# Patient Record
Sex: FEMALE | Race: WHITE | NOT HISPANIC OR LATINO | Employment: OTHER | URBAN - NONMETROPOLITAN AREA
[De-identification: names, ages, dates, MRNs, and addresses within clinical notes are randomized per-mention and may not be internally consistent; named-entity substitution may affect disease eponyms.]

---

## 2017-01-04 DIAGNOSIS — F41.1 GENERALIZED ANXIETY DISORDER: ICD-10-CM

## 2017-01-04 RX ORDER — VENLAFAXINE HYDROCHLORIDE 75 MG/1
CAPSULE, EXTENDED RELEASE ORAL
Qty: 90 CAPSULE | Refills: 2 | Status: SHIPPED | OUTPATIENT
Start: 2017-01-04 | End: 2017-07-13 | Stop reason: SDUPTHER

## 2017-01-27 DIAGNOSIS — G47.00 INSOMNIA, UNSPECIFIED TYPE: ICD-10-CM

## 2017-01-27 RX ORDER — PRAVASTATIN SODIUM 40 MG
TABLET ORAL
Qty: 30 TABLET | Refills: 5 | Status: SHIPPED | OUTPATIENT
Start: 2017-01-27 | End: 2017-08-05 | Stop reason: SDUPTHER

## 2017-01-27 RX ORDER — TRAZODONE HYDROCHLORIDE 100 MG/1
TABLET ORAL
Qty: 30 TABLET | Refills: 2 | Status: SHIPPED | OUTPATIENT
Start: 2017-01-27 | End: 2017-04-29 | Stop reason: SDUPTHER

## 2017-01-30 ENCOUNTER — OFFICE VISIT (OUTPATIENT)
Dept: FAMILY MEDICINE CLINIC | Facility: CLINIC | Age: 77
End: 2017-01-30

## 2017-01-30 ENCOUNTER — APPOINTMENT (OUTPATIENT)
Dept: LAB | Facility: HOSPITAL | Age: 77
End: 2017-01-30

## 2017-01-30 VITALS
HEIGHT: 61 IN | BODY MASS INDEX: 28.51 KG/M2 | DIASTOLIC BLOOD PRESSURE: 82 MMHG | WEIGHT: 151 LBS | SYSTOLIC BLOOD PRESSURE: 154 MMHG

## 2017-01-30 DIAGNOSIS — I10 ESSENTIAL HYPERTENSION: ICD-10-CM

## 2017-01-30 DIAGNOSIS — E11.9 TYPE 2 DIABETES, DIET CONTROLLED (HCC): ICD-10-CM

## 2017-01-30 DIAGNOSIS — R00.2 PALPITATIONS: ICD-10-CM

## 2017-01-30 DIAGNOSIS — L21.9 SEBORRHEIC ECZEMA OF SCALP: Primary | ICD-10-CM

## 2017-01-30 LAB
ALBUMIN SERPL-MCNC: 3.8 G/DL (ref 3.4–4.8)
ALBUMIN UR-MCNC: 2.4 MG/L
ALBUMIN/GLOB SERPL: 1.3 G/DL (ref 1.1–1.8)
ALP SERPL-CCNC: 53 U/L (ref 38–126)
ALT SERPL W P-5'-P-CCNC: 22 U/L (ref 9–52)
ANION GAP SERPL CALCULATED.3IONS-SCNC: 5 MMOL/L (ref 5–15)
AST SERPL-CCNC: 34 U/L (ref 14–36)
BILIRUB SERPL-MCNC: 0.5 MG/DL (ref 0.2–1.3)
BUN BLD-MCNC: 12 MG/DL (ref 7–21)
BUN/CREAT SERPL: 17.4 (ref 7–25)
CALCIUM SPEC-SCNC: 8.7 MG/DL (ref 8.4–10.2)
CHLORIDE SERPL-SCNC: 101 MMOL/L (ref 95–110)
CO2 SERPL-SCNC: 34 MMOL/L (ref 22–31)
CREAT BLD-MCNC: 0.69 MG/DL (ref 0.5–1)
DEPRECATED RDW RBC AUTO: 49.1 FL (ref 36.4–46.3)
ERYTHROCYTE [DISTWIDTH] IN BLOOD BY AUTOMATED COUNT: 15.1 % (ref 11.5–14.5)
GFR SERPL CREATININE-BSD FRML MDRD: 82 ML/MIN/1.73 (ref 60–90)
GLOBULIN UR ELPH-MCNC: 3 GM/DL (ref 2.3–3.5)
GLUCOSE BLD-MCNC: 99 MG/DL (ref 60–100)
HBA1C MFR BLD: 6.29 % (ref 4–5.6)
HCT VFR BLD AUTO: 36.3 % (ref 35–45)
HGB BLD-MCNC: 11.8 G/DL (ref 12–15.5)
MCH RBC QN AUTO: 29.1 PG (ref 26.5–34)
MCHC RBC AUTO-ENTMCNC: 32.5 G/DL (ref 31.4–36)
MCV RBC AUTO: 89.4 FL (ref 80–98)
PLATELET # BLD AUTO: 234 10*3/MM3 (ref 150–450)
PMV BLD AUTO: 9.7 FL (ref 8–12)
POTASSIUM BLD-SCNC: 4.5 MMOL/L (ref 3.5–5.1)
PROT SERPL-MCNC: 6.8 G/DL (ref 6.3–8.6)
RBC # BLD AUTO: 4.06 10*6/MM3 (ref 3.77–5.16)
SODIUM BLD-SCNC: 140 MMOL/L (ref 137–145)
TSH SERPL DL<=0.05 MIU/L-ACNC: 2.28 MIU/ML (ref 0.46–4.68)
WBC NRBC COR # BLD: 6.64 10*3/MM3 (ref 3.2–9.8)

## 2017-01-30 PROCEDURE — 36415 COLL VENOUS BLD VENIPUNCTURE: CPT | Performed by: FAMILY MEDICINE

## 2017-01-30 PROCEDURE — 82043 UR ALBUMIN QUANTITATIVE: CPT | Performed by: FAMILY MEDICINE

## 2017-01-30 PROCEDURE — 99214 OFFICE O/P EST MOD 30 MIN: CPT | Performed by: FAMILY MEDICINE

## 2017-01-30 PROCEDURE — 85027 COMPLETE CBC AUTOMATED: CPT | Performed by: FAMILY MEDICINE

## 2017-01-30 PROCEDURE — 84443 ASSAY THYROID STIM HORMONE: CPT | Performed by: FAMILY MEDICINE

## 2017-01-30 PROCEDURE — 83036 HEMOGLOBIN GLYCOSYLATED A1C: CPT | Performed by: FAMILY MEDICINE

## 2017-01-30 PROCEDURE — 80053 COMPREHEN METABOLIC PANEL: CPT | Performed by: FAMILY MEDICINE

## 2017-01-30 PROCEDURE — 93005 ELECTROCARDIOGRAM TRACING: CPT | Performed by: FAMILY MEDICINE

## 2017-01-30 PROCEDURE — 93010 ELECTROCARDIOGRAM REPORT: CPT | Performed by: INTERNAL MEDICINE

## 2017-01-30 RX ORDER — NEBIVOLOL 10 MG/1
15 TABLET ORAL DAILY
Qty: 45 TABLET | Refills: 3 | Status: SHIPPED | OUTPATIENT
Start: 2017-01-30 | End: 2017-03-30 | Stop reason: CLARIF

## 2017-01-30 RX ORDER — GABAPENTIN 100 MG/1
CAPSULE ORAL
Qty: 90 CAPSULE | Refills: 2 | Status: SHIPPED | OUTPATIENT
Start: 2017-01-30 | End: 2017-03-30 | Stop reason: DRUGHIGH

## 2017-01-30 RX ORDER — CLOBETASOL PROPIONATE 0.05 G/100ML
SHAMPOO TOPICAL DAILY
Qty: 118 ML | Refills: 1 | Status: SHIPPED | OUTPATIENT
Start: 2017-01-30 | End: 2017-03-30

## 2017-01-30 NOTE — MR AVS SNAPSHOT
Mariluz Arango   1/30/2017 9:15 AM   Office Visit    Dept Phone:  [phone]   Encounter #:  [id number]    Provider:  Denisa Powell MD   Department:  [facility] FAMILY MEDICINE                Your Full Care Plan              Today's Medication Changes          These changes are accurate as of: 1/30/17  9:58 AM.  If you have any questions, ask your nurse or doctor.               New Medication(s)Ordered:     clobetasol propionate 0.05 % shampoo   Commonly known as:  CLOBEX   Apply  topically Daily.   Started by:  Denisa Powell MD         Medication(s)that have changed:     cyanocobalamin 1000 MCG/ML injection   INJECT ONE ML SUBCUTANEOUSLY ONCE MONTHLY   What changed:  Another medication with the same name was removed. Continue taking this medication, and follow the directions you see here.   Changed by:  Denisa Powell MD       nebivolol 10 MG tablet   Commonly known as:  BYSTOLIC   Take 1.5 tablets by mouth Daily.   What changed:    - how much to take  - additional instructions   Changed by:  Denisa Powell MD            Where to Get Your Medications      These medications were sent to [facility] [address] [phone]  - [phone] [address] [address]     Phone:  [phone]     clobetasol propionate 0.05 % shampoo    nebivolol 10 MG tablet                  Your Updated Medication List          This list is accurate as of: 1/30/17  9:58 AM.  Always use your most recent med list.                clobetasol propionate 0.05 % shampoo   Commonly known as:  CLOBEX   Apply  topically Daily.       cyanocobalamin 1000 MCG/ML injection   INJECT ONE ML SUBCUTANEOUSLY ONCE MONTHLY       donepezil 5 MG tablet   Commonly known as:  ARICEPT   Take 1 tablet by mouth Every Night.       gabapentin 100 MG capsule   Commonly known as:  NEURONTIN   Take 1 capsule by mouth 3 (Three)  "Times a Day.       glucose blood test strip       lidocaine 5 % ointment   Commonly known as:  XYLOCAINE   Apply  topically 3 (Three) Times a Day. Apply to tongue       nebivolol 10 MG tablet   Commonly known as:  BYSTOLIC   Take 1.5 tablets by mouth Daily.       nystatin 445253 UNIT/GM powder powder       PLAVIX 75 MG tablet   Generic drug:  clopidogrel       potassium chloride 20 MEQ CR tablet   Commonly known as:  K-DUR,KLOR-CON   Take 1 tablet by mouth daily.       pravastatin 40 MG tablet   Commonly known as:  PRAVACHOL   TAKE ONE TABLET BY MOUTH EVERY NIGHT AT BEDTIME       PRILOSEC 40 MG capsule   Generic drug:  omeprazole       * Syringe/Needle (Disp) 25G X 5/8\" 3 ML misc   USE 1 SYRINGE FOR MONTHLY VITAMIN B-12 SHOTS       * B-D INTEGRA SYRINGE 25G X 5/8\" 3 ML misc   Generic drug:  Syringe/Needle (Disp)   Inject 1 syringe as directed every 30 (thirty) days.       traZODone 100 MG tablet   Commonly known as:  DESYREL   TAKE 1 TABLET BY MOUTH EVERY NIGHT.       venlafaxine XR 75 MG 24 hr capsule   Commonly known as:  EFFEXOR-XR   TAKE ONE CAPSULE BY MOUTH THREE TIMES A DAY       Zinc Oxide 25 % paste       * Notice:  This list has 2 medication(s) that are the same as other medications prescribed for you. Read the directions carefully, and ask your doctor or other care provider to review them with you.            We Performed the Following     CBC (No Diff)     Comprehensive Metabolic Panel     ECG 12 Lead     Hemoglobin A1c     MicroAlbumin, Urine, Random     TSH       You Were Diagnosed With        Codes Comments    Seborrheic eczema of scalp    -  Primary ICD-10-CM: L21.9  ICD-9-CM: 690.18     Palpitations     ICD-10-CM: R00.2  ICD-9-CM: 785.1     Essential hypertension     ICD-10-CM: I10  ICD-9-CM: 401.9     Type 2 diabetes, diet controlled     ICD-10-CM: E11.9  ICD-9-CM: 250.00       Instructions    Impingement Syndrome, Rotator Cuff, Bursitis With Rehab  Impingement syndrome is a condition that involves " inflammation of the tendons of the rotator cuff and the subacromial bursa, that causes pain in the shoulder. The rotator cuff consists of four tendons and muscles that control much of the shoulder and upper arm function. The subacromial bursa is a fluid filled sac that helps reduce friction between the rotator cuff and one of the bones of the shoulder (acromion). Impingement syndrome is usually an overuse injury that causes swelling of the bursa (bursitis), swelling of the tendon (tendonitis), and/or a tear of the tendon (strain). Strains are classified into three categories. Grade 1 strains cause pain, but the tendon is not lengthened. Grade 2 strains include a lengthened ligament, due to the ligament being stretched or partially ruptured. With grade 2 strains there is still function, although the function may be decreased. Grade 3 strains include a complete tear of the tendon or muscle, and function is usually impaired.  SYMPTOMS   · Pain around the shoulder, often at the outer portion of the upper arm.  · Pain that gets worse with shoulder function, especially when reaching overhead or lifting.  · Sometimes, aching when not using the arm.  · Pain that wakes you up at night.  · Sometimes, tenderness, swelling, warmth, or redness over the affected area.  · Loss of strength.  · Limited motion of the shoulder, especially reaching behind the back (to the back pocket or to unhook bra) or across your body.  · Crackling sound (crepitation) when moving the arm.  · Biceps tendon pain and inflammation (in the front of the shoulder). Worse when bending the elbow or lifting.  CAUSES   Impingement syndrome is often an overuse injury, in which chronic (repetitive) motions cause the tendons or bursa to become inflamed. A strain occurs when a force is paced on the tendon or muscle that is greater than it can withstand. Common mechanisms of injury include:  Stress from sudden increase in duration, frequency, or intensity of  training.  · Direct hit (trauma) to the shoulder.  · Aging, erosion of the tendon with normal use.  · Bony bump on shoulder (acromial spur).  RISK INCREASES WITH:  · Contact sports (football, wrestling, boxing).  · Throwing sports (baseball, tennis, volleyball).  · Weightlifting and bodybuilding.  · Heavy labor.  · Previous injury to the rotator cuff, including impingement.  · Poor shoulder strength and flexibility.  · Failure to warm up properly before activity.  · Inadequate protective equipment.  · Old age.  · Bony bump on shoulder (acromial spur).  PREVENTION   · Warm up and stretch properly before activity.  · Allow for adequate recovery between workouts.  · Maintain physical fitness:    Strength, flexibility, and endurance.    Cardiovascular fitness.  · Learn and use proper exercise technique.  PROGNOSIS   If treated properly, impingement syndrome usually goes away within 6 weeks. Sometimes surgery is required.   RELATED COMPLICATIONS   · Longer healing time if not properly treated, or if not given enough time to heal.  · Recurring symptoms, that result in a chronic condition.  · Shoulder stiffness, frozen shoulder, or loss of motion.  · Rotator cuff tendon tear.  · Recurring symptoms, especially if activity is resumed too soon, with overuse, with a direct blow, or when using poor technique.  TREATMENT   Treatment first involves the use of ice and medicine, to reduce pain and inflammation. The use of strengthening and stretching exercises may help reduce pain with activity. These exercises may be performed at home or with a therapist. If non-surgical treatment is unsuccessful after more than 6 months, surgery may be advised. After surgery and rehabilitation, activity is usually possible in 3 months.   MEDICATION  · If pain medicine is needed, nonsteroidal anti-inflammatory medicines (aspirin and ibuprofen), or other minor pain relievers (acetaminophen), are often advised.  · Do not take pain medicine for 7  days before surgery.  · Prescription pain relievers may be given, if your caregiver thinks they are needed. Use only as directed and only as much as you need.  · Corticosteroid injections may be given by your caregiver. These injections should be reserved for the most serious cases, because they may only be given a certain number of times.  HEAT AND COLD  · Cold treatment (icing) should be applied for 10 to 15 minutes every 2 to 3 hours for inflammation and pain, and immediately after activity that aggravates your symptoms. Use ice packs or an ice massage.  · Heat treatment may be used before performing stretching and strengthening activities prescribed by your caregiver, physical therapist, or . Use a heat pack or a warm water soak.  SEEK MEDICAL CARE IF:   · Symptoms get worse or do not improve in 4 to 6 weeks, despite treatment.  · New, unexplained symptoms develop. (Drugs used in treatment may produce side effects.)  EXERCISES   RANGE OF MOTION (ROM) AND STRETCHING EXERCISES - Impingement Syndrome (Rotator Cuff   Tendinitis, Bursitis)  These exercises may help you when beginning to rehabilitate your injury. Your symptoms may go away with or without further involvement from your physician, physical therapist or . While completing these exercises, remember:   · Restoring tissue flexibility helps normal motion to return to the joints. This allows healthier, less painful movement and activity.  · An effective stretch should be held for at least 30 seconds.  · A stretch should never be painful. You should only feel a gentle lengthening or release in the stretched tissue.  STRETCH - Flexion, Standing  · Stand with good posture. With an underhand  on your right / left hand, and an overhand  on the opposite hand, grasp a broomstick or cane so that your hands are a little more than shoulder width apart.  · Keeping your right / left elbow straight and shoulder muscles relaxed, push  "the stick with your opposite hand, to raise your right / left arm in front of your body and then overhead. Raise your arm until you feel a stretch in your right / left shoulder, but before you have increased shoulder pain.  · Try to avoid shrugging your right / left shoulder as your arm rises, by keeping your shoulder blade tucked down and toward your mid-back spine. Hold for __________ seconds.  · Slowly return to the starting position.  Repeat __________ times. Complete this exercise __________ times per day.  STRETCH - Abduction, Supine  · Lie on your back. With an underhand  on your right / left hand and an overhand  on the opposite hand, grasp a broomstick or cane so that your hands are a little more than shoulder width apart.  · Keeping your right / left elbow straight and your shoulder muscles relaxed, push the stick with your opposite hand, to raise your right / left arm out to the side of your body and then overhead. Raise your arm until you feel a stretch in your right / left shoulder, but before you have increased shoulder pain.  · Try to avoid shrugging your right / left shoulder as your arm rises, by keeping your shoulder blade tucked down and toward your mid-back spine. Hold for __________ seconds.  · Slowly return to the starting position.  Repeat __________ times. Complete this exercise __________ times per day.  ROM - Flexion, Active-Assisted  · Lie on your back. You may bend your knees for comfort.  · Grasp a broomstick or cane so your hands are about shoulder width apart. Your right / left hand should  the end of the stick, so that your hand is positioned \"thumbs-up,\" as if you were about to shake hands.  · Using your healthy arm to lead, raise your right / left arm overhead, until you feel a gentle stretch in your shoulder. Hold for __________ seconds.  · Use the stick to assist in returning your right / left arm to its starting position.  Repeat __________ times. Complete this " exercise __________ times per day.   ROM - Internal Rotation, Supine   · Lie on your back on a firm surface. Place your right / left elbow about 60 degrees away from your side. Elevate your elbow with a folded towel, so that the elbow and shoulder are the same height.  · Using a broomstick or cane and your strong arm, pull your right / left hand toward your body until you feel a gentle stretch, but no increase in your shoulder pain. Keep your shoulder and elbow in place throughout the exercise.  · Hold for __________ seconds. Slowly return to the starting position.  Repeat __________ times. Complete this exercise __________ times per day.  STRETCH - Internal Rotation  · Place your right / left hand behind your back, palm up.  · Throw a towel or belt over your opposite shoulder. Grasp the towel with your right / left hand.  · While keeping an upright posture, gently pull up on the towel, until you feel a stretch in the front of your right / left shoulder.  · Avoid shrugging your right / left shoulder as your arm rises, by keeping your shoulder blade tucked down and toward your mid-back spine.  · Hold for __________ seconds. Release the stretch, by lowering your healthy hand.  Repeat __________ times. Complete this exercise __________ times per day.  ROM - Internal Rotation   · Using an underhand , grasp a stick behind your back with both hands.  · While standing upright with good posture, slide the stick up your back until you feel a mild stretch in the front of your shoulder.  · Hold for __________ seconds. Slowly return to your starting position.  Repeat __________ times. Complete this exercise __________ times per day.   STRETCH - Posterior Shoulder Capsule   · Stand or sit with good posture. Grasp your right / left elbow and draw it across your chest, keeping it at the same height as your shoulder.  · Pull your elbow, so your upper arm comes in closer to your chest. Pull until you feel a gentle stretch in the  back of your shoulder.  · Hold for __________ seconds.  Repeat __________ times. Complete this exercise __________ times per day.  STRENGTHENING EXERCISES - Impingement Syndrome (Rotator Cuff Tendinitis, Bursitis)  These exercises may help you when beginning to rehabilitate your injury. They may resolve your symptoms with or without further involvement from your physician, physical therapist or . While completing these exercises, remember:  · Muscles can gain both the endurance and the strength needed for everyday activities through controlled exercises.  · Complete these exercises as instructed by your physician, physical therapist or . Increase the resistance and repetitions only as guided.  · You may experience muscle soreness or fatigue, but the pain or discomfort you are trying to eliminate should never worsen during these exercises. If this pain does get worse, stop and make sure you are following the directions exactly. If the pain is still present after adjustments, discontinue the exercise until you can discuss the trouble with your clinician.  · During your recovery, avoid activity or exercises which involve actions that place your injured hand or elbow above your head or behind your back or head. These positions stress the tissues which you are trying to heal.  STRENGTH - Scapular Depression and Adduction   · With good posture, sit on a firm chair. Support your arms in front of you, with pillows, arm rests, or on a table top. Have your elbows in line with the sides of your body.  · Gently draw your shoulder blades down and toward your mid-back spine. Gradually increase the tension, without tensing the muscles along the top of your shoulders and the back of your neck.  · Hold for __________ seconds. Slowly release the tension and relax your muscles completely before starting the next repetition.  · After you have practiced this exercise, remove the arm support and complete  "the exercise in standing as well as sitting position.  Repeat __________ times. Complete this exercise __________ times per day.   STRENGTH - Shoulder Abductors, Isometric  · With good posture, stand or sit about 4-6 inches from a wall, with your right / left side facing the wall.  · Bend your right / left elbow. Gently press your right / left elbow into the wall. Increase the pressure gradually, until you are pressing as hard as you can, without shrugging your shoulder or increasing any shoulder discomfort.  · Hold for __________ seconds.  · Release the tension slowly. Relax your shoulder muscles completely before you begin the next repetition.  Repeat __________ times. Complete this exercise __________ times per day.   STRENGTH - External Rotators, Isometric  · Keep your right / left elbow at your side and bend it 90 degrees.  · Step into a door frame so that the outside of your right / left wrist can press against the door frame without your upper arm leaving your side.  · Gently press your right / left wrist into the door frame, as if you were trying to swing the back of your hand away from your stomach. Gradually increase the tension, until you are pressing as hard as you can, without shrugging your shoulder or increasing any shoulder discomfort.  · Hold for __________ seconds.  · Release the tension slowly. Relax your shoulder muscles completely before you begin the next repetition.  Repeat __________ times. Complete this exercise __________ times per day.   STRENGTH - Supraspinatus   · Stand or sit with good posture. Grasp a __________ weight, or an exercise band or tubing, so that your hand is \"thumbs-up,\" like you are shaking hands.  · Slowly lift your right / left arm in a \"V\" away from your thigh, diagonally into the space between your side and straight ahead. Lift your hand to shoulder height or as far as you can, without increasing any shoulder pain. At first, many people do not lift their hands above " shoulder height.  · Avoid shrugging your right / left shoulder as your arm rises, by keeping your shoulder blade tucked down and toward your mid-back spine.  · Hold for __________ seconds. Control the descent of your hand, as you slowly return to your starting position.  Repeat __________ times. Complete this exercise __________ times per day.   STRENGTH - External Rotators  · Secure a rubber exercise band or tubing to a fixed object (table, pole) so that it is at the same height as your right / left elbow when you are standing or sitting on a firm surface.  · Stand or sit so that the secured exercise band is at your uninjured side.  · Bend your right / left elbow 90 degrees. Place a folded towel or small pillow under your right / left arm, so that your elbow is a few inches away from your side.  · Keeping the tension on the exercise band, pull it away from your body, as if pivoting on your elbow. Be sure to keep your body steady, so that the movement is coming only from your rotating shoulder.  · Hold for __________ seconds. Release the tension in a controlled manner, as you return to the starting position.  Repeat __________ times. Complete this exercise __________ times per day.   STRENGTH - Internal Rotators   · Secure a rubber exercise band or tubing to a fixed object (table, pole) so that it is at the same height as your right / left elbow when you are standing or sitting on a firm surface.  · Stand or sit so that the secured exercise band is at your right / left side.  · Bend your elbow 90 degrees. Place a folded towel or small pillow under your right / left arm so that your elbow is a few inches away from your side.  · Keeping the tension on the exercise band, pull it across your body, toward your stomach. Be sure to keep your body steady, so that the movement is coming only from your rotating shoulder.  · Hold for __________ seconds. Release the tension in a controlled manner, as you return to the starting  position.  Repeat __________ times. Complete this exercise __________ times per day.   STRENGTH - Scapular Protractors, Standing   · Stand arms length away from a wall. Place your hands on the wall, keeping your elbows straight.  · Begin by dropping your shoulder blades down and toward your mid-back spine.  · To strengthen your protractors, keep your shoulder blades down, but slide them forward on your rib cage. It will feel as if you are lifting the back of your rib cage away from the wall. This is a subtle motion and can be challenging to complete. Ask your caregiver for further instruction, if you are not sure you are doing the exercise correctly.  · Hold for __________ seconds. Slowly return to the starting position, resting the muscles completely before starting the next repetition.  Repeat __________ times. Complete this exercise __________ times per day.  STRENGTH - Scapular Protractors, Supine  · Lie on your back on a firm surface. Extend your right / left arm straight into the air while holding a __________ weight in your hand.  · Keeping your head and back in place, lift your shoulder off the floor.  · Hold for __________ seconds. Slowly return to the starting position, and allow your muscles to relax completely before starting the next repetition.  Repeat __________ times. Complete this exercise __________ times per day.  STRENGTH - Scapular Protractors, Quadruped  · Get onto your hands and knees, with your shoulders directly over your hands (or as close as you can be, comfortably).  · Keeping your elbows locked, lift the back of your rib cage up into your shoulder blades, so your mid-back rounds out. Keep your neck muscles relaxed.  · Hold this position for __________ seconds. Slowly return to the starting position and allow your muscles to relax completely before starting the next repetition.  Repeat __________ times. Complete this exercise __________ times per day.   STRENGTH - Scapular  Retractors  · Secure a rubber exercise band or tubing to a fixed object (table, pole), so that it is at the height of your shoulders when you are either standing, or sitting on a firm armless chair.  · With a palm down , grasp an end of the band in each hand. Straighten your elbows and lift your hands straight in front of you, at shoulder height. Step back, away from the secured end of the band, until it becomes tense.  · Squeezing your shoulder blades together, draw your elbows back toward your sides, as you bend them. Keep your upper arms lifted away from your body throughout the exercise.  · Hold for __________ seconds. Slowly ease the tension on the band, as you reverse the directions and return to the starting position.  Repeat __________ times. Complete this exercise __________ times per day.  STRENGTH - Shoulder Extensors   · Secure a rubber exercise band or tubing to a fixed object (table, pole) so that it is at the height of your shoulders when you are either standing, or sitting on a firm armless chair.  · With a thumbs-up , grasp an end of the band in each hand. Straighten your elbows and lift your hands straight in front of you, at shoulder height. Step back, away from the secured end of the band, until it becomes tense.  · Squeezing your shoulder blades together, pull your hands down to the sides of your thighs. Do not allow your hands to go behind you.  · Hold for __________ seconds. Slowly ease the tension on the band, as you reverse the directions and return to the starting position.  Repeat __________ times. Complete this exercise __________ times per day.   STRENGTH - Scapular Retractors and External Rotators   · Secure a rubber exercise band or tubing to a fixed object (table, pole) so that it is at the height as your shoulders, when you are either standing, or sitting on a firm armless chair.  · With a palm down , grasp an end of the band in each hand. Bend your elbows 90 degrees and  lift your elbows to shoulder height, at your sides. Step back, away from the secured end of the band, until it becomes tense.  · Squeezing your shoulder blades together, rotate your shoulders so that your upper arms and elbows remain stationary, but your fists travel upward to head height.  · Hold for __________ seconds. Slowly ease the tension on the band, as you reverse the directions and return to the starting position.  Repeat __________ times. Complete this exercise __________ times per day.   STRENGTH - Scapular Retractors and External Rotators, Rowing   · Secure a rubber exercise band or tubing to a fixed object (table, pole) so that it is at the height of your shoulders, when you are either standing, or sitting on a firm armless chair.  · With a palm down , grasp an end of the band in each hand. Straighten your elbows and lift your hands straight in front of you, at shoulder height. Step back, away from the secured end of the band, until it becomes tense.  · Step 1: Squeeze your shoulder blades together. Bending your elbows, draw your hands to your chest, as if you are rowing a boat. At the end of this motion, your hands and elbow should be at shoulder height and your elbows should be out to your sides.  · Step 2: Rotate your shoulders, to raise your hands above your head. Your forearms should be vertical and your upper arms should be horizontal.  · Hold for __________ seconds. Slowly ease the tension on the band, as you reverse the directions and return to the starting position.  Repeat __________ times. Complete this exercise __________ times per day.   STRENGTH - Scapular Depressors  · Find a sturdy chair without wheels, such as a dining room chair.  · Keeping your feet on the floor, and your hands on the chair arms, lift your bottom up from the seat, and lock your elbows.  · Keeping your elbows straight, allow gravity to pull your body weight down. Your shoulders will rise toward your ears.  · Raise  your body against gravity by drawing your shoulder blades down your back, shortening the distance between your shoulders and ears. Although your feet should always maintain contact with the floor, your feet should progressively support less body weight, as you get stronger.  · Hold for __________ seconds. In a controlled and slow manner, lower your body weight to begin the next repetition.  Repeat __________ times. Complete this exercise __________ times per day.      This information is not intended to replace advice given to you by your health care provider. Make sure you discuss any questions you have with your health care provider.     Document Released: 12/18/2006 Document Revised: 01/08/2016 Document Reviewed: 04/01/2010  [employer] Patient Education ©2016 [employer]       Patient Instructions History      Upcoming Appointments     Visit Type Date Time Department    OFFICE VISIT 1/30/2017  9:15 AM [facility]    OFFICE VISIT 3/30/2017  9:15 AM [facility]      MyChart Signup     Our records indicate that your [employer] account has been deactivated. If you would like to reactivate your account, please email [email or web address] or call [phone] to talk to our [employer] staff.             Other Info from Your Visit           Your Appointments     Mar 30, 2017  9:15 AM CDT   Office Visit with Denisa Powell MD   [facility] (--)    [address]  [address] [place] [id number] [phone]           Arrive 15 minutes prior to appointment.              Allergies     Aspirin      Codeine      UNKNOWN REACTION    Other      Cipro    Penicillins      Sulfa Antibiotics      Sulfa (Sulfonamide Antibiotics)      Reason for Visit     Follow-up recheck for dementia without behavioral disturbance,insomnia      Vital Signs     Blood Pressure Height Weight Last Menstrual Period Body Mass Index Smoking Status     "154/82 61\" (154.9 cm) 151 lb (68.5 kg) (LMP Unknown) 28.53 kg/m2 Former Smoker      Problems and Diagnoses Noted     High blood pressure    Seborrheic eczema of scalp    -  Primary    Palpitations        Diet-controlled type 2 diabetes mellitus            "

## 2017-01-30 NOTE — PATIENT INSTRUCTIONS
Impingement Syndrome, Rotator Cuff, Bursitis With Rehab  Impingement syndrome is a condition that involves inflammation of the tendons of the rotator cuff and the subacromial bursa, that causes pain in the shoulder. The rotator cuff consists of four tendons and muscles that control much of the shoulder and upper arm function. The subacromial bursa is a fluid filled sac that helps reduce friction between the rotator cuff and one of the bones of the shoulder (acromion). Impingement syndrome is usually an overuse injury that causes swelling of the bursa (bursitis), swelling of the tendon (tendonitis), and/or a tear of the tendon (strain). Strains are classified into three categories. Grade 1 strains cause pain, but the tendon is not lengthened. Grade 2 strains include a lengthened ligament, due to the ligament being stretched or partially ruptured. With grade 2 strains there is still function, although the function may be decreased. Grade 3 strains include a complete tear of the tendon or muscle, and function is usually impaired.  SYMPTOMS   · Pain around the shoulder, often at the outer portion of the upper arm.  · Pain that gets worse with shoulder function, especially when reaching overhead or lifting.  · Sometimes, aching when not using the arm.  · Pain that wakes you up at night.  · Sometimes, tenderness, swelling, warmth, or redness over the affected area.  · Loss of strength.  · Limited motion of the shoulder, especially reaching behind the back (to the back pocket or to unhook bra) or across your body.  · Crackling sound (crepitation) when moving the arm.  · Biceps tendon pain and inflammation (in the front of the shoulder). Worse when bending the elbow or lifting.  CAUSES   Impingement syndrome is often an overuse injury, in which chronic (repetitive) motions cause the tendons or bursa to become inflamed. A strain occurs when a force is paced on the tendon or muscle that is greater than it can withstand.  Common mechanisms of injury include:  Stress from sudden increase in duration, frequency, or intensity of training.  · Direct hit (trauma) to the shoulder.  · Aging, erosion of the tendon with normal use.  · Bony bump on shoulder (acromial spur).  RISK INCREASES WITH:  · Contact sports (football, wrestling, boxing).  · Throwing sports (baseball, tennis, volleyball).  · Weightlifting and bodybuilding.  · Heavy labor.  · Previous injury to the rotator cuff, including impingement.  · Poor shoulder strength and flexibility.  · Failure to warm up properly before activity.  · Inadequate protective equipment.  · Old age.  · Bony bump on shoulder (acromial spur).  PREVENTION   · Warm up and stretch properly before activity.  · Allow for adequate recovery between workouts.  · Maintain physical fitness:    Strength, flexibility, and endurance.    Cardiovascular fitness.  · Learn and use proper exercise technique.  PROGNOSIS   If treated properly, impingement syndrome usually goes away within 6 weeks. Sometimes surgery is required.   RELATED COMPLICATIONS   · Longer healing time if not properly treated, or if not given enough time to heal.  · Recurring symptoms, that result in a chronic condition.  · Shoulder stiffness, frozen shoulder, or loss of motion.  · Rotator cuff tendon tear.  · Recurring symptoms, especially if activity is resumed too soon, with overuse, with a direct blow, or when using poor technique.  TREATMENT   Treatment first involves the use of ice and medicine, to reduce pain and inflammation. The use of strengthening and stretching exercises may help reduce pain with activity. These exercises may be performed at home or with a therapist. If non-surgical treatment is unsuccessful after more than 6 months, surgery may be advised. After surgery and rehabilitation, activity is usually possible in 3 months.   MEDICATION  · If pain medicine is needed, nonsteroidal anti-inflammatory medicines (aspirin and  ibuprofen), or other minor pain relievers (acetaminophen), are often advised.  · Do not take pain medicine for 7 days before surgery.  · Prescription pain relievers may be given, if your caregiver thinks they are needed. Use only as directed and only as much as you need.  · Corticosteroid injections may be given by your caregiver. These injections should be reserved for the most serious cases, because they may only be given a certain number of times.  HEAT AND COLD  · Cold treatment (icing) should be applied for 10 to 15 minutes every 2 to 3 hours for inflammation and pain, and immediately after activity that aggravates your symptoms. Use ice packs or an ice massage.  · Heat treatment may be used before performing stretching and strengthening activities prescribed by your caregiver, physical therapist, or . Use a heat pack or a warm water soak.  SEEK MEDICAL CARE IF:   · Symptoms get worse or do not improve in 4 to 6 weeks, despite treatment.  · New, unexplained symptoms develop. (Drugs used in treatment may produce side effects.)  EXERCISES   RANGE OF MOTION (ROM) AND STRETCHING EXERCISES - Impingement Syndrome (Rotator Cuff   Tendinitis, Bursitis)  These exercises may help you when beginning to rehabilitate your injury. Your symptoms may go away with or without further involvement from your physician, physical therapist or . While completing these exercises, remember:   · Restoring tissue flexibility helps normal motion to return to the joints. This allows healthier, less painful movement and activity.  · An effective stretch should be held for at least 30 seconds.  · A stretch should never be painful. You should only feel a gentle lengthening or release in the stretched tissue.  STRETCH - Flexion, Standing  · Stand with good posture. With an underhand  on your right / left hand, and an overhand  on the opposite hand, grasp a broomstick or cane so that your hands are a  "little more than shoulder width apart.  · Keeping your right / left elbow straight and shoulder muscles relaxed, push the stick with your opposite hand, to raise your right / left arm in front of your body and then overhead. Raise your arm until you feel a stretch in your right / left shoulder, but before you have increased shoulder pain.  · Try to avoid shrugging your right / left shoulder as your arm rises, by keeping your shoulder blade tucked down and toward your mid-back spine. Hold for __________ seconds.  · Slowly return to the starting position.  Repeat __________ times. Complete this exercise __________ times per day.  STRETCH - Abduction, Supine  · Lie on your back. With an underhand  on your right / left hand and an overhand  on the opposite hand, grasp a broomstick or cane so that your hands are a little more than shoulder width apart.  · Keeping your right / left elbow straight and your shoulder muscles relaxed, push the stick with your opposite hand, to raise your right / left arm out to the side of your body and then overhead. Raise your arm until you feel a stretch in your right / left shoulder, but before you have increased shoulder pain.  · Try to avoid shrugging your right / left shoulder as your arm rises, by keeping your shoulder blade tucked down and toward your mid-back spine. Hold for __________ seconds.  · Slowly return to the starting position.  Repeat __________ times. Complete this exercise __________ times per day.  ROM - Flexion, Active-Assisted  · Lie on your back. You may bend your knees for comfort.  · Grasp a broomstick or cane so your hands are about shoulder width apart. Your right / left hand should  the end of the stick, so that your hand is positioned \"thumbs-up,\" as if you were about to shake hands.  · Using your healthy arm to lead, raise your right / left arm overhead, until you feel a gentle stretch in your shoulder. Hold for __________ seconds.  · Use the stick " to assist in returning your right / left arm to its starting position.  Repeat __________ times. Complete this exercise __________ times per day.   ROM - Internal Rotation, Supine   · Lie on your back on a firm surface. Place your right / left elbow about 60 degrees away from your side. Elevate your elbow with a folded towel, so that the elbow and shoulder are the same height.  · Using a broomstick or cane and your strong arm, pull your right / left hand toward your body until you feel a gentle stretch, but no increase in your shoulder pain. Keep your shoulder and elbow in place throughout the exercise.  · Hold for __________ seconds. Slowly return to the starting position.  Repeat __________ times. Complete this exercise __________ times per day.  STRETCH - Internal Rotation  · Place your right / left hand behind your back, palm up.  · Throw a towel or belt over your opposite shoulder. Grasp the towel with your right / left hand.  · While keeping an upright posture, gently pull up on the towel, until you feel a stretch in the front of your right / left shoulder.  · Avoid shrugging your right / left shoulder as your arm rises, by keeping your shoulder blade tucked down and toward your mid-back spine.  · Hold for __________ seconds. Release the stretch, by lowering your healthy hand.  Repeat __________ times. Complete this exercise __________ times per day.  ROM - Internal Rotation   · Using an underhand , grasp a stick behind your back with both hands.  · While standing upright with good posture, slide the stick up your back until you feel a mild stretch in the front of your shoulder.  · Hold for __________ seconds. Slowly return to your starting position.  Repeat __________ times. Complete this exercise __________ times per day.   STRETCH - Posterior Shoulder Capsule   · Stand or sit with good posture. Grasp your right / left elbow and draw it across your chest, keeping it at the same height as your  shoulder.  · Pull your elbow, so your upper arm comes in closer to your chest. Pull until you feel a gentle stretch in the back of your shoulder.  · Hold for __________ seconds.  Repeat __________ times. Complete this exercise __________ times per day.  STRENGTHENING EXERCISES - Impingement Syndrome (Rotator Cuff Tendinitis, Bursitis)  These exercises may help you when beginning to rehabilitate your injury. They may resolve your symptoms with or without further involvement from your physician, physical therapist or . While completing these exercises, remember:  · Muscles can gain both the endurance and the strength needed for everyday activities through controlled exercises.  · Complete these exercises as instructed by your physician, physical therapist or . Increase the resistance and repetitions only as guided.  · You may experience muscle soreness or fatigue, but the pain or discomfort you are trying to eliminate should never worsen during these exercises. If this pain does get worse, stop and make sure you are following the directions exactly. If the pain is still present after adjustments, discontinue the exercise until you can discuss the trouble with your clinician.  · During your recovery, avoid activity or exercises which involve actions that place your injured hand or elbow above your head or behind your back or head. These positions stress the tissues which you are trying to heal.  STRENGTH - Scapular Depression and Adduction   · With good posture, sit on a firm chair. Support your arms in front of you, with pillows, arm rests, or on a table top. Have your elbows in line with the sides of your body.  · Gently draw your shoulder blades down and toward your mid-back spine. Gradually increase the tension, without tensing the muscles along the top of your shoulders and the back of your neck.  · Hold for __________ seconds. Slowly release the tension and relax your muscles  "completely before starting the next repetition.  · After you have practiced this exercise, remove the arm support and complete the exercise in standing as well as sitting position.  Repeat __________ times. Complete this exercise __________ times per day.   STRENGTH - Shoulder Abductors, Isometric  · With good posture, stand or sit about 4-6 inches from a wall, with your right / left side facing the wall.  · Bend your right / left elbow. Gently press your right / left elbow into the wall. Increase the pressure gradually, until you are pressing as hard as you can, without shrugging your shoulder or increasing any shoulder discomfort.  · Hold for __________ seconds.  · Release the tension slowly. Relax your shoulder muscles completely before you begin the next repetition.  Repeat __________ times. Complete this exercise __________ times per day.   STRENGTH - External Rotators, Isometric  · Keep your right / left elbow at your side and bend it 90 degrees.  · Step into a door frame so that the outside of your right / left wrist can press against the door frame without your upper arm leaving your side.  · Gently press your right / left wrist into the door frame, as if you were trying to swing the back of your hand away from your stomach. Gradually increase the tension, until you are pressing as hard as you can, without shrugging your shoulder or increasing any shoulder discomfort.  · Hold for __________ seconds.  · Release the tension slowly. Relax your shoulder muscles completely before you begin the next repetition.  Repeat __________ times. Complete this exercise __________ times per day.   STRENGTH - Supraspinatus   · Stand or sit with good posture. Grasp a __________ weight, or an exercise band or tubing, so that your hand is \"thumbs-up,\" like you are shaking hands.  · Slowly lift your right / left arm in a \"V\" away from your thigh, diagonally into the space between your side and straight ahead. Lift your hand to " shoulder height or as far as you can, without increasing any shoulder pain. At first, many people do not lift their hands above shoulder height.  · Avoid shrugging your right / left shoulder as your arm rises, by keeping your shoulder blade tucked down and toward your mid-back spine.  · Hold for __________ seconds. Control the descent of your hand, as you slowly return to your starting position.  Repeat __________ times. Complete this exercise __________ times per day.   STRENGTH - External Rotators  · Secure a rubber exercise band or tubing to a fixed object (table, pole) so that it is at the same height as your right / left elbow when you are standing or sitting on a firm surface.  · Stand or sit so that the secured exercise band is at your uninjured side.  · Bend your right / left elbow 90 degrees. Place a folded towel or small pillow under your right / left arm, so that your elbow is a few inches away from your side.  · Keeping the tension on the exercise band, pull it away from your body, as if pivoting on your elbow. Be sure to keep your body steady, so that the movement is coming only from your rotating shoulder.  · Hold for __________ seconds. Release the tension in a controlled manner, as you return to the starting position.  Repeat __________ times. Complete this exercise __________ times per day.   STRENGTH - Internal Rotators   · Secure a rubber exercise band or tubing to a fixed object (table, pole) so that it is at the same height as your right / left elbow when you are standing or sitting on a firm surface.  · Stand or sit so that the secured exercise band is at your right / left side.  · Bend your elbow 90 degrees. Place a folded towel or small pillow under your right / left arm so that your elbow is a few inches away from your side.  · Keeping the tension on the exercise band, pull it across your body, toward your stomach. Be sure to keep your body steady, so that the movement is coming only from  your rotating shoulder.  · Hold for __________ seconds. Release the tension in a controlled manner, as you return to the starting position.  Repeat __________ times. Complete this exercise __________ times per day.   STRENGTH - Scapular Protractors, Standing   · Stand arms length away from a wall. Place your hands on the wall, keeping your elbows straight.  · Begin by dropping your shoulder blades down and toward your mid-back spine.  · To strengthen your protractors, keep your shoulder blades down, but slide them forward on your rib cage. It will feel as if you are lifting the back of your rib cage away from the wall. This is a subtle motion and can be challenging to complete. Ask your caregiver for further instruction, if you are not sure you are doing the exercise correctly.  · Hold for __________ seconds. Slowly return to the starting position, resting the muscles completely before starting the next repetition.  Repeat __________ times. Complete this exercise __________ times per day.  STRENGTH - Scapular Protractors, Supine  · Lie on your back on a firm surface. Extend your right / left arm straight into the air while holding a __________ weight in your hand.  · Keeping your head and back in place, lift your shoulder off the floor.  · Hold for __________ seconds. Slowly return to the starting position, and allow your muscles to relax completely before starting the next repetition.  Repeat __________ times. Complete this exercise __________ times per day.  STRENGTH - Scapular Protractors, Quadruped  · Get onto your hands and knees, with your shoulders directly over your hands (or as close as you can be, comfortably).  · Keeping your elbows locked, lift the back of your rib cage up into your shoulder blades, so your mid-back rounds out. Keep your neck muscles relaxed.  · Hold this position for __________ seconds. Slowly return to the starting position and allow your muscles to relax completely before starting the  next repetition.  Repeat __________ times. Complete this exercise __________ times per day.   STRENGTH - Scapular Retractors  · Secure a rubber exercise band or tubing to a fixed object (table, pole), so that it is at the height of your shoulders when you are either standing, or sitting on a firm armless chair.  · With a palm down , grasp an end of the band in each hand. Straighten your elbows and lift your hands straight in front of you, at shoulder height. Step back, away from the secured end of the band, until it becomes tense.  · Squeezing your shoulder blades together, draw your elbows back toward your sides, as you bend them. Keep your upper arms lifted away from your body throughout the exercise.  · Hold for __________ seconds. Slowly ease the tension on the band, as you reverse the directions and return to the starting position.  Repeat __________ times. Complete this exercise __________ times per day.  STRENGTH - Shoulder Extensors   · Secure a rubber exercise band or tubing to a fixed object (table, pole) so that it is at the height of your shoulders when you are either standing, or sitting on a firm armless chair.  · With a thumbs-up , grasp an end of the band in each hand. Straighten your elbows and lift your hands straight in front of you, at shoulder height. Step back, away from the secured end of the band, until it becomes tense.  · Squeezing your shoulder blades together, pull your hands down to the sides of your thighs. Do not allow your hands to go behind you.  · Hold for __________ seconds. Slowly ease the tension on the band, as you reverse the directions and return to the starting position.  Repeat __________ times. Complete this exercise __________ times per day.   STRENGTH - Scapular Retractors and External Rotators   · Secure a rubber exercise band or tubing to a fixed object (table, pole) so that it is at the height as your shoulders, when you are either standing, or sitting on a  firm armless chair.  · With a palm down , grasp an end of the band in each hand. Bend your elbows 90 degrees and lift your elbows to shoulder height, at your sides. Step back, away from the secured end of the band, until it becomes tense.  · Squeezing your shoulder blades together, rotate your shoulders so that your upper arms and elbows remain stationary, but your fists travel upward to head height.  · Hold for __________ seconds. Slowly ease the tension on the band, as you reverse the directions and return to the starting position.  Repeat __________ times. Complete this exercise __________ times per day.   STRENGTH - Scapular Retractors and External Rotators, Rowing   · Secure a rubber exercise band or tubing to a fixed object (table, pole) so that it is at the height of your shoulders, when you are either standing, or sitting on a firm armless chair.  · With a palm down , grasp an end of the band in each hand. Straighten your elbows and lift your hands straight in front of you, at shoulder height. Step back, away from the secured end of the band, until it becomes tense.  · Step 1: Squeeze your shoulder blades together. Bending your elbows, draw your hands to your chest, as if you are rowing a boat. At the end of this motion, your hands and elbow should be at shoulder height and your elbows should be out to your sides.  · Step 2: Rotate your shoulders, to raise your hands above your head. Your forearms should be vertical and your upper arms should be horizontal.  · Hold for __________ seconds. Slowly ease the tension on the band, as you reverse the directions and return to the starting position.  Repeat __________ times. Complete this exercise __________ times per day.   STRENGTH - Scapular Depressors  · Find a sturdy chair without wheels, such as a dining room chair.  · Keeping your feet on the floor, and your hands on the chair arms, lift your bottom up from the seat, and lock your elbows.  · Keeping  your elbows straight, allow gravity to pull your body weight down. Your shoulders will rise toward your ears.  · Raise your body against gravity by drawing your shoulder blades down your back, shortening the distance between your shoulders and ears. Although your feet should always maintain contact with the floor, your feet should progressively support less body weight, as you get stronger.  · Hold for __________ seconds. In a controlled and slow manner, lower your body weight to begin the next repetition.  Repeat __________ times. Complete this exercise __________ times per day.      This information is not intended to replace advice given to you by your health care provider. Make sure you discuss any questions you have with your health care provider.     Document Released: 12/18/2006 Document Revised: 01/08/2016 Document Reviewed: 04/01/2010  [employer] Patient Education ©2016 [employer]

## 2017-01-30 NOTE — PROGRESS NOTES
Subjective   Mariluz Arango is a 76 y.o. female.     Rash   This is a new problem. The current episode started more than 1 month ago. The problem is unchanged. The affected locations include the scalp. The rash is characterized by dryness and scaling. She was exposed to nothing. Pertinent negatives include no anorexia, congestion, cough, diarrhea, eye pain, facial edema, fatigue, fever, joint pain, nail changes, rhinorrhea, shortness of breath, sore throat or vomiting. Past treatments include nothing. The treatment provided no relief. There is no history of allergies, asthma, eczema or varicella.   Palpitations    This is a new problem. The current episode started more than 1 month ago. The problem occurs intermittently. The problem has been gradually worsening. The symptoms are aggravated by exercise. Associated symptoms include chest pain, an irregular heartbeat and malaise/fatigue. Pertinent negatives include no chest fullness, coughing, diaphoresis, fever, nausea, near-syncope, numbness, shortness of breath, syncope, vomiting or weakness. She has tried nothing for the symptoms. The treatment provided no relief. Risk factors include diabetes mellitus, dyslipidemia, family history, post menopause, sedentary lifestyle and stress. Her past medical history is significant for anxiety. There is no history of anemia, drug use, heart disease, hyperthyroidism or a valve disorder.   Hypertension   The current episode started more than 1 year ago. The problem is unchanged. The problem is uncontrolled. Associated symptoms include chest pain, malaise/fatigue and palpitations. Pertinent negatives include no blurred vision, headaches, neck pain, orthopnea, peripheral edema, PND, shortness of breath or sweats. There are no associated agents to hypertension. Past treatments include beta blockers. The current treatment provides mild improvement. There are no compliance problems.    Diabetes   She presents for her follow-up  "diabetic visit. She has type 2 diabetes mellitus. Her disease course has been stable. There are no hypoglycemic associated symptoms. Pertinent negatives for hypoglycemia include no headaches, pallor or sweats. Associated symptoms include chest pain. Pertinent negatives for diabetes include no blurred vision, no fatigue and no weakness. There are no hypoglycemic complications. There are no diabetic complications. Pertinent negatives for diabetic complications include no heart disease. Risk factors for coronary artery disease include dyslipidemia, hypertension, sedentary lifestyle, post-menopausal and stress. Current diabetic treatment includes diet. She is compliant with treatment most of the time. Her weight is increasing steadily. She is following a generally healthy diet. She participates in exercise intermittently. There is no change in her home blood glucose trend. An ACE inhibitor/angiotensin II receptor blocker is contraindicated. She does not see a podiatrist.Eye exam is not current.        Current Outpatient Prescriptions:   •  B-D INTEGRA SYRINGE 25G X 5/8\" 3 ML misc, Inject 1 syringe as directed every 30 (thirty) days., Disp: 12 each, Rfl: 1  •  clopidogrel (PLAVIX) 75 MG tablet, Take 75 mg by mouth daily., Disp: , Rfl:   •  cyanocobalamin 1000 MCG/ML injection, INJECT ONE ML SUBCUTANEOUSLY ONCE MONTHLY, Disp: 1 mL, Rfl: 4  •  donepezil (ARICEPT) 5 MG tablet, Take 1 tablet by mouth Every Night., Disp: 30 tablet, Rfl: 3  •  gabapentin (NEURONTIN) 100 MG capsule, Take 1 capsule by mouth 3 (Three) Times a Day., Disp: 90 capsule, Rfl: 2  •  glucose blood test strip, Contour Test Strips, Disp: , Rfl:   •  lidocaine (XYLOCAINE) 5 % ointment, Apply  topically 3 (Three) Times a Day. Apply to tongue, Disp: 50 g, Rfl: 0  •  nebivolol (BYSTOLIC) 10 MG tablet, Take 10 mg by mouth daily. Indication: Essential (primary) hypertension, Disp: , Rfl:   •  nystatin (NYSTOP) 202282 UNIT/GM powder powder, Apply 1 application " "topically 3 (three) times a day as needed. Indication: Candidiasis of skin and nails, Disp: , Rfl:   •  omeprazole (PRILOSEC) 40 MG capsule, Take 40 mg by mouth daily. omeprazole 40 mg capsule,delayed release, Disp: , Rfl:   •  potassium chloride (K-DUR,KLOR-CON) 20 MEQ CR tablet, Take 1 tablet by mouth daily., Disp: 30 tablet, Rfl: 2  •  pravastatin (PRAVACHOL) 40 MG tablet, TAKE ONE TABLET BY MOUTH EVERY NIGHT AT BEDTIME, Disp: 30 tablet, Rfl: 5  •  Syringe/Needle, Disp, 25G X 5/8\" 3 ML misc, USE 1 SYRINGE FOR MONTHLY VITAMIN B-12 SHOTS, Disp: 6 each, Rfl: 2  •  traZODone (DESYREL) 100 MG tablet, TAKE 1 TABLET BY MOUTH EVERY NIGHT., Disp: 30 tablet, Rfl: 2  •  venlafaxine XR (EFFEXOR-XR) 75 MG 24 hr capsule, TAKE ONE CAPSULE BY MOUTH THREE TIMES A DAY, Disp: 90 capsule, Rfl: 2  •  Zinc Oxide 25 % paste, Apply 1 application topically 2 (two) times a day. zinc oxide 25 % topical paste, Disp: , Rfl:     The following portions of the patient's history were reviewed and updated as appropriate: allergies, current medications, past family history, past medical history, past social history, past surgical history and problem list.    Review of Systems   Constitutional: Positive for activity change and malaise/fatigue. Negative for appetite change, diaphoresis, fatigue and fever.   HENT: Negative for congestion, rhinorrhea and sore throat.    Eyes: Negative for blurred vision and pain.   Respiratory: Positive for chest tightness. Negative for cough and shortness of breath.    Cardiovascular: Positive for chest pain and palpitations. Negative for orthopnea, leg swelling, syncope, PND and near-syncope.   Gastrointestinal: Negative for abdominal distention, abdominal pain, anorexia, constipation, diarrhea, nausea and vomiting.   Musculoskeletal: Negative for joint pain and neck pain.   Skin: Positive for rash. Negative for color change, nail changes, pallor and wound.   Neurological: Negative for weakness, numbness and " "headaches.   Psychiatric/Behavioral: Positive for decreased concentration. Negative for dysphoric mood and sleep disturbance.       Objective    Vitals:    01/30/17 0910   BP: 154/82   Weight: 151 lb (68.5 kg)   Height: 61\" (154.9 cm)     Physical Exam   Constitutional: She is oriented to person, place, and time. She appears well-developed and well-nourished. No distress.   Cardiovascular: Normal rate, regular rhythm and normal heart sounds.    No murmur heard.  No LE edema   Pulmonary/Chest: Effort normal and breath sounds normal. No respiratory distress. She has no wheezes.   Abdominal: Soft. Bowel sounds are normal. She exhibits no distension. There is no tenderness.   Neurological: She is alert and oriented to person, place, and time.   Skin:   Large scaly lesions on her left scalp behind her ear.   Psychiatric: She has a normal mood and affect. Her behavior is normal. Judgment and thought content normal.   More alert and seems to be oriented today   Nursing note and vitals reviewed.    EKG- Sinus bradycardia with rate of 59BPM.    Assessment/Plan   Problems Addressed this Visit        Cardiovascular and Mediastinum    Hypertensive disorder    Relevant Medications    nebivolol (BYSTOLIC) 10 MG tablet    Other Relevant Orders    ECG 12 Lead    Comprehensive Metabolic Panel      Other Visit Diagnoses     Seborrheic eczema of scalp    -  Primary    Relevant Medications    clobetasol propionate (CLOBEX) 0.05 % shampoo    Palpitations        Relevant Orders    ECG 12 Lead    Comprehensive Metabolic Panel    CBC (No Diff)    TSH    Type 2 diabetes, diet controlled        Relevant Orders    Comprehensive Metabolic Panel    Hemoglobin A1c    MicroAlbumin, Urine, Random        1.) Scalp derm- Will try steroid shampoo every other day.  2/) Palpitations-  EKG showed bradycardia. May be deconditioning.  Seems to be worse with activity.  May be BP related as well.  Will check TSH and CBC today.  3.) HTN-  Will increase her " bystolic slightly and see if that helps her palpitations and BP.  4.) DM-  She has started to gain weight because she is eating more.  Will recheck A1C and make sure that she doesn't need medications.  Will discuss eye referral at her next appointment.  RTC in 1-2 months or sooner PRN

## 2017-01-31 RX ORDER — CLOPIDOGREL BISULFATE 75 MG/1
75 TABLET ORAL DAILY
Qty: 30 TABLET | Refills: 2 | Status: SHIPPED | OUTPATIENT
Start: 2017-01-31 | End: 2017-05-04 | Stop reason: SDUPTHER

## 2017-02-03 ENCOUNTER — TELEPHONE (OUTPATIENT)
Dept: FAMILY MEDICINE CLINIC | Facility: CLINIC | Age: 77
End: 2017-02-03

## 2017-02-03 RX ORDER — OMEPRAZOLE 40 MG/1
40 CAPSULE, DELAYED RELEASE ORAL DAILY
Qty: 30 CAPSULE | Refills: 5 | Status: SHIPPED | OUTPATIENT
Start: 2017-02-03 | End: 2017-07-11 | Stop reason: SDUPTHER

## 2017-02-03 NOTE — TELEPHONE ENCOUNTER
----- Message from Mayra Root sent at 2/3/2017 10:53 AM CST -----  Regarding: RHONDA MCCABE CALLED FOR ELISE CONTE..SHE NEEDS REFILL ON  OMEPRAZOLE 40MG

## 2017-03-30 ENCOUNTER — OFFICE VISIT (OUTPATIENT)
Dept: FAMILY MEDICINE CLINIC | Facility: CLINIC | Age: 77
End: 2017-03-30

## 2017-03-30 VITALS
SYSTOLIC BLOOD PRESSURE: 140 MMHG | BODY MASS INDEX: 29.27 KG/M2 | HEIGHT: 61 IN | DIASTOLIC BLOOD PRESSURE: 82 MMHG | OXYGEN SATURATION: 98 % | WEIGHT: 155 LBS | HEART RATE: 60 BPM

## 2017-03-30 DIAGNOSIS — R42 DIZZINESS: Primary | ICD-10-CM

## 2017-03-30 DIAGNOSIS — R00.2 PALPITATIONS: ICD-10-CM

## 2017-03-30 DIAGNOSIS — E11.42 DIABETIC POLYNEUROPATHY ASSOCIATED WITH TYPE 2 DIABETES MELLITUS (HCC): ICD-10-CM

## 2017-03-30 DIAGNOSIS — Z23 NEED FOR PNEUMOCOCCAL VACCINATION: ICD-10-CM

## 2017-03-30 DIAGNOSIS — M79.671 RIGHT FOOT PAIN: ICD-10-CM

## 2017-03-30 PROCEDURE — 90732 PPSV23 VACC 2 YRS+ SUBQ/IM: CPT | Performed by: FAMILY MEDICINE

## 2017-03-30 PROCEDURE — G0009 ADMIN PNEUMOCOCCAL VACCINE: HCPCS | Performed by: FAMILY MEDICINE

## 2017-03-30 PROCEDURE — 99214 OFFICE O/P EST MOD 30 MIN: CPT | Performed by: FAMILY MEDICINE

## 2017-03-30 RX ORDER — CARVEDILOL 6.25 MG/1
6.25 TABLET ORAL 2 TIMES DAILY WITH MEALS
Qty: 60 TABLET | Refills: 2 | Status: SHIPPED | OUTPATIENT
Start: 2017-03-30 | End: 2017-06-10 | Stop reason: SDUPTHER

## 2017-03-30 RX ORDER — GABAPENTIN 300 MG/1
300 CAPSULE ORAL 3 TIMES DAILY
Qty: 90 CAPSULE | Refills: 2 | Status: SHIPPED | OUTPATIENT
Start: 2017-03-30 | End: 2017-06-29 | Stop reason: SDUPTHER

## 2017-03-30 NOTE — PATIENT INSTRUCTIONS
Plantar Fasciitis With Rehab  The plantar fascia is a fibrous, ligament-like, soft-tissue structure that spans the bottom of the foot. Plantar fasciitis, also called heel spur syndrome, is a condition that causes pain in the foot due to inflammation of the tissue.  SYMPTOMS   · Pain and tenderness on the underneath side of the foot.  · Pain that worsens with standing or walking.  CAUSES   Plantar fasciitis is caused by irritation and injury to the plantar fascia on the underneath side of the foot. Common mechanisms of injury include:  · Direct trauma to bottom of the foot.  · Damage to a small nerve that runs under the foot where the main fascia attaches to the heel bone.  · Stress placed on the plantar fascia due to bone spurs.  RISK INCREASES WITH:   · Activities that place stress on the plantar fascia (running, jumping, pivoting, or cutting).  · Poor strength and flexibility.  · Improperly fitted shoes.  · Tight calf muscles.  · Flat feet.  · Failure to warm-up properly before activity.  · Obesity.  PREVENTION  · Warm up and stretch properly before activity.  · Allow for adequate recovery between workouts.  · Maintain physical fitness:    Strength, flexibility, and endurance.    Cardiovascular fitness.  · Maintain a health body weight.  · Avoid stress on the plantar fascia.  · Wear properly fitted shoes, including arch supports for individuals who have flat feet.  PROGNOSIS   If treated properly, then the symptoms of plantar fasciitis usually resolve without surgery. However, occasionally surgery is necessary.  RELATED COMPLICATIONS   · Recurrent symptoms that may result in a chronic condition.  · Problems of the lower back that are caused by compensating for the injury, such as limping.  · Pain or weakness of the foot during push-off following surgery.  · Chronic inflammation, scarring, and partial or complete fascia tear, occurring more often from repeated injections.  TREATMENT   Treatment initially involves  the use of ice and medication to help reduce pain and inflammation. The use of strengthening and stretching exercises may help reduce pain with activity, especially stretches of the Achilles tendon. These exercises may be performed at home or with a therapist. Your caregiver may recommend that you use heel cups of arch supports to help reduce stress on the plantar fascia. Occasionally, corticosteroid injections are given to reduce inflammation. If symptoms persist for greater than 6 months despite non-surgical (conservative), then surgery may be recommended.   MEDICATION   · If pain medication is necessary, then nonsteroidal anti-inflammatory medications, such as aspirin and ibuprofen, or other minor pain relievers, such as acetaminophen, are often recommended.  · Do not take pain medication within 7 days before surgery.  · Prescription pain relievers may be given if deemed necessary by your caregiver. Use only as directed and only as much as you need.  · Corticosteroid injections may be given by your caregiver. These injections should be reserved for the most serious cases, because they may only be given a certain number of times.  HEAT AND COLD  · Cold treatment (icing) relieves pain and reduces inflammation. Cold treatment should be applied for 10 to 15 minutes every 2 to 3 hours for inflammation and pain and immediately after any activity that aggravates your symptoms. Use ice packs or massage the area with a piece of ice (ice massage).  · Heat treatment may be used prior to performing the stretching and strengthening activities prescribed by your caregiver, physical therapist, or . Use a heat pack or soak the injury in warm water.  SEEK IMMEDIATE MEDICAL CARE IF:  · Treatment seems to offer no benefit, or the condition worsens.  · Any medications produce adverse side effects.  EXERCISES  RANGE OF MOTION (ROM) AND STRETCHING EXERCISES - Plantar Fasciitis (Heel Spur Syndrome)  These exercises may  help you when beginning to rehabilitate your injury. Your symptoms may resolve with or without further involvement from your physician, physical therapist or . While completing these exercises, remember:   · Restoring tissue flexibility helps normal motion to return to the joints. This allows healthier, less painful movement and activity.  · An effective stretch should be held for at least 30 seconds.  · A stretch should never be painful. You should only feel a gentle lengthening or release in the stretched tissue.  RANGE OF MOTION - Toe Extension, Flexion  · Sit with your right / left leg crossed over your opposite knee.  · Grasp your toes and gently pull them back toward the top of your foot. You should feel a stretch on the bottom of your toes and/or foot.  · Hold this stretch for __________ seconds.  · Now, gently pull your toes toward the bottom of your foot. You should feel a stretch on the top of your toes and or foot.  · Hold this stretch for __________ seconds.  Repeat __________ times. Complete this stretch __________ times per day.   RANGE OF MOTION - Ankle Dorsiflexion, Active Assisted  · Remove shoes and sit on a chair that is preferably not on a carpeted surface.  · Place right / left foot under knee. Extend your opposite leg for support.  · Keeping your heel down, slide your right / left foot back toward the chair until you feel a stretch at your ankle or calf. If you do not feel a stretch, slide your bottom forward to the edge of the chair, while still keeping your heel down.  · Hold this stretch for __________ seconds.  Repeat __________ times. Complete this stretch __________ times per day.   STRETCH - Gastroc, Standing  · Place hands on wall.  · Extend right / left leg, keeping the front knee somewhat bent.  · Slightly point your toes inward on your back foot.  · Keeping your right / left heel on the floor and your knee straight, shift your weight toward the wall, not allowing your  back to arch.  · You should feel a gentle stretch in the right / left calf. Hold this position for __________ seconds.  Repeat __________ times. Complete this stretch __________ times per day.  STRETCH - Soleus, Standing  · Place hands on wall.  · Extend right / left leg, keeping the other knee somewhat bent.  · Slightly point your toes inward on your back foot.  · Keep your right / left heel on the floor, bend your back knee, and slightly shift your weight over the back leg so that you feel a gentle stretch deep in your back calf.  · Hold this position for __________ seconds.  Repeat __________ times. Complete this stretch __________ times per day.  STRETCH - Gastrocsoleus, Standing   Note: This exercise can place a lot of stress on your foot and ankle. Please complete this exercise only if specifically instructed by your caregiver.   · Place the ball of your right / left foot on a step, keeping your other foot firmly on the same step.  · Hold on to the wall or a rail for balance.  · Slowly lift your other foot, allowing your body weight to press your heel down over the edge of the step.  · You should feel a stretch in your right / left calf.  · Hold this position for __________ seconds.  · Repeat this exercise with a slight bend in your right / left knee.  Repeat __________ times. Complete this stretch __________ times per day.   STRENGTHENING EXERCISES - Plantar Fasciitis (Heel Spur Syndrome)   These exercises may help you when beginning to rehabilitate your injury. They may resolve your symptoms with or without further involvement from your physician, physical therapist or . While completing these exercises, remember:   · Muscles can gain both the endurance and the strength needed for everyday activities through controlled exercises.  · Complete these exercises as instructed by your physician, physical therapist or . Progress the resistance and repetitions only as  guided.  STRENGTH - Towel Curls  · Sit in a chair positioned on a non-carpeted surface.  · Place your foot on a towel, keeping your heel on the floor.  · Pull the towel toward your heel by only curling your toes. Keep your heel on the floor.  · If instructed by your physician, physical therapist or , add ____________________ at the end of the towel.  Repeat __________ times. Complete this exercise __________ times per day.  STRENGTH - Ankle Inversion  · Secure one end of a rubber exercise band/tubing to a fixed object (table, pole). Loop the other end around your foot just before your toes.  · Place your fists between your knees. This will focus your strengthening at your ankle.  · Slowly, pull your big toe up and in, making sure the band/tubing is positioned to resist the entire motion.  · Hold this position for __________ seconds.  · Have your muscles resist the band/tubing as it slowly pulls your foot back to the starting position.  Repeat __________ times. Complete this exercises __________ times per day.      This information is not intended to replace advice given to you by your health care provider. Make sure you discuss any questions you have with your health care provider.     Document Released: 12/18/2006 Document Revised: 05/03/2016 Document Reviewed: 10/31/2016  [employer] Interactive Patient Education ©2016 [employer]

## 2017-03-30 NOTE — PROGRESS NOTES
"Subjective   Mariluz Arango is a 76 y.o. female.     Dizziness   This is a recurrent problem. The current episode started more than 1 month ago. The problem occurs intermittently. The problem has been unchanged. Associated symptoms include arthralgias, fatigue, numbness, a visual change and weakness. Pertinent negatives include no abdominal pain, anorexia, change in bowel habit, chest pain, chills, congestion, coughing, diaphoresis, fever, headaches, joint swelling, myalgias, nausea, neck pain, rash, sore throat, swollen glands, urinary symptoms, vertigo or vomiting. Nothing aggravates the symptoms. She has tried nothing for the symptoms. The treatment provided no relief.   Lower Extremity Issue   This is a new (She has been having heal pain for about a month now on the right.) problem. The current episode started more than 1 month ago. The problem occurs daily. The problem has been gradually worsening. Associated symptoms include arthralgias, fatigue, numbness, a visual change and weakness. Pertinent negatives include no abdominal pain, anorexia, change in bowel habit, chest pain, chills, congestion, coughing, diaphoresis, fever, headaches, joint swelling, myalgias, nausea, neck pain, rash, sore throat, swollen glands, urinary symptoms, vertigo or vomiting. Exacerbated by: Has severe pain with putting weight on her right foot. She has tried lying down for the symptoms. The treatment provided mild relief.   She feels that her neuropathy is getting worse in both her feet.  She has history of diabetes but has been well controlled on her diabetes. She has been taking gabapentin 100 TID and that isn't helping.  Doesn't make her tired at all.       Current Outpatient Prescriptions:   •  B-D INTEGRA SYRINGE 25G X 5/8\" 3 ML misc, Inject 1 syringe as directed every 30 (thirty) days., Disp: 12 each, Rfl: 1  •  clobetasol propionate (CLOBEX) 0.05 % shampoo, Apply  topically Daily., Disp: 118 mL, Rfl: 1  •  clopidogrel " "(PLAVIX) 75 MG tablet, Take 1 tablet by mouth Daily., Disp: 30 tablet, Rfl: 2  •  cyanocobalamin 1000 MCG/ML injection, INJECT ONE ML SUBCUTANEOUSLY ONCE MONTHLY, Disp: 1 mL, Rfl: 4  •  donepezil (ARICEPT) 5 MG tablet, Take 1 tablet by mouth Every Night., Disp: 30 tablet, Rfl: 3  •  gabapentin (NEURONTIN) 100 MG capsule, TAKE 1 CAPSULE BY MOUTH 3 TIMES A DAY, Disp: 90 capsule, Rfl: 2  •  glucose blood test strip, Contour Test Strips, Disp: , Rfl:   •  lidocaine (XYLOCAINE) 5 % ointment, Apply  topically 3 (Three) Times a Day. Apply to tongue, Disp: 50 g, Rfl: 0  •  nebivolol (BYSTOLIC) 10 MG tablet, Take 1.5 tablets by mouth Daily., Disp: 45 tablet, Rfl: 3  •  nystatin (NYSTOP) 730739 UNIT/GM powder powder, Apply 1 application topically 3 (three) times a day as needed. Indication: Candidiasis of skin and nails, Disp: , Rfl:   •  omeprazole (PRILOSEC) 40 MG capsule, Take 1 capsule by mouth Daily. omeprazole 40 mg capsule,delayed release, Disp: 30 capsule, Rfl: 5  •  potassium chloride (K-DUR,KLOR-CON) 20 MEQ CR tablet, Take 1 tablet by mouth daily., Disp: 30 tablet, Rfl: 2  •  pravastatin (PRAVACHOL) 40 MG tablet, TAKE ONE TABLET BY MOUTH EVERY NIGHT AT BEDTIME, Disp: 30 tablet, Rfl: 5  •  Syringe/Needle, Disp, 25G X 5/8\" 3 ML misc, USE 1 SYRINGE FOR MONTHLY VITAMIN B-12 SHOTS, Disp: 6 each, Rfl: 2  •  traZODone (DESYREL) 100 MG tablet, TAKE 1 TABLET BY MOUTH EVERY NIGHT., Disp: 30 tablet, Rfl: 2  •  venlafaxine XR (EFFEXOR-XR) 75 MG 24 hr capsule, TAKE ONE CAPSULE BY MOUTH THREE TIMES A DAY, Disp: 90 capsule, Rfl: 2  •  Zinc Oxide 25 % paste, Apply 1 application topically 2 (two) times a day. zinc oxide 25 % topical paste, Disp: , Rfl:     The following portions of the patient's history were reviewed and updated as appropriate: allergies, current medications, past family history, past medical history, past social history, past surgical history and problem list.    Review of Systems   Constitutional: Positive for " "activity change, fatigue and unexpected weight change. Negative for appetite change, chills, diaphoresis and fever.   HENT: Negative for congestion and sore throat.    Respiratory: Negative for cough.    Cardiovascular: Positive for palpitations. Negative for chest pain and leg swelling.   Gastrointestinal: Negative for abdominal distention, abdominal pain, anorexia, change in bowel habit, constipation, diarrhea, nausea and vomiting.   Musculoskeletal: Positive for arthralgias and gait problem. Negative for joint swelling, myalgias and neck pain.   Skin: Negative for rash.   Neurological: Positive for dizziness, weakness, light-headedness and numbness. Negative for vertigo, tremors, syncope, facial asymmetry, speech difficulty and headaches.   Psychiatric/Behavioral: Positive for dysphoric mood and sleep disturbance. Negative for confusion. The patient is nervous/anxious.        Objective    Vitals:    03/30/17 0924   BP: 140/82   Pulse: 60   SpO2: 98%   Weight: 155 lb (70.3 kg)   Height: 61\" (154.9 cm)     Physical Exam   Constitutional: She is oriented to person, place, and time. She appears well-developed and well-nourished. No distress.   Cardiovascular: Normal rate, regular rhythm and normal heart sounds.    No murmur heard.  NO LE EDEMA BL   Pulmonary/Chest: Effort normal and breath sounds normal. No respiratory distress. She has no wheezes.   Abdominal: Soft. Bowel sounds are normal. She exhibits no distension. There is no tenderness.    Mariluz had a diabetic foot exam performed today.   During the foot exam she had a monofilament test performed.    Neurological Sensory Findings - Unaltered hot/cold right ankle/foot discrimination and unaltered hot/cold left ankle/foot discrimination. Unaltered sharp/dull right ankle/foot discrimination and unaltered sharp/dull left ankle/foot discrimination. No right ankle/foot altered proprioception and no left ankle/foot altered proprioception    Vascular Status -  Her exam " exhibits right foot vasculature normal. Her exam exhibits no right foot edema. Her exam exhibits left foot vasculature normal. Her exam exhibits no left foot edema.   Skin Integrity  -  Her right foot skin is intact.   She has no right foot onychomycosis, no right foot ulcer and non-callous right foot.    Mariluz 's left foot skin is intact.  She has no left foot onychomycosis, no left foot ulcer and non-callous left foot..  Neurological: She is alert and oriented to person, place, and time.   Psychiatric: Her behavior is normal. Judgment and thought content normal.   Flat affect today   Nursing note and vitals reviewed.      Assessment/Plan   Problems Addressed this Visit        Nervous and Auditory    Diabetic polyneuropathy      Other Visit Diagnoses     Dizziness    -  Primary    Relevant Orders    Holter monitor - 48 hour    Ambulatory Referral to Cardiology    Palpitations        Relevant Orders    Ambulatory Referral to Cardiology    Right foot pain        Need for pneumococcal vaccination        Relevant Orders    Pneumococcal Polysaccharide Vaccine 23-Valent Greater Than or Equal To 1yo Subcutaneous / IM (Completed)        1.) Dizziness/Palpitations- She had EKG last time that she was here and thyroid studies were normal.  Will get Holter and refer to Cardiology for evaluation.  She says that her Bystolic is too expensive and she has been having issues cutting it.  Will change to Coreg.  2.) Right foot pain-  She has had injury to that foot in the past and had surgery on it.  Will try topical NSAIDS and see if that helps.  3.) Neuropathy-  Increased her gabapentin. Monitor for sedation.  4.) Pneumovax given today.  She has had Prevnar at [facility] in 2015.  RTC in 2 months or sooner PRN. Will do Medicare Wellness at that appointment.

## 2017-04-01 DIAGNOSIS — F03.90 DEMENTIA WITHOUT BEHAVIORAL DISTURBANCE, UNSPECIFIED DEMENTIA TYPE: ICD-10-CM

## 2017-04-03 RX ORDER — DONEPEZIL HYDROCHLORIDE 5 MG/1
TABLET, FILM COATED ORAL
Qty: 30 TABLET | Refills: 3 | Status: SHIPPED | OUTPATIENT
Start: 2017-04-03 | End: 2017-06-29 | Stop reason: SDUPTHER

## 2017-04-29 DIAGNOSIS — G47.00 INSOMNIA, UNSPECIFIED TYPE: ICD-10-CM

## 2017-05-01 RX ORDER — TRAZODONE HYDROCHLORIDE 100 MG/1
TABLET ORAL
Qty: 30 TABLET | Refills: 2 | Status: SHIPPED | OUTPATIENT
Start: 2017-05-01 | End: 2017-06-29 | Stop reason: SDUPTHER

## 2017-05-02 ENCOUNTER — LAB (OUTPATIENT)
Dept: LAB | Facility: HOSPITAL | Age: 77
End: 2017-05-02

## 2017-05-02 ENCOUNTER — OFFICE VISIT (OUTPATIENT)
Dept: FAMILY MEDICINE CLINIC | Facility: CLINIC | Age: 77
End: 2017-05-02

## 2017-05-02 VITALS
HEIGHT: 61 IN | WEIGHT: 158 LBS | SYSTOLIC BLOOD PRESSURE: 122 MMHG | BODY MASS INDEX: 29.83 KG/M2 | DIASTOLIC BLOOD PRESSURE: 78 MMHG

## 2017-05-02 DIAGNOSIS — M54.5 ACUTE BILATERAL LOW BACK PAIN, WITH SCIATICA PRESENCE UNSPECIFIED: Primary | ICD-10-CM

## 2017-05-02 DIAGNOSIS — I71.40 ABDOMINAL AORTIC ANEURYSM WITHOUT RUPTURE (HCC): ICD-10-CM

## 2017-05-02 DIAGNOSIS — M54.5 ACUTE BILATERAL LOW BACK PAIN, WITH SCIATICA PRESENCE UNSPECIFIED: ICD-10-CM

## 2017-05-02 DIAGNOSIS — Z79.899 HIGH RISK MEDICATION USE: ICD-10-CM

## 2017-05-02 DIAGNOSIS — R42 DIZZINESS: ICD-10-CM

## 2017-05-02 LAB
ANION GAP SERPL CALCULATED.3IONS-SCNC: 11 MMOL/L (ref 5–15)
BACTERIA UR QL AUTO: ABNORMAL /HPF
BILIRUB UR QL STRIP: NEGATIVE
BUN BLD-MCNC: 13 MG/DL (ref 7–21)
BUN/CREAT SERPL: 16.5 (ref 7–25)
CALCIUM SPEC-SCNC: 8.9 MG/DL (ref 8.4–10.2)
CHLORIDE SERPL-SCNC: 97 MMOL/L (ref 95–110)
CLARITY UR: CLEAR
CO2 SERPL-SCNC: 29 MMOL/L (ref 22–31)
COLOR UR: YELLOW
CREAT BLD-MCNC: 0.79 MG/DL (ref 0.5–1)
GFR SERPL CREATININE-BSD FRML MDRD: 70 ML/MIN/1.73 (ref 39–90)
GLUCOSE BLD-MCNC: 154 MG/DL (ref 60–100)
GLUCOSE UR STRIP-MCNC: NEGATIVE MG/DL
HGB UR QL STRIP.AUTO: ABNORMAL
HYALINE CASTS UR QL AUTO: ABNORMAL /LPF
KETONES UR QL STRIP: NEGATIVE
LEUKOCYTE ESTERASE UR QL STRIP.AUTO: ABNORMAL
NITRITE UR QL STRIP: NEGATIVE
PH UR STRIP.AUTO: 5.5 [PH] (ref 5–9)
POTASSIUM BLD-SCNC: 4 MMOL/L (ref 3.5–5.1)
PROT UR QL STRIP: ABNORMAL
RBC # UR: ABNORMAL /HPF
REF LAB TEST METHOD: ABNORMAL
SODIUM BLD-SCNC: 137 MMOL/L (ref 137–145)
SP GR UR STRIP: 1.01 (ref 1–1.03)
SQUAMOUS #/AREA URNS HPF: ABNORMAL /HPF
UROBILINOGEN UR QL STRIP: ABNORMAL
WBC UR QL AUTO: ABNORMAL /HPF

## 2017-05-02 PROCEDURE — 81001 URINALYSIS AUTO W/SCOPE: CPT | Performed by: FAMILY MEDICINE

## 2017-05-02 PROCEDURE — 87077 CULTURE AEROBIC IDENTIFY: CPT | Performed by: FAMILY MEDICINE

## 2017-05-02 PROCEDURE — 96372 THER/PROPH/DIAG INJ SC/IM: CPT | Performed by: FAMILY MEDICINE

## 2017-05-02 PROCEDURE — 80048 BASIC METABOLIC PNL TOTAL CA: CPT | Performed by: FAMILY MEDICINE

## 2017-05-02 PROCEDURE — 99213 OFFICE O/P EST LOW 20 MIN: CPT | Performed by: FAMILY MEDICINE

## 2017-05-02 PROCEDURE — 36415 COLL VENOUS BLD VENIPUNCTURE: CPT | Performed by: FAMILY MEDICINE

## 2017-05-02 PROCEDURE — 87086 URINE CULTURE/COLONY COUNT: CPT | Performed by: FAMILY MEDICINE

## 2017-05-02 PROCEDURE — 87186 SC STD MICRODIL/AGAR DIL: CPT | Performed by: FAMILY MEDICINE

## 2017-05-02 RX ORDER — LIDOCAINE 50 MG/G
1 PATCH TOPICAL EVERY 24 HOURS
Qty: 30 PATCH | Refills: 1 | Status: SHIPPED | OUTPATIENT
Start: 2017-05-02 | End: 2017-11-06 | Stop reason: SDUPTHER

## 2017-05-02 RX ORDER — HYDROCODONE BITARTRATE AND ACETAMINOPHEN 5; 325 MG/1; MG/1
1 TABLET ORAL EVERY 6 HOURS PRN
Qty: 40 TABLET | Refills: 0 | Status: SHIPPED | OUTPATIENT
Start: 2017-05-02 | End: 2017-07-12

## 2017-05-02 RX ORDER — CYANOCOBALAMIN 1000 UG/ML
INJECTION, SOLUTION INTRAMUSCULAR; SUBCUTANEOUS
Qty: 1 ML | Refills: 4 | Status: SHIPPED | OUTPATIENT
Start: 2017-05-02 | End: 2017-09-07 | Stop reason: SDUPTHER

## 2017-05-02 RX ORDER — TRIAMCINOLONE ACETONIDE 40 MG/ML
40 INJECTION, SUSPENSION INTRA-ARTICULAR; INTRAMUSCULAR ONCE
Status: COMPLETED | OUTPATIENT
Start: 2017-05-02 | End: 2017-05-02

## 2017-05-02 RX ADMIN — TRIAMCINOLONE ACETONIDE 40 MG: 40 INJECTION, SUSPENSION INTRA-ARTICULAR; INTRAMUSCULAR at 10:54

## 2017-05-03 ENCOUNTER — TELEPHONE (OUTPATIENT)
Dept: FAMILY MEDICINE CLINIC | Facility: CLINIC | Age: 77
End: 2017-05-03

## 2017-05-03 RX ORDER — NITROFURANTOIN 25; 75 MG/1; MG/1
100 CAPSULE ORAL 2 TIMES DAILY
Qty: 14 CAPSULE | Refills: 0 | Status: SHIPPED | OUTPATIENT
Start: 2017-05-03 | End: 2017-05-05 | Stop reason: SINTOL

## 2017-05-04 ENCOUNTER — HOSPITAL ENCOUNTER (OUTPATIENT)
Dept: MRI IMAGING | Facility: HOSPITAL | Age: 77
End: 2017-05-04

## 2017-05-04 ENCOUNTER — TELEPHONE (OUTPATIENT)
Dept: FAMILY MEDICINE CLINIC | Facility: CLINIC | Age: 77
End: 2017-05-04

## 2017-05-04 LAB
BACTERIA SPEC AEROBE CULT: ABNORMAL
BETA LACTAMASE: ABNORMAL

## 2017-05-04 RX ORDER — CLOPIDOGREL BISULFATE 75 MG/1
TABLET ORAL
Qty: 30 TABLET | Refills: 2 | Status: SHIPPED | OUTPATIENT
Start: 2017-05-04 | End: 2017-06-29 | Stop reason: SDUPTHER

## 2017-05-05 RX ORDER — CEFDINIR 300 MG/1
300 CAPSULE ORAL 2 TIMES DAILY
Qty: 10 CAPSULE | Refills: 0 | Status: SHIPPED | OUTPATIENT
Start: 2017-05-05 | End: 2017-05-19

## 2017-05-19 ENCOUNTER — APPOINTMENT (OUTPATIENT)
Dept: LAB | Facility: HOSPITAL | Age: 77
End: 2017-05-19

## 2017-05-19 ENCOUNTER — OFFICE VISIT (OUTPATIENT)
Dept: CARDIOLOGY | Facility: CLINIC | Age: 77
End: 2017-05-19

## 2017-05-19 VITALS
DIASTOLIC BLOOD PRESSURE: 79 MMHG | SYSTOLIC BLOOD PRESSURE: 159 MMHG | BODY MASS INDEX: 27.48 KG/M2 | WEIGHT: 155.1 LBS | HEART RATE: 101 BPM | HEIGHT: 63 IN

## 2017-05-19 DIAGNOSIS — I73.9 CLAUDICATION (HCC): ICD-10-CM

## 2017-05-19 DIAGNOSIS — I48.0 PAROXYSMAL ATRIAL FIBRILLATION (HCC): ICD-10-CM

## 2017-05-19 DIAGNOSIS — I25.10 CORONARY ARTERY CALCIFICATION SEEN ON CAT SCAN: ICD-10-CM

## 2017-05-19 DIAGNOSIS — I10 ESSENTIAL HYPERTENSION: ICD-10-CM

## 2017-05-19 DIAGNOSIS — R00.2 PALPITATIONS: ICD-10-CM

## 2017-05-19 DIAGNOSIS — R06.09 DYSPNEA ON EXERTION: ICD-10-CM

## 2017-05-19 DIAGNOSIS — E78.2 MIXED HYPERLIPIDEMIA: Primary | ICD-10-CM

## 2017-05-19 DIAGNOSIS — I70.0 CALCIFICATION OF ABDOMINAL AORTA (HCC): ICD-10-CM

## 2017-05-19 DIAGNOSIS — R42 DIZZINESS: ICD-10-CM

## 2017-05-19 DIAGNOSIS — I71.60: ICD-10-CM

## 2017-05-19 LAB
ALBUMIN SERPL-MCNC: 4.2 G/DL (ref 3.4–4.8)
ALBUMIN UR-MCNC: 5.4 MG/L
ALBUMIN/GLOB SERPL: 1.2 G/DL (ref 1.1–1.8)
ALP SERPL-CCNC: 69 U/L (ref 38–126)
ALT SERPL W P-5'-P-CCNC: 31 U/L (ref 9–52)
ANION GAP SERPL CALCULATED.3IONS-SCNC: 11 MMOL/L (ref 5–15)
ARTICHOKE IGE QN: 66 MG/DL (ref 1–129)
AST SERPL-CCNC: 37 U/L (ref 14–36)
BILIRUB SERPL-MCNC: 0.5 MG/DL (ref 0.2–1.3)
BUN BLD-MCNC: 12 MG/DL (ref 7–21)
BUN/CREAT SERPL: 16.2 (ref 7–25)
CALCIUM SPEC-SCNC: 9.2 MG/DL (ref 8.4–10.2)
CHLORIDE SERPL-SCNC: 97 MMOL/L (ref 95–110)
CHOLEST SERPL-MCNC: 159 MG/DL (ref 0–199)
CO2 SERPL-SCNC: 29 MMOL/L (ref 22–31)
CREAT BLD-MCNC: 0.74 MG/DL (ref 0.5–1)
CREAT UR-MCNC: 114.2 MG/DL
DEPRECATED RDW RBC AUTO: 44 FL (ref 36.4–46.3)
ERYTHROCYTE [DISTWIDTH] IN BLOOD BY AUTOMATED COUNT: 13.4 % (ref 11.5–14.5)
GFR SERPL CREATININE-BSD FRML MDRD: 76 ML/MIN/1.73 (ref 39–90)
GLOBULIN UR ELPH-MCNC: 3.4 GM/DL (ref 2.3–3.5)
GLUCOSE BLD-MCNC: 94 MG/DL (ref 60–100)
HCT VFR BLD AUTO: 38.3 % (ref 35–45)
HDLC SERPL-MCNC: 67 MG/DL (ref 60–200)
HGB BLD-MCNC: 12.4 G/DL (ref 12–15.5)
LDLC/HDLC SERPL: 0.61 {RATIO} (ref 0–3.22)
MCH RBC QN AUTO: 29.3 PG (ref 26.5–34)
MCHC RBC AUTO-ENTMCNC: 32.4 G/DL (ref 31.4–36)
MCV RBC AUTO: 90.5 FL (ref 80–98)
MICROALBUMIN/CREAT UR: 47.3 MG/G (ref 0–30)
NT-PROBNP SERPL-MCNC: 342 PG/ML (ref 0–1800)
PLATELET # BLD AUTO: 281 10*3/MM3 (ref 150–450)
PMV BLD AUTO: 9.3 FL (ref 8–12)
POTASSIUM BLD-SCNC: 4 MMOL/L (ref 3.5–5.1)
PROT SERPL-MCNC: 7.6 G/DL (ref 6.3–8.6)
RBC # BLD AUTO: 4.23 10*6/MM3 (ref 3.77–5.16)
SODIUM BLD-SCNC: 137 MMOL/L (ref 137–145)
TRIGL SERPL-MCNC: 254 MG/DL (ref 20–199)
WBC NRBC COR # BLD: 10.52 10*3/MM3 (ref 3.2–9.8)

## 2017-05-19 PROCEDURE — 83880 ASSAY OF NATRIURETIC PEPTIDE: CPT | Performed by: INTERNAL MEDICINE

## 2017-05-19 PROCEDURE — 85027 COMPLETE CBC AUTOMATED: CPT | Performed by: INTERNAL MEDICINE

## 2017-05-19 PROCEDURE — 80053 COMPREHEN METABOLIC PANEL: CPT | Performed by: INTERNAL MEDICINE

## 2017-05-19 PROCEDURE — 82043 UR ALBUMIN QUANTITATIVE: CPT | Performed by: INTERNAL MEDICINE

## 2017-05-19 PROCEDURE — 82570 ASSAY OF URINE CREATININE: CPT | Performed by: INTERNAL MEDICINE

## 2017-05-19 PROCEDURE — 86141 C-REACTIVE PROTEIN HS: CPT | Performed by: INTERNAL MEDICINE

## 2017-05-19 PROCEDURE — 80061 LIPID PANEL: CPT | Performed by: INTERNAL MEDICINE

## 2017-05-19 PROCEDURE — 99214 OFFICE O/P EST MOD 30 MIN: CPT | Performed by: INTERNAL MEDICINE

## 2017-05-19 PROCEDURE — 36415 COLL VENOUS BLD VENIPUNCTURE: CPT | Performed by: INTERNAL MEDICINE

## 2017-05-20 LAB — CRP SERPL HS-MCNC: 1.49 MG/L (ref 0–3)

## 2017-05-22 ENCOUNTER — HOSPITAL ENCOUNTER (OUTPATIENT)
Dept: MRI IMAGING | Facility: HOSPITAL | Age: 77
Discharge: HOME | End: 2017-05-22
Admitting: FAMILY MEDICINE

## 2017-05-22 DIAGNOSIS — M54.5 ACUTE BILATERAL LOW BACK PAIN, WITH SCIATICA PRESENCE UNSPECIFIED: ICD-10-CM

## 2017-05-22 DIAGNOSIS — I71.40 ABDOMINAL AORTIC ANEURYSM WITHOUT RUPTURE (HCC): ICD-10-CM

## 2017-05-22 PROCEDURE — 25010000002 GADOTERIDOL PER 1 ML: Performed by: FAMILY MEDICINE

## 2017-05-22 PROCEDURE — 72158 MRI LUMBAR SPINE W/O & W/DYE: CPT

## 2017-05-22 PROCEDURE — A9576 INJ PROHANCE MULTIPACK: HCPCS | Performed by: FAMILY MEDICINE

## 2017-05-22 RX ADMIN — GADOTERIDOL 15 ML: 279.3 INJECTION, SOLUTION INTRAVENOUS at 11:06

## 2017-05-24 ENCOUNTER — HOSPITAL ENCOUNTER (OUTPATIENT)
Dept: PULMONOLOGY | Facility: HOSPITAL | Age: 77
Discharge: HOME | End: 2017-05-24
Attending: INTERNAL MEDICINE

## 2017-05-24 ENCOUNTER — HOSPITAL ENCOUNTER (OUTPATIENT)
Dept: CT IMAGING | Facility: HOSPITAL | Age: 77
Discharge: HOME | End: 2017-05-24
Attending: INTERNAL MEDICINE | Admitting: INTERNAL MEDICINE

## 2017-05-24 ENCOUNTER — HOSPITAL ENCOUNTER (OUTPATIENT)
Dept: CT IMAGING | Facility: HOSPITAL | Age: 77
Discharge: HOME | End: 2017-05-24
Attending: INTERNAL MEDICINE

## 2017-05-24 PROCEDURE — 94010 BREATHING CAPACITY TEST: CPT | Performed by: INTERNAL MEDICINE

## 2017-05-24 PROCEDURE — 94010 BREATHING CAPACITY TEST: CPT

## 2017-05-24 PROCEDURE — 71250 CT THORAX DX C-: CPT

## 2017-05-24 PROCEDURE — 94727 GAS DIL/WSHOT DETER LNG VOL: CPT

## 2017-05-24 PROCEDURE — 74150 CT ABDOMEN W/O CONTRAST: CPT

## 2017-05-24 PROCEDURE — 94729 DIFFUSING CAPACITY: CPT | Performed by: INTERNAL MEDICINE

## 2017-05-24 PROCEDURE — 94727 GAS DIL/WSHOT DETER LNG VOL: CPT | Performed by: INTERNAL MEDICINE

## 2017-05-26 ENCOUNTER — TELEPHONE (OUTPATIENT)
Dept: FAMILY MEDICINE CLINIC | Facility: CLINIC | Age: 77
End: 2017-05-26

## 2017-06-01 ENCOUNTER — HOSPITAL ENCOUNTER (OUTPATIENT)
Dept: NUCLEAR MEDICINE | Facility: HOSPITAL | Age: 77
Discharge: HOME | End: 2017-06-01
Attending: INTERNAL MEDICINE

## 2017-06-01 ENCOUNTER — HOSPITAL ENCOUNTER (OUTPATIENT)
Dept: CARDIOLOGY | Facility: HOSPITAL | Age: 77
Discharge: HOME | End: 2017-06-01
Attending: INTERNAL MEDICINE

## 2017-06-01 LAB
BH CV STRESS BP STAGE 1: NORMAL
BH CV STRESS COMMENTS STAGE 1: NORMAL
BH CV STRESS DOSE REGADENOSON STAGE 1: 0.4
BH CV STRESS DURATION MIN STAGE 1: 0
BH CV STRESS DURATION SEC STAGE 1: 10
BH CV STRESS HR STAGE 1: 57
BH CV STRESS PROTOCOL 1: NORMAL
BH CV STRESS RECOVERY BP: NORMAL MMHG
BH CV STRESS RECOVERY HR: 71 BPM
BH CV STRESS STAGE 1: 1
LV EF NUC BP: 80 %
MAXIMAL PREDICTED HEART RATE: 142 BPM
PERCENT MAX PREDICTED HR: 57.04 %
STRESS BASELINE BP: NORMAL MMHG
STRESS BASELINE HR: 55 BPM
STRESS PERCENT HR: 67 %
STRESS POST ESTIMATED WORKLOAD: 1 METS
STRESS POST PEAK BP: NORMAL MMHG
STRESS POST PEAK HR: 81 BPM
STRESS TARGET HR: 121 BPM

## 2017-06-01 PROCEDURE — 0 TECHNETIUM SESTAMIBI: Performed by: INTERNAL MEDICINE

## 2017-06-01 PROCEDURE — A9500 TC99M SESTAMIBI: HCPCS | Performed by: INTERNAL MEDICINE

## 2017-06-01 PROCEDURE — 78452 HT MUSCLE IMAGE SPECT MULT: CPT

## 2017-06-01 PROCEDURE — 78452 HT MUSCLE IMAGE SPECT MULT: CPT | Performed by: INTERNAL MEDICINE

## 2017-06-01 PROCEDURE — 25010000002 REGADENOSON 0.4 MG/5ML SOLUTION: Performed by: INTERNAL MEDICINE

## 2017-06-01 PROCEDURE — 93016 CV STRESS TEST SUPVJ ONLY: CPT | Performed by: INTERNAL MEDICINE

## 2017-06-01 PROCEDURE — 93017 CV STRESS TEST TRACING ONLY: CPT

## 2017-06-01 PROCEDURE — 93018 CV STRESS TEST I&R ONLY: CPT | Performed by: INTERNAL MEDICINE

## 2017-06-01 RX ORDER — 0.9 % SODIUM CHLORIDE 0.9 %
10 VIAL (ML) INJECTION AS NEEDED
Status: DISCONTINUED | OUTPATIENT
Start: 2017-06-01 | End: 2017-06-02 | Stop reason: HOSPADM

## 2017-06-01 RX ADMIN — REGADENOSON 0.4 MG: 0.08 INJECTION, SOLUTION INTRAVENOUS at 08:36

## 2017-06-01 RX ADMIN — SODIUM CHLORIDE 10 ML: 9 INJECTION INTRAMUSCULAR; INTRAVENOUS; SUBCUTANEOUS at 08:36

## 2017-06-01 RX ADMIN — Medication 1 DOSE: at 08:36

## 2017-06-01 RX ADMIN — Medication 1 DOSE: at 07:22

## 2017-06-02 LAB
BH CV ECHO MEAS - ACS: 2.2 CM
BH CV ECHO MEAS - AO ISTHMUS: 3 CM
BH CV ECHO MEAS - AO MAX PG (FULL): 3 MMHG
BH CV ECHO MEAS - AO MAX PG: 7.1 MMHG
BH CV ECHO MEAS - AO MEAN PG (FULL): 2 MMHG
BH CV ECHO MEAS - AO MEAN PG: 4 MMHG
BH CV ECHO MEAS - AO ROOT AREA: 9.6 CM^2
BH CV ECHO MEAS - AO ROOT DIAM: 3.5 CM
BH CV ECHO MEAS - AO V2 MAX: 133 CM/SEC
BH CV ECHO MEAS - AO V2 MEAN: 89.7 CM/SEC
BH CV ECHO MEAS - AO V2 VTI: 27.2 CM
BH CV ECHO MEAS - ASC AORTA: 3.3 CM
BH CV ECHO MEAS - AVA(I,A): 2 CM^2
BH CV ECHO MEAS - AVA(I,D): 2 CM^2
BH CV ECHO MEAS - AVA(V,A): 1.9 CM^2
BH CV ECHO MEAS - AVA(V,D): 1.9 CM^2
BH CV ECHO MEAS - EDV(CUBED): 54.9 ML
BH CV ECHO MEAS - EDV(TEICH): 62 ML
BH CV ECHO MEAS - EF(CUBED): 77.8 %
BH CV ECHO MEAS - EF(TEICH): 70.8 %
BH CV ECHO MEAS - ESV(CUBED): 12.2 ML
BH CV ECHO MEAS - ESV(TEICH): 18.1 ML
BH CV ECHO MEAS - FS: 39.5 %
BH CV ECHO MEAS - IVS/LVPW: 0.92
BH CV ECHO MEAS - IVSD: 1.1 CM
BH CV ECHO MEAS - LA DIMENSION: 2.9 CM
BH CV ECHO MEAS - LA/AO: 0.83
BH CV ECHO MEAS - LV MASS(C)D: 143.8 GRAMS
BH CV ECHO MEAS - LV MAX PG: 4.1 MMHG
BH CV ECHO MEAS - LV MEAN PG: 2 MMHG
BH CV ECHO MEAS - LV V1 MAX: 101 CM/SEC
BH CV ECHO MEAS - LV V1 MEAN: 62.7 CM/SEC
BH CV ECHO MEAS - LV V1 VTI: 20.9 CM
BH CV ECHO MEAS - LVIDD: 3.8 CM
BH CV ECHO MEAS - LVIDS: 2.3 CM
BH CV ECHO MEAS - LVOT AREA (M): 2.5 CM^2
BH CV ECHO MEAS - LVOT AREA: 2.5 CM^2
BH CV ECHO MEAS - LVOT DIAM: 1.8 CM
BH CV ECHO MEAS - LVPWD: 1.2 CM
BH CV ECHO MEAS - MV A MAX VEL: 93.3 CM/SEC
BH CV ECHO MEAS - MV DEC SLOPE: 290 CM/SEC^2
BH CV ECHO MEAS - MV E MAX VEL: 59.2 CM/SEC
BH CV ECHO MEAS - MV E/A: 0.63
BH CV ECHO MEAS - MV MAX PG: 3.4 MMHG
BH CV ECHO MEAS - MV MEAN PG: 1 MMHG
BH CV ECHO MEAS - MV P1/2T MAX VEL: 60.8 CM/SEC
BH CV ECHO MEAS - MV P1/2T: 61.4 MSEC
BH CV ECHO MEAS - MV V2 MAX: 92.6 CM/SEC
BH CV ECHO MEAS - MV V2 MEAN: 45.7 CM/SEC
BH CV ECHO MEAS - MV V2 VTI: 20.1 CM
BH CV ECHO MEAS - MVA P1/2T LCG: 3.6 CM^2
BH CV ECHO MEAS - MVA(P1/2T): 3.6 CM^2
BH CV ECHO MEAS - MVA(VTI): 2.6 CM^2
BH CV ECHO MEAS - PA MAX PG: 3.3 MMHG
BH CV ECHO MEAS - PA V2 MAX: 91.1 CM/SEC
BH CV ECHO MEAS - RAP SYSTOLE: 5 MMHG
BH CV ECHO MEAS - RVDD: 2.4 CM
BH CV ECHO MEAS - RVSP: 15.8 MMHG
BH CV ECHO MEAS - SV(AO): 261.7 ML
BH CV ECHO MEAS - SV(CUBED): 42.7 ML
BH CV ECHO MEAS - SV(LVOT): 53.2 ML
BH CV ECHO MEAS - SV(TEICH): 43.8 ML
BH CV ECHO MEAS - TR MAX VEL: 164 CM/SEC

## 2017-06-03 LAB
BH CV LOWER ARTERIAL LEFT ABI RATIO: 1.1
BH CV LOWER ARTERIAL LEFT DORSALIS PEDIS SYS MAX: 166 MMHG
BH CV LOWER ARTERIAL LEFT POST TIBIAL SYS MAX: 180 MMHG
BH CV LOWER ARTERIAL RIGHT ABI RATIO: 1.01
BH CV LOWER ARTERIAL RIGHT DORSALIS PEDIS SYS MAX: 165 MMHG
BH CV LOWER ARTERIAL RIGHT POST TIBIAL SYS MAX: 157 MMHG
UPPER ARTERIAL LEFT ARM BRACHIAL SYS MAX: 163 MMHG
UPPER ARTERIAL RIGHT ARM BRACHIAL SYS MAX: 151 MMHG

## 2017-06-07 ENCOUNTER — OFFICE VISIT (OUTPATIENT)
Dept: FAMILY MEDICINE CLINIC | Facility: CLINIC | Age: 77
End: 2017-06-07

## 2017-06-07 VITALS
HEIGHT: 63 IN | WEIGHT: 156.8 LBS | DIASTOLIC BLOOD PRESSURE: 80 MMHG | BODY MASS INDEX: 27.78 KG/M2 | SYSTOLIC BLOOD PRESSURE: 135 MMHG

## 2017-06-07 DIAGNOSIS — M54.16 LUMBAR RADICULOPATHY: ICD-10-CM

## 2017-06-07 DIAGNOSIS — R42 DIZZINESS AND GIDDINESS: Primary | ICD-10-CM

## 2017-06-07 DIAGNOSIS — J01.00 ACUTE NON-RECURRENT MAXILLARY SINUSITIS: ICD-10-CM

## 2017-06-07 PROBLEM — I48.0 PAROXYSMAL ATRIAL FIBRILLATION: Status: ACTIVE | Noted: 2017-06-07

## 2017-06-07 PROCEDURE — 99213 OFFICE O/P EST LOW 20 MIN: CPT | Performed by: FAMILY MEDICINE

## 2017-06-07 RX ORDER — FLUTICASONE PROPIONATE 50 MCG
2 SPRAY, SUSPENSION (ML) NASAL DAILY
Qty: 1 EACH | Refills: 0 | Status: SHIPPED | OUTPATIENT
Start: 2017-06-07 | End: 2017-07-11 | Stop reason: SDUPTHER

## 2017-06-07 RX ORDER — AZITHROMYCIN 250 MG/1
TABLET, FILM COATED ORAL
Qty: 6 TABLET | Refills: 0 | Status: SHIPPED | OUTPATIENT
Start: 2017-06-07 | End: 2017-07-05

## 2017-06-07 NOTE — PROGRESS NOTES
"Subjective   Mariluz Shari Arango is a 76 y.o. female.     History of Present Illness   Ms. Arango is a 75yo female that presents today for follow-up on dizziness, weakness, and chronic back pain.  She has seen Dr. nicholson since the last time that she was here.  She has been still dizzy and not feeling any better.  She is on coreg for rate control. She is on ASA and plavix.  She is frustrated about all the testing that she has had to have. She doesn't know \"any of the results yet\" and they set her up with vascular surgery.    She says that she has had a \"terrible cold\".  She has had it for weeks. She has had productive cough that is producing purulent sputum. She has been taking cough syrup. She hasn't had any fevers or chills.    She has still been having a lot of back pain. She had an MRI of her back. She was having weakness in her legs and gait issues and has PAD as well. She had canal stenosis on MRI.  Has had back surgeries in the past.  She says that the back pain comes and goes.  Depends on how active she is. Doesn't want to see neurosurgery because of her age and she doesn't want to have back surgery. She is nervous about shots also.  She says that the lidocaine patches have helped more then any pain medications.      Current Outpatient Prescriptions:   •  carvedilol (COREG) 6.25 MG tablet, Take 1 tablet by mouth 2 (Two) Times a Day With Meals., Disp: 60 tablet, Rfl: 2  •  clopidogrel (PLAVIX) 75 MG tablet, TAKE 1 TABLET BY MOUTH DAILY., Disp: 30 tablet, Rfl: 2  •  cyanocobalamin 1000 MCG/ML injection, INJECT ONE ML SUBCUTANEOUSLY ONCE A MONTH, Disp: 1 mL, Rfl: 4  •  donepezil (ARICEPT) 5 MG tablet, TAKE 1 TABLET BY MOUTH EVERY NIGHT., Disp: 30 tablet, Rfl: 3  •  gabapentin (NEURONTIN) 300 MG capsule, Take 1 capsule by mouth 3 (Three) Times a Day., Disp: 90 capsule, Rfl: 2  •  HYDROcodone-acetaminophen (NORCO) 5-325 MG per tablet, Take 1 tablet by mouth Every 6 (Six) Hours As Needed for Moderate Pain (4-6)., " "Disp: 40 tablet, Rfl: 0  •  lidocaine (LIDODERM) 5 %, Place 1 patch on the skin Daily. Remove & Discard patch within 12 hours or as directed by MD (Patient taking differently: Place 1 patch on the skin Daily. Remove & Discard patch within 12 hours or as directed by MD (Patient states as needed)), Disp: 30 patch, Rfl: 1  •  nystatin (NYSTOP) 040302 UNIT/GM powder powder, Apply 1 application topically 3 (three) times a day as needed. Indication: Candidiasis of skin and nails, Disp: , Rfl:   •  omeprazole (PRILOSEC) 40 MG capsule, Take 1 capsule by mouth Daily. omeprazole 40 mg capsule,delayed release, Disp: 30 capsule, Rfl: 5  •  potassium chloride (K-DUR,KLOR-CON) 20 MEQ CR tablet, Take 1 tablet by mouth daily., Disp: 30 tablet, Rfl: 2  •  pravastatin (PRAVACHOL) 40 MG tablet, TAKE ONE TABLET BY MOUTH EVERY NIGHT AT BEDTIME, Disp: 30 tablet, Rfl: 5  •  Syringe/Needle, Disp, 25G X 5/8\" 3 ML misc, USE 1 SYRINGE FOR MONTHLY VITAMIN B-12 SHOTS, Disp: 6 each, Rfl: 2  •  traZODone (DESYREL) 100 MG tablet, TAKE 1 TABLET BY MOUTH EVERY NIGHT., Disp: 30 tablet, Rfl: 2  •  venlafaxine XR (EFFEXOR-XR) 75 MG 24 hr capsule, TAKE ONE CAPSULE BY MOUTH THREE TIMES A DAY, Disp: 90 capsule, Rfl: 2    The following portions of the patient's history were reviewed and updated as appropriate: allergies, current medications, past family history, past medical history, past social history, past surgical history and problem list.    Review of Systems   Constitutional: Positive for activity change, appetite change, fatigue and unexpected weight change.   HENT: Positive for congestion, postnasal drip, rhinorrhea, sinus pressure and sore throat.    Eyes: Positive for visual disturbance.   Respiratory: Positive for cough and shortness of breath. Negative for wheezing.    Cardiovascular: Negative for chest pain, palpitations and leg swelling.   Musculoskeletal: Negative for arthralgias, back pain, gait problem, joint swelling, myalgias, neck pain " "and neck stiffness.   Skin: Negative for pallor, rash and wound.   Neurological: Positive for dizziness and weakness. Negative for headaches.   Psychiatric/Behavioral: Positive for dysphoric mood and sleep disturbance. The patient is not nervous/anxious.        Objective    Vitals:    06/07/17 1102   BP: 135/80   Weight: 156 lb 12.8 oz (71.1 kg)   Height: 63\" (160 cm)     Physical Exam   Constitutional: She is oriented to person, place, and time. She appears well-developed and well-nourished. No distress.   HENT:   Right Ear: Hearing, tympanic membrane, external ear and ear canal normal.   Left Ear: Hearing, tympanic membrane, external ear and ear canal normal.   Nose: Mucosal edema and rhinorrhea present. Right sinus exhibits maxillary sinus tenderness. Right sinus exhibits no frontal sinus tenderness. Left sinus exhibits maxillary sinus tenderness. Left sinus exhibits no frontal sinus tenderness.   Mouth/Throat: Posterior oropharyngeal erythema present. No oropharyngeal exudate or posterior oropharyngeal edema.   Cardiovascular: Normal rate, regular rhythm and normal heart sounds.    No LE Edema   Pulmonary/Chest: Effort normal and breath sounds normal. No respiratory distress. She has no wheezes.   Abdominal: Soft. Bowel sounds are normal. She exhibits no distension. There is no tenderness.   Neurological: She is alert and oriented to person, place, and time.   Psychiatric: She has a normal mood and affect. Her behavior is normal. Judgment and thought content normal.   Nursing note and vitals reviewed.      Assessment/Plan   Problems Addressed this Visit        Nervous and Auditory    Lumbar radiculopathy      Other Visit Diagnoses     Dizziness and giddiness    -  Primary    Acute non-recurrent maxillary sinusitis        Relevant Medications    azithromycin (ZITHROMAX) 250 MG tablet        1.) Dizziness-  She is still in the process of having work-up for her newly diagnosed atrial fibrillation. She has had GI " bleed in the past and not a good candidate for long-term AC like coumadin.  She has monitor on and she has a follow-up with Dr. Gill.  I think that this may be the cause of her dizziness.  Will continue to follow his plan and progress notes.    2.) Back pain-  Today is okay. Continue lidacaine patches. Those have helped her a lot.  3.) Sinusitis-  Will treat with azithromycin since she is allergic to PCN.  RTC in 2 months or sooner PRN

## 2017-06-12 RX ORDER — CARVEDILOL 6.25 MG/1
TABLET ORAL
Qty: 60 TABLET | Refills: 2 | Status: SHIPPED | OUTPATIENT
Start: 2017-06-12 | End: 2017-09-29 | Stop reason: SDUPTHER

## 2017-06-29 ENCOUNTER — OFFICE VISIT (OUTPATIENT)
Dept: OPHTHALMOLOGY | Facility: CLINIC | Age: 77
End: 2017-06-29

## 2017-06-29 DIAGNOSIS — Z96.1 PSEUDOPHAKIA: Primary | ICD-10-CM

## 2017-06-29 DIAGNOSIS — E11.9 DIABETES MELLITUS WITHOUT COMPLICATION (HCC): ICD-10-CM

## 2017-06-29 DIAGNOSIS — F03.90 DEMENTIA WITHOUT BEHAVIORAL DISTURBANCE, UNSPECIFIED DEMENTIA TYPE: ICD-10-CM

## 2017-06-29 DIAGNOSIS — G47.00 INSOMNIA, UNSPECIFIED TYPE: ICD-10-CM

## 2017-06-29 DIAGNOSIS — H52.203 ASTIGMATISM, BILATERAL: ICD-10-CM

## 2017-06-29 PROCEDURE — 99214 OFFICE O/P EST MOD 30 MIN: CPT | Performed by: OPHTHALMOLOGY

## 2017-06-29 RX ORDER — CLOPIDOGREL BISULFATE 75 MG/1
TABLET ORAL
Qty: 30 TABLET | Refills: 2 | Status: SHIPPED | OUTPATIENT
Start: 2017-06-29 | End: 2017-10-02 | Stop reason: SDUPTHER

## 2017-06-29 RX ORDER — TRAZODONE HYDROCHLORIDE 100 MG/1
TABLET ORAL
Qty: 30 TABLET | Refills: 2 | Status: SHIPPED | OUTPATIENT
Start: 2017-06-29 | End: 2017-09-08 | Stop reason: SDUPTHER

## 2017-06-29 RX ORDER — DONEPEZIL HYDROCHLORIDE 5 MG/1
TABLET, FILM COATED ORAL
Qty: 30 TABLET | Refills: 3 | Status: SHIPPED | OUTPATIENT
Start: 2017-06-29 | End: 2017-07-12 | Stop reason: DRUGHIGH

## 2017-06-29 RX ORDER — GABAPENTIN 300 MG/1
CAPSULE ORAL
Qty: 90 CAPSULE | Refills: 2 | Status: SHIPPED | OUTPATIENT
Start: 2017-06-29 | End: 2017-07-12 | Stop reason: SDUPTHER

## 2017-06-29 NOTE — PROGRESS NOTES
Subjective   Mariluz Arango is a 76 y.o. female.   Chief Complaint   Patient presents with   • Diabetic Eye Exam   • Pseudophakia     HPI     Diabetic Eye Exam   Vision: is blurred for near   Associated symptoms: Negative for blurred vision   Diabetes Type: controlled with diet   Duration: years   Blood Sugars: is controlled       Last edited by Bernard Guy MD on 6/29/2017  1:17 PM. (History)          Review of Systems    Objective   Base Eye Exam     Visual Acuity (Snellen - Linear)      Right Left   Dist sc 20/70 +1 20/25 +2   Near sc J16 J10         Tonometry (Applanation, 1:19 PM)      Right Left   Pressure 16 17         Pupils      Pupils   Right PERRL   Left PERRL         Visual Fields      Left Right   Result Full Full         Extraocular Movement      Right Left   Result Full Full         Dilation     Both eyes:  1.0% Mydriacyl, 2.5% Alejandro Synephrine @ 1:23 PM            Slit Lamp and Fundus Exam     External Exam      Right Left    External Normal Normal      Slit Lamp Exam      Right Left    Lids/Lashes Normal Normal    Conjunctiva/Sclera White and quiet White and quiet    Cornea Clear Clear    Anterior Chamber Deep and quiet Deep and quiet    Iris Round and reactive Round and reactive    Lens Posterior chamber intraocular lens Posterior chamber intraocular lens    Vitreous Normal Normal      Fundus Exam      Right Left    Disc Normal Normal    Macula Normal Normal    Vessels Normal Normal    Periphery Normal Normal            Refraction     Manifest Refraction      Sphere Cylinder Axis Dist   Right -0.50 +1.25 015 20/25   Left [place] +0.75 165 20/25                   Assessment/Plan   Diagnoses and all orders for this visit:    Pseudophakia    Astigmatism, bilateral    Diabetes mellitus without complication      Plan:      Eye disease risks with diabetes discussed  Consider Rx  Recommend ophthalmology f/u one year/prn

## 2017-07-05 ENCOUNTER — OFFICE VISIT (OUTPATIENT)
Dept: CARDIOLOGY | Facility: CLINIC | Age: 77
End: 2017-07-05

## 2017-07-05 VITALS
DIASTOLIC BLOOD PRESSURE: 86 MMHG | OXYGEN SATURATION: 97 % | WEIGHT: 158.25 LBS | HEIGHT: 63 IN | SYSTOLIC BLOOD PRESSURE: 150 MMHG | BODY MASS INDEX: 28.04 KG/M2

## 2017-07-05 DIAGNOSIS — I48.0 PAROXYSMAL ATRIAL FIBRILLATION (HCC): Primary | ICD-10-CM

## 2017-07-05 DIAGNOSIS — E78.2 MIXED HYPERLIPIDEMIA: ICD-10-CM

## 2017-07-05 DIAGNOSIS — I71.20 THORACIC ANEURYSM WITHOUT MENTION OF RUPTURE: ICD-10-CM

## 2017-07-05 PROCEDURE — 99214 OFFICE O/P EST MOD 30 MIN: CPT | Performed by: INTERNAL MEDICINE

## 2017-07-05 NOTE — PROGRESS NOTES
Cardiovascular Medicine      Hima Gill M.D., Ph.D., [facility]         History of Present Illness  This is a 76 y.o. female with:    1. NVAF  A. Anticoagulation CI  2. Dizziness  3. HTN  4. HLD  5.  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  Subclinical PAD  -CT scan June 2016 with moderate to severe aortoiliac calcifications  -ABIs normal 2017  7.  Subclinical atherosclerosis  -CT scan June 2016 with coronary artery calcifications  -MPS normal 2017  8.  Former smoker  9. Dementia  10. ASA intolerance  -possible prior GI bleed  11. Possible cerebral aneurysm    Atrial Fibrillation   The patient's had sensations of irregularity and cardiac awareness.  EKG was obtained and was abnormal.  This showed sinus bradycardia with poor R-wave progression, unable to exclude anterior prior infarction.  Patient underwent a Holter monitor that had episodes of asymptomatic SVT.  However, the variable cycle length was concerning for atrial fibrillation.  She had a repeat monitor.  This showed primarily asymptomatic SVT.  No recent evidence of atrial fibrillation.  Echocardiogram showed no significant valvular abnormalities.  Her rate control agent is currently carvedilol.  She was deemed to not be a candidate for anticoagulation because of a possible cerebral aneurysm and prior episodes of bleeding.  Her symptoms have remarkably improved since her last visit.    Sub-clinical atherosclerosis  Patient's prior CT scan for other reasons revealed calcification of the coronary arteries.  She was not endorsing any overt angina.  She did perform a myocardial perfusion stress which was unremarkable.    Thoracoabdominal aortic aneurysm  Patient recently had a CT scan dated June 2016.  This showed a thoracoabdominal aortic aneurysm at the level of the diaphragmatic hiatus that measured 3.3 cm.  Patient has no family history of aneurysmal diseases.  She does have an appointment with  vascular tomorrow.    Sub-clinical PAD  Patient's most recent CT scan showed moderate to severe aortoiliac calcifications.She had had some pain in her legs primarily when walking.  ABIs returned as normal.      Review of Systems - History obtained from chart review and the patient  General ROS: positive for  - dizziness  Respiratory ROS: positive for - shortness of breath  Cardiovascular ROS: positive for - chest pain, dyspnea on exertion, irregular heartbeat, palpitations and claudication.  All other systems were reviewed and were negative.    family history includes Diabetes in her other; Hypertension in her mother; Stroke in her mother.     reports that she quit smoking about 13 months ago. Her smoking use included Cigarettes. She smoked 0.50 packs per day. She has never used smokeless tobacco. She reports that she does not drink alcohol or use illicit drugs.    Allergies   Allergen Reactions   • Aspirin    • Codeine      UNKNOWN REACTION   • Other      Cipro   • Penicillins    • Sulfa Antibiotics      Sulfa (Sulfonamide Antibiotics)         Current Outpatient Prescriptions:   •  azithromycin (ZITHROMAX) 250 MG tablet, Take 2 tablets the first day, then 1 tablet daily for 4 days., Disp: 6 tablet, Rfl: 0  •  carvedilol (COREG) 6.25 MG tablet, TAKE 1 TABLET BY MOUTH 2 TIMES A DAY WITH MEALS, Disp: 60 tablet, Rfl: 2  •  clopidogrel (PLAVIX) 75 MG tablet, TAKE 1 TABLET BY MOUTH DAILY., Disp: 30 tablet, Rfl: 2  •  cyanocobalamin 1000 MCG/ML injection, INJECT ONE ML SUBCUTANEOUSLY ONCE A MONTH, Disp: 1 mL, Rfl: 4  •  donepezil (ARICEPT) 5 MG tablet, TAKE 1 TABLET BY MOUTH EVERY NIGHT., Disp: 30 tablet, Rfl: 3  •  fluticasone (FLONASE) 50 MCG/ACT nasal spray, 2 sprays into each nostril Daily for 30 days., Disp: 1 each, Rfl: 0  •  gabapentin (NEURONTIN) 300 MG capsule, TAKE 1 CAPSULE BY MOUTH 3 TIMES A DAY, Disp: 90 capsule, Rfl: 2  •  HYDROcodone-acetaminophen (NORCO) 5-325 MG per tablet, Take 1 tablet by mouth Every 6  "(Six) Hours As Needed for Moderate Pain (4-6)., Disp: 40 tablet, Rfl: 0  •  lidocaine (LIDODERM) 5 %, Place 1 patch on the skin Daily. Remove & Discard patch within 12 hours or as directed by MD (Patient taking differently: Place 1 patch on the skin Daily. Remove & Discard patch within 12 hours or as directed by MD (Patient states as needed)), Disp: 30 patch, Rfl: 1  •  nystatin (NYSTOP) 561226 UNIT/GM powder powder, Apply 1 application topically 3 (three) times a day as needed. Indication: Candidiasis of skin and nails, Disp: , Rfl:   •  omeprazole (PRILOSEC) 40 MG capsule, Take 1 capsule by mouth Daily. omeprazole 40 mg capsule,delayed release, Disp: 30 capsule, Rfl: 5  •  potassium chloride (K-DUR,KLOR-CON) 20 MEQ CR tablet, Take 1 tablet by mouth daily., Disp: 30 tablet, Rfl: 2  •  pravastatin (PRAVACHOL) 40 MG tablet, TAKE ONE TABLET BY MOUTH EVERY NIGHT AT BEDTIME, Disp: 30 tablet, Rfl: 5  •  Syringe/Needle, Disp, 25G X 5/8\" 3 ML misc, USE 1 SYRINGE FOR MONTHLY VITAMIN B-12 SHOTS, Disp: 6 each, Rfl: 2  •  traZODone (DESYREL) 100 MG tablet, TAKE 1 TABLET BY MOUTH EVERY NIGHT., Disp: 30 tablet, Rfl: 2  •  venlafaxine XR (EFFEXOR-XR) 75 MG 24 hr capsule, TAKE ONE CAPSULE BY MOUTH THREE TIMES A DAY, Disp: 90 capsule, Rfl: 2    Physical Exam:  Vitals:    07/05/17 1040   BP: 150/86   SpO2: 97%     Body mass index is 28.03 kg/(m^2).    GEN: alert, appears stated age and cooperative  Body Habitus: well-nourished  JVP: 6 cm of water at 45 degrees HJR: absent      Carotid:  Upstroke: easily palpated bilaterally Volume: Normal.    Carotid Bruit:  None  Subclavian Bruit: Not present.    Lymph: No overt LAD.   Back: Normal.  Chest:  Normal Excursion: Good    I:E: Good  Pulmonary:clear to auscultation, no wheezes, rales or rhonchi, symmetric air entry. Equal chest excursion. Chest physical exam is normal. No tenderness.        Precordium:  No palpable heaves or thrusts. P2 is not palpable.   [place]:  normal size and " placement Palpable S4: Not present.   Heart rate: normal  Heart Rhythm: regular     Heart Sounds: S1: normal intensity  S2: normal intensity  S3: absent   S4: absent  Opening Snap: absent  A2-OS:  N/A  Pericardial rub: absent    Ejection click: None      Murmurs: Systolic: none  Diastolic: none  Pulses: Right radial artery has 2+ (normal) pulse and Left radial artery has 2+ (normal) pulse    DATA REVIEWED:     [facility] CARDI[facility]  [address] [place] [address] [address]  [address]  [facility] [address] [phone]             Mariluz Arango   Holter monitor - 48 hour   Order# [id number]   Reading physician: Hima Gill MD PhD Ordering physician: Denisa Powell MD Study date: 17   Patient Information   Patient Name MRN Sex  (Age)   Mariluz Arango [id number] Female 1940 (76 y.o.)   Interpretation Summary   · Predominant rhythm sinus with normal average heart rate  · Asymptomatic Holter  · Asymptomatic episodes of SVT, some with variable cycle length unable to exclude atrial fibrillation     EKG dated 2017.  This was interpreted today.  Sinus bradycardia with poor R-wave progression, unable to exclude prior anterior infarction.    CONCLUSION-   1. Aneurysmal dilatation of the distal descending/proximal abdominal  aorta at the level of the diaphragmatic hiatus measuring 3.3 cm in  diameter, unchanged.  2. Extensive moderate to severe aortoiliac atherosclerotic  calcification, including a large calcific plaque at the origin of the  celiac artery. Celiac artery appears grossly patent.  3. Coronary artery disease.  4. Redundant sigmoid colon.  5. New, mild grade 1 spondylolisthesis at L4-L5.    ABIs, 2017    Interpretation Summary   · Right Conclusion: The right SILVIA is normal.  · Left Conclusion: The left SILVIA is normal.         [facility]  [address] [place] [address] [address] [place] [address] [phone]             Mariluz Francisw    Acquired echocardiogram 2D complete   Order# [id number]   Reading physician: Hima Gill MD PhD Ordering physician: Hima Gill MD PhD Study date: 17   Patient Information   Patient Name MRN Sex  (Age)   Mariluz Arango [id number] Female 1940 (76 y.o.)   Sedation Narrator Report   Sedation Narrator Report   Interpretation Summary   · Left Ventricle: Left ventricular systolic function is normal. Estimated EF appears to be in the range of 61 - 65%. Normal left ventricular cavity size noted  · Left ventricular wall thickness is consistent with mild concentric hypertrophy  · Left ventricular diastolic dysfunction is noted (grade I a w/high LAP) consistent with impaired relaxation. Elevated left atrial pressure  · Right Ventricle: Normal right ventricular cavity size, wall thickness, systolic function and septal motion noted  · Small patent foramen ovale present with right to left shunting indicated by saline contrast  · There is mild pulmonic valve regurgitation present.  · No evidence of pulmonary hypertension is present  · There is no evidence of pericardial effusion         Total Cholesterol 0 - 199 mg/dL 159   Triglycerides 20 - 199 mg/dL 254 (H)   HDL Cholesterol 60 - 200 mg/dL 67   LDL Cholesterol  1 - 129 mg/dL 66   LDL/HDL Ratio 0.00 - 3.22 0.61     PFTs:  Study date: 17     Spirometry  Spirometry demonstrates moderate airway obstruction.     Post Bronchodilator  N/A     Lung Volume  Lung volumes are normal. and Lung volumes were determined by inert gas methodologies, which demonstrated good intrapulmonary gas mixing.     Diffusion  The diffusing capacity for carbon monoxide (DLCO), a reflection of alveolar-capillary gas transport, appears to be moderately reduced. However, hemoglobin (Hb) levels are not available in the pulmonary function laboratory the thus the potential effect of anemia on reducing carbon monoxide uptake cannot be ruled out.     Study  Comparison  N/A     Conclusion  Moderate obstruction with normal lung volumes. Moderately reduced diffusing capacity    Assessment/Plan     1. paroxysmal - 7 days or less Atrial fibrillation. The patient is currently in NSR with a well controlled ventricular rate.  The patient's been having increased cardiac awareness.  Holter monitor certainly revealed asymptomatic SVT.  However, some of the episodes of SVT had significant artifact, but did appear to have significant variable CLs concerning for atrial fibrillation.  She did have evidence of atrial fibrillation on a recent Holter monitor.  I repeated a monitor with a ZIO.  This showed primarily asymptomatic SVT.  No evidence of atrial fibrillation.  I again discussed the risks and benefits of anticoagulation.  She clearly meets indications for anticoagulation, but she's had issues with bleeding in the past with ASA.  I think she is at elevated risk of hemorrhage.    Anticoagulation CI: bleeding disorder, aneurysm  Rate control agents:ordered.  - Agent: beta blocker-Coreg  -Rhythm control agents:not indicated    2.  Subclinical atherosclerosis.  Patient's CT scan documented significant calcification of the coronary arteries.  Myocardial perfusion stress 2017 was without evidence of inducible ischemia.  -Continue risk factor modifications    3.  Subclinical PAD.  Most recent CT scan showed moderate to severe aortoiliac atherosclerosis calcification.  There is also a large calcium plaque at the origin of the celiac artery but celiac arterial flow appeared patent.  She does not ambulate on a regular basis, so it's difficult to obtain claudication symptoms.  Fortunately, ABIs returned as normal in 2017.  -Risk factor modifications    4.  Thoracoabdominal aortic aneurysm at the diaphragmatic hiatus most recently documented at 4.3cm. This was a marked change from last year. I did not pull up the images to measure this, as she has an appt. With TS tomorrow.   -   Alan for surveillance  -Continue beta blocker and risk factor modifications    5. Cardiac Risk Assessment and need for statin therapy: High Risk.   -Statin:  Yes.  -Recommended moderate-intensity statin therapy  -Lipid-lowering medications: Pravachol (pravastatin)  -Recommended ASA    6. Tobacco status: Former smoker.    Plan for follow-up: in 9 months      EMR Dragon/Transcription disclaimer:     Much of this encounter note is an electronic transcription. This may permit erroneous, or at times, nonsensical words or phrases to be inadvertently transcribed; Although I have reviewed the note for such errors, some may still exist.

## 2017-07-06 ENCOUNTER — OFFICE VISIT (OUTPATIENT)
Dept: CARDIAC SURGERY | Facility: CLINIC | Age: 77
End: 2017-07-06

## 2017-07-06 VITALS
HEART RATE: 80 BPM | HEIGHT: 63 IN | BODY MASS INDEX: 28.44 KG/M2 | SYSTOLIC BLOOD PRESSURE: 152 MMHG | DIASTOLIC BLOOD PRESSURE: 88 MMHG | OXYGEN SATURATION: 98 % | WEIGHT: 160.5 LBS

## 2017-07-06 DIAGNOSIS — I48.0 PAROXYSMAL ATRIAL FIBRILLATION (HCC): ICD-10-CM

## 2017-07-06 DIAGNOSIS — K44.9 HIATAL HERNIA: ICD-10-CM

## 2017-07-06 DIAGNOSIS — K21.9 GASTROESOPHAGEAL REFLUX DISEASE WITHOUT ESOPHAGITIS: ICD-10-CM

## 2017-07-06 DIAGNOSIS — I71.20 THORACIC AORTIC ANEURYSM WITHOUT RUPTURE (HCC): Primary | ICD-10-CM

## 2017-07-06 DIAGNOSIS — Z79.4 TYPE 2 DIABETES MELLITUS WITH DIABETIC MONONEUROPATHY, WITH LONG-TERM CURRENT USE OF INSULIN (HCC): ICD-10-CM

## 2017-07-06 DIAGNOSIS — I10 ESSENTIAL HYPERTENSION: ICD-10-CM

## 2017-07-06 DIAGNOSIS — E11.49 NEUROLOGIC DISORDER ASSOCIATED WITH DIABETES MELLITUS (HCC): ICD-10-CM

## 2017-07-06 DIAGNOSIS — E78.00 HYPERCHOLESTEROLEMIA: ICD-10-CM

## 2017-07-06 DIAGNOSIS — E11.41 TYPE 2 DIABETES MELLITUS WITH DIABETIC MONONEUROPATHY, WITH LONG-TERM CURRENT USE OF INSULIN (HCC): ICD-10-CM

## 2017-07-06 PROCEDURE — 99204 OFFICE O/P NEW MOD 45 MIN: CPT | Performed by: THORACIC SURGERY (CARDIOTHORACIC VASCULAR SURGERY)

## 2017-07-06 NOTE — PROGRESS NOTES
2017    Mariluz Arango  1940      Chief Complaint:    Chief Complaint   Patient presents with   • Aortic Aneurysm       HPI:      PCP:  Denisa Powell MD  Cardiology:  Dr Gill    76 y.o. female with Afib(stable), hyperlipidemia(stable), DM2(stable), GERD, hiatal hernia, peripheral neuropathy.  former smoker.  Moderate palpitations x 1 year.  Moderate burning, stinging pain bilateral feet legs x years.  Asymptomatic thoracic aortic aneurysm found on imaging.  Grandmother  with ruptured abdominal aortic aneurysm.  No TIA stroke amaurosis.  No claudication.  Walks with cane..  No other associated signs, symptoms or modifying factors.    2015 CT Abdomen Pelvis:  38mm aorta at diaphragm, 22mm infrarenal.  2016 CT Abdomen Pelvis:  38mm aorta at diaphragm, 22mm infrarenal.  2017 CT Chest:  Ascending aorta 39mm, arch 31mm, descending thoracic aorta 43mm, diaphragm 36mm, infrarenal abdominal 21mm.    2017 Carotid Duplex:  WILEY 0-49%, antegrade vert.  LICA 0-49%, antegrade vert.  2017 SILVIA:  RIGHT 1.0, LEFT 1.0    The following portions of the patient's history were reviewed and updated as appropriate: allergies, current medications, past family history, past medical history, past social history, past surgical history and problem list.  Recent images independently reviewed.  Available laboratory values reviewed.    PMH:  Past Medical History:   Diagnosis Date   • Altered mental status     unspecified    • Anxiety    • Artificial lens present     Artificial lens in position   • Depressive disorder    • Diabetes mellitus     no retinopathy      • Diabetic polyneuropathy    • Diarrhea     unspecified    • Diverticular disease of colon    • Dizziness    • Gastroesophageal reflux disease    • Generalized abdominal pain    • Headache    • Hemorrhoids    • Hiatal hernia    • History of colonic polyps     Personal history of colonic polyps   • History of echocardiogram 2008     Echocardiogram W/O color flow 37486 (1) - Evidence of LVH & diastolic dysfunction & mild LA enlargement, otherwise NRL echocradiogram   • History of mammogram 05/27/2011    MAMMOGRAM DIAGNOSTIC BILATERAL 30716 ([facility]) (1) - benign Birads category 2   • Hypercholesterolemia    • Hypertensive disorder    • Lumbar radiculopathy    • Memory impairment     Multifactorial      • Nausea    • Neurologic disorder associated with diabetes mellitus    • Pain in thoracic spine    • Palpitations    • Polyp of colon     History of polyp of colon   • Vitamin D deficiency        ALLERGIES:  Allergies   Allergen Reactions   • Aspirin    • Codeine      UNKNOWN REACTION   • Other      Cipro   • Penicillins    • Sulfa Antibiotics      Sulfa (Sulfonamide Antibiotics)         MEDICATIONS:    Current Outpatient Prescriptions:   •  carvedilol (COREG) 6.25 MG tablet, TAKE 1 TABLET BY MOUTH 2 TIMES A DAY WITH MEALS, Disp: 60 tablet, Rfl: 2  •  clopidogrel (PLAVIX) 75 MG tablet, TAKE 1 TABLET BY MOUTH DAILY., Disp: 30 tablet, Rfl: 2  •  cyanocobalamin 1000 MCG/ML injection, INJECT ONE ML SUBCUTANEOUSLY ONCE A MONTH, Disp: 1 mL, Rfl: 4  •  donepezil (ARICEPT) 5 MG tablet, TAKE 1 TABLET BY MOUTH EVERY NIGHT., Disp: 30 tablet, Rfl: 3  •  fluticasone (FLONASE) 50 MCG/ACT nasal spray, 2 sprays into each nostril Daily for 30 days., Disp: 1 each, Rfl: 0  •  gabapentin (NEURONTIN) 300 MG capsule, TAKE 1 CAPSULE BY MOUTH 3 TIMES A DAY, Disp: 90 capsule, Rfl: 2  •  HYDROcodone-acetaminophen (NORCO) 5-325 MG per tablet, Take 1 tablet by mouth Every 6 (Six) Hours As Needed for Moderate Pain (4-6)., Disp: 40 tablet, Rfl: 0  •  lidocaine (LIDODERM) 5 %, Place 1 patch on the skin Daily. Remove & Discard patch within 12 hours or as directed by MD (Patient taking differently: Place 1 patch on the skin Daily. Remove & Discard patch within 12 hours or as directed by MD (Patient states as needed)), Disp: 30 patch, Rfl: 1  •  nystatin (NYSTOP) 104516 UNIT/GM powder  "powder, Apply 1 application topically 3 (three) times a day as needed. Indication: Candidiasis of skin and nails, Disp: , Rfl:   •  omeprazole (PRILOSEC) 40 MG capsule, Take 1 capsule by mouth Daily. omeprazole 40 mg capsule,delayed release, Disp: 30 capsule, Rfl: 5  •  potassium chloride (K-DUR,KLOR-CON) 20 MEQ CR tablet, Take 1 tablet by mouth daily., Disp: 30 tablet, Rfl: 2  •  pravastatin (PRAVACHOL) 40 MG tablet, TAKE ONE TABLET BY MOUTH EVERY NIGHT AT BEDTIME, Disp: 30 tablet, Rfl: 5  •  Syringe/Needle, Disp, 25G X 5/8\" 3 ML misc, USE 1 SYRINGE FOR MONTHLY VITAMIN B-12 SHOTS, Disp: 6 each, Rfl: 2  •  traZODone (DESYREL) 100 MG tablet, TAKE 1 TABLET BY MOUTH EVERY NIGHT., Disp: 30 tablet, Rfl: 2  •  venlafaxine XR (EFFEXOR-XR) 75 MG 24 hr capsule, TAKE ONE CAPSULE BY MOUTH THREE TIMES A DAY, Disp: 90 capsule, Rfl: 2    Review of Systems   Review of Systems   Constitution: Positive for weakness and malaise/fatigue. Negative for night sweats and weight loss.   HENT: Negative for hearing loss, hoarse voice and stridor.    Eyes: Negative for vision loss in left eye, vision loss in right eye and visual disturbance.   Cardiovascular: Positive for palpitations. Negative for chest pain and leg swelling.   Respiratory: Negative for cough, hemoptysis and shortness of breath.    Hematologic/Lymphatic: Negative for adenopathy and bleeding problem. Does not bruise/bleed easily.   Skin: Positive for rash. Negative for color change and poor wound healing.   Musculoskeletal: Positive for arthritis, back pain, muscle weakness and neck pain.   Gastrointestinal: Negative for abdominal pain, dysphagia and heartburn.   Neurological: Positive for difficulty with concentration, dizziness, numbness and paresthesias. Negative for seizures.   Psychiatric/Behavioral: Positive for depression and memory loss. Negative for altered mental status. The patient is nervous/anxious.        Physical Exam   Physical Exam   Constitutional: She is " oriented to person, place, and time. She is active and cooperative. She does not appear ill. No distress.   HENT:   Head: Atraumatic.   Right Ear: Hearing normal.   Left Ear: Hearing normal.   Nose: No nasal deformity. No epistaxis.   Mouth/Throat: She does not have dentures. Normal dentition.   Eyes: Conjunctivae and lids are normal. Right pupil is round and reactive. Left pupil is round and reactive.   Neck: No JVD present. Carotid bruit is not present. No tracheal deviation present. No thyroid mass and no thyromegaly present.   Cardiovascular: Normal rate and regular rhythm.    No murmur heard.  Pulses:       Carotid pulses are 2+ on the right side, and 2+ on the left side.       Radial pulses are 2+ on the right side, and 2+ on the left side.        Dorsalis pedis pulses are 2+ on the right side, and 2+ on the left side.        Posterior tibial pulses are 1+ on the right side, and 1+ on the left side.   Pulmonary/Chest: Effort normal and breath sounds normal.   Abdominal: Soft. She exhibits no distension and no mass. There is no splenomegaly or hepatomegaly. There is no tenderness.   Musculoskeletal: She exhibits no deformity.   Gait normal.    Lymphadenopathy:     She has no cervical adenopathy.        Right: No supraclavicular adenopathy present.        Left: No supraclavicular adenopathy present.   Neurological: She is alert and oriented to person, place, and time. She has normal strength.   Skin: Skin is warm and dry. No cyanosis or erythema. No pallor.   No venous staining   Psychiatric: She has a normal mood and affect. Her speech is normal. Judgment and thought content normal.   Results for MARILUZ CONTE (MRN [id number]) as of 7/6/2017 16:10   Ref. Range 5/19/2017 10:48   Creatinine Latest Ref Range: 0.50 - 1.00 mg/dL 0.74   BUN Latest Ref Range: 7 - 21 mg/dL 12       ASSESSMENT:  Mariluz was seen today for aortic aneurysm.    Diagnoses and all orders for this visit:    Thoracic aortic aneurysm without  rupture  -     CT Chest Without Contrast; Future    Hypercholesterolemia    Essential hypertension    Paroxysmal atrial fibrillation    Hiatal hernia    Gastroesophageal reflux disease without esophagitis    Neurologic disorder associated with diabetes mellitus    Type 2 diabetes mellitus with diabetic mononeuropathy, with long-term current use of insulin    PLAN:  Detailed discussion with Ms Arango regarding situation and options.  Aneurysm descending thoracic Aorta.  Surgical intervention not indicated at this time.  Will plan to follow with interval imaging.  BP control in 120 range.  Discussed symptoms of rupture, details of surgical repair including  Endovascular and open repair.  Risks, benefits discussed.  Understands and wishes to proceed with plan    Return in 1 year with CT Chest without contrast    Recommended regular physical activity, progressive walking program.  Continue current medications as directed.  Will Obtain relevant old records.    Thank you for the opportunity to participate in this patient's care.    Copy to primary care provider.    EMR Dragon/Transcription disclaimer:   Much of this encounter note is an electronic transcription/translation of spoken language to printed text. The electronic translation of spoken language may permit erroneous, or at times, nonsensical words or phrases to be inadvertently transcribed; Although I have reviewed the note for such errors, some may still exist.

## 2017-07-10 ENCOUNTER — TELEPHONE (OUTPATIENT)
Dept: FAMILY MEDICINE CLINIC | Facility: CLINIC | Age: 77
End: 2017-07-10

## 2017-07-10 RX ORDER — TRAMADOL HYDROCHLORIDE 50 MG/1
50 TABLET ORAL EVERY 6 HOURS PRN
Qty: 40 TABLET | Refills: 0 | Status: SHIPPED | OUTPATIENT
Start: 2017-07-10 | End: 2017-07-12 | Stop reason: SDUPTHER

## 2017-07-10 NOTE — TELEPHONE ENCOUNTER
Patient called because she was having issues with the pain medication and she has been having itching.  She has had severe pain.  She is allergic to codeine.  She isn't sure what she can take that she won't have issues.

## 2017-07-11 RX ORDER — FLUTICASONE PROPIONATE 50 MCG
SPRAY, SUSPENSION (ML) NASAL
Qty: 16 G | Refills: 5 | Status: SHIPPED | OUTPATIENT
Start: 2017-07-11 | End: 2019-03-21

## 2017-07-11 RX ORDER — OMEPRAZOLE 40 MG/1
CAPSULE, DELAYED RELEASE ORAL
Qty: 30 CAPSULE | Refills: 5 | Status: SHIPPED | OUTPATIENT
Start: 2017-07-11 | End: 2018-01-20 | Stop reason: SDUPTHER

## 2017-07-11 NOTE — TELEPHONE ENCOUNTER
Pharmacy called and confirmed that Dr. JENNIFER Powell did fax the script for Tramadol 50 mg #40.    ---- Message from Ceci Valero sent at 7/11/2017  9:59 AM CDT -----  Contact: [facility]  PT NEEDS REFILL FOR TRAMODAL 50MG 40 TAB WITH NO REFILLS.     CAMILO FROM [facility] [phone]

## 2017-07-12 ENCOUNTER — APPOINTMENT (OUTPATIENT)
Dept: LAB | Facility: HOSPITAL | Age: 77
End: 2017-07-12

## 2017-07-12 ENCOUNTER — OFFICE VISIT (OUTPATIENT)
Dept: FAMILY MEDICINE CLINIC | Facility: CLINIC | Age: 77
End: 2017-07-12

## 2017-07-12 VITALS
HEIGHT: 63 IN | WEIGHT: 159.2 LBS | DIASTOLIC BLOOD PRESSURE: 72 MMHG | SYSTOLIC BLOOD PRESSURE: 146 MMHG | BODY MASS INDEX: 28.21 KG/M2

## 2017-07-12 DIAGNOSIS — G30.0 EARLY ONSET ALZHEIMER'S DEMENTIA WITHOUT BEHAVIORAL DISTURBANCE (HCC): ICD-10-CM

## 2017-07-12 DIAGNOSIS — G89.29 CHRONIC BILATERAL LOW BACK PAIN WITH RIGHT-SIDED SCIATICA: Primary | ICD-10-CM

## 2017-07-12 DIAGNOSIS — R35.0 URINARY FREQUENCY: ICD-10-CM

## 2017-07-12 DIAGNOSIS — F02.80 EARLY ONSET ALZHEIMER'S DEMENTIA WITHOUT BEHAVIORAL DISTURBANCE (HCC): ICD-10-CM

## 2017-07-12 DIAGNOSIS — I10 ESSENTIAL HYPERTENSION: ICD-10-CM

## 2017-07-12 DIAGNOSIS — Z79.899 HIGH RISK MEDICATION USE: ICD-10-CM

## 2017-07-12 DIAGNOSIS — M54.41 CHRONIC BILATERAL LOW BACK PAIN WITH RIGHT-SIDED SCIATICA: Primary | ICD-10-CM

## 2017-07-12 LAB
BILIRUB BLD-MCNC: NEGATIVE MG/DL
CLARITY, POC: ABNORMAL
COLOR UR: YELLOW
GLUCOSE UR STRIP-MCNC: NEGATIVE MG/DL
KETONES UR QL: NEGATIVE
LEUKOCYTE EST, POC: ABNORMAL
NITRITE UR-MCNC: POSITIVE MG/ML
PH UR: 7 [PH] (ref 5–8)
PROT UR STRIP-MCNC: ABNORMAL MG/DL
RBC # UR STRIP: ABNORMAL /UL
SP GR UR: 1 (ref 1–1.03)
UROBILINOGEN UR QL: ABNORMAL

## 2017-07-12 PROCEDURE — 81002 URINALYSIS NONAUTO W/O SCOPE: CPT | Performed by: FAMILY MEDICINE

## 2017-07-12 PROCEDURE — 99214 OFFICE O/P EST MOD 30 MIN: CPT | Performed by: FAMILY MEDICINE

## 2017-07-12 PROCEDURE — G0481 DRUG TEST DEF 8-14 CLASSES: HCPCS | Performed by: FAMILY MEDICINE

## 2017-07-12 PROCEDURE — 80307 DRUG TEST PRSMV CHEM ANLYZR: CPT | Performed by: FAMILY MEDICINE

## 2017-07-12 RX ORDER — TRAMADOL HYDROCHLORIDE 50 MG/1
50 TABLET ORAL EVERY 6 HOURS PRN
Qty: 120 TABLET | Refills: 1 | Status: SHIPPED | OUTPATIENT
Start: 2017-07-12 | End: 2017-08-11

## 2017-07-12 RX ORDER — LEVOFLOXACIN 500 MG/1
500 TABLET, FILM COATED ORAL DAILY
Qty: 5 TABLET | Refills: 0 | Status: SHIPPED | OUTPATIENT
Start: 2017-07-12 | End: 2017-08-11

## 2017-07-12 RX ORDER — DONEPEZIL HYDROCHLORIDE 10 MG/1
10 TABLET, FILM COATED ORAL NIGHTLY
Qty: 90 TABLET | Refills: 2 | Status: SHIPPED | OUTPATIENT
Start: 2017-07-12 | End: 2018-04-21 | Stop reason: SDUPTHER

## 2017-07-12 RX ORDER — GABAPENTIN 300 MG/1
300 CAPSULE ORAL DAILY
Qty: 90 CAPSULE | Refills: 2 | Status: SHIPPED | OUTPATIENT
Start: 2017-07-12 | End: 2017-09-08 | Stop reason: SDUPTHER

## 2017-07-12 RX ORDER — FUROSEMIDE 20 MG/1
20 TABLET ORAL DAILY
Qty: 90 TABLET | Refills: 2 | Status: SHIPPED | OUTPATIENT
Start: 2017-07-12 | End: 2018-04-02

## 2017-07-12 NOTE — PROGRESS NOTES
Subjective   Mariluz Arango is a 76 y.o. female.     History of Present Illness   Ms. Arango is a 75yo female that presents today for issues with worsened back pain and lower abd pain. She says that she has been in more pain over the last week and has become almost unbearable.  She has had some urinary urgency.  She says that she has had several surgeries to have her bowels removed.  She had SBO in the past. She doesn't think that this is like that.  She has been very active at home and bending more then she usually does.  She tried her pain medication but she didn't tolerate that.   She had a lot of itching.  She hasn't had any accidents or falls.    Abd pain is located in lower abd. She feels pressure in her bladder.  She says that her blood has been running higher then usual.  She has had this pressure since she had a bladder infection several months ago.   She has had issues with back pain for years.  Located in her low back.  Located in her low back and her right flank. She has had shooting pains down her right leg.  She says that walking makes it worse and she has felt weak at times.  She has felt that she might fall, but she hasn't.  She has had a low back surgery in the past.  I called her in tramadol and that seemed to help her a lot.    She has had urinary issues at night. She has had some swelling in her legs and her BP has been running higher.  She has been taking her medications and not missing.    She has still been having a lot of issues with her memory. She has been on Aricept and tolerating that well. Would like to have that increased.  She feels that her memory has gotten worse and she has been getting angry more often and having a hard time controlling it.        Current Outpatient Prescriptions:   •  carvedilol (COREG) 6.25 MG tablet, TAKE 1 TABLET BY MOUTH 2 TIMES A DAY WITH MEALS, Disp: 60 tablet, Rfl: 2  •  clopidogrel (PLAVIX) 75 MG tablet, TAKE 1 TABLET BY MOUTH DAILY., Disp: 30 tablet,  "Rfl: 2  •  cyanocobalamin 1000 MCG/ML injection, INJECT ONE ML SUBCUTANEOUSLY ONCE A MONTH, Disp: 1 mL, Rfl: 4  •  donepezil (ARICEPT) 5 MG tablet, TAKE 1 TABLET BY MOUTH EVERY NIGHT., Disp: 30 tablet, Rfl: 3  •  fluticasone (FLONASE) 50 MCG/ACT nasal spray, INSTILL 2 SPRAYS INTO EACH NOSTRIL DAILY, Disp: 16 g, Rfl: 5  •  gabapentin (NEURONTIN) 300 MG capsule, TAKE 1 CAPSULE BY MOUTH 3 TIMES A DAY, Disp: 90 capsule, Rfl: 2  •  lidocaine (LIDODERM) 5 %, Place 1 patch on the skin Daily. Remove & Discard patch within 12 hours or as directed by MD (Patient taking differently: Place 1 patch on the skin Daily. Remove & Discard patch within 12 hours or as directed by MD (Patient states as needed)), Disp: 30 patch, Rfl: 1  •  nystatin (NYSTOP) 656388 UNIT/GM powder powder, Apply 1 application topically 3 (three) times a day as needed. Indication: Candidiasis of skin and nails, Disp: , Rfl:   •  omeprazole (priLOSEC) 40 MG capsule, TAKE 1 CAPSULE BY MOUTH DAILY., Disp: 30 capsule, Rfl: 5  •  potassium chloride (K-DUR,KLOR-CON) 20 MEQ CR tablet, Take 1 tablet by mouth daily., Disp: 30 tablet, Rfl: 2  •  pravastatin (PRAVACHOL) 40 MG tablet, TAKE ONE TABLET BY MOUTH EVERY NIGHT AT BEDTIME, Disp: 30 tablet, Rfl: 5  •  Syringe/Needle, Disp, 25G X 5/8\" 3 ML misc, USE 1 SYRINGE FOR MONTHLY VITAMIN B-12 SHOTS, Disp: 6 each, Rfl: 2  •  traMADol (ULTRAM) 50 MG tablet, Take 1 tablet by mouth Every 6 (Six) Hours As Needed for Moderate Pain (4-6)., Disp: 40 tablet, Rfl: 0  •  traZODone (DESYREL) 100 MG tablet, TAKE 1 TABLET BY MOUTH EVERY NIGHT., Disp: 30 tablet, Rfl: 2  •  venlafaxine XR (EFFEXOR-XR) 75 MG 24 hr capsule, TAKE ONE CAPSULE BY MOUTH THREE TIMES A DAY, Disp: 90 capsule, Rfl: 2    The following portions of the patient's history were reviewed and updated as appropriate: allergies, current medications, past family history, past medical history, past social history, past surgical history and problem list.    Review of Systems " "  Constitutional: Positive for activity change, appetite change and fatigue. Negative for chills, fever and unexpected weight change.   Eyes: Negative for visual disturbance.   Respiratory: Negative for cough, shortness of breath and wheezing.    Cardiovascular: Positive for leg swelling. Negative for chest pain and palpitations.   Gastrointestinal: Positive for abdominal pain. Negative for abdominal distention, constipation, diarrhea, nausea and vomiting.   Musculoskeletal: Positive for arthralgias, back pain, gait problem and myalgias. Negative for joint swelling.   Skin: Negative for pallor, rash and wound.   Neurological: Positive for weakness. Negative for headaches.   Psychiatric/Behavioral: Positive for confusion and sleep disturbance. Negative for dysphoric mood. The patient is not nervous/anxious.        Objective    Vitals:    07/12/17 0751   BP: 146/72   Weight: 159 lb 3.2 oz (72.2 kg)   Height: 63\" (160 cm)       Physical Exam   Constitutional: She is oriented to person, place, and time. She appears well-developed and well-nourished. No distress.   Cardiovascular: Normal rate, regular rhythm and normal heart sounds.    No murmur heard.  No LE edema.   Pulmonary/Chest: Effort normal and breath sounds normal. No respiratory distress.   Abdominal: Soft. Bowel sounds are normal. She exhibits no distension. There is tenderness. There is no rebound and no guarding.   Musculoskeletal:        Lumbar back: She exhibits deformity, pain and spasm. She exhibits normal range of motion and no tenderness.   Neurological: She is alert and oriented to person, place, and time.   Psychiatric: She has a normal mood and affect. Her behavior is normal. Judgment and thought content normal.   Nursing note and vitals reviewed.      Assessment/Plan   Problems Addressed this Visit        Cardiovascular and Mediastinum    Hypertensive disorder    Relevant Medications    furosemide (LASIX) 20 MG tablet       Nervous and Auditory    " Early onset Alzheimer's dementia without behavioral disturbance    Relevant Medications    donepezil (ARICEPT) 10 MG tablet      Other Visit Diagnoses     Chronic bilateral low back pain with right-sided sciatica    -  Primary    Relevant Orders    Ambulatory Referral to Physical Therapy Evaluate and treat    Urinary frequency        Relevant Orders    Urine culture (clean catch)    High risk medication use        Relevant Orders    ToxASSURE Select 13 (MW)        1.) Back Pain- She has had MRI in May.  Likely not surgical candidate. Will refer to PT again.  UDS today. Will continue tramadol because she is tolerating that well.  Can't take NSAIDS.  The patient has read and signed the [facility] Controlled Substance Contract.  I will continue to see patient for regular follow up appointments.  They are well controlled on their medication.  YASMEEN has been reviewed by me and is updated every 3 months. The patient is aware of the potential for addiction and dependence.  2.) HTN-  Has been running a little high and she is having some swelling. Will add low dose lasix in the am and see if that helps.  3.) Urinary retention/UTI-  Will treat with levaquin. Send culture also.  4.) Dementia- Will increase her Aricept.  RTC in 1 months or sooner PRN.  Has Medicare wellness at that appointment. Brought her paperwork and we will file it so she doesn't have to bring it next time.

## 2017-07-13 DIAGNOSIS — F41.1 GENERALIZED ANXIETY DISORDER: ICD-10-CM

## 2017-07-13 LAB — BACTERIA SPEC AEROBE CULT: NORMAL

## 2017-07-13 RX ORDER — VENLAFAXINE HYDROCHLORIDE 75 MG/1
CAPSULE, EXTENDED RELEASE ORAL
Qty: 90 CAPSULE | Refills: 2 | Status: SHIPPED | OUTPATIENT
Start: 2017-07-13 | End: 2017-11-09 | Stop reason: DRUGHIGH

## 2017-07-22 LAB — CONV REPORT SUMMARY: NORMAL

## 2017-08-07 RX ORDER — PRAVASTATIN SODIUM 40 MG
TABLET ORAL
Qty: 30 TABLET | Refills: 5 | Status: SHIPPED | OUTPATIENT
Start: 2017-08-07 | End: 2018-02-03 | Stop reason: SDUPTHER

## 2017-08-11 ENCOUNTER — OFFICE VISIT (OUTPATIENT)
Dept: FAMILY MEDICINE CLINIC | Facility: CLINIC | Age: 77
End: 2017-08-11

## 2017-08-11 ENCOUNTER — APPOINTMENT (OUTPATIENT)
Dept: LAB | Facility: HOSPITAL | Age: 77
End: 2017-08-11

## 2017-08-11 VITALS
HEIGHT: 63 IN | SYSTOLIC BLOOD PRESSURE: 98 MMHG | WEIGHT: 158.7 LBS | BODY MASS INDEX: 28.12 KG/M2 | DIASTOLIC BLOOD PRESSURE: 70 MMHG

## 2017-08-11 DIAGNOSIS — Z00.00 INITIAL MEDICARE ANNUAL WELLNESS VISIT: Primary | ICD-10-CM

## 2017-08-11 DIAGNOSIS — E11.9 WELL CONTROLLED TYPE 2 DIABETES MELLITUS (HCC): ICD-10-CM

## 2017-08-11 DIAGNOSIS — G89.29 CHRONIC BILATERAL LOW BACK PAIN WITH RIGHT-SIDED SCIATICA: ICD-10-CM

## 2017-08-11 DIAGNOSIS — M54.41 CHRONIC BILATERAL LOW BACK PAIN WITH RIGHT-SIDED SCIATICA: ICD-10-CM

## 2017-08-11 DIAGNOSIS — L40.9 SCALP PSORIASIS: ICD-10-CM

## 2017-08-11 LAB
ALBUMIN SERPL-MCNC: 4 G/DL (ref 3.4–4.8)
ALBUMIN/GLOB SERPL: 1.3 G/DL (ref 1.1–1.8)
ALP SERPL-CCNC: 66 U/L (ref 38–126)
ALT SERPL W P-5'-P-CCNC: 30 U/L (ref 9–52)
ANION GAP SERPL CALCULATED.3IONS-SCNC: 9 MMOL/L (ref 5–15)
AST SERPL-CCNC: 28 U/L (ref 14–36)
BILIRUB SERPL-MCNC: 0.4 MG/DL (ref 0.2–1.3)
BUN BLD-MCNC: 13 MG/DL (ref 7–21)
BUN/CREAT SERPL: 15.7 (ref 7–25)
CALCIUM SPEC-SCNC: 9 MG/DL (ref 8.4–10.2)
CHLORIDE SERPL-SCNC: 98 MMOL/L (ref 95–110)
CO2 SERPL-SCNC: 30 MMOL/L (ref 22–31)
CREAT BLD-MCNC: 0.83 MG/DL (ref 0.5–1)
DEPRECATED RDW RBC AUTO: 44.6 FL (ref 36.4–46.3)
ERYTHROCYTE [DISTWIDTH] IN BLOOD BY AUTOMATED COUNT: 13.5 % (ref 11.5–14.5)
GFR SERPL CREATININE-BSD FRML MDRD: 66 ML/MIN/1.73 (ref 39–90)
GLOBULIN UR ELPH-MCNC: 3 GM/DL (ref 2.3–3.5)
GLUCOSE BLD-MCNC: 100 MG/DL (ref 60–100)
HBA1C MFR BLD: 6.1 % (ref 4–5.6)
HCT VFR BLD AUTO: 38.4 % (ref 35–45)
HGB BLD-MCNC: 12.2 G/DL (ref 12–15.5)
MCH RBC QN AUTO: 28.7 PG (ref 26.5–34)
MCHC RBC AUTO-ENTMCNC: 31.8 G/DL (ref 31.4–36)
MCV RBC AUTO: 90.4 FL (ref 80–98)
PLATELET # BLD AUTO: 224 10*3/MM3 (ref 150–450)
PMV BLD AUTO: 10 FL (ref 8–12)
POTASSIUM BLD-SCNC: 4.2 MMOL/L (ref 3.5–5.1)
PROT SERPL-MCNC: 7 G/DL (ref 6.3–8.6)
RBC # BLD AUTO: 4.25 10*6/MM3 (ref 3.77–5.16)
SODIUM BLD-SCNC: 137 MMOL/L (ref 137–145)
WBC NRBC COR # BLD: 7.78 10*3/MM3 (ref 3.2–9.8)

## 2017-08-11 PROCEDURE — G0438 PPPS, INITIAL VISIT: HCPCS | Performed by: FAMILY MEDICINE

## 2017-08-11 PROCEDURE — 80053 COMPREHEN METABOLIC PANEL: CPT | Performed by: FAMILY MEDICINE

## 2017-08-11 PROCEDURE — 83036 HEMOGLOBIN GLYCOSYLATED A1C: CPT | Performed by: FAMILY MEDICINE

## 2017-08-11 PROCEDURE — 85027 COMPLETE CBC AUTOMATED: CPT | Performed by: FAMILY MEDICINE

## 2017-08-11 PROCEDURE — 36415 COLL VENOUS BLD VENIPUNCTURE: CPT | Performed by: FAMILY MEDICINE

## 2017-08-11 PROCEDURE — 99213 OFFICE O/P EST LOW 20 MIN: CPT | Performed by: FAMILY MEDICINE

## 2017-08-11 RX ORDER — HYDROCODONE BITARTRATE AND ACETAMINOPHEN 5; 325 MG/1; MG/1
1 TABLET ORAL 3 TIMES DAILY PRN
Qty: 90 TABLET | Refills: 0 | Status: SHIPPED | OUTPATIENT
Start: 2017-08-11 | End: 2017-11-09 | Stop reason: DRUGHIGH

## 2017-08-11 RX ORDER — BLOOD SUGAR DIAGNOSTIC
STRIP MISCELLANEOUS
Refills: 11 | COMMUNITY
Start: 2017-07-12

## 2017-08-11 NOTE — PROGRESS NOTES
Subjective   Mariluz Arango is a 76 y.o. female.     History of Present Illness   Ms. Arango is a 77yo female that presents today for follow-up on hypertension, diabetes, and scalp psoriasis.    She says that her scalp has been really itching and she hasn't been able to get her hair done. She is constantly itching. She has tried OTC remedies, steroid shampoo, and ketoconazole shampoo. Hasn't given any relief.    Her BP had been running high but we added a low dose of lasix and that has helped that a lot. She has had some swelling and that is better also. No issues with hypotension.  She hasn't been missing any medications.  Her blood sugars have been running a little better. She has been not taking medications, but she watches her diet. She has had her foot exam and her eye exam. She checks her sugars daily and has been good.    Her back has still been trying to get all that she needs done. 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  She didn't do well with narcotics. She had been started on tramadol and that isn't helping much. She is allergic to codeine.  She has been using lidocaine patches also and that has given temporary relief.      Current Outpatient Prescriptions:   •  carvedilol (COREG) 6.25 MG tablet, TAKE 1 TABLET BY MOUTH 2 TIMES A DAY WITH MEALS, Disp: 60 tablet, Rfl: 2  •  clopidogrel (PLAVIX) 75 MG tablet, TAKE 1 TABLET BY MOUTH DAILY., Disp: 30 tablet, Rfl: 2  •  cyanocobalamin 1000 MCG/ML injection, INJECT ONE ML SUBCUTANEOUSLY ONCE A MONTH, Disp: 1 mL, Rfl: 4  •  donepezil (ARICEPT) 10 MG tablet, Take 1 tablet by mouth Every Night., Disp: 90 tablet, Rfl: 2  •  fluticasone (FLONASE) 50 MCG/ACT nasal spray, INSTILL 2 SPRAYS INTO EACH NOSTRIL DAILY, Disp: 16 g, Rfl: 5  •  furosemide (LASIX) 20 MG tablet, Take 1 tablet by mouth Daily., Disp: 90 tablet, Rfl: 2  •  gabapentin (NEURONTIN) 300 MG capsule, Take 1 capsule by mouth Daily., Disp: 90 capsule, Rfl: 2  •  lidocaine  "(LIDODERM) 5 %, Place 1 patch on the skin Daily. Remove & Discard patch within 12 hours or as directed by MD (Patient taking differently: Place 1 patch on the skin Daily. Remove & Discard patch within 12 hours or as directed by MD (Patient states as needed)), Disp: 30 patch, Rfl: 1  •  nystatin (NYSTOP) [id number] UNIT/GM powder powder, Apply 1 application topically 3 (three) times a day as needed. Indication: Candidiasis of skin and nails, Disp: , Rfl:   •  omeprazole (priLOSEC) 40 MG capsule, TAKE 1 CAPSULE BY MOUTH DAILY., Disp: 30 capsule, Rfl: 5  •  ONETOUCH VERIO test strip, , Disp: , Rfl: 11  •  potassium chloride (K-DUR,KLOR-CON) 20 MEQ CR tablet, Take 1 tablet by mouth daily., Disp: 30 tablet, Rfl: 2  •  pravastatin (PRAVACHOL) 40 MG tablet, TAKE ONE TABLET BY MOUTH EVERY NIGHT AT BEDTIME, Disp: 30 tablet, Rfl: 5  •  Syringe/Needle, Disp, 25G X 5/8\" 3 ML misc, USE 1 SYRINGE FOR MONTHLY VITAMIN B-12 SHOTS, Disp: 6 each, Rfl: 2  •  traMADol (ULTRAM) 50 MG tablet, Take 1 tablet by mouth Every 6 (Six) Hours As Needed for Moderate Pain (4-6)., Disp: 120 tablet, Rfl: 1  •  traZODone (DESYREL) 100 MG tablet, TAKE 1 TABLET BY MOUTH EVERY NIGHT., Disp: 30 tablet, Rfl: 2  •  venlafaxine XR (EFFEXOR-XR) 75 MG 24 hr capsule, TAKE ONE CAPSULE BY MOUTH THREE TIMES A DAY, Disp: 90 capsule, Rfl: 2    The following portions of the patient's history were reviewed and updated as appropriate: allergies, current medications, past family history, past medical history, past social history, past surgical history and problem list.    Review of Systems   Constitutional: Positive for fatigue. Negative for activity change, appetite change and unexpected weight change.   Eyes: Negative for visual disturbance.   Respiratory: Negative for cough, shortness of breath and wheezing.    Cardiovascular: Negative for chest pain, palpitations and leg swelling.   Gastrointestinal: Negative for abdominal distention, abdominal pain, constipation, " "diarrhea, nausea and vomiting.   Musculoskeletal: Positive for back pain. Negative for arthralgias, gait problem and joint swelling.   Skin: Positive for rash. Negative for pallor and wound.   Neurological: Positive for weakness. Negative for headaches.   Psychiatric/Behavioral: Negative for dysphoric mood and sleep disturbance. The patient is not nervous/anxious.        Objective    Vitals:    08/11/17 1115   BP: 98/70   Weight: 158 lb 11.2 oz (72 kg)   Height: 63\" (160 cm)       Physical Exam  Constitutional: She is oriented to person, place, and time. She appears well-developed and well-nourished. No distress.   Cardiovascular: Normal rate, regular rhythm and normal heart sounds.    No murmur heard.  No LE edema.   Pulmonary/Chest: Effort normal and breath sounds normal. No respiratory distress.   Abdominal: Soft. Bowel sounds are normal. She exhibits no distension. There is tenderness. There is no rebound and no guarding.   Musculoskeletal:        Lumbar back: She exhibits deformity, pain and spasm. She exhibits normal range of motion and no tenderness.   Neurological: She is alert and oriented to person, place, and time.   Skin:   Skin on scalp on the temporal left side is thickened, white, and scaly   Psychiatric: She has a normal mood and affect. Her behavior is normal. Judgment and thought content normal.   Nursing note and vitals reviewed.    Assessment/Plan   Problems Addressed this Visit     None      Visit Diagnoses     Initial Medicare annual wellness visit    -  Primary    Scalp psoriasis        Relevant Orders    Ambulatory Referral to Dermatology    Chronic bilateral low back pain with right-sided sciatica        Relevant Medications    HYDROcodone-acetaminophen (NORCO) 5-325 MG per tablet    Other Relevant Orders    Ambulatory Referral to Pain Management    Well controlled type 2 diabetes mellitus        Relevant Orders    CBC (No Diff) (Completed)    Comprehensive Metabolic Panel (Completed)    " Hemoglobin A1c (Completed)        1.) Medicare Wellness-  See additional note.  2.) Psoriasis-  Will refer to dermatology. We have tried many treatments without relief.  3.) Back Pain-  Seems to be getting worse.  Has been limiting her. We tried tramadol and that isn't helping. Will give low dose hydrocodone. Refer to Pain Management.  UDS in chart reviewed. The patient has read and signed the [facility] Controlled Substance Contract.  I will continue to see patient for regular follow up appointments.  They are well controlled on their medication.  YASMEEN has been reviewed by me and is updated every 3 months. The patient is aware of the potential for addiction and dependence.  4.) DM-  Will recheck A1C, CMP today.  Continue to watch diet and monitor at home.  RTC in 1 month or sooner PRN

## 2017-08-11 NOTE — PROGRESS NOTES
QUICK REFERENCE INFORMATION:  The ABCs of the Annual Wellness Visit    Initial Medicare Wellness Visit    HEALTH RISK ASSESSMENT    1940    Recent Hospitalizations:  Recently treated at the following:  [facility].        Current Medical Providers:  Patient Care Team:  Denisa Powell MD as PCP - General (Family Medicine)  Denisa Powell MD as PCP - Claims Attributed  Dr. Gill- Cardiology  Dr. Nix- GI      Smoking Status:  History   Smoking Status   • Former Smoker   • Packs/day: 0.50   • Types: Cigarettes   • Quit date: 06/2016   Smokeless Tobacco   • Never Used     Comment: 0.5 - 1 Pack(s) Of Cigarettes Per Day       Alcohol Consumption:  History   Alcohol Use No       Depression Screen:   PHQ-9 Depression Screening 8/11/2017   Little interest or pleasure in doing things 2   Feeling down, depressed, or hopeless 2   Trouble falling or staying asleep, or sleeping too much 2   Feeling tired or having little energy 2   Poor appetite or overeating 2   Feeling bad about yourself - or that you are a failure or have let yourself or your family down 0   Trouble concentrating on things, such as reading the newspaper or watching television 1   Moving or speaking so slowly that other people could have noticed. Or the opposite - being so fidgety or restless that you have been moving around a lot more than usual 1   Thoughts that you would be better off dead, or of hurting yourself in some way 0   PHQ-9 Total Score 12   If you checked off any problems, how difficult have these problems made it for you to do your work, take care of things at home, or get along with other people? Somewhat difficult       Health Habits and Functional and Cognitive Screening:  Functional & Cognitive Status 8/11/2017   Do you have difficulty preparing food and eating? No   Do you have difficulty bathing yourself? No   Do you have difficulty getting dressed? No   Do you have difficulty using the  toilet? No   Do you have difficulty moving around from place to place? Yes   In the past year have you fallen or experienced a near fall? Yes   Do you need help using the phone?  No   Are you deaf or do you have serious difficulty hearing?  No   Do you need help with transportation? Yes   Do you need help shopping? No   Do you need help preparing meals?  No   Do you need help with housework?  No   Do you need help with laundry? No   Do you need help taking your medications? No   Do you need help managing money? No   Do you have difficulty concentrating, remembering or making decisions? Yes       Health Habits  Current Diet: Unhealthy Diet  Dental Exam: Up to date  Eye Exam: Not up to date  Exercise (times per week): 0 times per week  Current Exercise Activities Include: None          Does the patient have evidence of cognitive impairment? No    Asiprin use counseling: Taking ASA appropriately as indicated      Recent Lab Results:  Pending    Visual Acuity:  No exam data present    Age-appropriate Screening Schedule:  Refer to the list below for future screening recommendations based on patient's age, sex and/or medical conditions. Orders for these recommended tests are listed in the plan section. The patient has been provided with a written plan.    Health Maintenance   Topic Date Due   • TDAP/TD VACCINES (1 - Tdap) 11/18/1959   • ZOSTER VACCINE  08/08/2016   • HEMOGLOBIN A1C  07/30/2017   • INFLUENZA VACCINE  09/01/2017   • DIABETIC FOOT EXAM  03/30/2018   • LIPID PANEL  05/19/2018   • DIABETIC EYE EXAM  06/29/2018   • PNEUMOCOCCAL VACCINES (65+ LOW/MEDIUM RISK)  Addressed        Subjective   History of Present Illness    Mariluz Arango is a 76 y.o. female who presents for an Annual Wellness Visit.    The following portions of the patient's history were reviewed and updated as appropriate: allergies, current medications, past family history, past medical history, past social history, past surgical history and  "problem list.    Outpatient Medications Prior to Visit   Medication Sig Dispense Refill   • carvedilol (COREG) 6.25 MG tablet TAKE 1 TABLET BY MOUTH 2 TIMES A DAY WITH MEALS 60 tablet 2   • clopidogrel (PLAVIX) 75 MG tablet TAKE 1 TABLET BY MOUTH DAILY. 30 tablet 2   • cyanocobalamin 1000 MCG/ML injection INJECT ONE ML SUBCUTANEOUSLY ONCE A MONTH 1 mL 4   • donepezil (ARICEPT) 10 MG tablet Take 1 tablet by mouth Every Night. 90 tablet 2   • fluticasone (FLONASE) 50 MCG/ACT nasal spray INSTILL 2 SPRAYS INTO EACH NOSTRIL DAILY 16 g 5   • furosemide (LASIX) 20 MG tablet Take 1 tablet by mouth Daily. 90 tablet 2   • gabapentin (NEURONTIN) 300 MG capsule Take 1 capsule by mouth Daily. 90 capsule 2   • lidocaine (LIDODERM) 5 % Place 1 patch on the skin Daily. Remove & Discard patch within 12 hours or as directed by MD (Patient taking differently: Place 1 patch on the skin Daily. Remove & Discard patch within 12 hours or as directed by MD (Patient states as needed)) 30 patch 1   • nystatin (NYSTOP) 340027 UNIT/GM powder powder Apply 1 application topically 3 (three) times a day as needed. Indication: Candidiasis of skin and nails     • omeprazole (priLOSEC) 40 MG capsule TAKE 1 CAPSULE BY MOUTH DAILY. 30 capsule 5   • potassium chloride (K-DUR,KLOR-CON) 20 MEQ CR tablet Take 1 tablet by mouth daily. 30 tablet 2   • pravastatin (PRAVACHOL) 40 MG tablet TAKE ONE TABLET BY MOUTH EVERY NIGHT AT BEDTIME 30 tablet 5   • Syringe/Needle, Disp, 25G X 5/8\" 3 ML misc USE 1 SYRINGE FOR MONTHLY VITAMIN B-12 SHOTS 6 each 2   • traMADol (ULTRAM) 50 MG tablet Take 1 tablet by mouth Every 6 (Six) Hours As Needed for Moderate Pain (4-6). 120 tablet 1   • traZODone (DESYREL) 100 MG tablet TAKE 1 TABLET BY MOUTH EVERY NIGHT. 30 tablet 2   • venlafaxine XR (EFFEXOR-XR) 75 MG 24 hr capsule TAKE ONE CAPSULE BY MOUTH THREE TIMES A DAY 90 capsule 2   • levoFLOXacin (LEVAQUIN) 500 MG tablet Take 1 tablet by mouth Daily. 5 tablet 0     No " facility-administered medications prior to visit.        Patient Active Problem List   Diagnosis   • Anxiety   • Artificial lens present   • Depressive disorder   • Diabetes mellitus   • Diabetic polyneuropathy   • Diverticular disease of colon   • Gastroesophageal reflux disease   • Generalized abdominal pain   • Hemorrhoid   • Hiatal hernia   • History of colon polyps   • Hypercholesterolemia   • Hypertensive disorder   • Lumbar radiculopathy   • Neurologic disorder associated with diabetes mellitus   • Pain in thoracic spine   • Vitamin D deficiency   • Paroxysmal atrial fibrillation   • Thoracic aortic aneurysm without rupture   • Early onset Alzheimer's dementia without behavioral disturbance       Advance Care Planning:  has an advance directive - a copy has been provided and is in file    Identification of Risk Factors:  Risk factors include: weight , unhealthy diet, cardiovascular risk, increased fall risk, chronic pain, depression and polypharmacy.    Review of Systems   Constitutional: Positive for activity change and fatigue. Negative for appetite change and unexpected weight change.   Eyes: Negative for visual disturbance.   Respiratory: Negative for cough, shortness of breath and wheezing.    Cardiovascular: Negative for chest pain, palpitations and leg swelling.   Gastrointestinal: Negative for abdominal distention, abdominal pain, constipation, diarrhea, nausea and vomiting.   Musculoskeletal: Positive for arthralgias, back pain and gait problem. Negative for joint swelling.   Skin: Positive for rash. Negative for pallor and wound.   Neurological: Positive for weakness. Negative for headaches.   Psychiatric/Behavioral: Negative for dysphoric mood and sleep disturbance. The patient is not nervous/anxious.        Compared to one year ago, the patient feels her physical health is better.  Compared to one year ago, the patient feels her mental health is better.    Objective     Physical Exam  "  Constitutional: She is oriented to person, place, and time. She appears well-developed and well-nourished. No distress.   Cardiovascular: Normal rate, regular rhythm and normal heart sounds.    No murmur heard.  No LE edema.   Pulmonary/Chest: Effort normal and breath sounds normal. No respiratory distress.   Abdominal: Soft. Bowel sounds are normal. She exhibits no distension. There is tenderness. There is no rebound and no guarding.   Musculoskeletal:        Lumbar back: She exhibits deformity, pain and spasm. She exhibits normal range of motion and no tenderness.   Neurological: She is alert and oriented to person, place, and time.   Skin:   Skin on scalp on the temporal left side is thickened, white, and scaly   Psychiatric: She has a normal mood and affect. Her behavior is normal. Judgment and thought content normal.   Nursing note and vitals reviewed.      Vitals:    08/11/17 1115   BP: 98/70   Weight: 158 lb 11.2 oz (72 kg)   Height: 63\" (160 cm)   PainSc: 10-Worst pain ever   PainLoc: Leg       Body mass index is 28.11 kg/(m^2).  Discussed the patient's BMI with her. The BMI is above average; no BMI management plan is appropriate..    Assessment/Plan   Patient Self-Management and Personalized Health Advice  The patient has been provided with information about: diet, exercise, weight management, prevention of cardiac or vascular disease and fall prevention and preventive services including:   · Diabetes screening, see lab orders, Exercise counseling provided, Fall Risk assessment done, Fall Risk plan of care done.    Visit Diagnoses:    ICD-10-CM ICD-9-CM   1. Initial Medicare annual wellness visit Z00.00 V70.0   2. Scalp psoriasis L40.9 696.1   3. Chronic bilateral low back pain with right-sided sciatica M54.41 724.2    G89.29 724.3     338.29   4. Well controlled type 2 diabetes mellitus E11.9 250.00       Orders Placed This Encounter   Procedures   • CBC (No Diff)   • Comprehensive Metabolic Panel   • " Hemoglobin A1c   • Ambulatory Referral to Dermatology     Referral Priority:   Routine     Referral Type:   Consultation     Referral Reason:   Specialty Services Required     Referred to Provider:   Lamin Dimas MD     Requested Specialty:   Dermatology     Number of Visits Requested:   1   • Ambulatory Referral to Pain Management     Referral Priority:   Routine     Referral Type:   Pain Management     Referral Reason:   Specialty Services Required     Referred to Provider:   Stephon Ayers MD     Requested Specialty:   Pain Medicine     Number of Visits Requested:   1       Outpatient Encounter Prescriptions as of 8/11/2017   Medication Sig Dispense Refill   • carvedilol (COREG) 6.25 MG tablet TAKE 1 TABLET BY MOUTH 2 TIMES A DAY WITH MEALS 60 tablet 2   • clopidogrel (PLAVIX) 75 MG tablet TAKE 1 TABLET BY MOUTH DAILY. 30 tablet 2   • cyanocobalamin 1000 MCG/ML injection INJECT ONE ML SUBCUTANEOUSLY ONCE A MONTH 1 mL 4   • donepezil (ARICEPT) 10 MG tablet Take 1 tablet by mouth Every Night. 90 tablet 2   • fluticasone (FLONASE) 50 MCG/ACT nasal spray INSTILL 2 SPRAYS INTO EACH NOSTRIL DAILY 16 g 5   • furosemide (LASIX) 20 MG tablet Take 1 tablet by mouth Daily. 90 tablet 2   • gabapentin (NEURONTIN) 300 MG capsule Take 1 capsule by mouth Daily. 90 capsule 2   • lidocaine (LIDODERM) 5 % Place 1 patch on the skin Daily. Remove & Discard patch within 12 hours or as directed by MD (Patient taking differently: Place 1 patch on the skin Daily. Remove & Discard patch within 12 hours or as directed by MD (Patient states as needed)) 30 patch 1   • nystatin (NYSTOP) 195578 UNIT/GM powder powder Apply 1 application topically 3 (three) times a day as needed. Indication: Candidiasis of skin and nails     • omeprazole (priLOSEC) 40 MG capsule TAKE 1 CAPSULE BY MOUTH DAILY. 30 capsule 5   • ONETOUCH VERIO test strip   11   • potassium chloride (K-DUR,KLOR-CON) 20 MEQ CR tablet Take 1 tablet by mouth daily. 30 tablet 2  "  • pravastatin (PRAVACHOL) 40 MG tablet TAKE ONE TABLET BY MOUTH EVERY NIGHT AT BEDTIME 30 tablet 5   • Syringe/Needle, Disp, 25G X 5/8\" 3 ML misc USE 1 SYRINGE FOR MONTHLY VITAMIN B-12 SHOTS 6 each 2   • traMADol (ULTRAM) 50 MG tablet Take 1 tablet by mouth Every 6 (Six) Hours As Needed for Moderate Pain (4-6). 120 tablet 1   • traZODone (DESYREL) 100 MG tablet TAKE 1 TABLET BY MOUTH EVERY NIGHT. 30 tablet 2   • venlafaxine XR (EFFEXOR-XR) 75 MG 24 hr capsule TAKE ONE CAPSULE BY MOUTH THREE TIMES A DAY 90 capsule 2   • [DISCONTINUED] levoFLOXacin (LEVAQUIN) 500 MG tablet Take 1 tablet by mouth Daily. 5 tablet 0     No facility-administered encounter medications on file as of 8/11/2017.        Reviewed use of high risk medication in the elderly: yes  Reviewed for potential of harmful drug interactions in the elderly: yes    Follow Up:  Return in about 4 weeks (around 9/8/2017) for Recheck.     An After Visit Summary and PPPS with all of these plans were given to the patient.          "

## 2017-08-15 ENCOUNTER — TELEPHONE (OUTPATIENT)
Dept: FAMILY MEDICINE CLINIC | Facility: CLINIC | Age: 77
End: 2017-08-15

## 2017-08-15 NOTE — TELEPHONE ENCOUNTER
Pr Dr. JENNIFER Powell, Ms. Arango has been called with her recent lab results & recommendations.  Continue her current medications and follow-up as planned or sooner if any problems.    ----- Message from Denisa Powell MD sent at 8/12/2017  7:06 PM CDT -----  Please let her know that her sugars still look good.  Other labs are normal.  Follow-up as planned or sooner PRN.

## 2017-08-15 NOTE — PROGRESS NOTES
Pr Dr. JENNIFER Powell, Ms. Arango has been called with her recent lab results & recommendations.  Continue her current medications and follow-up as planned or sooner if any problems.

## 2017-08-16 ENCOUNTER — OFFICE VISIT (OUTPATIENT)
Dept: PAIN MEDICINE | Facility: CLINIC | Age: 77
End: 2017-08-16

## 2017-08-16 VITALS
HEIGHT: 64 IN | DIASTOLIC BLOOD PRESSURE: 70 MMHG | WEIGHT: 150 LBS | SYSTOLIC BLOOD PRESSURE: 120 MMHG | BODY MASS INDEX: 25.61 KG/M2

## 2017-08-16 DIAGNOSIS — M51.36 DDD (DEGENERATIVE DISC DISEASE), LUMBAR: Primary | ICD-10-CM

## 2017-08-16 DIAGNOSIS — M54.16 LUMBAR RADICULOPATHY: ICD-10-CM

## 2017-08-16 DIAGNOSIS — Z79.899 HIGH RISK MEDICATIONS (NOT ANTICOAGULANTS) LONG-TERM USE: ICD-10-CM

## 2017-08-16 PROCEDURE — 99204 OFFICE O/P NEW MOD 45 MIN: CPT | Performed by: PAIN MEDICINE

## 2017-08-16 RX ORDER — HYDROCODONE BITARTRATE AND ACETAMINOPHEN 7.5; 325 MG/1; MG/1
1 TABLET ORAL 4 TIMES DAILY
Qty: 120 TABLET | Refills: 0 | Status: SHIPPED | OUTPATIENT
Start: 2017-08-16 | End: 2017-09-15

## 2017-08-16 NOTE — PROGRESS NOTES
"Mariluz Arango is a 76 y.o. female.   1940    HPI:   Location: lower back and bilateral leg  Quality: aching, sharp and dull  Severity: 10/10  Timing: constant  Alleviating: nothing  Aggravating: increased activity     Patient has had low back pain and right leg pain for many years.  She has had lumbar fusion at L5-S1 but she does not remember when this was.  She does not think she has been to physical therapy nor has she been to a pain clinic.  Upon reviewing her medical record I do see that she has \"early onset Alzheimer's\".  There is no one here with her to supply further history.  She does not recall if she has had interventions or not.  She's been taking tramadol and more recently Norco 5 by mouth 3 times a day which did not provide her with much relief at all.  She does not feels though the gabapentin she is on provides her relief either.  She is chronically on Plavix but does not recall why.  She looks very uncomfortable sitting in the chair.    Reviewed plain films of the back as well as MRI.      DATE OF EXAM: 5/22/2017 10:25 AM CDT     PROCEDURE: MR LUMBAR SPINE WITHOUT THEN WITH IV CONTRAST     INDICATION FOR PROCEDURE: 76 years old patient presents for  evaluation of low back pain and aneurysm.  ICD 10 codes:  M54.5  and I71.4.     TECHNIQUE:  Sagittal and axial T1 and T2 MR images of the lumbar  spine are obtained before and after intravenous administration of  15 mL of ProHance.     COMPARISON:  CT of the abdomen and pelvis dated Angelique 3, 2016 and  MRI of the lumbar spine dated April 12, 2013     FINDINGS:  Patient has been treated with vertebroplasty of a  compression fracture of T12. There is mildly exaggerated lumbar  lordosis. Patient has had surgical fusion of L5 and S1 vertebral  segments.     The lumbar vertebral bodies have generally normal height and  alignment. There is mild anterolisthesis at L4-5.     Axial images at T11-T12 reveal annular disc bulge and osteophyte  complex. The " neural foramina are patent.     Axial images at T12-L1 reveal a mild annular disc bulge. There is  mild arthropathy of the facet joints. The neuroforamina are  mildly narrowed without evidence for nerve impingement. There is  no acquired central canal stenosis.     Axial images at L1-2 reveal a mild annular disc bulge. There is  mild arthropathy of the facet joints. The neuroforamina are  mildly narrowed without evidence for nerve impingement. There is  no acquired central canal stenosis.     Axial images at L2-3 reveal there is a moderate annular disc  bulge and osteophyte complex. There is mild degenerative  arthropathy of facet joints. There is mild central acquired canal  stenosis. Neural foramina are bilaterally narrowed without  evidence for nerve impingement.     Axial images at L3-4 reveal moderate annular disc bulge and  posterior broad central disc protrusion. There is moderate  degenerative arthropathy of the facet joints. There is mild  central acquired canal stenosis. Neuroforamina are bilaterally  narrowed without evidence for nerve impingement.     Axial images at L4-5 reveal there is uncovering of this disc  secondary to the anterolisthesis. There is moderate acquired  canal stenosis. There is moderate degenerative arthropathy of  facet joints. Artifactual signal from the interpedicular screws  are present. Neuroforamina are narrowed, greater on the right  than the left with potential impingement of the L4 nerve roots at  the neural foramina.     Axial images at L5-S1 reveal patient has had discectomy at this  level. Patient has had operative fusion of L5 and S1 vertebral  segments with interpedicular screws. The neural foramina are  mildly narrowed without evidence for nerve impingement. Patient  may have had laminectomies of S1.     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       Small bilateral renal cysts. There is no  evidence for abnormal  enhancement after intravenous administration of contrast.     IMPRESSION:     1.  Status post discectomy and operative fusion of L5 and S1.  2.  Status post vertebroplasty of compression fracture of T12.  3.  Multilevel degenerative disc disease and degenerative  arthropathy of the lumbar spine with acquired canal stenosis,  neural foraminal narrowing and potential nerve impingement, as  described.  4.  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        The following portions of the patient's history were reviewed by me and updated as appropriate: allergies, current medications, past family history, past medical history, past social history, past surgical history and problem list.    Past Medical History:   Diagnosis Date   • Altered mental status     unspecified    • Anxiety    • Artificial lens present     Artificial lens in position   • Depressive disorder    • Diabetes mellitus     no retinopathy      • Diabetic polyneuropathy    • Diarrhea     unspecified    • Diverticular disease of colon    • Dizziness    • Gastroesophageal reflux disease    • Generalized abdominal pain    • Headache    • Hemorrhoids    • Hiatal hernia    • History of colonic polyps     Personal history of colonic polyps   • History of echocardiogram 09/17/2008    Echocardiogram W/O color flow 11683 (1) - Evidence of LVH & diastolic dysfunction & mild LA enlargement, otherwise NRL echocradiogram   • History of mammogram 05/27/2011    MAMMOGRAM DIAGNOSTIC BILATERAL 28155 ([facility]) (1) - benign Birads category 2   • Hypercholesterolemia    • Hypertensive disorder    • Lumbar radiculopathy    • Memory impairment     Multifactorial      • Nausea    • Neurologic disorder associated with diabetes mellitus    • Pain in thoracic spine    • Palpitations    • Polyp of colon     History of polyp of colon   • Vitamin D deficiency        Social History     Social History   • Marital status:      Spouse  name: N/A   • Number of children: N/A   • Years of education: N/A     Occupational History   • Not on file.     Social History Main Topics   • Smoking status: Former Smoker     Packs/day: 0.50     Types: Cigarettes     Quit date: 06/2016   • Smokeless tobacco: Never Used      Comment: 0.5 - 1 Pack(s) Of Cigarettes Per Day   • Alcohol use No   • Drug use: No   • Sexual activity: Defer      Comment:  - 59 Years     Other Topics Concern   • Not on file     Social History Narrative       Family History   Problem Relation Age of Onset   • Hypertension Mother    • Stroke Mother    • Diabetes Other          Current Outpatient Prescriptions:   •  carvedilol (COREG) 6.25 MG tablet, TAKE 1 TABLET BY MOUTH 2 TIMES A DAY WITH MEALS, Disp: 60 tablet, Rfl: 2  •  clopidogrel (PLAVIX) 75 MG tablet, TAKE 1 TABLET BY MOUTH DAILY., Disp: 30 tablet, Rfl: 2  •  cyanocobalamin 1000 MCG/ML injection, INJECT ONE ML SUBCUTANEOUSLY ONCE A MONTH, Disp: 1 mL, Rfl: 4  •  donepezil (ARICEPT) 10 MG tablet, Take 1 tablet by mouth Every Night., Disp: 90 tablet, Rfl: 2  •  fluticasone (FLONASE) 50 MCG/ACT nasal spray, INSTILL 2 SPRAYS INTO EACH NOSTRIL DAILY, Disp: 16 g, Rfl: 5  •  furosemide (LASIX) 20 MG tablet, Take 1 tablet by mouth Daily., Disp: 90 tablet, Rfl: 2  •  gabapentin (NEURONTIN) 300 MG capsule, Take 1 capsule by mouth Daily., Disp: 90 capsule, Rfl: 2  •  HYDROcodone-acetaminophen (NORCO) 5-325 MG per tablet, Take 1 tablet by mouth 3 (Three) Times a Day As Needed for Moderate Pain (4-6)., Disp: 90 tablet, Rfl: 0  •  lidocaine (LIDODERM) 5 %, Place 1 patch on the skin Daily. Remove & Discard patch within 12 hours or as directed by MD (Patient taking differently: Place 1 patch on the skin Daily. Remove & Discard patch within 12 hours or as directed by MD (Patient states as needed)), Disp: 30 patch, Rfl: 1  •  nystatin (NYSTOP) 011159 UNIT/GM powder powder, Apply 1 application topically 3 (three) times a day as needed. Indication:  "Candidiasis of skin and nails, Disp: , Rfl:   •  omeprazole (priLOSEC) 40 MG capsule, TAKE 1 CAPSULE BY MOUTH DAILY., Disp: 30 capsule, Rfl: 5  •  ONETOUCH VERIO test strip, , Disp: , Rfl: 11  •  potassium chloride (K-DUR,KLOR-CON) 20 MEQ CR tablet, Take 1 tablet by mouth daily., Disp: 30 tablet, Rfl: 2  •  pravastatin (PRAVACHOL) 40 MG tablet, TAKE ONE TABLET BY MOUTH EVERY NIGHT AT BEDTIME, Disp: 30 tablet, Rfl: 5  •  Syringe/Needle, Disp, 25G X 5/8\" 3 ML misc, USE 1 SYRINGE FOR MONTHLY VITAMIN B-12 SHOTS, Disp: 6 each, Rfl: 2  •  traZODone (DESYREL) 100 MG tablet, TAKE 1 TABLET BY MOUTH EVERY NIGHT., Disp: 30 tablet, Rfl: 2  •  venlafaxine XR (EFFEXOR-XR) 75 MG 24 hr capsule, TAKE ONE CAPSULE BY MOUTH THREE TIMES A DAY, Disp: 90 capsule, Rfl: 2  •  HYDROcodone-acetaminophen (NORCO) 7.5-325 MG per tablet, Take 1 tablet by mouth 4 (Four) Times a Day for 30 days., Disp: 120 tablet, Rfl: 0    Allergies   Allergen Reactions   • Aspirin    • Codeine      UNKNOWN REACTION   • Other      Cipro   • Penicillins    • Sulfa Antibiotics      Sulfa (Sulfonamide Antibiotics)       Review of Systems   HENT:        Hypertension,high cholesterol     Endocrine:        Diabetes     Musculoskeletal: Positive for back pain.        B.leg     Psychiatric/Behavioral: Positive for dysphoric mood. The patient is nervous/anxious.    All other systems reviewed and are negative.    All review of systems reviewed and negative except for above.    Physical Exam   Constitutional: She is oriented to person, place, and time. She appears well-developed and well-nourished. She does not appear ill. No distress.   HENT:   Head: Normocephalic and atraumatic.   Right Ear: Hearing normal.   Left Ear: Hearing normal.   Eyes: Conjunctivae and EOM are normal. Pupils are equal, round, and reactive to light.   Neck: Normal range of motion and full passive range of motion without pain. Neck supple. No muscular tenderness present. Normal range of motion " present.   Cardiovascular: Normal rate, regular rhythm and normal heart sounds.    Pulmonary/Chest: Effort normal and breath sounds normal.   Abdominal: Soft. Bowel sounds are normal. There is no tenderness.   Musculoskeletal:        Lumbar back: She exhibits decreased range of motion (Pain with flexion and extension while seated.).   Neurological: She is alert and oriented to person, place, and time. She has normal strength. She displays normal reflexes. No cranial nerve deficit or sensory deficit. She exhibits normal muscle tone. Coordination and gait normal.   Hypoactive reflexes throughout.    Moderate straight leg raise on right   Skin: Skin is warm and dry. No rash noted. No erythema.   Psychiatric: She has a normal mood and affect. Her behavior is normal. Judgment normal.   Vitals reviewed.      Mariluz was seen today for back pain and pain.    Diagnoses and all orders for this visit:    DDD (degenerative disc disease), lumbar    Lumbar radiculopathy    High risk medications (not anticoagulants) long-term use    Other orders  -     HYDROcodone-acetaminophen (NORCO) 7.5-325 MG per tablet; Take 1 tablet by mouth 4 (Four) Times a Day for 30 days.        Medication: Patient reports no negative side effects, Patient reports appropriate usage and storage habits, Refill opioid medication as above and Opioid contract was read and discussed today and the patient chooses to sign. I will provide Norco 7.5 by mouth 4 times a day.  She currently has a prescription of Norco 5 by mouth 3 times a day.  I instructed her to take the remaining of that 4 times per day and when it runs out she will be able to fill her new prescription which will still be 4 times per day.    Interventional: none at this time.  May be candidate for interventions however she is chronically on Plavix.  We will continue to consider.    Rehab: none at this time    Behavioral: No aberrant behavior noted. [facility] Report #[id number]  was reviewed and is  consistent with stated history    Urine drug screen None at this time    ORT: 1            This document has been electronically signed by Stephon Ayers MD on August 16, 2017 11:18 AM

## 2017-09-04 LAB
BH CV XLRA MEAS CAROTID RIGHT ICA/CCA DIASTOLIC RATIO: 1.5
BH CV XLRA MEAS LEFT DIST CCA EDV: 18 CM/SEC
BH CV XLRA MEAS LEFT DIST CCA PSV: 56 CM/SEC
BH CV XLRA MEAS LEFT DIST ICA EDV: 34 CM/SEC
BH CV XLRA MEAS LEFT DIST ICA PSV: 100 CM/SEC
BH CV XLRA MEAS LEFT ICA/CCA DIASTOLIC RATIO: 1.7
BH CV XLRA MEAS LEFT ICA/CCA RATIO: 1.3
BH CV XLRA MEAS LEFT MID ICA EDV: 22 CM/SEC
BH CV XLRA MEAS LEFT MID ICA PSV: 65 CM/SEC
BH CV XLRA MEAS LEFT PROX CCA EDV: 20 CM/SEC
BH CV XLRA MEAS LEFT PROX CCA PSV: 78 CM/SEC
BH CV XLRA MEAS LEFT PROX ECA EDV: 24 CM/SEC
BH CV XLRA MEAS LEFT PROX ECA PSV: 244 CM/SEC
BH CV XLRA MEAS LEFT PROX ICA EDV: 14 CM/SEC
BH CV XLRA MEAS LEFT PROX ICA PSV: 46 CM/SEC
BH CV XLRA MEAS LEFT VERTEBRAL A EDV: 11 CM/SEC
BH CV XLRA MEAS LEFT VERTEBRAL A PSV: 47 CM/SEC
BH CV XLRA MEAS RIGHT DIST CCA EDV: 17 CM/SEC
BH CV XLRA MEAS RIGHT DIST CCA PSV: 59 CM/SEC
BH CV XLRA MEAS RIGHT DIST ICA EDV: 25 CM/SEC
BH CV XLRA MEAS RIGHT DIST ICA PSV: 85 CM/SEC
BH CV XLRA MEAS RIGHT ICA/CCA RATIO: 1.4
BH CV XLRA MEAS RIGHT MID ICA EDV: 21 CM/SEC
BH CV XLRA MEAS RIGHT MID ICA PSV: 63 CM/SEC
BH CV XLRA MEAS RIGHT PROX CCA EDV: 13 CM/SEC
BH CV XLRA MEAS RIGHT PROX CCA PSV: 52 CM/SEC
BH CV XLRA MEAS RIGHT PROX ECA EDV: 5 CM/SEC
BH CV XLRA MEAS RIGHT PROX ECA PSV: 76 CM/SEC
BH CV XLRA MEAS RIGHT PROX ICA EDV: 16 CM/SEC
BH CV XLRA MEAS RIGHT PROX ICA PSV: 46 CM/SEC
BH CV XLRA MEAS RIGHT VERTEBRAL A EDV: 20 CM/SEC
BH CV XLRA MEAS RIGHT VERTEBRAL A PSV: 63 CM/SEC

## 2017-09-06 ENCOUNTER — TELEPHONE (OUTPATIENT)
Dept: FAMILY MEDICINE CLINIC | Facility: CLINIC | Age: 77
End: 2017-09-06

## 2017-09-06 NOTE — TELEPHONE ENCOUNTER
Pr Dr. JENNIFER Powell, Ms. Arango has been called with her recent US results & recommendations.  Continue her current medications and follow-up as planned or sooner if any problems.      ----- Message from Denisa Powell MD sent at 9/4/2017  8:01 PM CDT -----  Please let her know that her US was negative for significant stenosis

## 2017-09-07 RX ORDER — CYANOCOBALAMIN 1000 UG/ML
INJECTION, SOLUTION INTRAMUSCULAR; SUBCUTANEOUS
Qty: 1 ML | Refills: 4 | Status: SHIPPED | OUTPATIENT
Start: 2017-09-07 | End: 2018-03-08 | Stop reason: SDUPTHER

## 2017-09-08 ENCOUNTER — OFFICE VISIT (OUTPATIENT)
Dept: FAMILY MEDICINE CLINIC | Facility: CLINIC | Age: 77
End: 2017-09-08

## 2017-09-08 VITALS
DIASTOLIC BLOOD PRESSURE: 90 MMHG | SYSTOLIC BLOOD PRESSURE: 142 MMHG | WEIGHT: 158 LBS | HEIGHT: 64 IN | HEART RATE: 76 BPM | BODY MASS INDEX: 26.98 KG/M2 | OXYGEN SATURATION: 99 %

## 2017-09-08 DIAGNOSIS — G47.00 INSOMNIA, UNSPECIFIED TYPE: Primary | ICD-10-CM

## 2017-09-08 DIAGNOSIS — K59.03 CONSTIPATION DUE TO OPIOID THERAPY: ICD-10-CM

## 2017-09-08 DIAGNOSIS — T40.2X5A CONSTIPATION DUE TO OPIOID THERAPY: ICD-10-CM

## 2017-09-08 PROCEDURE — 99213 OFFICE O/P EST LOW 20 MIN: CPT | Performed by: FAMILY MEDICINE

## 2017-09-08 RX ORDER — SENNA AND DOCUSATE SODIUM 50; 8.6 MG/1; MG/1
1 TABLET, FILM COATED ORAL DAILY
Qty: 90 TABLET | Refills: 2 | Status: SHIPPED | OUTPATIENT
Start: 2017-09-08 | End: 2018-04-02

## 2017-09-08 RX ORDER — TRAZODONE HYDROCHLORIDE 100 MG/1
150 TABLET ORAL NIGHTLY
Qty: 135 TABLET | Refills: 1 | Status: SHIPPED | OUTPATIENT
Start: 2017-09-08 | End: 2017-12-27 | Stop reason: SDUPTHER

## 2017-09-08 RX ORDER — PROMETHAZINE HYDROCHLORIDE 25 MG/1
25 TABLET ORAL EVERY 6 HOURS PRN
COMMUNITY
End: 2017-09-08 | Stop reason: SDUPTHER

## 2017-09-08 RX ORDER — CLOBETASOL PROPIONATE 0.46 MG/ML
1 SOLUTION TOPICAL DAILY
COMMUNITY
Start: 2017-08-29 | End: 2022-08-29

## 2017-09-08 RX ORDER — FLUOCINOLONE ACETONIDE 0.11 MG/ML
1 OIL TOPICAL DAILY
COMMUNITY
Start: 2017-08-29 | End: 2022-08-29

## 2017-09-08 RX ORDER — PROMETHAZINE HYDROCHLORIDE 25 MG/1
25 TABLET ORAL EVERY 6 HOURS PRN
Qty: 56 TABLET | Refills: 2 | Status: SHIPPED | OUTPATIENT
Start: 2017-09-08 | End: 2018-02-01 | Stop reason: SDUPTHER

## 2017-09-08 RX ORDER — GABAPENTIN 300 MG/1
300 CAPSULE ORAL DAILY
Qty: 90 CAPSULE | Refills: 2 | Status: SHIPPED | OUTPATIENT
Start: 2017-09-08 | End: 2017-09-12 | Stop reason: SDUPTHER

## 2017-09-08 NOTE — PATIENT INSTRUCTIONS
ASK Pharmacy about coverage.We don't have here.  Shingles Vaccine: What You Need to Know  WHAT IS SHINGLES?  · Shingles is a painful skin rash, often with blisters. It is also called Herpes Zoster or just Zoster.  · A shingles rash usually appears on one side of the face or body and lasts from 2 to 4 weeks. Its main symptom is pain, which can be quite severe. Other symptoms of shingles can include fever, headache, chills, and upset stomach. Very rarely, a shingles infection can lead to pneumonia, hearing problems, blindness, brain inflammation (encephalitis), or death.  · For about 1 person in 5, severe pain can continue even after the rash clears up. This is called post-herpetic neuralgia.  · Shingles is caused by the Varicella Zoster virus. This is the same virus that causes chickenpox. Only someone who has had a case of chickenpox or rarely, has gotten chickenpox vaccine, can get shingles. The virus stays in your body. It can reappear many years later to cause a case of shingles.  · You cannot catch shingles from another person with shingles. However, a person who has never had chickenpox (or chickenpox vaccine) could get chickenpox from someone with shingles. This is not very common.  · Shingles is far more common in people 50 and older than in younger people. It is also more common in people whose immune systems are weakened because of a disease such as cancer or drugs such as steroids or chemotherapy.  · At least 1 million people get shingles per year in the [place].  SHINGLES VACCINE  · A vaccine for shingles was licensed in 2006. In clinical trials, the vaccine reduced the risk of shingles by 50%. It can also reduce the pain in people who still get shingles after being vaccinated.  · A single dose of shingles vaccine is recommended for adults 60 years of age and older.  SOME PEOPLE SHOULD NOT GET SHINGLES VACCINE OR SHOULD WAIT  A person should not get shingles vaccine if he or she:  · Has ever had a  life-threatening allergic reaction to gelatin, the antibiotic neomycin, or any other component of shingles vaccine. Tell your caregiver if you have any severe allergies.  · Has a weakened immune system because of current:    AIDS or another disease that affects the immune system.    Treatment with drugs that affect the immune system, such as prolonged use of high-dose steroids.    Cancer treatment, such as radiation or chemotherapy.    Cancer affecting the bone marrow or lymphatic system, such as leukemia or lymphoma.    Is pregnant, or might be pregnant. Women should not become pregnant until at least 4 weeks after getting shingles vaccine.  Someone with a minor illness, such as a cold, may be vaccinated. Anyone with a moderate or severe acute illness should usually wait until he or she recovers before getting the vaccine. This includes anyone with a temperature of 101.3° F (38° C) or higher.  WHAT ARE THE RISKS FROM SHINGLES VACCINE?  · A vaccine, like any medicine, could possibly cause serious problems, such as severe allergic reactions. However, the risk of a vaccine causing serious harm, or death, is extremely small.  · No serious problems have been identified with shingles vaccine.  Mild Problems  · Redness, soreness, swelling, or itching at the site of the injection (about 1 person in 3).  · Headache (about 1 person in 70).  Like all vaccines, shingles vaccine is being closely monitored for unusual or severe problems.  WHAT IF THERE IS A MODERATE OR SEVERE REACTION?  What should I look for?  Any unusual condition, such as a severe allergic reaction or a high fever. If a severe allergic reaction occurred, it would be within a few minutes to an hour after the shot. Signs of a serious allergic reaction can include difficulty breathing, weakness, hoarseness or wheezing, a fast heartbeat, hives, dizziness, paleness, or swelling of the throat.  What should I do?  · Call your caregiver, or get the person to a  caregiver right away.  · Tell the caregiver what happened, the date and time it happened, and when the vaccination was given.  · Ask the caregiver to report the reaction by filing a Vaccine Adverse Event Reporting System (VAERS) form. Or, you can file this report through the VAERS web site at www.vaers.[facility].gov or by calling 1-383.797.1423.  VAERS does not provide medical advice.  HOW CAN I LEARN MORE?  · Ask your caregiver. He or she can give you the vaccine package insert or suggest other sources of information.  · Contact the Centers for Disease Control and Prevention (CDC):    Call 1-766.636.3445 (6-558-QUU-INFO).    Visit the CDC website at www.cdc.gov/vaccines  CDC Shingles Vaccine VIS (10/6/09)     This information is not intended to replace advice given to you by your health care provider. Make sure you discuss any questions you have with your health care provider.     Document Released: 10/15/2007 Document Revised: 05/03/2016 Document Reviewed: 04/09/2014  Elsevier Interactive Patient Education ©2017 [employer]

## 2017-09-08 NOTE — PROGRESS NOTES
Subjective   Mariluz Arango is a 76 y.o. female.     Nausea   This is a new problem. The current episode started more than 1 month ago. The problem occurs intermittently. The problem has been unchanged. Associated symptoms include fatigue and nausea. Pertinent negatives include no abdominal pain, anorexia, arthralgias, change in bowel habit, chest pain, chills, congestion, coughing, diaphoresis, fever, joint swelling, myalgias, numbness, rash, sore throat, swollen glands, urinary symptoms, vertigo, visual change, vomiting or weakness. Exacerbated by: Started with pain medication. She has tried nothing for the symptoms. The treatment provided no relief.   Insomnia   This is a chronic problem. The current episode started more than 1 year ago. The problem occurs daily. The problem has been unchanged. Associated symptoms include fatigue and nausea. Pertinent negatives include no abdominal pain, anorexia, arthralgias, change in bowel habit, chest pain, chills, congestion, coughing, diaphoresis, fever, joint swelling, myalgias, numbness, rash, sore throat, swollen glands, urinary symptoms, vertigo, visual change, vomiting or weakness. Nothing aggravates the symptoms. Treatments tried: trazodone. The treatment provided mild relief.        Current Outpatient Prescriptions:   •  carvedilol (COREG) 6.25 MG tablet, TAKE 1 TABLET BY MOUTH 2 TIMES A DAY WITH MEALS, Disp: 60 tablet, Rfl: 2  •  clopidogrel (PLAVIX) 75 MG tablet, TAKE 1 TABLET BY MOUTH DAILY., Disp: 30 tablet, Rfl: 2  •  cyanocobalamin 1000 MCG/ML injection, INJECT ONE ML SUBCUTANEOUSLY ONCE A MONTH, Disp: 1 mL, Rfl: 4  •  donepezil (ARICEPT) 10 MG tablet, Take 1 tablet by mouth Every Night., Disp: 90 tablet, Rfl: 2  •  fluticasone (FLONASE) 50 MCG/ACT nasal spray, INSTILL 2 SPRAYS INTO EACH NOSTRIL DAILY, Disp: 16 g, Rfl: 5  •  furosemide (LASIX) 20 MG tablet, Take 1 tablet by mouth Daily., Disp: 90 tablet, Rfl: 2  •  gabapentin (NEURONTIN) 300 MG capsule, Take  "1 capsule by mouth Daily., Disp: 90 capsule, Rfl: 2  •  HYDROcodone-acetaminophen (NORCO) 7.5-325 MG per tablet, Take 1 tablet by mouth 4 (Four) Times a Day for 30 days., Disp: 120 tablet, Rfl: 0  •  lidocaine (LIDODERM) 5 %, Place 1 patch on the skin Daily. Remove & Discard patch within 12 hours or as directed by MD (Patient taking differently: Place 1 patch on the skin Daily. Remove & Discard patch within 12 hours or as directed by MD (Patient states as needed)), Disp: 30 patch, Rfl: 1  •  nystatin (NYSTOP) 035808 UNIT/GM powder powder, Apply 1 application topically 3 (three) times a day as needed. Indication: Candidiasis of skin and nails, Disp: , Rfl:   •  omeprazole (priLOSEC) 40 MG capsule, TAKE 1 CAPSULE BY MOUTH DAILY., Disp: 30 capsule, Rfl: 5  •  ONETOUCH VERIO test strip, , Disp: , Rfl: 11  •  potassium chloride (K-DUR,KLOR-CON) 20 MEQ CR tablet, Take 1 tablet by mouth daily., Disp: 30 tablet, Rfl: 2  •  pravastatin (PRAVACHOL) 40 MG tablet, TAKE ONE TABLET BY MOUTH EVERY NIGHT AT BEDTIME, Disp: 30 tablet, Rfl: 5  •  promethazine (PHENERGAN) 25 MG tablet, Take 25 mg by mouth Every 6 (Six) Hours As Needed for Nausea or Vomiting., Disp: , Rfl:   •  Syringe/Needle, Disp, 25G X 5/8\" 3 ML misc, USE 1 SYRINGE FOR MONTHLY VITAMIN B-12 SHOTS, Disp: 6 each, Rfl: 2  •  traZODone (DESYREL) 100 MG tablet, TAKE 1 TABLET BY MOUTH EVERY NIGHT., Disp: 30 tablet, Rfl: 2  •  venlafaxine XR (EFFEXOR-XR) 75 MG 24 hr capsule, TAKE ONE CAPSULE BY MOUTH THREE TIMES A DAY, Disp: 90 capsule, Rfl: 2  •  HYDROcodone-acetaminophen (NORCO) 5-325 MG per tablet, Take 1 tablet by mouth 3 (Three) Times a Day As Needed for Moderate Pain (4-6)., Disp: 90 tablet, Rfl: 0    The following portions of the patient's history were reviewed and updated as appropriate: allergies, current medications, past family history, past medical history, past social history, past surgical history and problem list.    Review of Systems   Constitutional: " "Positive for activity change and fatigue. Negative for appetite change, chills, diaphoresis and fever.   HENT: Negative for congestion and sore throat.    Respiratory: Negative for cough, shortness of breath and wheezing.    Cardiovascular: Negative for chest pain, palpitations and leg swelling.   Gastrointestinal: Positive for abdominal distention, constipation and nausea. Negative for abdominal pain, anorexia, blood in stool, change in bowel habit, diarrhea and vomiting.   Musculoskeletal: Negative for arthralgias, joint swelling and myalgias.   Skin: Negative for rash.   Neurological: Negative for vertigo, weakness and numbness.   Psychiatric/Behavioral: Positive for sleep disturbance. Negative for dysphoric mood. The patient is nervous/anxious and has insomnia.        Objective    Vitals:    09/08/17 1010   BP: 142/90   BP Location: Left arm   Patient Position: Sitting   Cuff Size: Adult   Pulse: 76   SpO2: 99%   Weight: 158 lb (71.7 kg)   Height: 63.5\" (161.3 cm)       Physical Exam   Constitutional: She is oriented to person, place, and time. She appears well-developed and well-nourished. No distress.   Cardiovascular: Normal rate, regular rhythm and normal heart sounds.    No murmur heard.  No LE edema.   Pulmonary/Chest: Effort normal and breath sounds normal. No respiratory distress.   Abdominal: Soft. Bowel sounds are normal. She exhibits no distension. There is no tenderness.   Neurological: She is alert and oriented to person, place, and time.   Psychiatric: She has a normal mood and affect. Her behavior is normal. Judgment and thought content normal.   Seems to be in less pain and more upbeat today.   Nursing note and vitals reviewed.      Assessment/Plan   Problems Addressed this Visit     None      Visit Diagnoses     Insomnia, unspecified type    -  Primary    Relevant Medications    traZODone (DESYREL) 100 MG tablet    Constipation due to opioid therapy            1.) Insomnia- Will increase her " trazodone.  Seems to be doing well as far as anxiety and pain are concerned.    2.) Constipation/Nausea-  Pain medication is really helping her but causing her some nausea and constipation.  Will try senna-s daily and promethazine PRN.  RTC in 2-3 months or sooner PRN

## 2017-09-12 RX ORDER — GABAPENTIN 300 MG/1
300 CAPSULE ORAL 3 TIMES DAILY
Qty: 90 CAPSULE | Refills: 2 | Status: SHIPPED | OUTPATIENT
Start: 2017-09-12 | End: 2017-12-06 | Stop reason: SDUPTHER

## 2017-09-29 DIAGNOSIS — G47.00 INSOMNIA, UNSPECIFIED TYPE: ICD-10-CM

## 2017-09-29 RX ORDER — TRAZODONE HYDROCHLORIDE 100 MG/1
TABLET ORAL
Qty: 30 TABLET | Refills: 2 | Status: SHIPPED | OUTPATIENT
Start: 2017-09-29 | End: 2017-11-09

## 2017-09-29 RX ORDER — CARVEDILOL 6.25 MG/1
TABLET ORAL
Qty: 60 TABLET | Refills: 2 | Status: SHIPPED | OUTPATIENT
Start: 2017-09-29 | End: 2018-02-03 | Stop reason: SDUPTHER

## 2017-10-02 RX ORDER — CLOPIDOGREL BISULFATE 75 MG/1
TABLET ORAL
Qty: 30 TABLET | Refills: 2 | Status: SHIPPED | OUTPATIENT
Start: 2017-10-02 | End: 2018-02-03 | Stop reason: SDUPTHER

## 2017-10-17 ENCOUNTER — TELEPHONE (OUTPATIENT)
Dept: FAMILY MEDICINE CLINIC | Facility: CLINIC | Age: 77
End: 2017-10-17

## 2017-10-17 NOTE — TELEPHONE ENCOUNTER
----- Message from Denisa Powell MD sent at 10/17/2017 12:41 PM CDT -----  Contact: MARILUZ   If she has gotten pain medication from him I cannot because she has a pain contract.   ----- Message -----     From: Roya Lopez     Sent: 10/17/2017  11:24 AM       To: Denisa Powell MD    Mariluz called and said she cannot get in to pain management until 11-6-17 and she will run out of pain medication this weekend.  Wanted to know if she could get medication until her appt with them?    [phone]

## 2017-10-17 NOTE — TELEPHONE ENCOUNTER
Called and spoke with patient. Advised that Dr. Denisa Powell could not fill her controlled substance due to a pain contract she signed with pain management. Pt. Verbalized understanding.

## 2017-10-23 RX ORDER — NYSTATIN 100000 [USP'U]/G
POWDER TOPICAL
Qty: 60 G | Refills: 2 | Status: SHIPPED | OUTPATIENT
Start: 2017-10-23 | End: 2018-03-27

## 2017-11-06 ENCOUNTER — OFFICE VISIT (OUTPATIENT)
Dept: PAIN MEDICINE | Facility: CLINIC | Age: 77
End: 2017-11-06

## 2017-11-06 VITALS
SYSTOLIC BLOOD PRESSURE: 132 MMHG | HEIGHT: 63 IN | BODY MASS INDEX: 28.62 KG/M2 | WEIGHT: 161.5 LBS | DIASTOLIC BLOOD PRESSURE: 82 MMHG

## 2017-11-06 DIAGNOSIS — M51.36 DDD (DEGENERATIVE DISC DISEASE), LUMBAR: Primary | ICD-10-CM

## 2017-11-06 DIAGNOSIS — M54.16 LUMBAR RADICULOPATHY: ICD-10-CM

## 2017-11-06 DIAGNOSIS — Z79.899 HIGH RISK MEDICATIONS (NOT ANTICOAGULANTS) LONG-TERM USE: ICD-10-CM

## 2017-11-06 PROCEDURE — 99213 OFFICE O/P EST LOW 20 MIN: CPT | Performed by: PAIN MEDICINE

## 2017-11-06 RX ORDER — HYDROCODONE BITARTRATE AND ACETAMINOPHEN 7.5; 325 MG/1; MG/1
1 TABLET ORAL 3 TIMES DAILY
Qty: 90 TABLET | Refills: 0 | Status: SHIPPED | OUTPATIENT
Start: 2017-11-06 | End: 2017-12-06

## 2017-11-06 RX ORDER — LIDOCAINE 50 MG/G
PATCH TOPICAL
Qty: 30 PATCH | Refills: 1 | Status: ON HOLD | OUTPATIENT
Start: 2017-11-06 | End: 2020-08-19

## 2017-11-06 NOTE — PROGRESS NOTES
Mariluz Arango is a 76 y.o. female.   1940    HPI:   Location: lower back and bilateral leg  Quality: aching, sharp and dull  Severity: 10/10  Timing: constant  Alleviating: nothing  Aggravating: increased activity      first follow-up appointment and has run out of medication.  She states that the increase did help her significantly.  She's been now been without opioid for at least 3 weeks she states.  She denies any side effects of the medication.      The following portions of the patient's history were reviewed by me and updated as appropriate: allergies, current medications, past family history, past medical history, past social history, past surgical history and problem list.    Past Medical History:   Diagnosis Date   • Altered mental status     unspecified    • Anxiety    • Artificial lens present     Artificial lens in position   • Depressive disorder    • Diabetes mellitus     no retinopathy      • Diabetic polyneuropathy    • Diarrhea     unspecified    • Diverticular disease of colon    • Dizziness    • Gastroesophageal reflux disease    • Generalized abdominal pain    • Headache    • Hemorrhoids    • Hiatal hernia    • History of colonic polyps     Personal history of colonic polyps   • History of echocardiogram 09/17/2008    Echocardiogram W/O color flow 02351 (1) - Evidence of LVH & diastolic dysfunction & mild LA enlargement, otherwise NRL echocradiogram   • History of mammogram 05/27/2011    MAMMOGRAM DIAGNOSTIC BILATERAL 94467 ([facility]) (1) - benign Birads category 2   • Hypercholesterolemia    • Hypertensive disorder    • Lumbar radiculopathy    • Memory impairment     Multifactorial      • Nausea    • Neurologic disorder associated with diabetes mellitus    • Pain in thoracic spine    • Palpitations    • Polyp of colon     History of polyp of colon   • Vitamin D deficiency        Social History     Social History   • Marital status:      Spouse name: N/A   • Number of children: N/A    • Years of education: N/A     Occupational History   • Not on file.     Social History Main Topics   • Smoking status: Former Smoker     Packs/day: 0.50     Types: Cigarettes     Quit date: 06/2016   • Smokeless tobacco: Never Used      Comment: 0.5 - 1 Pack(s) Of Cigarettes Per Day   • Alcohol use No   • Drug use: No   • Sexual activity: Defer      Comment:  - 59 Years     Other Topics Concern   • Not on file     Social History Narrative       Family History   Problem Relation Age of Onset   • Hypertension Mother    • Stroke Mother    • Diabetes Other          Current Outpatient Prescriptions:   •  carvedilol (COREG) 6.25 MG tablet, TAKE 1 TABLET BY MOUTH 2 TIMES A DAY WITH MEALS, Disp: 60 tablet, Rfl: 2  •  clobetasol (TEMOVATE) 0.05 % external solution, Apply 1 application topically Daily., Disp: , Rfl:   •  clopidogrel (PLAVIX) 75 MG tablet, TAKE 1 TABLET BY MOUTH DAILY., Disp: 30 tablet, Rfl: 2  •  cyanocobalamin 1000 MCG/ML injection, INJECT ONE ML SUBCUTANEOUSLY ONCE A MONTH, Disp: 1 mL, Rfl: 4  •  donepezil (ARICEPT) 10 MG tablet, Take 1 tablet by mouth Every Night., Disp: 90 tablet, Rfl: 2  •  FLUOCINOLONE ACETONIDE SCALP 0.01 % oil, Apply 1 application topically Daily., Disp: , Rfl:   •  fluticasone (FLONASE) 50 MCG/ACT nasal spray, INSTILL 2 SPRAYS INTO EACH NOSTRIL DAILY, Disp: 16 g, Rfl: 5  •  furosemide (LASIX) 20 MG tablet, Take 1 tablet by mouth Daily., Disp: 90 tablet, Rfl: 2  •  gabapentin (NEURONTIN) 300 MG capsule, Take 1 capsule by mouth 3 (Three) Times a Day., Disp: 90 capsule, Rfl: 2  •  HYDROcodone-acetaminophen (NORCO) 5-325 MG per tablet, Take 1 tablet by mouth 3 (Three) Times a Day As Needed for Moderate Pain (4-6)., Disp: 90 tablet, Rfl: 0  •  lidocaine (LIDODERM) 5 %, Place 1 patch on the skin Daily. Remove & Discard patch within 12 hours or as directed by MD (Patient taking differently: Place 1 patch on the skin Daily. Remove & Discard patch within 12 hours or as directed by MD  "(Patient states as needed)), Disp: 30 patch, Rfl: 1  •  NYSTATIN 913202 UNIT/GM powder, APPLY TO THE AFFECTED AREA THREE TIMES DAILY, Disp: 60 g, Rfl: 2  •  omeprazole (priLOSEC) 40 MG capsule, TAKE 1 CAPSULE BY MOUTH DAILY., Disp: 30 capsule, Rfl: 5  •  ONETOUCH VERIO test strip, , Disp: , Rfl: 11  •  potassium chloride (K-DUR,KLOR-CON) 20 MEQ CR tablet, Take 1 tablet by mouth daily., Disp: 30 tablet, Rfl: 2  •  pravastatin (PRAVACHOL) 40 MG tablet, TAKE ONE TABLET BY MOUTH EVERY NIGHT AT BEDTIME, Disp: 30 tablet, Rfl: 5  •  promethazine (PHENERGAN) 25 MG tablet, Take 1 tablet by mouth Every 6 (Six) Hours As Needed for Nausea or Vomiting., Disp: 56 tablet, Rfl: 2  •  sennosides-docusate sodium (SENOKOT-S) 8.6-50 MG tablet, Take 1 tablet by mouth Daily., Disp: 90 tablet, Rfl: 2  •  Syringe/Needle, Disp, 25G X 5/8\" 3 ML misc, USE 1 SYRINGE FOR MONTHLY VITAMIN B-12 SHOTS, Disp: 6 each, Rfl: 2  •  traZODone (DESYREL) 100 MG tablet, Take 1.5 tablets by mouth Every Night for 90 days., Disp: 135 tablet, Rfl: 1  •  traZODone (DESYREL) 100 MG tablet, TAKE 1 TABLET BY MOUTH EVERY NIGHT., Disp: 30 tablet, Rfl: 2  •  venlafaxine XR (EFFEXOR-XR) 75 MG 24 hr capsule, TAKE ONE CAPSULE BY MOUTH THREE TIMES A DAY, Disp: 90 capsule, Rfl: 2  •  HYDROcodone-acetaminophen (NORCO) 7.5-325 MG per tablet, Take 1 tablet by mouth 3 (Three) Times a Day for 30 days., Disp: 90 tablet, Rfl: 0    Allergies   Allergen Reactions   • Aspirin    • Codeine      UNKNOWN REACTION   • Other      Cipro   • Penicillins    • Sulfa Antibiotics      Sulfa (Sulfonamide Antibiotics)       Review of Systems   Musculoskeletal: Positive for back pain.        B.leg     All other systems reviewed and are negative.    All systems reviewed and negative except for above.    Physical Exam   Constitutional: She appears well-developed and well-nourished. No distress.   Neurological: She is alert. Gait (Rolling walker) abnormal.   Mod slr on right   Psychiatric: She has a " normal mood and affect. Her behavior is normal. Judgment normal.       Mariluz was seen today for back pain and pain.    Diagnoses and all orders for this visit:    DDD (degenerative disc disease), lumbar    Lumbar radiculopathy    High risk medications (not anticoagulants) long-term use    Other orders  -     HYDROcodone-acetaminophen (NORCO) 7.5-325 MG per tablet; Take 1 tablet by mouth 3 (Three) Times a Day for 30 days.        Medication: Patient reports no negative side effects, Patient reports appropriate usage and storage habits, Patient's opioid provides enough relief to be more active and perform activities of daily living with less discomfort. and Refill opioid medication as above.    Interventional: none at this time    Rehab: none at this time    Behavioral: No aberrant behavior noted. YASMEEN Report #[id number]  was reviewed and is consistent with stated history    Urine drug screen None at this time          This document has been electronically signed by Stephon Ayers MD on November 6, 2017 11:54 AM

## 2017-11-09 ENCOUNTER — OFFICE VISIT (OUTPATIENT)
Dept: FAMILY MEDICINE CLINIC | Facility: CLINIC | Age: 77
End: 2017-11-09

## 2017-11-09 ENCOUNTER — APPOINTMENT (OUTPATIENT)
Dept: LAB | Facility: HOSPITAL | Age: 77
End: 2017-11-09

## 2017-11-09 VITALS
WEIGHT: 163 LBS | BODY MASS INDEX: 28.88 KG/M2 | DIASTOLIC BLOOD PRESSURE: 84 MMHG | SYSTOLIC BLOOD PRESSURE: 146 MMHG | HEIGHT: 63 IN

## 2017-11-09 DIAGNOSIS — F99 INSOMNIA DUE TO OTHER MENTAL DISORDER: ICD-10-CM

## 2017-11-09 DIAGNOSIS — R53.83 FATIGUE, UNSPECIFIED TYPE: ICD-10-CM

## 2017-11-09 DIAGNOSIS — R41.3 MEMORY CHANGES: ICD-10-CM

## 2017-11-09 DIAGNOSIS — F51.05 INSOMNIA DUE TO OTHER MENTAL DISORDER: ICD-10-CM

## 2017-11-09 DIAGNOSIS — I10 ESSENTIAL HYPERTENSION: ICD-10-CM

## 2017-11-09 DIAGNOSIS — I48.20 CHRONIC ATRIAL FIBRILLATION (HCC): ICD-10-CM

## 2017-11-09 DIAGNOSIS — F41.1 GENERALIZED ANXIETY DISORDER: Primary | ICD-10-CM

## 2017-11-09 LAB
DEPRECATED RDW RBC AUTO: 48.7 FL (ref 36.4–46.3)
ERYTHROCYTE [DISTWIDTH] IN BLOOD BY AUTOMATED COUNT: 14.7 % (ref 11.5–14.5)
FERRITIN SERPL-MCNC: 15 NG/ML (ref 11.1–264)
HCT VFR BLD AUTO: 38.5 % (ref 35–45)
HGB BLD-MCNC: 12.3 G/DL (ref 12–15.5)
MCH RBC QN AUTO: 29 PG (ref 26.5–34)
MCHC RBC AUTO-ENTMCNC: 31.9 G/DL (ref 31.4–36)
MCV RBC AUTO: 90.8 FL (ref 80–98)
PLATELET # BLD AUTO: 261 10*3/MM3 (ref 150–450)
PMV BLD AUTO: 9.5 FL (ref 8–12)
RBC # BLD AUTO: 4.24 10*6/MM3 (ref 3.77–5.16)
TSH SERPL DL<=0.05 MIU/L-ACNC: 1.48 MIU/ML (ref 0.46–4.68)
VIT B12 BLD-MCNC: 587 PG/ML (ref 239–931)
WBC NRBC COR # BLD: 10.18 10*3/MM3 (ref 3.2–9.8)

## 2017-11-09 PROCEDURE — 82607 VITAMIN B-12: CPT | Performed by: FAMILY MEDICINE

## 2017-11-09 PROCEDURE — 84443 ASSAY THYROID STIM HORMONE: CPT | Performed by: FAMILY MEDICINE

## 2017-11-09 PROCEDURE — 85027 COMPLETE CBC AUTOMATED: CPT | Performed by: FAMILY MEDICINE

## 2017-11-09 PROCEDURE — 82728 ASSAY OF FERRITIN: CPT | Performed by: FAMILY MEDICINE

## 2017-11-09 PROCEDURE — 36415 COLL VENOUS BLD VENIPUNCTURE: CPT | Performed by: FAMILY MEDICINE

## 2017-11-09 PROCEDURE — 99214 OFFICE O/P EST MOD 30 MIN: CPT | Performed by: FAMILY MEDICINE

## 2017-11-09 RX ORDER — BUSPIRONE HYDROCHLORIDE 5 MG/1
5 TABLET ORAL 3 TIMES DAILY
Qty: 60 TABLET | Refills: 1 | Status: SHIPPED | OUTPATIENT
Start: 2017-11-09 | End: 2017-11-14 | Stop reason: SINTOL

## 2017-11-09 RX ORDER — VENLAFAXINE HYDROCHLORIDE 75 MG/1
75 CAPSULE, EXTENDED RELEASE ORAL DAILY
COMMUNITY
End: 2017-12-06

## 2017-11-09 RX ORDER — KETOCONAZOLE 20 MG/ML
SHAMPOO TOPICAL
COMMUNITY
Start: 2017-11-06 | End: 2022-08-29
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
## 2017-11-14 ENCOUNTER — TELEPHONE (OUTPATIENT)
Dept: FAMILY MEDICINE CLINIC | Facility: CLINIC | Age: 77
End: 2017-11-14

## 2017-11-14 NOTE — TELEPHONE ENCOUNTER
Pr Dr. JENNIFER Powell, Ms. Arango has been called with her recent lab results & recommendations.  Continue her current medications and follow-up as planned or sooner if any problems.      ----- Message from Denisa Powell MD sent at 11/9/2017  5:28 PM CST -----  Please let her know that her know that her labs were normal.  Follow-up as planned or sooner PRN.

## 2017-11-14 NOTE — TELEPHONE ENCOUNTER
PATIENT WAS TAKING THE venlafaxine XR (EFFEXOR-XR) 75 MG 24 hr capsule AND DR ELLIS CHANGED HER RX TO THE busPIRone (BUSPAR) 5 MG tablet. SHE SAID SHE STOPPED TAKING THE BUSPAR BECAUSE SHE COULDN'T SEE. SHE HAS PUT HERSELF BACK ON THE EFFEXOR. PLEASE CALL HER [phone]

## 2017-12-02 DIAGNOSIS — F41.1 GENERALIZED ANXIETY DISORDER: ICD-10-CM

## 2017-12-04 RX ORDER — VENLAFAXINE HYDROCHLORIDE 75 MG/1
CAPSULE, EXTENDED RELEASE ORAL
Qty: 90 CAPSULE | Refills: 2 | Status: SHIPPED | OUTPATIENT
Start: 2017-12-04 | End: 2017-12-06

## 2017-12-06 ENCOUNTER — OFFICE VISIT (OUTPATIENT)
Dept: PAIN MEDICINE | Facility: CLINIC | Age: 77
End: 2017-12-06

## 2017-12-06 ENCOUNTER — OFFICE VISIT (OUTPATIENT)
Dept: FAMILY MEDICINE CLINIC | Facility: CLINIC | Age: 77
End: 2017-12-06

## 2017-12-06 VITALS
BODY MASS INDEX: 28.62 KG/M2 | HEIGHT: 63 IN | WEIGHT: 161.5 LBS | DIASTOLIC BLOOD PRESSURE: 80 MMHG | SYSTOLIC BLOOD PRESSURE: 142 MMHG

## 2017-12-06 VITALS
WEIGHT: 161.5 LBS | SYSTOLIC BLOOD PRESSURE: 144 MMHG | BODY MASS INDEX: 28.62 KG/M2 | DIASTOLIC BLOOD PRESSURE: 88 MMHG | HEIGHT: 63 IN

## 2017-12-06 DIAGNOSIS — Z79.899 HIGH RISK MEDICATIONS (NOT ANTICOAGULANTS) LONG-TERM USE: ICD-10-CM

## 2017-12-06 DIAGNOSIS — E11.49 NEUROLOGIC DISORDER ASSOCIATED WITH DIABETES MELLITUS (HCC): ICD-10-CM

## 2017-12-06 DIAGNOSIS — F41.9 ANXIETY: Primary | ICD-10-CM

## 2017-12-06 DIAGNOSIS — M54.16 LUMBAR RADICULOPATHY: ICD-10-CM

## 2017-12-06 DIAGNOSIS — M51.36 DDD (DEGENERATIVE DISC DISEASE), LUMBAR: Primary | ICD-10-CM

## 2017-12-06 PROCEDURE — 99213 OFFICE O/P EST LOW 20 MIN: CPT | Performed by: FAMILY MEDICINE

## 2017-12-06 PROCEDURE — 99213 OFFICE O/P EST LOW 20 MIN: CPT | Performed by: PAIN MEDICINE

## 2017-12-06 RX ORDER — GABAPENTIN 300 MG/1
300 CAPSULE ORAL 3 TIMES DAILY
Qty: 90 CAPSULE | Refills: 2 | Status: SHIPPED | OUTPATIENT
Start: 2017-12-06 | End: 2018-06-12 | Stop reason: SDUPTHER

## 2017-12-06 RX ORDER — HYDROCODONE BITARTRATE AND ACETAMINOPHEN 7.5; 325 MG/1; MG/1
1 TABLET ORAL 3 TIMES DAILY
Qty: 90 TABLET | Refills: 0 | Status: SHIPPED | OUTPATIENT
Start: 2017-12-06 | End: 2018-01-05

## 2017-12-06 NOTE — PROGRESS NOTES
Subjective   Mariluz Arango is a 77 y.o. female.     Anxiety   Presents for follow-up visit. Symptoms include excessive worry, insomnia, irritability, malaise, muscle tension, nervous/anxious behavior and restlessness. Patient reports no chest pain, compulsions, confusion, decreased concentration, depressed mood, dizziness, dry mouth, feeling of choking, hyperventilation, impotence, nausea, obsessions, palpitations, panic, shortness of breath or suicidal ideas. Symptoms occur most days. The severity of symptoms is moderate. The quality of sleep is poor. Nighttime awakenings: several.     Compliance with medications is %.   We tried buspar and she had blurred vision. She has been taking her effexor BID and not TID. She had thought that it was working well for her before. She doesn't think that she has been on many  Other SSRI.  She has been taking gabapentin for her neuropathy and her back pain. She sees pain mangement and they are giving her pain medication.  She is having normal bowel movements. She takes stool softners and senna PRN.          Current Outpatient Prescriptions:   •  carvedilol (COREG) 6.25 MG tablet, TAKE 1 TABLET BY MOUTH 2 TIMES A DAY WITH MEALS, Disp: 60 tablet, Rfl: 2  •  clobetasol (TEMOVATE) 0.05 % external solution, Apply 1 application topically Daily., Disp: , Rfl:   •  clopidogrel (PLAVIX) 75 MG tablet, TAKE 1 TABLET BY MOUTH DAILY., Disp: 30 tablet, Rfl: 2  •  cyanocobalamin 1000 MCG/ML injection, INJECT ONE ML SUBCUTANEOUSLY ONCE A MONTH, Disp: 1 mL, Rfl: 4  •  donepezil (ARICEPT) 10 MG tablet, Take 1 tablet by mouth Every Night., Disp: 90 tablet, Rfl: 2  •  FLUOCINOLONE ACETONIDE SCALP 0.01 % oil, Apply 1 application topically Daily., Disp: , Rfl:   •  fluticasone (FLONASE) 50 MCG/ACT nasal spray, INSTILL 2 SPRAYS INTO EACH NOSTRIL DAILY, Disp: 16 g, Rfl: 5  •  furosemide (LASIX) 20 MG tablet, Take 1 tablet by mouth Daily., Disp: 90 tablet, Rfl: 2  •  gabapentin (NEURONTIN) 300  "MG capsule, Take 1 capsule by mouth 3 (Three) Times a Day., Disp: 90 capsule, Rfl: 2  •  HYDROcodone-acetaminophen (NORCO) 7.5-325 MG per tablet, Take 1 tablet by mouth 3 (Three) Times a Day for 30 days., Disp: 90 tablet, Rfl: 0  •  HYDROcodone-acetaminophen (NORCO) 7.5-325 MG per tablet, Take 1 tablet by mouth 3 (Three) Times a Day for 30 days., Disp: 90 tablet, Rfl: 0  •  HYDROcodone-acetaminophen (NORCO) 7.5-325 MG per tablet, Take 1 tablet by mouth 3 (Three) Times a Day for 30 days., Disp: 90 tablet, Rfl: 0  •  ketoconazole (NIZORAL) 2 % shampoo, , Disp: , Rfl:   •  lidocaine (LIDODERM) 5 %, PLACE ONE PATCH ONE THE SKIN EVERY DAY. REMOVE AND DISCARD THE PATCH WITHIN 12 HOURS AFTER APPLICATION EVERY DAY OR AS DIRECTED BY MD., Disp: 30 patch, Rfl: 1  •  NYSTATIN 459507 UNIT/GM powder, APPLY TO THE AFFECTED AREA THREE TIMES DAILY, Disp: 60 g, Rfl: 2  •  omeprazole (priLOSEC) 40 MG capsule, TAKE 1 CAPSULE BY MOUTH DAILY., Disp: 30 capsule, Rfl: 5  •  ONETOUCH VERIO test strip, , Disp: , Rfl: 11  •  potassium chloride (K-DUR,KLOR-CON) 20 MEQ CR tablet, Take 1 tablet by mouth daily., Disp: 30 tablet, Rfl: 2  •  pravastatin (PRAVACHOL) 40 MG tablet, TAKE ONE TABLET BY MOUTH EVERY NIGHT AT BEDTIME, Disp: 30 tablet, Rfl: 5  •  promethazine (PHENERGAN) 25 MG tablet, Take 1 tablet by mouth Every 6 (Six) Hours As Needed for Nausea or Vomiting., Disp: 56 tablet, Rfl: 2  •  sennosides-docusate sodium (SENOKOT-S) 8.6-50 MG tablet, Take 1 tablet by mouth Daily., Disp: 90 tablet, Rfl: 2  •  Syringe/Needle, Disp, 25G X 5/8\" 3 ML misc, USE 1 SYRINGE FOR MONTHLY VITAMIN B-12 SHOTS, Disp: 6 each, Rfl: 2  •  traZODone (DESYREL) 100 MG tablet, Take 1.5 tablets by mouth Every Night for 90 days., Disp: 135 tablet, Rfl: 1  •  venlafaxine XR (EFFEXOR-XR) 75 MG 24 hr capsule, Take 75 mg by mouth Daily., Disp: , Rfl:   •  venlafaxine XR (EFFEXOR-XR) 75 MG 24 hr capsule, TAKE ONE CAPSULE BY MOUTH THREE TIMES A DAY, Disp: 90 capsule, Rfl: " "2    The following portions of the patient's history were reviewed and updated as appropriate: allergies, current medications, past family history, past medical history, past social history, past surgical history and problem list.    Review of Systems   Constitutional: Positive for fatigue and irritability. Negative for activity change, appetite change and unexpected weight change.   Eyes: Negative for visual disturbance.   Respiratory: Negative for cough, shortness of breath and wheezing.    Cardiovascular: Negative for chest pain, palpitations and leg swelling.   Gastrointestinal: Negative for abdominal distention, abdominal pain, constipation, diarrhea, nausea and vomiting.   Genitourinary: Negative for impotence.   Musculoskeletal: Positive for back pain. Negative for arthralgias, gait problem and joint swelling.   Skin: Negative for pallor, rash and wound.   Neurological: Positive for weakness. Negative for dizziness and headaches.   Psychiatric/Behavioral: Positive for sleep disturbance. Negative for confusion, decreased concentration, dysphoric mood, self-injury and suicidal ideas. The patient is nervous/anxious and has insomnia.        Objective    Vitals:    12/06/17 0847   BP: 144/88   Weight: 73.3 kg (161 lb 8 oz)   Height: 160 cm (63\")       Physical Exam   Constitutional: She is oriented to person, place, and time. She appears well-developed and well-nourished. No distress.   Cardiovascular: Normal rate, regular rhythm and normal heart sounds.    No murmur heard.  No LE edema.   Pulmonary/Chest: Effort normal and breath sounds normal. No respiratory distress.   Abdominal: Soft. Bowel sounds are normal. She exhibits no distension. There is no tenderness.   Neurological: She is alert and oriented to person, place, and time.   Psychiatric: She has a normal mood and affect. Her behavior is normal. Judgment and thought content normal.   She seems more upbeat and positive today.   Nursing note and vitals " reviewed.      Assessment/Plan   Problems Addressed this Visit        Endocrine    Neurologic disorder associated with diabetes mellitus       Other    Anxiety - Primary        1.) Anxiety/Insomnia- She seems to be doing better. Didn't tolerate the buspar  Will stop the effexor since she has titrated down. Doesn't think that it was working well.  WIll try zoloft and continue trazodone at night.  2.) Neuropathy-  Will continue gabapentin. UDS in chart reviewed. The patient has read and signed the [facility] Controlled Substance Contract.  I will continue to see patient for regular follow up appointments.  They are well controlled on their medication.  YASMEEN has been reviewed by me and is updated every 3 months. The patient is aware of the potential for addiction and dependence.  RTC in 2 months or sooner PRN

## 2017-12-06 NOTE — PROGRESS NOTES
Mariluz Arango is a 77 y.o. female.   1940    HPI:   Location: lower back and bilateral leg  Quality: aching, sharp and dull  Severity: 6/10  Timing: constant  Alleviating: nothing  Aggravating: increased activity     Patient reports that the opioid medication still provides her good relief and allows more activity than she would have without the opioid medication.  She denies side effects.        The following portions of the patient's history were reviewed by me and updated as appropriate: allergies, current medications, past family history, past medical history, past social history, past surgical history and problem list.    Past Medical History:   Diagnosis Date   • Altered mental status     unspecified    • Anxiety    • Artificial lens present     Artificial lens in position   • Depressive disorder    • Diabetes mellitus     no retinopathy      • Diabetic polyneuropathy    • Diarrhea     unspecified    • Diverticular disease of colon    • Dizziness    • Gastroesophageal reflux disease    • Generalized abdominal pain    • Headache    • Hemorrhoids    • Hiatal hernia    • History of colonic polyps     Personal history of colonic polyps   • History of echocardiogram 09/17/2008    Echocardiogram W/O color flow 54866 (1) - Evidence of LVH & diastolic dysfunction & mild LA enlargement, otherwise NRL echocradiogram   • History of mammogram 05/27/2011    MAMMOGRAM DIAGNOSTIC BILATERAL 28824 ([facility]) (1) - benign Birads category 2   • Hypercholesterolemia    • Hypertensive disorder    • Lumbar radiculopathy    • Memory impairment     Multifactorial      • Nausea    • Neurologic disorder associated with diabetes mellitus    • Pain in thoracic spine    • Palpitations    • Polyp of colon     History of polyp of colon   • Vitamin D deficiency        Social History     Social History   • Marital status:      Spouse name: N/A   • Number of children: N/A   • Years of education: N/A     Occupational History   • Not  on file.     Social History Main Topics   • Smoking status: Former Smoker     Packs/day: 0.50     Types: Cigarettes     Quit date: 06/2016   • Smokeless tobacco: Never Used      Comment: 0.5 - 1 Pack(s) Of Cigarettes Per Day   • Alcohol use No   • Drug use: No   • Sexual activity: Defer      Comment:  - 59 Years     Other Topics Concern   • Not on file     Social History Narrative       Family History   Problem Relation Age of Onset   • Hypertension Mother    • Stroke Mother    • Diabetes Other          Current Outpatient Prescriptions:   •  carvedilol (COREG) 6.25 MG tablet, TAKE 1 TABLET BY MOUTH 2 TIMES A DAY WITH MEALS, Disp: 60 tablet, Rfl: 2  •  clobetasol (TEMOVATE) 0.05 % external solution, Apply 1 application topically Daily., Disp: , Rfl:   •  clopidogrel (PLAVIX) 75 MG tablet, TAKE 1 TABLET BY MOUTH DAILY., Disp: 30 tablet, Rfl: 2  •  cyanocobalamin 1000 MCG/ML injection, INJECT ONE ML SUBCUTANEOUSLY ONCE A MONTH, Disp: 1 mL, Rfl: 4  •  donepezil (ARICEPT) 10 MG tablet, Take 1 tablet by mouth Every Night., Disp: 90 tablet, Rfl: 2  •  FLUOCINOLONE ACETONIDE SCALP 0.01 % oil, Apply 1 application topically Daily., Disp: , Rfl:   •  fluticasone (FLONASE) 50 MCG/ACT nasal spray, INSTILL 2 SPRAYS INTO EACH NOSTRIL DAILY, Disp: 16 g, Rfl: 5  •  furosemide (LASIX) 20 MG tablet, Take 1 tablet by mouth Daily., Disp: 90 tablet, Rfl: 2  •  gabapentin (NEURONTIN) 300 MG capsule, Take 1 capsule by mouth 3 (Three) Times a Day., Disp: 90 capsule, Rfl: 2  •  HYDROcodone-acetaminophen (NORCO) 7.5-325 MG per tablet, Take 1 tablet by mouth 3 (Three) Times a Day for 30 days., Disp: 90 tablet, Rfl: 0  •  ketoconazole (NIZORAL) 2 % shampoo, , Disp: , Rfl:   •  lidocaine (LIDODERM) 5 %, PLACE ONE PATCH ONE THE SKIN EVERY DAY. REMOVE AND DISCARD THE PATCH WITHIN 12 HOURS AFTER APPLICATION EVERY DAY OR AS DIRECTED BY MD., Disp: 30 patch, Rfl: 1  •  NYSTATIN 677161 UNIT/GM powder, APPLY TO THE AFFECTED AREA THREE TIMES  "DAILY, Disp: 60 g, Rfl: 2  •  omeprazole (priLOSEC) 40 MG capsule, TAKE 1 CAPSULE BY MOUTH DAILY., Disp: 30 capsule, Rfl: 5  •  ONETOUCH VERIO test strip, , Disp: , Rfl: 11  •  potassium chloride (K-DUR,KLOR-CON) 20 MEQ CR tablet, Take 1 tablet by mouth daily., Disp: 30 tablet, Rfl: 2  •  pravastatin (PRAVACHOL) 40 MG tablet, TAKE ONE TABLET BY MOUTH EVERY NIGHT AT BEDTIME, Disp: 30 tablet, Rfl: 5  •  promethazine (PHENERGAN) 25 MG tablet, Take 1 tablet by mouth Every 6 (Six) Hours As Needed for Nausea or Vomiting., Disp: 56 tablet, Rfl: 2  •  sennosides-docusate sodium (SENOKOT-S) 8.6-50 MG tablet, Take 1 tablet by mouth Daily., Disp: 90 tablet, Rfl: 2  •  Syringe/Needle, Disp, 25G X 5/8\" 3 ML misc, USE 1 SYRINGE FOR MONTHLY VITAMIN B-12 SHOTS, Disp: 6 each, Rfl: 2  •  traZODone (DESYREL) 100 MG tablet, Take 1.5 tablets by mouth Every Night for 90 days., Disp: 135 tablet, Rfl: 1  •  venlafaxine XR (EFFEXOR-XR) 75 MG 24 hr capsule, Take 75 mg by mouth Daily., Disp: , Rfl:   •  venlafaxine XR (EFFEXOR-XR) 75 MG 24 hr capsule, TAKE ONE CAPSULE BY MOUTH THREE TIMES A DAY, Disp: 90 capsule, Rfl: 2  •  HYDROcodone-acetaminophen (NORCO) 7.5-325 MG per tablet, Take 1 tablet by mouth 3 (Three) Times a Day for 30 days., Disp: 90 tablet, Rfl: 0  •  HYDROcodone-acetaminophen (NORCO) 7.5-325 MG per tablet, Take 1 tablet by mouth 3 (Three) Times a Day for 30 days., Disp: 90 tablet, Rfl: 0    Allergies   Allergen Reactions   • Aspirin    • Codeine      UNKNOWN REACTION   • Other      Cipro   • Penicillins    • Sulfa Antibiotics      Sulfa (Sulfonamide Antibiotics)       Review of Systems   Musculoskeletal: Positive for back pain (lower).        Bilateral leg pain     All other systems reviewed and are negative.    All systems reviewed and negative except for above.    Physical Exam   Constitutional: She appears well-developed and well-nourished. No distress.   Neurological: She is alert.   Mild to Mod slr on right   Psychiatric: " She has a normal mood and affect. Her behavior is normal. Judgment normal.       Mariluz was seen today for back pain and leg pain.    Diagnoses and all orders for this visit:    DDD (degenerative disc disease), lumbar    Lumbar radiculopathy    High risk medications (not anticoagulants) long-term use    Other orders  -     HYDROcodone-acetaminophen (NORCO) 7.5-325 MG per tablet; Take 1 tablet by mouth 3 (Three) Times a Day for 30 days.  -     HYDROcodone-acetaminophen (NORCO) 7.5-325 MG per tablet; Take 1 tablet by mouth 3 (Three) Times a Day for 30 days.        Medication: Patient reports no negative side effects, Patient reports appropriate usage and storage habits, Patient's opioid provides enough relief to be more active and perform activities of daily living with less discomfort. and Refill opioid medication as above.   I explained that the pain clinic will be closing after the first of the year.  I let the pt know where I will be located.     Interventional: none at this time    Rehab: none at this time    Behavioral: No aberrant behavior noted. [facility] Report #[id number]  was reviewed and is consistent with stated history    Urine drug screen None at this time          This document has been electronically signed by Stephon Ayers MD on December 6, 2017 7:48 AM

## 2017-12-27 DIAGNOSIS — G47.00 INSOMNIA, UNSPECIFIED TYPE: ICD-10-CM

## 2017-12-27 RX ORDER — TRAZODONE HYDROCHLORIDE 100 MG/1
TABLET ORAL
Qty: 135 TABLET | Refills: 1 | Status: SHIPPED | OUTPATIENT
Start: 2017-12-27 | End: 2018-03-27

## 2018-01-22 RX ORDER — OMEPRAZOLE 40 MG/1
CAPSULE, DELAYED RELEASE ORAL
Qty: 30 CAPSULE | Refills: 5 | Status: SHIPPED | OUTPATIENT
Start: 2018-01-22 | End: 2018-03-27

## 2018-02-01 RX ORDER — PROMETHAZINE HYDROCHLORIDE 25 MG/1
TABLET ORAL
Qty: 56 TABLET | Refills: 2 | Status: SHIPPED | OUTPATIENT
Start: 2018-02-01 | End: 2018-03-27

## 2018-02-05 RX ORDER — CLOPIDOGREL BISULFATE 75 MG/1
TABLET ORAL
Qty: 30 TABLET | Refills: 2 | Status: SHIPPED | OUTPATIENT
Start: 2018-02-05 | End: 2018-03-27

## 2018-02-05 RX ORDER — PRAVASTATIN SODIUM 40 MG
TABLET ORAL
Qty: 30 TABLET | Refills: 5 | Status: SHIPPED | OUTPATIENT
Start: 2018-02-05 | End: 2018-03-27

## 2018-02-05 RX ORDER — CARVEDILOL 6.25 MG/1
TABLET ORAL
Qty: 60 TABLET | Refills: 2 | Status: SHIPPED | OUTPATIENT
Start: 2018-02-05 | End: 2018-03-27

## 2018-02-14 ENCOUNTER — TRANSCRIBE ORDERS (OUTPATIENT)
Dept: LAB | Facility: HOSPITAL | Age: 78
End: 2018-02-14

## 2018-02-14 ENCOUNTER — APPOINTMENT (OUTPATIENT)
Dept: LAB | Facility: HOSPITAL | Age: 78
End: 2018-02-14

## 2018-02-14 DIAGNOSIS — L29.9 PRURITUS: Primary | ICD-10-CM

## 2018-02-14 LAB
ALBUMIN SERPL-MCNC: 4.1 G/DL (ref 3.4–4.8)
ALBUMIN/GLOB SERPL: 1.3 G/DL (ref 1.1–1.8)
ALP SERPL-CCNC: 43 U/L (ref 38–126)
ALT SERPL W P-5'-P-CCNC: 24 U/L (ref 9–52)
ANION GAP SERPL CALCULATED.3IONS-SCNC: 15 MMOL/L (ref 5–15)
AST SERPL-CCNC: 31 U/L (ref 14–36)
BASOPHILS # BLD AUTO: 0.05 10*3/MM3 (ref 0–0.2)
BASOPHILS NFR BLD AUTO: 0.5 % (ref 0–2)
BILIRUB SERPL-MCNC: 0.4 MG/DL (ref 0.2–1.3)
BUN BLD-MCNC: 16 MG/DL (ref 7–21)
BUN/CREAT SERPL: 21.1 (ref 7–25)
CALCIUM SPEC-SCNC: 9.1 MG/DL (ref 8.4–10.2)
CHLORIDE SERPL-SCNC: 99 MMOL/L (ref 95–110)
CO2 SERPL-SCNC: 27 MMOL/L (ref 22–31)
CREAT BLD-MCNC: 0.76 MG/DL (ref 0.5–1)
DEPRECATED RDW RBC AUTO: 45.8 FL (ref 36.4–46.3)
EOSINOPHIL # BLD AUTO: 0.25 10*3/MM3 (ref 0–0.7)
EOSINOPHIL NFR BLD AUTO: 2.5 % (ref 0–7)
ERYTHROCYTE [DISTWIDTH] IN BLOOD BY AUTOMATED COUNT: 13.8 % (ref 11.5–14.5)
GFR SERPL CREATININE-BSD FRML MDRD: 73 ML/MIN/1.73 (ref 39–90)
GLOBULIN UR ELPH-MCNC: 3.2 GM/DL (ref 2.3–3.5)
GLUCOSE BLD-MCNC: 140 MG/DL (ref 60–100)
HCT VFR BLD AUTO: 40.6 % (ref 35–45)
HGB BLD-MCNC: 12.8 G/DL (ref 12–15.5)
IMM GRANULOCYTES # BLD: 0.03 10*3/MM3 (ref 0–0.02)
IMM GRANULOCYTES NFR BLD: 0.3 % (ref 0–0.5)
LYMPHOCYTES # BLD AUTO: 2.07 10*3/MM3 (ref 0.6–4.2)
LYMPHOCYTES NFR BLD AUTO: 20.8 % (ref 10–50)
MCH RBC QN AUTO: 28.4 PG (ref 26.5–34)
MCHC RBC AUTO-ENTMCNC: 31.5 G/DL (ref 31.4–36)
MCV RBC AUTO: 90.2 FL (ref 80–98)
MONOCYTES # BLD AUTO: 0.68 10*3/MM3 (ref 0–0.9)
MONOCYTES NFR BLD AUTO: 6.8 % (ref 0–12)
NEUTROPHILS # BLD AUTO: 6.87 10*3/MM3 (ref 2–8.6)
NEUTROPHILS NFR BLD AUTO: 69.1 % (ref 37–80)
PLATELET # BLD AUTO: 260 10*3/MM3 (ref 150–450)
PMV BLD AUTO: 9.7 FL (ref 8–12)
POTASSIUM BLD-SCNC: 3.7 MMOL/L (ref 3.5–5.1)
PROT SERPL-MCNC: 7.3 G/DL (ref 6.3–8.6)
RBC # BLD AUTO: 4.5 10*6/MM3 (ref 3.77–5.16)
SODIUM BLD-SCNC: 141 MMOL/L (ref 137–145)
TSH SERPL DL<=0.05 MIU/L-ACNC: 1.39 MIU/ML (ref 0.46–4.68)
WBC NRBC COR # BLD: 9.95 10*3/MM3 (ref 3.2–9.8)

## 2018-02-14 PROCEDURE — 84443 ASSAY THYROID STIM HORMONE: CPT | Performed by: NURSE PRACTITIONER

## 2018-02-14 PROCEDURE — 80053 COMPREHEN METABOLIC PANEL: CPT | Performed by: NURSE PRACTITIONER

## 2018-02-14 PROCEDURE — 85025 COMPLETE CBC W/AUTO DIFF WBC: CPT | Performed by: NURSE PRACTITIONER

## 2018-02-14 PROCEDURE — 36415 COLL VENOUS BLD VENIPUNCTURE: CPT | Performed by: NURSE PRACTITIONER

## 2018-02-27 ENCOUNTER — OFFICE VISIT (OUTPATIENT)
Dept: FAMILY MEDICINE CLINIC | Facility: CLINIC | Age: 78
End: 2018-02-27

## 2018-02-27 VITALS
OXYGEN SATURATION: 99 % | WEIGHT: 158.6 LBS | TEMPERATURE: 98.1 F | BODY MASS INDEX: 28.1 KG/M2 | HEART RATE: 61 BPM | HEIGHT: 63 IN | DIASTOLIC BLOOD PRESSURE: 82 MMHG | SYSTOLIC BLOOD PRESSURE: 148 MMHG | RESPIRATION RATE: 18 BRPM

## 2018-02-27 DIAGNOSIS — L29.9 ITCHING: ICD-10-CM

## 2018-02-27 DIAGNOSIS — R13.19 ESOPHAGEAL DYSPHAGIA: Primary | ICD-10-CM

## 2018-02-27 PROCEDURE — 99213 OFFICE O/P EST LOW 20 MIN: CPT | Performed by: NURSE PRACTITIONER

## 2018-02-27 RX ORDER — HYDROCODONE BITARTRATE AND ACETAMINOPHEN 7.5; 325 MG/1; MG/1
1 TABLET ORAL AS NEEDED
Refills: 0 | COMMUNITY
Start: 2018-02-07 | End: 2018-12-20

## 2018-02-27 RX ORDER — HYDROXYZINE PAMOATE 50 MG/1
50 CAPSULE ORAL 3 TIMES DAILY PRN
Qty: 60 CAPSULE | Refills: 0 | Status: SHIPPED | OUTPATIENT
Start: 2018-02-27 | End: 2018-04-02

## 2018-03-08 RX ORDER — CYANOCOBALAMIN 1000 UG/ML
INJECTION, SOLUTION INTRAMUSCULAR; SUBCUTANEOUS
Qty: 1 ML | Refills: 4 | Status: SHIPPED | OUTPATIENT
Start: 2018-03-08 | End: 2018-03-27

## 2018-03-14 ENCOUNTER — TELEPHONE (OUTPATIENT)
Dept: FAMILY MEDICINE CLINIC | Facility: CLINIC | Age: 78
End: 2018-03-14

## 2018-03-14 NOTE — TELEPHONE ENCOUNTER
ELISE CONTE WAS SEEN RECENTLY FOR ITCHING RASH..WAS GIVEN PRESP FOR VISTIRIL FOR ITCHING...SHE HAS EVEN STOPPED THE NORCO AS SOMEONE HAD TOLD HER IT WAS THE PAIN MEDS CAUSING THIS..BUT THAT WAS OVER A  WK AGO AND SHE IS WORSE NOW..ASKING WHAT ELSE IS SHE TO DO?PLEASE CALL HER BACK  0521

## 2018-03-14 NOTE — TELEPHONE ENCOUNTER
"Returned Mrs. Arango's call concerning itching.  She reports that she is \"broke out it from head to toe itching all over.\"  Denies shortness of breath at this time.  Reports she is getting no relief with the Vistaril that was previously prescribed  Encouraged at this time to seek treatment at either the local emergency department or urgent care as this office is closing at this time.  Mariluz verbalized understanding and reports her daughter Maya  is on her way over at this time to look at her rash.    "

## 2018-03-22 ENCOUNTER — HOSPITAL ENCOUNTER (OUTPATIENT)
Dept: GENERAL RADIOLOGY | Facility: HOSPITAL | Age: 78
Discharge: HOME | End: 2018-03-22
Admitting: INTERNAL MEDICINE

## 2018-03-22 DIAGNOSIS — R13.10 DYSPHAGIA, UNSPECIFIED TYPE: ICD-10-CM

## 2018-03-22 PROCEDURE — 74220 X-RAY XM ESOPHAGUS 1CNTRST: CPT

## 2018-03-22 PROCEDURE — 63710000001 BARIUM SULFATE 96 % RECONSTITUTED SUSPENSION: Performed by: RADIOLOGY

## 2018-03-22 PROCEDURE — A9270 NON-COVERED ITEM OR SERVICE: HCPCS | Performed by: RADIOLOGY

## 2018-03-22 RX ADMIN — BARIUM SULFATE 183 ML: 960 POWDER, FOR SUSPENSION ORAL at 07:57

## 2018-03-27 RX ORDER — CYANOCOBALAMIN 1000 UG/ML
1000 INJECTION, SOLUTION INTRAMUSCULAR; SUBCUTANEOUS
COMMUNITY
End: 2018-08-30 | Stop reason: SDUPTHER

## 2018-03-27 RX ORDER — PROMETHAZINE HYDROCHLORIDE 25 MG/1
25 TABLET ORAL EVERY 6 HOURS PRN
COMMUNITY
End: 2020-09-28

## 2018-03-27 RX ORDER — TRAZODONE HYDROCHLORIDE 100 MG/1
150 TABLET ORAL NIGHTLY
COMMUNITY
End: 2018-07-11 | Stop reason: SDUPTHER

## 2018-03-27 RX ORDER — CLOPIDOGREL BISULFATE 75 MG/1
75 TABLET ORAL DAILY
COMMUNITY
End: 2018-07-16 | Stop reason: SDUPTHER

## 2018-03-27 RX ORDER — NYSTATIN 100000 [USP'U]/G
1 POWDER TOPICAL 4 TIMES DAILY
COMMUNITY
End: 2018-07-16 | Stop reason: SDUPTHER

## 2018-03-27 RX ORDER — CARVEDILOL 6.25 MG/1
6.25 TABLET ORAL 2 TIMES DAILY WITH MEALS
COMMUNITY
End: 2018-06-12 | Stop reason: SDUPTHER

## 2018-03-27 RX ORDER — PRAVASTATIN SODIUM 40 MG
40 TABLET ORAL NIGHTLY
COMMUNITY
End: 2018-08-30 | Stop reason: SDUPTHER

## 2018-03-27 RX ORDER — OMEPRAZOLE 40 MG/1
40 CAPSULE, DELAYED RELEASE ORAL 2 TIMES DAILY
COMMUNITY
End: 2022-08-29

## 2018-03-29 ENCOUNTER — HOSPITAL ENCOUNTER (OUTPATIENT)
Facility: HOSPITAL | Age: 78
Setting detail: HOSPITAL OUTPATIENT SURGERY
Discharge: HOME | End: 2018-03-29
Attending: INTERNAL MEDICINE | Admitting: INTERNAL MEDICINE

## 2018-03-29 ENCOUNTER — ANESTHESIA (OUTPATIENT)
Dept: GASTROENTEROLOGY | Facility: HOSPITAL | Age: 78
End: 2018-03-29

## 2018-03-29 ENCOUNTER — ANESTHESIA EVENT (OUTPATIENT)
Dept: GASTROENTEROLOGY | Facility: HOSPITAL | Age: 78
End: 2018-03-29

## 2018-03-29 VITALS
TEMPERATURE: 98.3 F | BODY MASS INDEX: 27.46 KG/M2 | DIASTOLIC BLOOD PRESSURE: 70 MMHG | SYSTOLIC BLOOD PRESSURE: 148 MMHG | HEIGHT: 63 IN | OXYGEN SATURATION: 96 % | HEART RATE: 62 BPM | WEIGHT: 155 LBS | RESPIRATION RATE: 18 BRPM

## 2018-03-29 DIAGNOSIS — K22.4 ESOPHAGEAL SPASM: ICD-10-CM

## 2018-03-29 PROCEDURE — 88342 IMHCHEM/IMCYTCHM 1ST ANTB: CPT | Performed by: PATHOLOGY

## 2018-03-29 PROCEDURE — 88342 IMHCHEM/IMCYTCHM 1ST ANTB: CPT | Performed by: INTERNAL MEDICINE

## 2018-03-29 PROCEDURE — 25010000002 PROPOFOL 10 MG/ML EMULSION: Performed by: NURSE ANESTHETIST, CERTIFIED REGISTERED

## 2018-03-29 PROCEDURE — 88305 TISSUE EXAM BY PATHOLOGIST: CPT | Performed by: INTERNAL MEDICINE

## 2018-03-29 PROCEDURE — 25010000002 FENTANYL CITRATE (PF) 100 MCG/2ML SOLUTION: Performed by: NURSE ANESTHETIST, CERTIFIED REGISTERED

## 2018-03-29 PROCEDURE — 88305 TISSUE EXAM BY PATHOLOGIST: CPT | Performed by: PATHOLOGY

## 2018-03-29 RX ORDER — LIDOCAINE HYDROCHLORIDE 20 MG/ML
INJECTION, SOLUTION INFILTRATION; PERINEURAL AS NEEDED
Status: DISCONTINUED | OUTPATIENT
Start: 2018-03-29 | End: 2018-03-29 | Stop reason: SURG

## 2018-03-29 RX ORDER — PROPOFOL 10 MG/ML
VIAL (ML) INTRAVENOUS AS NEEDED
Status: DISCONTINUED | OUTPATIENT
Start: 2018-03-29 | End: 2018-03-29 | Stop reason: SURG

## 2018-03-29 RX ORDER — PYRIDOXINE HCL (VITAMIN B6) 100 MG
1 TABLET ORAL DAILY
COMMUNITY

## 2018-03-29 RX ORDER — DEXTROSE AND SODIUM CHLORIDE 5; .45 G/100ML; G/100ML
20 INJECTION, SOLUTION INTRAVENOUS CONTINUOUS
Status: DISCONTINUED | OUTPATIENT
Start: 2018-03-29 | End: 2018-03-29 | Stop reason: HOSPADM

## 2018-03-29 RX ORDER — FENTANYL CITRATE 50 UG/ML
INJECTION, SOLUTION INTRAMUSCULAR; INTRAVENOUS AS NEEDED
Status: DISCONTINUED | OUTPATIENT
Start: 2018-03-29 | End: 2018-03-29 | Stop reason: SURG

## 2018-03-29 RX ORDER — MULTIPLE VITAMINS W/ MINERALS TAB 9MG-400MCG
1 TAB ORAL DAILY
COMMUNITY
End: 2018-04-02 | Stop reason: SDUPTHER

## 2018-03-29 RX ADMIN — LIDOCAINE HYDROCHLORIDE 60 MG: 20 INJECTION, SOLUTION INFILTRATION; PERINEURAL at 08:11

## 2018-03-29 RX ADMIN — PROPOFOL 20 MG: 10 INJECTION, EMULSION INTRAVENOUS at 08:17

## 2018-03-29 RX ADMIN — FENTANYL CITRATE 50 MCG: 50 INJECTION, SOLUTION INTRAMUSCULAR; INTRAVENOUS at 08:05

## 2018-03-29 RX ADMIN — PROPOFOL 20 MG: 10 INJECTION, EMULSION INTRAVENOUS at 08:19

## 2018-03-29 RX ADMIN — DEXTROSE AND SODIUM CHLORIDE 20 ML/HR: 5; 450 INJECTION, SOLUTION INTRAVENOUS at 08:01

## 2018-03-29 RX ADMIN — PROPOFOL 20 MG: 10 INJECTION, EMULSION INTRAVENOUS at 08:14

## 2018-03-29 RX ADMIN — PROPOFOL 80 MG: 10 INJECTION, EMULSION INTRAVENOUS at 08:11

## 2018-03-29 NOTE — ANESTHESIA PREPROCEDURE EVALUATION
Anesthesia Evaluation     NPO Solid Status: > 8 hours  NPO Liquid Status: > 8 hours           Airway   Mallampati: II  TM distance: >3 FB  Dental      Pulmonary - normal exam    breath sounds clear to auscultation  (+) a smoker Former,   Cardiovascular - normal exam    ECG reviewed  PT is on anticoagulation therapy  Patient on routine beta blocker and Beta blocker given within 24 hours of surgery    (+) hypertension, dysrhythmias, PVD, hyperlipidemia,       Neuro/Psych  (+) CVA, headaches, dizziness/light headedness, numbness, psychiatric history, dementia,     GI/Hepatic/Renal/Endo    (+)  hiatal hernia, GERD,  diabetes mellitus type 2 well controlled,     Musculoskeletal     Abdominal  - normal exam   Substance History - negative use     OB/GYN          Other                      Anesthesia Plan    ASA 3     MAC     intravenous induction   Anesthetic plan and risks discussed with patient.

## 2018-03-29 NOTE — ANESTHESIA POSTPROCEDURE EVALUATION
Patient: Mariluz Arango    Procedure Summary     Date:  03/29/18 Room / Location:  [facility] ENDOSCOPY 2 / [facility] ENDOSCOPY    Anesthesia Start:  0805 Anesthesia Stop:  0824    Procedure:  ESOPHAGOGASTRODUODENOSCOPY (N/A Esophagus) Diagnosis:       Esophageal spasm      (Esophageal spasm [K22.4])    Surgeon:  Ezra Meyers DO Provider:  Brayan Hunt CRNA    Anesthesia Type:  MAC ASA Status:  3          Anesthesia Type: MAC  Last vitals  BP   159/67 (03/29/18 0746)   Temp   97 °F (36.1 °C) (03/29/18 0746)   Pulse   57 (03/29/18 0746)   Resp   16 (03/29/18 0746)     SpO2   95 % (03/29/18 0746)     Post Anesthesia Care and Evaluation    Patient location during evaluation: PACU  Level of consciousness: responsive to light touch  Pain score: 0  Pain management: adequate  Airway patency: patent  Anesthetic complications: No anesthetic complications  PONV Status: none  Cardiovascular status: acceptable  Respiratory status: acceptable and spontaneous ventilation  Hydration status: acceptable

## 2018-03-30 LAB
LAB AP CASE REPORT: NORMAL
LAB AP DIAGNOSIS COMMENT: NORMAL
Lab: NORMAL
PATH REPORT.FINAL DX SPEC: NORMAL
PATH REPORT.GROSS SPEC: NORMAL

## 2018-04-02 ENCOUNTER — OFFICE VISIT (OUTPATIENT)
Dept: FAMILY MEDICINE CLINIC | Facility: CLINIC | Age: 78
End: 2018-04-02

## 2018-04-02 ENCOUNTER — APPOINTMENT (OUTPATIENT)
Dept: LAB | Facility: HOSPITAL | Age: 78
End: 2018-04-02

## 2018-04-02 VITALS
HEIGHT: 63 IN | WEIGHT: 157.3 LBS | SYSTOLIC BLOOD PRESSURE: 110 MMHG | BODY MASS INDEX: 27.87 KG/M2 | DIASTOLIC BLOOD PRESSURE: 68 MMHG

## 2018-04-02 DIAGNOSIS — R53.1 WEAKNESS: ICD-10-CM

## 2018-04-02 DIAGNOSIS — F32.A DEPRESSIVE DISORDER: ICD-10-CM

## 2018-04-02 DIAGNOSIS — R39.198 DIFFICULTY URINATING: ICD-10-CM

## 2018-04-02 DIAGNOSIS — E11.9 DIET-CONTROLLED TYPE 2 DIABETES MELLITUS (HCC): Primary | ICD-10-CM

## 2018-04-02 LAB
BACTERIA UR QL AUTO: ABNORMAL /HPF
BILIRUB UR QL STRIP: ABNORMAL
CLARITY UR: ABNORMAL
COD CRY URNS QL: ABNORMAL /HPF
COLOR UR: ABNORMAL
DEPRECATED RDW RBC AUTO: 46.8 FL (ref 36.4–46.3)
ERYTHROCYTE [DISTWIDTH] IN BLOOD BY AUTOMATED COUNT: 14.5 % (ref 11.5–14.5)
GLUCOSE UR STRIP-MCNC: NEGATIVE MG/DL
HBA1C MFR BLD: 6.3 % (ref 4–5.6)
HCT VFR BLD AUTO: 40.7 % (ref 35–45)
HGB BLD-MCNC: 13.5 G/DL (ref 12–15.5)
HGB UR QL STRIP.AUTO: ABNORMAL
HYALINE CASTS UR QL AUTO: ABNORMAL /LPF
KETONES UR QL STRIP: NEGATIVE
LEUKOCYTE ESTERASE UR QL STRIP.AUTO: ABNORMAL
MCH RBC QN AUTO: 29.3 PG (ref 26.5–34)
MCHC RBC AUTO-ENTMCNC: 33.2 G/DL (ref 31.4–36)
MCV RBC AUTO: 88.5 FL (ref 80–98)
MUCOUS THREADS URNS QL MICRO: ABNORMAL /HPF
NITRITE UR QL STRIP: POSITIVE
PH UR STRIP.AUTO: 5.5 [PH] (ref 5–9)
PLATELET # BLD AUTO: 235 10*3/MM3 (ref 150–450)
PMV BLD AUTO: 8.8 FL (ref 8–12)
PROT UR QL STRIP: ABNORMAL
RBC # BLD AUTO: 4.6 10*6/MM3 (ref 3.77–5.16)
RBC # UR: ABNORMAL /HPF
REF LAB TEST METHOD: ABNORMAL
SP GR UR STRIP: 1.03 (ref 1–1.03)
SQUAMOUS #/AREA URNS HPF: ABNORMAL /HPF
UROBILINOGEN UR QL STRIP: ABNORMAL
WBC NRBC COR # BLD: 9.14 10*3/MM3 (ref 3.2–9.8)
WBC UR QL AUTO: ABNORMAL /HPF

## 2018-04-02 PROCEDURE — 36415 COLL VENOUS BLD VENIPUNCTURE: CPT | Performed by: FAMILY MEDICINE

## 2018-04-02 PROCEDURE — 87077 CULTURE AEROBIC IDENTIFY: CPT | Performed by: FAMILY MEDICINE

## 2018-04-02 PROCEDURE — 99214 OFFICE O/P EST MOD 30 MIN: CPT | Performed by: FAMILY MEDICINE

## 2018-04-02 PROCEDURE — 87186 SC STD MICRODIL/AGAR DIL: CPT | Performed by: FAMILY MEDICINE

## 2018-04-02 PROCEDURE — 81001 URINALYSIS AUTO W/SCOPE: CPT | Performed by: FAMILY MEDICINE

## 2018-04-02 PROCEDURE — 87086 URINE CULTURE/COLONY COUNT: CPT | Performed by: FAMILY MEDICINE

## 2018-04-02 PROCEDURE — 83036 HEMOGLOBIN GLYCOSYLATED A1C: CPT | Performed by: FAMILY MEDICINE

## 2018-04-02 PROCEDURE — 85027 COMPLETE CBC AUTOMATED: CPT | Performed by: FAMILY MEDICINE

## 2018-04-02 RX ORDER — SERTRALINE HYDROCHLORIDE 100 MG/1
100 TABLET, FILM COATED ORAL DAILY
Qty: 30 TABLET | Refills: 2 | Status: SHIPPED | OUTPATIENT
Start: 2018-04-02 | End: 2018-05-01

## 2018-04-02 RX ORDER — CLOPIDOGREL BISULFATE 75 MG/1
TABLET ORAL
Qty: 30 TABLET | Refills: 2 | Status: SHIPPED | OUTPATIENT
Start: 2018-04-02 | End: 2018-04-02 | Stop reason: SDUPTHER

## 2018-04-02 RX ORDER — SUCRALFATE 1 G/1
1 TABLET ORAL 4 TIMES DAILY
Refills: 2 | COMMUNITY
Start: 2018-03-29 | End: 2019-03-21

## 2018-04-02 NOTE — PROGRESS NOTES
Subjective   Mariluz Arango is a 77 y.o. female.     Anxiety   Presents for follow-up visit. Symptoms include decreased concentration, depressed mood, excessive worry, insomnia, irritability, malaise, muscle tension and restlessness. Patient reports no chest pain, compulsions, confusion, dizziness, dry mouth, feeling of choking, hyperventilation, impotence, nausea, obsessions, palpitations, panic, shortness of breath or suicidal ideas. Symptoms occur most days. The severity of symptoms is moderate and causing significant distress. The quality of sleep is poor. Nighttime awakenings: several.     Compliance with medications is %.   Diabetes   She presents for her follow-up diabetic visit. She has type 2 diabetes mellitus. Her disease course has been stable. Hypoglycemia symptoms include headaches. Pertinent negatives for hypoglycemia include no confusion or dizziness. Associated symptoms include fatigue and weakness. Pertinent negatives for diabetes include no chest pain. There are no hypoglycemic complications. Pertinent negatives for diabetic complications include no impotence. Risk factors for coronary artery disease include diabetes mellitus, dyslipidemia, stress, sedentary lifestyle and post-menopausal. Current diabetic treatment includes diet. She is compliant with treatment most of the time. Her weight is fluctuating minimally. She is following a generally healthy and diabetic diet. There is no change in her home blood glucose trend. An ACE inhibitor/angiotensin II receptor blocker is being taken. She does not see a podiatrist.Eye exam is current.   She has been feeling very weak since she got sick. She has had more bruising. No blood in her stools that she can recall. NO dark or tarry stools either.          Current Outpatient Prescriptions:   •  carvedilol (COREG) 6.25 MG tablet, Take 6.25 mg by mouth 2 (Two) Times a Day With Meals., Disp: , Rfl:   •  clobetasol (TEMOVATE) 0.05 % external solution,  "Apply 1 application topically Daily., Disp: , Rfl:   •  clopidogrel (PLAVIX) 75 MG tablet, Take 75 mg by mouth Daily., Disp: , Rfl:   •  Cranberry 500 MG capsule, Take 1 tablet by mouth Daily., Disp: , Rfl:   •  cyanocobalamin 1000 MCG/ML injection, Inject 1,000 mcg into the shoulder, thigh, or buttocks Every 28 (Twenty-Eight) Days., Disp: , Rfl:   •  donepezil (ARICEPT) 10 MG tablet, Take 1 tablet by mouth Every Night., Disp: 90 tablet, Rfl: 2  •  FLUOCINOLONE ACETONIDE SCALP 0.01 % oil, Apply 1 application topically Daily., Disp: , Rfl:   •  fluticasone (FLONASE) 50 MCG/ACT nasal spray, INSTILL 2 SPRAYS INTO EACH NOSTRIL DAILY, Disp: 16 g, Rfl: 5  •  gabapentin (NEURONTIN) 300 MG capsule, Take 1 capsule by mouth 3 (Three) Times a Day., Disp: 90 capsule, Rfl: 2  •  HYDROcodone-acetaminophen (NORCO) 7.5-325 MG per tablet, 1 tablet Every 8 (Eight) Hours As Needed for Moderate Pain ., Disp: , Rfl: 0  •  ketoconazole (NIZORAL) 2 % shampoo, , Disp: , Rfl:   •  lidocaine (LIDODERM) 5 %, PLACE ONE PATCH ONE THE SKIN EVERY DAY. REMOVE AND DISCARD THE PATCH WITHIN 12 HOURS AFTER APPLICATION EVERY DAY OR AS DIRECTED BY MD., Disp: 30 patch, Rfl: 1  •  Multiple Vitamins-Minerals (MULTIVITAMIN GUMMIES ADULT PO), Take 1 each by mouth Daily., Disp: , Rfl:   •  nystatin (MYCOSTATIN) 100743 UNIT/GM powder, Apply 1 application topically 4 (Four) Times a Day., Disp: , Rfl:   •  omeprazole (priLOSEC) 40 MG capsule, Take 40 mg by mouth Daily., Disp: , Rfl:   •  ONETOUCH VERIO test strip, , Disp: , Rfl: 11  •  pravastatin (PRAVACHOL) 40 MG tablet, Take 40 mg by mouth Every Night., Disp: , Rfl:   •  promethazine (PHENERGAN) 25 MG tablet, Take 25 mg by mouth Every 6 (Six) Hours As Needed for Nausea or Vomiting., Disp: , Rfl:   •  sertraline (ZOLOFT) 50 MG tablet, Take 50 mg by mouth Daily., Disp: , Rfl:   •  sucralfate (CARAFATE) 1 g tablet, Take 1 g by mouth 4 (Four) Times a Day., Disp: , Rfl: 2  •  Syringe/Needle, Disp, 25G X 5/8\" 3 ML " "misc, USE 1 SYRINGE FOR MONTHLY VITAMIN B-12 SHOTS, Disp: 6 each, Rfl: 2  •  traZODone (DESYREL) 100 MG tablet, Take 150 mg by mouth Every Night., Disp: , Rfl:     The following portions of the patient's history were reviewed and updated as appropriate: allergies, current medications, past family history, past medical history, past social history, past surgical history and problem list.    Review of Systems   Constitutional: Positive for activity change, appetite change, fatigue and irritability. Negative for chills, fever and unexpected weight change.   Respiratory: Negative for cough, shortness of breath and wheezing.    Cardiovascular: Negative for chest pain, palpitations and leg swelling.   Gastrointestinal: Positive for abdominal pain. Negative for abdominal distention, anal bleeding, blood in stool, constipation, diarrhea, nausea and vomiting.   Genitourinary: Positive for difficulty urinating. Negative for impotence.   Skin: Positive for color change. Negative for rash and wound.   Neurological: Positive for weakness and headaches. Negative for dizziness.   Psychiatric/Behavioral: Positive for decreased concentration, dysphoric mood and sleep disturbance. Negative for agitation, behavioral problems, confusion, self-injury and suicidal ideas. The patient has insomnia.        Objective    Vitals:    04/02/18 1053   BP: 110/68   Weight: 71.4 kg (157 lb 4.8 oz)   Height: 160 cm (63\")       Physical Exam   Constitutional: She is oriented to person, place, and time. She appears well-developed and well-nourished. No distress.   Appears fatigued. In wheelchair today.   Cardiovascular: Normal rate, regular rhythm and normal heart sounds.    No murmur heard.  No LE edema.   Pulmonary/Chest: Effort normal and breath sounds normal. No respiratory distress.   Abdominal: Soft. Bowel sounds are normal. She exhibits no distension. There is no tenderness.    Mariluz had a diabetic foot exam performed today.   During the foot " exam she had a monofilament test performed.    Neurological Sensory Findings - Unaltered hot/cold right ankle/foot discrimination and unaltered hot/cold left ankle/foot discrimination. Altered sharp/dull right ankle/foot discrimination and altered sharp/dull left ankle/foot discrimination. No right ankle/foot altered proprioception and no left ankle/foot altered proprioception  Vascular Status -  Her right foot exhibits abnormal foot vasculature . Her right foot exhibits no edema. Her left foot exhibits abnormal foot vasculature . Her left foot exhibits no edema.  Skin Integrity  -  Her right foot skin is not intact.  She has no right foot onychomycosis, no right foot ulcer and non-callous right foot.  Her left foot skin is not intact. She has no left foot onychomycosis, no left foot ulcer and non-callous left foot..  Neurological: She is alert and oriented to person, place, and time.   Psychiatric: Her behavior is normal. Judgment and thought content normal.   Flat affect   Nursing note and vitals reviewed.      Assessment/Plan   Problems Addressed this Visit        Other    Depressive disorder    Relevant Medications    sertraline (ZOLOFT) 100 MG tablet      Other Visit Diagnoses     Diet-controlled type 2 diabetes mellitus    -  Primary    Relevant Orders    Comprehensive Metabolic Panel    Hemoglobin A1c    Difficulty urinating        Relevant Orders    Urinalysis With / Culture If Indicated - Urine, Clean Catch    CBC (No Diff)    Weakness            1.) DM-  She has been doing well without medications.  Will continue to [facility]. Encouraged her to check her sugars when she is feeling weak to make sure that she doesn't have hypoglycemia.  Check A1C today. Foot exam overall normal. Encouraged her to use wide toe box.  2.) Depression/Anxiety- Will increase her sertraline.  3.) Weakness- Likely still recovering from recent illness. Stressed to her that it can take some time to get back to her normal. Will also  check CBC with increased bruising.  Offered PT/home health but she would like to wait for now.  4.) UA to evaluate urinary issues.  RTC in 1 month or sooner PRN

## 2018-04-03 RX ORDER — NITROFURANTOIN 25; 75 MG/1; MG/1
100 CAPSULE ORAL EVERY 12 HOURS SCHEDULED
Qty: 14 CAPSULE | Refills: 0 | Status: SHIPPED | OUTPATIENT
Start: 2018-04-03 | End: 2018-04-04 | Stop reason: SINTOL

## 2018-04-04 ENCOUNTER — TELEPHONE (OUTPATIENT)
Dept: FAMILY MEDICINE CLINIC | Facility: CLINIC | Age: 78
End: 2018-04-04

## 2018-04-04 LAB
BACTERIA SPEC AEROBE CULT: ABNORMAL
BACTERIA SPEC AEROBE CULT: ABNORMAL

## 2018-04-04 RX ORDER — LEVOFLOXACIN 250 MG/1
250 TABLET ORAL DAILY
Qty: 5 TABLET | Refills: 0 | Status: SHIPPED | OUTPATIENT
Start: 2018-04-04 | End: 2018-05-01

## 2018-04-17 ENCOUNTER — TELEPHONE (OUTPATIENT)
Dept: FAMILY MEDICINE CLINIC | Facility: CLINIC | Age: 78
End: 2018-04-17

## 2018-04-19 RX ORDER — HYDROXYZINE PAMOATE 50 MG/1
50 CAPSULE ORAL 3 TIMES DAILY PRN
Qty: 90 CAPSULE | Refills: 1 | Status: SHIPPED | OUTPATIENT
Start: 2018-04-19 | End: 2018-06-12 | Stop reason: DRUGHIGH

## 2018-04-23 RX ORDER — DONEPEZIL HYDROCHLORIDE 10 MG/1
10 TABLET, FILM COATED ORAL NIGHTLY
Qty: 90 TABLET | Refills: 2 | Status: SHIPPED | OUTPATIENT
Start: 2018-04-23 | End: 2018-11-02 | Stop reason: SDUPTHER

## 2018-05-01 ENCOUNTER — APPOINTMENT (OUTPATIENT)
Dept: LAB | Facility: HOSPITAL | Age: 78
End: 2018-05-01

## 2018-05-01 ENCOUNTER — OFFICE VISIT (OUTPATIENT)
Dept: FAMILY MEDICINE CLINIC | Facility: CLINIC | Age: 78
End: 2018-05-01

## 2018-05-01 VITALS
SYSTOLIC BLOOD PRESSURE: 122 MMHG | WEIGHT: 155.5 LBS | BODY MASS INDEX: 27.55 KG/M2 | DIASTOLIC BLOOD PRESSURE: 74 MMHG | HEIGHT: 63 IN

## 2018-05-01 DIAGNOSIS — F45.8 OTHER SOMATOFORM DISORDERS: ICD-10-CM

## 2018-05-01 DIAGNOSIS — F41.9 ANXIETY: Primary | ICD-10-CM

## 2018-05-01 DIAGNOSIS — R53.1 WEAKNESS: ICD-10-CM

## 2018-05-01 DIAGNOSIS — R30.0 DYSURIA: ICD-10-CM

## 2018-05-01 LAB
ALBUMIN SERPL-MCNC: 3.7 G/DL (ref 3.4–4.8)
ALBUMIN/GLOB SERPL: 1.3 G/DL (ref 1.1–1.8)
ALP SERPL-CCNC: 39 U/L (ref 38–126)
ALT SERPL W P-5'-P-CCNC: 22 U/L (ref 9–52)
ANION GAP SERPL CALCULATED.3IONS-SCNC: 8 MMOL/L (ref 5–15)
AST SERPL-CCNC: 37 U/L (ref 14–36)
BACTERIA UR QL AUTO: ABNORMAL /HPF
BILIRUB SERPL-MCNC: 0.3 MG/DL (ref 0.2–1.3)
BILIRUB UR QL STRIP: ABNORMAL
BUN BLD-MCNC: 12 MG/DL (ref 7–21)
BUN/CREAT SERPL: 15.2 (ref 7–25)
CALCIUM SPEC-SCNC: 9.1 MG/DL (ref 8.4–10.2)
CHLORIDE SERPL-SCNC: 100 MMOL/L (ref 95–110)
CLARITY UR: ABNORMAL
CO2 SERPL-SCNC: 32 MMOL/L (ref 22–31)
COLOR UR: ABNORMAL
CREAT BLD-MCNC: 0.79 MG/DL (ref 0.5–1)
GFR SERPL CREATININE-BSD FRML MDRD: 70 ML/MIN/1.73 (ref 60–90)
GLOBULIN UR ELPH-MCNC: 2.8 GM/DL (ref 2.3–3.5)
GLUCOSE BLD-MCNC: 97 MG/DL (ref 60–100)
GLUCOSE UR STRIP-MCNC: NEGATIVE MG/DL
HGB UR QL STRIP.AUTO: NEGATIVE
HYALINE CASTS UR QL AUTO: ABNORMAL /LPF
IRON 24H UR-MRATE: 31 MCG/DL (ref 37–170)
IRON SATN MFR SERPL: 8 % (ref 15–50)
KETONES UR QL STRIP: ABNORMAL
LEUKOCYTE ESTERASE UR QL STRIP.AUTO: ABNORMAL
NITRITE UR QL STRIP: POSITIVE
PH UR STRIP.AUTO: 6 [PH] (ref 5–9)
POTASSIUM BLD-SCNC: 3.8 MMOL/L (ref 3.5–5.1)
PROT SERPL-MCNC: 6.5 G/DL (ref 6.3–8.6)
PROT UR QL STRIP: ABNORMAL
RBC # UR: ABNORMAL /HPF
REF LAB TEST METHOD: ABNORMAL
SODIUM BLD-SCNC: 140 MMOL/L (ref 137–145)
SP GR UR STRIP: 1.04 (ref 1–1.03)
SQUAMOUS #/AREA URNS HPF: ABNORMAL /HPF
TIBC SERPL-MCNC: 381 MCG/DL (ref 265–497)
UROBILINOGEN UR QL STRIP: ABNORMAL
WBC UR QL AUTO: ABNORMAL /HPF

## 2018-05-01 PROCEDURE — 87086 URINE CULTURE/COLONY COUNT: CPT | Performed by: FAMILY MEDICINE

## 2018-05-01 PROCEDURE — 87186 SC STD MICRODIL/AGAR DIL: CPT | Performed by: FAMILY MEDICINE

## 2018-05-01 PROCEDURE — 80053 COMPREHEN METABOLIC PANEL: CPT | Performed by: FAMILY MEDICINE

## 2018-05-01 PROCEDURE — 81001 URINALYSIS AUTO W/SCOPE: CPT | Performed by: FAMILY MEDICINE

## 2018-05-01 PROCEDURE — 83540 ASSAY OF IRON: CPT | Performed by: FAMILY MEDICINE

## 2018-05-01 PROCEDURE — 99213 OFFICE O/P EST LOW 20 MIN: CPT | Performed by: FAMILY MEDICINE

## 2018-05-01 PROCEDURE — 36415 COLL VENOUS BLD VENIPUNCTURE: CPT | Performed by: FAMILY MEDICINE

## 2018-05-01 PROCEDURE — 87077 CULTURE AEROBIC IDENTIFY: CPT | Performed by: FAMILY MEDICINE

## 2018-05-01 PROCEDURE — 83550 IRON BINDING TEST: CPT | Performed by: FAMILY MEDICINE

## 2018-05-01 RX ORDER — ONDANSETRON HYDROCHLORIDE 8 MG/1
8 TABLET, FILM COATED ORAL EVERY 8 HOURS PRN
COMMUNITY
End: 2020-09-28

## 2018-05-01 NOTE — PROGRESS NOTES
Subjective   Mariluz Arango is a 77 y.o. female.     History of Present Illness   Ms. Arango is a 78yo female that presents today for follow-up. She had been feeling weak and doesn't think that her UTI has resolved with the antibiotics. She has completed levaquin.  She had an allergic reaction with macrobid.  She has been having a lot of issues with anxiety. She has been waking up a lot at night to urinate and hasn't been sleeping well. She has been taking zoloft for anxiety and she doesn't think that is working well.  She had been on benzos in the past but had a lot of sedation with that medication. She has had some issues with tremor because she has been so anxious.      Current Outpatient Prescriptions:   •  carvedilol (COREG) 6.25 MG tablet, Take 6.25 mg by mouth 2 (Two) Times a Day With Meals., Disp: , Rfl:   •  clobetasol (TEMOVATE) 0.05 % external solution, Apply 1 application topically Daily., Disp: , Rfl:   •  clopidogrel (PLAVIX) 75 MG tablet, Take 75 mg by mouth Daily., Disp: , Rfl:   •  Cranberry 500 MG capsule, Take 1 tablet by mouth Daily., Disp: , Rfl:   •  cyanocobalamin 1000 MCG/ML injection, Inject 1,000 mcg into the shoulder, thigh, or buttocks Every 28 (Twenty-Eight) Days., Disp: , Rfl:   •  donepezil (ARICEPT) 10 MG tablet, TAKE 1 TABLET BY MOUTH EVERY NIGHT., Disp: 90 tablet, Rfl: 2  •  FLUOCINOLONE ACETONIDE SCALP 0.01 % oil, Apply 1 application topically Daily., Disp: , Rfl:   •  fluticasone (FLONASE) 50 MCG/ACT nasal spray, INSTILL 2 SPRAYS INTO EACH NOSTRIL DAILY, Disp: 16 g, Rfl: 5  •  gabapentin (NEURONTIN) 300 MG capsule, Take 1 capsule by mouth 3 (Three) Times a Day., Disp: 90 capsule, Rfl: 2  •  HYDROcodone-acetaminophen (NORCO) 7.5-325 MG per tablet, 1 tablet Every 8 (Eight) Hours As Needed for Moderate Pain ., Disp: , Rfl: 0  •  hydrOXYzine (VISTARIL) 50 MG capsule, Take 1 capsule by mouth 3 (Three) Times a Day As Needed for Itching., Disp: 90 capsule, Rfl: 1  •  ketoconazole  "(NIZORAL) 2 % shampoo, , Disp: , Rfl:   •  lidocaine (LIDODERM) 5 %, PLACE ONE PATCH ONE THE SKIN EVERY DAY. REMOVE AND DISCARD THE PATCH WITHIN 12 HOURS AFTER APPLICATION EVERY DAY OR AS DIRECTED BY MD., Disp: 30 patch, Rfl: 1  •  Multiple Vitamins-Minerals (MULTIVITAMIN GUMMIES ADULT PO), Take 1 each by mouth Daily., Disp: , Rfl:   •  nystatin (MYCOSTATIN) 536324 UNIT/GM powder, Apply 1 application topically 4 (Four) Times a Day., Disp: , Rfl:   •  omeprazole (priLOSEC) 40 MG capsule, Take 40 mg by mouth Daily., Disp: , Rfl:   •  ondansetron (ZOFRAN) 8 MG tablet, Take 8 mg by mouth Every 8 (Eight) Hours As Needed for Nausea or Vomiting., Disp: , Rfl:   •  ONETOUCH VERIO test strip, , Disp: , Rfl: 11  •  pravastatin (PRAVACHOL) 40 MG tablet, Take 40 mg by mouth Every Night., Disp: , Rfl:   •  promethazine (PHENERGAN) 25 MG tablet, Take 25 mg by mouth Every 6 (Six) Hours As Needed for Nausea or Vomiting., Disp: , Rfl:   •  sertraline (ZOLOFT) 100 MG tablet, Take 1 tablet by mouth Daily., Disp: 30 tablet, Rfl: 2  •  sucralfate (CARAFATE) 1 g tablet, Take 1 g by mouth 4 (Four) Times a Day., Disp: , Rfl: 2  •  Syringe/Needle, Disp, 25G X 5/8\" 3 ML misc, USE 1 SYRINGE FOR MONTHLY VITAMIN B-12 SHOTS, Disp: 6 each, Rfl: 2  •  traZODone (DESYREL) 100 MG tablet, Take 150 mg by mouth Every Night., Disp: , Rfl:     The following portions of the patient's history were reviewed and updated as appropriate: allergies, current medications, past family history, past medical history, past social history, past surgical history and problem list.    Review of Systems   Constitutional: Positive for activity change, appetite change and fatigue. Negative for chills, fever and unexpected weight change.   Respiratory: Negative for cough, shortness of breath and wheezing.    Cardiovascular: Negative for chest pain, palpitations and leg swelling.   Gastrointestinal: Positive for abdominal pain. Negative for abdominal distention, anal " "bleeding, blood in stool, constipation, diarrhea, nausea and vomiting.   Genitourinary: Positive for difficulty urinating.   Skin: Positive for color change. Negative for rash and wound.   Neurological: Positive for weakness and headaches. Negative for dizziness.   Psychiatric/Behavioral: Positive for decreased concentration, dysphoric mood and sleep disturbance. Negative for agitation, behavioral problems, confusion, self-injury and suicidal ideas.       Objective    Vitals:    05/01/18 1536   BP: 122/74   Weight: 70.5 kg (155 lb 8 oz)   Height: 160 cm (63\")       Physical Exam   Constitutional: She is oriented to person, place, and time. She appears well-developed and well-nourished. No distress.   Cardiovascular: Normal rate, regular rhythm and normal heart sounds.    No murmur heard.  No LE edema.   Pulmonary/Chest: Effort normal and breath sounds normal. No respiratory distress.   Abdominal: Soft. Bowel sounds are normal. She exhibits no distension. There is tenderness.   Neurological: She is alert and oriented to person, place, and time.   Psychiatric: She has a normal mood and affect. Her behavior is normal. Judgment and thought content normal.   Nursing note and vitals reviewed.      Assessment/Plan   Problems Addressed this Visit        Other    Anxiety - Primary      Other Visit Diagnoses     Dysuria        Relevant Orders    Urinalysis With / Culture If Indicated - Urine, Clean Catch (Completed)    Urinalysis, Microscopic Only - Urine, Clean Catch (Completed)    Urine Culture - Urine, Urine, Clean Catch (Completed)    Weakness        Relevant Orders    Iron and TIBC (Completed)    Other somatoform disorders         Relevant Medications    Vortioxetine HBr (TRINTELLIX) 10 MG tablet    Other Relevant Orders    Iron and TIBC (Completed)        1.) MAGDALENA- She has been having a lot of issues with anxiety for months because she isn't able to do all the things that she would like to do.  Will change medication to " Trintellix and see if that helps. I think that there is a depression component. Will titrate off the sertraline. Cut in half for a week and then switch.  2.) Dysuria-  Culture showed that levaquin should have worked, but she doesn't feel better. Will recheck UA with culture today.   RTC in 1-2 months or sooner PRN

## 2018-05-02 RX ORDER — FERROUS SULFATE 325(65) MG
325 TABLET ORAL
Qty: 90 TABLET | Refills: 0 | Status: SHIPPED | OUTPATIENT
Start: 2018-05-02 | End: 2018-06-12 | Stop reason: SINTOL

## 2018-05-02 RX ORDER — CEFUROXIME AXETIL 250 MG/1
250 TABLET ORAL 2 TIMES DAILY
Qty: 10 TABLET | Refills: 0 | Status: SHIPPED | OUTPATIENT
Start: 2018-05-02 | End: 2018-05-07

## 2018-05-03 LAB
BACTERIA SPEC AEROBE CULT: ABNORMAL
BACTERIA SPEC AEROBE CULT: ABNORMAL

## 2018-05-07 RX ORDER — CARVEDILOL 6.25 MG/1
TABLET ORAL
Qty: 60 TABLET | Refills: 2 | Status: SHIPPED | OUTPATIENT
Start: 2018-05-07 | End: 2018-07-11 | Stop reason: SDUPTHER

## 2018-05-10 ENCOUNTER — ANESTHESIA EVENT (OUTPATIENT)
Dept: GASTROENTEROLOGY | Facility: HOSPITAL | Age: 78
End: 2018-05-10

## 2018-05-10 ENCOUNTER — HOSPITAL ENCOUNTER (OUTPATIENT)
Facility: HOSPITAL | Age: 78
Setting detail: HOSPITAL OUTPATIENT SURGERY
Discharge: HOME | End: 2018-05-10
Attending: INTERNAL MEDICINE | Admitting: INTERNAL MEDICINE

## 2018-05-10 ENCOUNTER — ANESTHESIA (OUTPATIENT)
Dept: GASTROENTEROLOGY | Facility: HOSPITAL | Age: 78
End: 2018-05-10

## 2018-05-10 VITALS
WEIGHT: 151 LBS | HEART RATE: 74 BPM | OXYGEN SATURATION: 96 % | DIASTOLIC BLOOD PRESSURE: 56 MMHG | HEIGHT: 63 IN | TEMPERATURE: 96.8 F | SYSTOLIC BLOOD PRESSURE: 140 MMHG | BODY MASS INDEX: 26.75 KG/M2 | RESPIRATION RATE: 16 BRPM

## 2018-05-10 DIAGNOSIS — K25.9 GASTRIC ULCER: ICD-10-CM

## 2018-05-10 LAB — GLUCOSE BLDC GLUCOMTR-MCNC: 92 MG/DL (ref 70–130)

## 2018-05-10 PROCEDURE — 25010000002 PROPOFOL 10 MG/ML EMULSION: Performed by: NURSE ANESTHETIST, CERTIFIED REGISTERED

## 2018-05-10 PROCEDURE — 88305 TISSUE EXAM BY PATHOLOGIST: CPT | Performed by: INTERNAL MEDICINE

## 2018-05-10 PROCEDURE — 88305 TISSUE EXAM BY PATHOLOGIST: CPT | Performed by: PATHOLOGY

## 2018-05-10 PROCEDURE — 82962 GLUCOSE BLOOD TEST: CPT

## 2018-05-10 RX ORDER — LIDOCAINE HYDROCHLORIDE 10 MG/ML
INJECTION, SOLUTION INFILTRATION; PERINEURAL AS NEEDED
Status: DISCONTINUED | OUTPATIENT
Start: 2018-05-10 | End: 2018-05-10 | Stop reason: SURG

## 2018-05-10 RX ORDER — DEXTROSE AND SODIUM CHLORIDE 5; .45 G/100ML; G/100ML
30 INJECTION, SOLUTION INTRAVENOUS CONTINUOUS
Status: DISCONTINUED | OUTPATIENT
Start: 2018-05-10 | End: 2018-05-10 | Stop reason: HOSPADM

## 2018-05-10 RX ORDER — PROPOFOL 10 MG/ML
VIAL (ML) INTRAVENOUS AS NEEDED
Status: DISCONTINUED | OUTPATIENT
Start: 2018-05-10 | End: 2018-05-10 | Stop reason: SURG

## 2018-05-10 RX ADMIN — LIDOCAINE HYDROCHLORIDE 60 MG: 10 INJECTION, SOLUTION INFILTRATION; PERINEURAL at 08:35

## 2018-05-10 RX ADMIN — DEXTROSE AND SODIUM CHLORIDE 30 ML/HR: 5; 450 INJECTION, SOLUTION INTRAVENOUS at 08:07

## 2018-05-10 RX ADMIN — PROPOFOL 20 MG: 10 INJECTION, EMULSION INTRAVENOUS at 08:38

## 2018-05-10 RX ADMIN — PROPOFOL 70 MG: 10 INJECTION, EMULSION INTRAVENOUS at 08:35

## 2018-05-10 NOTE — ANESTHESIA POSTPROCEDURE EVALUATION
Patient: Mariluz Arango    Procedure Summary     Date:  05/10/18 Room / Location:  [facility] ENDOSCOPY 2 / [facility] ENDOSCOPY    Anesthesia Start:  0831 Anesthesia Stop:  0841    Procedure:  ESOPHAGOGASTRODUODENOSCOPY (N/A Esophagus) Diagnosis:       Gastric ulcer      (Gastric ulcer [K25.9])    Surgeon:  Ezra Meyers DO Provider:  Alexandra Limon CRNA    Anesthesia Type:  MAC ASA Status:  3          Anesthesia Type: MAC  Last vitals  BP   178/91 (05/10/18 0757)   Temp   96.8 °F (36 °C) (05/10/18 0757)   Pulse   77 (05/10/18 0757)   Resp   18 (05/10/18 0757)     SpO2   96 % (05/10/18 0757)     Post Anesthesia Care and Evaluation    Patient location during evaluation: bedside  Patient participation: complete - patient participated  Level of consciousness: awake and awake and alert  Pain score: 0  Pain management: satisfactory to patient  Airway patency: patent  Anesthetic complications: No anesthetic complications  PONV Status: none  Cardiovascular status: acceptable and stable  Respiratory status: acceptable, room air, unassisted and spontaneous ventilation  Hydration status: acceptable

## 2018-05-10 NOTE — ANESTHESIA PREPROCEDURE EVALUATION
Anesthesia Evaluation     Patient summary reviewed and Nursing notes reviewed   NPO Solid Status: > 8 hours  NPO Liquid Status: > 8 hours           Airway   Mallampati: II  TM distance: >3 FB  No difficulty expected  Dental - normal exam     Pulmonary - normal exam    breath sounds clear to auscultation  (+) a smoker Former,   Cardiovascular - normal exam    ECG reviewed  PT is on anticoagulation therapy  Patient on routine beta blocker and Beta blocker given within 24 hours of surgery    (+) hypertension, dysrhythmias Atrial Fib, PVD, hyperlipidemia,       Neuro/Psych  (+) CVA, headaches, dizziness/light headedness, numbness, psychiatric history Depression and Anxiety, dementia,     GI/Hepatic/Renal/Endo    (+)  hiatal hernia, GERD,  diabetes mellitus type 2 well controlled,     Musculoskeletal     Abdominal  - normal exam   Substance History - negative use     OB/GYN          Other - negative ROS                       Anesthesia Plan    ASA 3     MAC     intravenous induction   Anesthetic plan and risks discussed with patient.

## 2018-05-10 NOTE — H&P
Henrietta Damico DO,[facility]  Gastroenterology  Hepatology  Endoscopy  Board Certified in Internal Medicine and gastroenterology  [address], [address]  [place]. [address]  - [phone]   F - [phone]     GASTROENTEROLOGY HISTORY AND PHYSICAL  NOTE   HENRIETTA DAMICO DO.         SUBJECTIVE:   5/10/2018    Name: Mariluz Arango  DOD: 1940        Chief Complaint:       Subjective : Gastric ulcer for follow-up     Patient is 77 y.o. female presents with desire for elective EGD with reevaluation of gastric ulcer without bleeding.      ROS/HISTORY/ CURRENT MEDICATIONS/OBJECTIVE/VS/PE:   Review of Systems:   Review of Systems    History:     Past Medical History:   Diagnosis Date   • Altered mental status     unspecified    • Anxiety    • Artificial lens present     Artificial lens in position   • Depressive disorder    • Diabetes mellitus     no retinopathy      • Diabetic polyneuropathy    • Diarrhea     unspecified    • Diverticular disease of colon    • Dizziness    • Gastroesophageal reflux disease    • Generalized abdominal pain    • Headache    • Hemorrhoids    • Hiatal hernia    • History of colonic polyps     Personal history of colonic polyps   • History of echocardiogram 09/17/2008    Echocardiogram W/O color flow 93709 (1) - Evidence of LVH & diastolic dysfunction & mild LA enlargement, otherwise NRL echocradiogram   • History of mammogram 05/27/2011    MAMMOGRAM DIAGNOSTIC BILATERAL 38901 ([facility]) (1) - benign Birads category 2   • History of transfusion    • Hypercholesterolemia    • Hypertensive disorder    • Lumbar radiculopathy    • Memory impairment     Multifactorial      • Nausea    • Neurologic disorder associated with diabetes mellitus    • Pain in thoracic spine    • Palpitations    • Polyp of colon     History of polyp of colon   • Stroke    • Vitamin D deficiency      Past Surgical History:   Procedure Laterality Date   • ANKLE SURGERY  11/18/2008    Ankle surgery (4) -  Open ankle arthrodesis with intermedullary bella fixation from the heel. Non union of the ankle, tibial plafond with failed ankle arthrodesis   • APPENDECTOMY  1965    Appendectomy (1)   • BLADDER SURGERY  1973    Bladder surgery (1) - Kendall-Raul repair. Cystocele with stress incontinence & endometriosis.Same with P.I.D   • BREAST BIOPSY  1997    Stereotactic breast biopsy (1) - Stereotactic needle localization with aspiration. Non palpable U/S solid mass, R breast by one interpretation & cluster cyst by another   • CARDIAC CATHETERIZATION  1986    Cardiac cath 53905 (1) - NRL LV function w/ NRL hemodynamics. NRL coronary arteries w/o evidence of atherosclerotic heart disease   • CARPAL TUNNEL RELEASE Left 08/15/1995    Carpal tunnel surgery (1) - Left carpal tunnel release, endoscopic procedure.   • CATARACT EXTRACTION Right 2007    Remove cataract, insert lens (2) - right   • CATARACT EXTRACTION Bilateral    • COLONOSCOPY W/ POLYPECTOMY  2016    Colonoscopy remove polyps 74058 (1) - One polyp in the ascending colon.Resected and retrieved.   • DILATATION AND CURETTAGE  10/02/1964    D&C (1) - D&C & colpotomy. Incomplete  or tubal pregnancy. Dysfunctional uterine bleeding   • ENDOSCOPY      Colon endoscopy 91867 (2)   • ENDOSCOPY  2016    EGD w/ biopsy 08071 (1) - Gastritis.Normal duodenum.Biopsied.Normal esophagus.Several biopsies obtained in the gastric antrum.   • ENDOSCOPY  2008    EGD w/ tube 40974 (1) - Hiatal hernia. GERD. Esophageal motility disorder.   • ENDOSCOPY N/A 3/29/2018    Procedure: ESOPHAGOGASTRODUODENOSCOPY;  Surgeon: Ezra Meyers DO;  Location: [facility] ENDOSCOPY;  Service: Gastroenterology   • INJECTION OF MEDICATION  2011    Kenalog (3) - Ordered By: RADHA JIMENEZ (FP)    • LEG SURGERY  1985    Extensive leg surgery (1) - Arthroscopy, arthrotomy lateral release. Chomdromalcia, patella, right knee   • LUMBAR DISC  SURGERY  2004    Low back disk surgery (1)   • OTHER SURGICAL HISTORY  01/13/1981    Bowel surgery procedure (1) - Lysis of adhesions, resection of terminal ileum with end-to-end anastomosis. partial small bowel ibstruction. Same plus ulceration of distal ileum   • OTHER SURGICAL HISTORY  06/16/1965    Suspension of uterus (1) - Uterine suspension & appendectomy, lysis of adhesion. Chronic pelvic inflammatory disease vs endometriosis. Chronic pelvic inflannatory disease   • TOTAL ABDOMINAL HYSTERECTOMY WITH SALPINGO OOPHORECTOMY  08/05/1966    YAEL/BSO (1) - Chronic pelvic inflammatory disease.Same plus endometriosis of the ovary   • WRIST ARTHROSCOPY  05/09/1988    Wrist arthroscopy/surgery (2) - excision ganglion cyst left wrist.     Family History   Problem Relation Age of Onset   • Hypertension Mother    • Stroke Mother    • Diabetes Other      Social History   Substance Use Topics   • Smoking status: Former Smoker     Packs/day: 0.50     Types: Cigarettes     Quit date: 06/2016   • Smokeless tobacco: Never Used      Comment: 0.5 - 1 Pack(s) Of Cigarettes Per Day   • Alcohol use No     Prescriptions Prior to Admission   Medication Sig Dispense Refill Last Dose   • carvedilol (COREG) 6.25 MG tablet Take 6.25 mg by mouth 2 (Two) Times a Day With Meals.   Taking   • carvedilol (COREG) 6.25 MG tablet TAKE 1 TABLET BY MOUTH 2 TIMES A DAY WITH MEALS 60 tablet 2    • clobetasol (TEMOVATE) 0.05 % external solution Apply 1 application topically Daily.   Taking   • clopidogrel (PLAVIX) 75 MG tablet Take 75 mg by mouth Daily.   Taking   • Cranberry 500 MG capsule Take 1 tablet by mouth Daily.   Taking   • cyanocobalamin 1000 MCG/ML injection Inject 1,000 mcg into the shoulder, thigh, or buttocks Every 28 (Twenty-Eight) Days.   Taking   • donepezil (ARICEPT) 10 MG tablet TAKE 1 TABLET BY MOUTH EVERY NIGHT. 90 tablet 2 Taking   • ferrous sulfate 325 (65 FE) MG tablet Take 1 tablet by mouth Daily With Breakfast. 90 tablet 0   "  • FLUOCINOLONE ACETONIDE SCALP 0.01 % oil Apply 1 application topically Daily.   Taking   • fluticasone (FLONASE) 50 MCG/ACT nasal spray INSTILL 2 SPRAYS INTO EACH NOSTRIL DAILY 16 g 5 Taking   • gabapentin (NEURONTIN) 300 MG capsule Take 1 capsule by mouth 3 (Three) Times a Day. 90 capsule 2 Taking   • HYDROcodone-acetaminophen (NORCO) 7.5-325 MG per tablet 1 tablet Every 8 (Eight) Hours As Needed for Moderate Pain .  0 Taking   • hydrOXYzine (VISTARIL) 50 MG capsule Take 1 capsule by mouth 3 (Three) Times a Day As Needed for Itching. 90 capsule 1 Taking   • ketoconazole (NIZORAL) 2 % shampoo    Taking   • lidocaine (LIDODERM) 5 % PLACE ONE PATCH ONE THE SKIN EVERY DAY. REMOVE AND DISCARD THE PATCH WITHIN 12 HOURS AFTER APPLICATION EVERY DAY OR AS DIRECTED BY MD. 30 patch 1 Taking   • Multiple Vitamins-Minerals (MULTIVITAMIN GUMMIES ADULT PO) Take 1 each by mouth Daily.   Taking   • nystatin (MYCOSTATIN) 500772 UNIT/GM powder Apply 1 application topically 4 (Four) Times a Day.   Taking   • omeprazole (priLOSEC) 40 MG capsule Take 40 mg by mouth Daily.   Taking   • ondansetron (ZOFRAN) 8 MG tablet Take 8 mg by mouth Every 8 (Eight) Hours As Needed for Nausea or Vomiting.   Taking   • ONETOUCH VERIO test strip   11 Taking   • pravastatin (PRAVACHOL) 40 MG tablet Take 40 mg by mouth Every Night.   Taking   • promethazine (PHENERGAN) 25 MG tablet Take 25 mg by mouth Every 6 (Six) Hours As Needed for Nausea or Vomiting.   Taking   • sucralfate (CARAFATE) 1 g tablet Take 1 g by mouth 4 (Four) Times a Day.  2 Taking   • Syringe/Needle, Disp, 25G X 5/8\" 3 ML misc USE 1 SYRINGE FOR MONTHLY VITAMIN B-12 SHOTS 6 each 2 Taking   • traZODone (DESYREL) 100 MG tablet Take 150 mg by mouth Every Night.   Taking   • Vortioxetine HBr (TRINTELLIX) 10 MG tablet Take 10 mg by mouth Daily. 30 tablet 2      Allergies:  Aspirin; Codeine; Macrobid [nitrofurantoin macrocrystal]; Other; Penicillins; Sulfa antibiotics; and Dexilant " [dexlansoprazole]    I have reviewed the patients medical history, surgical history and family history in the available medical record system.     Current Medications:     Current Facility-Administered Medications   Medication Dose Route Frequency Provider Last Rate Last Dose   • dextrose 5 % and sodium chloride 0.45 % infusion  30 mL/hr Intravenous Continuous Ezra Meyers DO           Objective     Physical Exam:        Physical Exam:  General Appearance:    Alert, cooperative, in no acute distress   Head:    Normocephalic, without obvious abnormality, atraumatic   Eyes:            Lids and lashes normal, conjunctivae and sclerae normal, no   icterus, no pallor, corneas clear, PERRLA   Ears:    Ears appear intact with no abnormalities noted   Throat:   No oral lesions, no thrush, oral mucosa moist   Neck:   No adenopathy, supple, trachea midline, no thyromegaly, no     carotid bruit, no JVD   Back:     No kyphosis present, no scoliosis present, no skin lesions,       erythema or scars, no tenderness to percussion or                   palpation,   range of motion normal   Lungs:     Clear to auscultation,respirations regular, even and                   unlabored    Heart:    Regular rhythm and normal rate, normal S1 and S2, no            murmur, no gallop, no rub, no click   Breast Exam:    Deferred   Abdomen:     Normal bowel sounds, no masses, no organomegaly, soft        non-tender, non-distended, no guarding, no rebound                 tenderness   Genitalia:    Deferred   Extremities:   Moves all extremities well, no edema, no cyanosis, no              redness   Pulses:   Pulses palpable and equal bilaterally   Skin:   No bleeding, bruising or rash   Lymph nodes:   No palpable adenopathy   Neurologic:   Cranial nerves 2 - 12 grossly intact, sensation intact, DTR        present and equal bilaterally      Results Review:     Lab Results   Component Value Date    WBC 9.14 04/02/2018    WBC 9.95 (H) 02/14/2018     WBC 10.18 (H) 11/09/2017    HGB 13.5 04/02/2018    HGB 12.8 02/14/2018    HGB 12.3 11/09/2017    HCT 40.7 04/02/2018    HCT 40.6 02/14/2018    HCT 38.5 11/09/2017     04/02/2018     02/14/2018     11/09/2017             No results found for: LIPASE  No results found for: INR       Radiology Review:  Imaging Results (last 72 hours)     ** No results found for the last 72 hours. **           I reviewed the patient's new clinical results.  I reviewed the patient's new imaging results and agree with the interpretation.     ASSESSMENT/PLAN:   ASSESSMENT:   1.  Gastric ulcer  2.  Esophageal stricture    PLAN:   1.  Esophagogastroduodenoscopy with biopsy and dilation as appropriate    Risk and benefits associated with the procedure are reviewed with the patient.  The patient wishes to proceed      Ezra Meyers DO  05/10/18  7:44 AM

## 2018-05-11 LAB
LAB AP CASE REPORT: NORMAL
Lab: NORMAL
PATH REPORT.FINAL DX SPEC: NORMAL
PATH REPORT.GROSS SPEC: NORMAL

## 2018-05-21 RX ORDER — NYSTATIN 100000 [USP'U]/G
POWDER TOPICAL
Qty: 60 G | Refills: 2 | Status: SHIPPED | OUTPATIENT
Start: 2018-05-21 | End: 2018-10-08 | Stop reason: SDUPTHER

## 2018-06-12 ENCOUNTER — OFFICE VISIT (OUTPATIENT)
Dept: FAMILY MEDICINE CLINIC | Facility: CLINIC | Age: 78
End: 2018-06-12

## 2018-06-12 VITALS
DIASTOLIC BLOOD PRESSURE: 80 MMHG | TEMPERATURE: 99 F | HEIGHT: 63 IN | WEIGHT: 150.2 LBS | BODY MASS INDEX: 26.61 KG/M2 | SYSTOLIC BLOOD PRESSURE: 126 MMHG

## 2018-06-12 DIAGNOSIS — F32.A DEPRESSIVE DISORDER: Primary | ICD-10-CM

## 2018-06-12 DIAGNOSIS — D50.8 IRON DEFICIENCY ANEMIA SECONDARY TO INADEQUATE DIETARY IRON INTAKE: ICD-10-CM

## 2018-06-12 DIAGNOSIS — E11.42 DIABETIC POLYNEUROPATHY ASSOCIATED WITH TYPE 2 DIABETES MELLITUS (HCC): ICD-10-CM

## 2018-06-12 PROCEDURE — 99214 OFFICE O/P EST MOD 30 MIN: CPT | Performed by: FAMILY MEDICINE

## 2018-06-12 RX ORDER — DULOXETIN HYDROCHLORIDE 20 MG/1
20 CAPSULE, DELAYED RELEASE ORAL DAILY
Qty: 30 CAPSULE | Refills: 2 | Status: SHIPPED | OUTPATIENT
Start: 2018-06-12 | End: 2018-08-30 | Stop reason: SDUPTHER

## 2018-06-12 RX ORDER — FENOPROFEN CALCIUM 200 MG
CAPSULE ORAL 3 TIMES DAILY PRN
Qty: 1 BOTTLE | Refills: 1 | Status: SHIPPED | OUTPATIENT
Start: 2018-06-12 | End: 2020-09-28

## 2018-06-12 RX ORDER — GABAPENTIN 300 MG/1
300 CAPSULE ORAL 3 TIMES DAILY
Qty: 90 CAPSULE | Refills: 2 | Status: SHIPPED | OUTPATIENT
Start: 2018-06-12 | End: 2018-09-18 | Stop reason: SDUPTHER

## 2018-06-12 NOTE — PATIENT INSTRUCTIONS
Iron-Rich Diet  Iron is a mineral that helps your body to produce hemoglobin. Hemoglobin is a protein in your red blood cells that carries oxygen to your body's tissues. Eating too little iron may cause you to feel weak and tired, and it can increase your risk for infection. Eating enough iron is necessary for your body's metabolism, muscle function, and nervous system.  Iron is naturally found in many foods. It can also be added to foods or fortified in foods. There are two types of dietary iron:  · Heme iron. Heme iron is absorbed by the body more easily than nonheme iron. Heme iron is found in meat, poultry, and fish.  · Nonheme iron. Nonheme iron is found in dietary supplements, iron-fortified grains, beans, and vegetables.  You may need to follow an iron-rich diet if:  · You have been diagnosed with iron deficiency or iron-deficiency anemia.  · You have a condition that prevents you from absorbing dietary iron, such as:  ¨ Infection in your intestines.  ¨ Celiac disease. This involves long-lasting (chronic) inflammation of your intestines.  · You do not eat enough iron.  · You eat a diet that is high in foods that impair iron absorption.  · You have lost a lot of blood.  · You have heavy bleeding during your menstrual cycle.  · You are pregnant.  What is my plan?  Your health care provider may help you to determine how much iron you need per day based on your condition. Generally, when a person consumes sufficient amounts of iron in the diet, the following iron needs are met:  · Men.  ¨ 14-18 years old: 11 mg per day.  ¨ 19-50 years old: 8 mg per day.  · Women.  ¨ 14-18 years old: 15 mg per day.  ¨ 19-50 years old: 18 mg per day.  ¨ Over 50 years old: 8 mg per day.  ¨ Pregnant women: 27 mg per day.  ¨ Breastfeeding women: 9 mg per day.  What do I need to know about an iron-rich diet?  · Eat fresh fruits and vegetables that are high in vitamin C along with foods that are high in iron. This will help increase the  amount of iron that your body absorbs from food, especially with foods containing nonheme iron. Foods that are high in vitamin C include oranges, peppers, tomatoes, and adalid.  · Take iron supplements only as directed by your health care provider. Overdose of iron can be life-threatening. If you were prescribed iron supplements, take them with orange juice or a vitamin C supplement.  · Cook foods in pots and pans that are made from iron.  · Eat nonheme iron-containing foods alongside foods that are high in heme iron. This helps to improve your iron absorption.  · Certain foods and drinks contain compounds that impair iron absorption. Avoid eating these foods in the same meal as iron-rich foods or with iron supplements. These include:  ¨ Coffee, black tea, and red wine.  ¨ Milk, dairy products, and foods that are high in calcium.  ¨ Beans, soybeans, and peas.  ¨ Whole grains.  · When eating foods that contain both nonheme iron and compounds that impair iron absorption, follow these tips to absorb iron better.  ¨ Soak beans overnight before cooking.  ¨ Soak whole grains overnight and drain them before using.  ¨ Ferment flours before baking, such as using yeast in bread dough.  What foods can I eat?  Grains   Iron-fortified breakfast cereal. Iron-fortified whole-wheat bread. Enriched rice. Sprouted grains.  Vegetables   Spinach. Potatoes with skin. Green peas. Broccoli. Red and green bell peppers. Fermented vegetables.  Fruits   Prunes. Raisins. Oranges. Strawberries. Adalid. Grapefruit.  Meats and Other Protein Sources   Beef liver. Oysters. Beef. Shrimp. Turkey. Chicken. Tuna. Sardines. Chickpeas. Nuts. Tofu.  Beverages   Tomato juice. Fresh orange juice. Prune juice. Hibiscus tea. Fortified instant breakfast shakes.  Condiments   Tahini. Fermented soy sauce.  Sweets and Desserts   Black-strap molasses.  Other   Wheat germ.  The items listed above may not be a complete list of recommended foods or beverages. Contact  your dietitian for more options.   What foods are not recommended?  Grains   Whole grains. Bran cereal. Bran flour. Oats.  Vegetables   Artichokes. Deposit sprouts. Kale.  Fruits   Blueberries. Raspberries. Strawberries. Figs.  Meats and Other Protein Sources   Soybeans. Products made from soy protein.  Dairy   Milk. Cream. Cheese. Yogurt. Cottage cheese.  Beverages   Coffee. Black tea. Red wine.  Sweets and Desserts   Cocoa. Chocolate. Ice cream.  Other   Basil. Oregano. Parsley.  The items listed above may not be a complete list of foods and beverages to avoid. Contact your dietitian for more information.   This information is not intended to replace advice given to you by your health care provider. Make sure you discuss any questions you have with your health care provider.  Document Released: 08/01/2006 Document Revised: 07/07/2017 Document Reviewed: 07/15/2015  [employer] Patient Education © 2017 [employer]

## 2018-06-12 NOTE — PROGRESS NOTES
Subjective   Mariluz Arango is a 77 y.o. female.     Fatigue   This is a chronic problem. The current episode started more than 1 year ago. The problem occurs constantly. The problem has been unchanged. Associated symptoms include abdominal pain, congestion, coughing, fatigue, headaches, nausea, a sore throat and weakness. Pertinent negatives include no anorexia, arthralgias, change in bowel habit, chest pain, chills, diaphoresis, fever, joint swelling, myalgias, neck pain, numbness, rash, swollen glands, urinary symptoms, vertigo, visual change or vomiting. Nothing aggravates the symptoms. Treatments tried: She has had issues with depression and anxiety. Seems to be getting worse and her sertraline isn't working. She tried Trintellix and that wasn't tolerated. The treatment provided no relief.     She has been very down about not being able to do as much as she used to do.  She says that she feels very frustrated about it   She restarted on her sertraline and she isn't sure which dose that was.      She had iron defieciency and she was started on an iron supplement but she stopped taking that because it caused her GI upset. She has still been having a lot of issues with heartburn. She had an ulcer and she hasn't felt well since then. She has been taking carafate and that helps with pain. She had a follow-up EGD and that showed that the ulcer had resolved.      She has been having issues with sinus drainage. She has been coughing up yellow sputum. She has been getting better with mucinex.  She has had some issues with fevers at home.  She didn't measure it but she had chills and fevers.      She has been on medication for neuropathy.  She has been taking gabapentin TID.  She has been having more issues with tingling pain in her legs.        Current Outpatient Prescriptions:   •  carvedilol (COREG) 6.25 MG tablet, TAKE 1 TABLET BY MOUTH 2 TIMES A DAY WITH MEALS, Disp: 60 tablet, Rfl: 2  •  clobetasol (TEMOVATE)  0.05 % external solution, Apply 1 application topically Daily., Disp: , Rfl:   •  clopidogrel (PLAVIX) 75 MG tablet, Take 75 mg by mouth Daily., Disp: , Rfl:   •  Cranberry 500 MG capsule, Take 1 tablet by mouth Daily., Disp: , Rfl:   •  cyanocobalamin 1000 MCG/ML injection, Inject 1,000 mcg into the shoulder, thigh, or buttocks Every 28 (Twenty-Eight) Days., Disp: , Rfl:   •  donepezil (ARICEPT) 10 MG tablet, TAKE 1 TABLET BY MOUTH EVERY NIGHT., Disp: 90 tablet, Rfl: 2  •  FLUOCINOLONE ACETONIDE SCALP 0.01 % oil, Apply 1 application topically Daily., Disp: , Rfl:   •  fluticasone (FLONASE) 50 MCG/ACT nasal spray, INSTILL 2 SPRAYS INTO EACH NOSTRIL DAILY, Disp: 16 g, Rfl: 5  •  gabapentin (NEURONTIN) 300 MG capsule, Take 1 capsule by mouth 3 (Three) Times a Day., Disp: 90 capsule, Rfl: 2  •  HYDROcodone-acetaminophen (NORCO) 7.5-325 MG per tablet, 1 tablet As Needed for Moderate Pain ., Disp: , Rfl: 0  •  hydrOXYzine (VISTARIL) 50 MG capsule, Take 1 capsule by mouth 3 (Three) Times a Day As Needed for Itching., Disp: 90 capsule, Rfl: 1  •  ketoconazole (NIZORAL) 2 % shampoo, , Disp: , Rfl:   •  lidocaine (LIDODERM) 5 %, PLACE ONE PATCH ONE THE SKIN EVERY DAY. REMOVE AND DISCARD THE PATCH WITHIN 12 HOURS AFTER APPLICATION EVERY DAY OR AS DIRECTED BY MD., Disp: 30 patch, Rfl: 1  •  Multiple Vitamins-Minerals (MULTIVITAMIN GUMMIES ADULT PO), Take 1 each by mouth Daily., Disp: , Rfl:   •  nystatin (MYCOSTATIN) 640903 UNIT/GM powder, Apply 1 application topically 4 (Four) Times a Day., Disp: , Rfl:   •  nystatin (MYCOSTATIN) 645220 UNIT/GM powder, APPLY TO THE AFFECTED AREA THREE TIMES DAILY, Disp: 60 g, Rfl: 2  •  omeprazole (priLOSEC) 40 MG capsule, Take 40 mg by mouth Daily., Disp: , Rfl:   •  ondansetron (ZOFRAN) 8 MG tablet, Take 8 mg by mouth Every 8 (Eight) Hours As Needed for Nausea or Vomiting., Disp: , Rfl:   •  ONETOUCH VERIO test strip, , Disp: , Rfl: 11  •  pravastatin (PRAVACHOL) 40 MG tablet, Take 40 mg by  "mouth Every Night., Disp: , Rfl:   •  promethazine (PHENERGAN) 25 MG tablet, Take 25 mg by mouth Every 6 (Six) Hours As Needed for Nausea or Vomiting., Disp: , Rfl:   •  sucralfate (CARAFATE) 1 g tablet, Take 1 g by mouth 4 (Four) Times a Day., Disp: , Rfl: 2  •  Syringe/Needle, Disp, 25G X 5/8\" 3 ML misc, USE 1 SYRINGE FOR MONTHLY VITAMIN B-12 SHOTS, Disp: 6 each, Rfl: 2  •  traZODone (DESYREL) 100 MG tablet, Take 150 mg by mouth Every Night., Disp: , Rfl:     The following portions of the patient's history were reviewed and updated as appropriate: allergies, current medications, past family history, past medical history, past social history, past surgical history and problem list.    Review of Systems   Constitutional: Positive for activity change, appetite change and fatigue. Negative for chills, diaphoresis, fever and unexpected weight change.   HENT: Positive for congestion, rhinorrhea, sinus pressure and sore throat. Negative for sinus pain.    Respiratory: Positive for cough. Negative for shortness of breath and wheezing.    Cardiovascular: Negative for chest pain, palpitations and leg swelling.   Gastrointestinal: Positive for abdominal pain, constipation, diarrhea and nausea. Negative for abdominal distention, anal bleeding, anorexia, blood in stool, change in bowel habit and vomiting.   Genitourinary: Negative for difficulty urinating and dysuria.   Musculoskeletal: Negative for arthralgias, joint swelling, myalgias and neck pain.   Skin: Positive for color change. Negative for rash and wound.   Neurological: Positive for weakness and headaches. Negative for dizziness, vertigo and numbness.   Psychiatric/Behavioral: Positive for decreased concentration, dysphoric mood and sleep disturbance. Negative for agitation, behavioral problems, confusion, self-injury and suicidal ideas.       Objective    Vitals:    06/12/18 1327   BP: 126/80   Temp: 99 °F (37.2 °C)   Weight: 68.1 kg (150 lb 3.2 oz)   Height: 160 cm " "(63\")       Physical Exam   Constitutional: She is oriented to person, place, and time. She appears well-developed and well-nourished. No distress.   HENT:   Right Ear: Hearing, tympanic membrane, external ear and ear canal normal.   Left Ear: Hearing, tympanic membrane, external ear and ear canal normal.   Nose: Mucosal edema and rhinorrhea present. Right sinus exhibits maxillary sinus tenderness. Right sinus exhibits no frontal sinus tenderness. Left sinus exhibits maxillary sinus tenderness. Left sinus exhibits no frontal sinus tenderness.   Mouth/Throat: Posterior oropharyngeal erythema present. No oropharyngeal exudate or posterior oropharyngeal edema.   Cardiovascular: Normal rate, regular rhythm and normal heart sounds.    No murmur heard.  Pulmonary/Chest: Effort normal and breath sounds normal. No respiratory distress. She has no wheezes.   Abdominal: Soft. Bowel sounds are normal. She exhibits no distension. There is tenderness.   Neurological: She is alert and oriented to person, place, and time.   Psychiatric: Her behavior is normal. Judgment and thought content normal.   Flat affect and depressed mood   Nursing note and vitals reviewed.      Assessment/Plan   Problems Addressed this Visit        Endocrine    Diabetic polyneuropathy       Hematopoietic and Hemostatic    Iron deficiency anemia secondary to inadequate dietary iron intake       Other    Depressive disorder - Primary    Relevant Medications    DULoxetine (CYMBALTA) 20 MG capsule        1.) Depression- Seems to be getting worse. I am concerned to continue adding medications. She has a lot of reactions to medications. Will stop her sertraline. Will try low dose cymbalta and see if that helps with her pain also.  2.) Neuropathy-  Refilled gabapentin. I would like to avoid increasing for now because of side effects and concern for sedation.  3.) Iron def anemia- She has a poor diet.  Discussed foods that she can try to eat to help with her " anemia. Printed list for her and her family.  She didn't tolerate the oral iron. Will rechek CBC when she comes back in.  RTC in 1 month or sooner PRN

## 2018-06-13 ENCOUNTER — APPOINTMENT (OUTPATIENT)
Dept: CT IMAGING | Facility: HOSPITAL | Age: 78
End: 2018-06-13

## 2018-06-13 ENCOUNTER — HOSPITAL ENCOUNTER (OUTPATIENT)
Facility: HOSPITAL | Age: 78
Setting detail: OBSERVATION
Discharge: HOME | End: 2018-06-17
Attending: EMERGENCY MEDICINE | Admitting: FAMILY MEDICINE

## 2018-06-13 ENCOUNTER — APPOINTMENT (OUTPATIENT)
Dept: GENERAL RADIOLOGY | Facility: HOSPITAL | Age: 78
End: 2018-06-13

## 2018-06-13 DIAGNOSIS — Z74.09 IMPAIRED PHYSICAL MOBILITY: ICD-10-CM

## 2018-06-13 DIAGNOSIS — Z78.9 IMPAIRED MOBILITY AND ADLS: ICD-10-CM

## 2018-06-13 DIAGNOSIS — I16.0 HYPERTENSIVE URGENCY: ICD-10-CM

## 2018-06-13 DIAGNOSIS — Z74.09 IMPAIRED MOBILITY AND ADLS: ICD-10-CM

## 2018-06-13 DIAGNOSIS — N12 PYELONEPHRITIS: Primary | ICD-10-CM

## 2018-06-13 DIAGNOSIS — I71.60 THORACOABDOMINAL AORTIC ANEURYSM (TAAA) WITHOUT RUPTURE (HCC): ICD-10-CM

## 2018-06-13 PROBLEM — D50.8 IRON DEFICIENCY ANEMIA SECONDARY TO INADEQUATE DIETARY IRON INTAKE: Status: ACTIVE | Noted: 2018-06-13

## 2018-06-13 PROBLEM — N30.00 ACUTE CYSTITIS WITHOUT HEMATURIA: Status: ACTIVE | Noted: 2018-06-13

## 2018-06-13 LAB
ALBUMIN SERPL-MCNC: 3.5 G/DL (ref 3.4–4.8)
ALBUMIN/GLOB SERPL: 1.3 G/DL (ref 1.1–1.8)
ALP SERPL-CCNC: 41 U/L (ref 38–126)
ALT SERPL W P-5'-P-CCNC: 29 U/L (ref 9–52)
ANION GAP SERPL CALCULATED.3IONS-SCNC: 7 MMOL/L (ref 5–15)
AST SERPL-CCNC: 21 U/L (ref 14–36)
BACTERIA UR QL AUTO: ABNORMAL /HPF
BASOPHILS # BLD AUTO: 0.05 10*3/MM3 (ref 0–0.2)
BASOPHILS NFR BLD AUTO: 0.4 % (ref 0–2)
BILIRUB SERPL-MCNC: 0.5 MG/DL (ref 0.2–1.3)
BILIRUB UR QL STRIP: NEGATIVE
BUN BLD-MCNC: 10 MG/DL (ref 7–21)
BUN/CREAT SERPL: 16.1 (ref 7–25)
CALCIUM SPEC-SCNC: 9.2 MG/DL (ref 8.4–10.2)
CHLORIDE SERPL-SCNC: 96 MMOL/L (ref 95–110)
CLARITY UR: CLEAR
CO2 SERPL-SCNC: 34 MMOL/L (ref 22–31)
COLOR UR: YELLOW
CREAT BLD-MCNC: 0.62 MG/DL (ref 0.5–1)
D-LACTATE SERPL-SCNC: 0.6 MMOL/L (ref 0.5–2)
DEPRECATED RDW RBC AUTO: 44.2 FL (ref 36.4–46.3)
EOSINOPHIL # BLD AUTO: 0.53 10*3/MM3 (ref 0–0.7)
EOSINOPHIL NFR BLD AUTO: 4.5 % (ref 0–7)
ERYTHROCYTE [DISTWIDTH] IN BLOOD BY AUTOMATED COUNT: 14.1 % (ref 11.5–14.5)
GFR SERPL CREATININE-BSD FRML MDRD: 93 ML/MIN/1.73 (ref 39–90)
GLOBULIN UR ELPH-MCNC: 2.7 GM/DL (ref 2.3–3.5)
GLUCOSE BLD-MCNC: 109 MG/DL (ref 60–100)
GLUCOSE UR STRIP-MCNC: NEGATIVE MG/DL
HCT VFR BLD AUTO: 38.4 % (ref 35–45)
HGB BLD-MCNC: 12.6 G/DL (ref 12–15.5)
HGB UR QL STRIP.AUTO: ABNORMAL
HOLD SPECIMEN: NORMAL
HOLD SPECIMEN: NORMAL
HYALINE CASTS UR QL AUTO: ABNORMAL /LPF
IMM GRANULOCYTES # BLD: 0.03 10*3/MM3 (ref 0–0.02)
IMM GRANULOCYTES NFR BLD: 0.3 % (ref 0–0.5)
KETONES UR QL STRIP: ABNORMAL
LEUKOCYTE ESTERASE UR QL STRIP.AUTO: ABNORMAL
LIPASE SERPL-CCNC: 32 U/L (ref 23–300)
LYMPHOCYTES # BLD AUTO: 2.03 10*3/MM3 (ref 0.6–4.2)
LYMPHOCYTES NFR BLD AUTO: 17.3 % (ref 10–50)
MCH RBC QN AUTO: 28.1 PG (ref 26.5–34)
MCHC RBC AUTO-ENTMCNC: 32.8 G/DL (ref 31.4–36)
MCV RBC AUTO: 85.5 FL (ref 80–98)
MONOCYTES # BLD AUTO: 0.59 10*3/MM3 (ref 0–0.9)
MONOCYTES NFR BLD AUTO: 5 % (ref 0–12)
NEUTROPHILS # BLD AUTO: 8.52 10*3/MM3 (ref 2–8.6)
NEUTROPHILS NFR BLD AUTO: 72.5 % (ref 37–80)
NITRITE UR QL STRIP: POSITIVE
PH UR STRIP.AUTO: 7 [PH] (ref 5–9)
PLATELET # BLD AUTO: 188 10*3/MM3 (ref 150–450)
PMV BLD AUTO: 9.1 FL (ref 8–12)
POTASSIUM BLD-SCNC: 3.5 MMOL/L (ref 3.5–5.1)
PROT SERPL-MCNC: 6.2 G/DL (ref 6.3–8.6)
PROT UR QL STRIP: NEGATIVE
RBC # BLD AUTO: 4.49 10*6/MM3 (ref 3.77–5.16)
RBC # UR: ABNORMAL /HPF
REF LAB TEST METHOD: ABNORMAL
SODIUM BLD-SCNC: 137 MMOL/L (ref 137–145)
SP GR UR STRIP: 1.01 (ref 1–1.03)
SQUAMOUS #/AREA URNS HPF: ABNORMAL /HPF
UROBILINOGEN UR QL STRIP: ABNORMAL
WBC CLUMPS # UR AUTO: ABNORMAL /HPF
WBC NRBC COR # BLD: 11.75 10*3/MM3 (ref 3.2–9.8)
WBC UR QL AUTO: ABNORMAL /HPF
WHOLE BLOOD HOLD SPECIMEN: NORMAL
WHOLE BLOOD HOLD SPECIMEN: NORMAL

## 2018-06-13 PROCEDURE — 0 DIATRIZOATE MEGLUMINE & SODIUM PER 1 ML: Performed by: EMERGENCY MEDICINE

## 2018-06-13 PROCEDURE — 25010000002 ONDANSETRON PER 1 MG: Performed by: FAMILY MEDICINE

## 2018-06-13 PROCEDURE — 71045 X-RAY EXAM CHEST 1 VIEW: CPT

## 2018-06-13 PROCEDURE — G0378 HOSPITAL OBSERVATION PER HR: HCPCS

## 2018-06-13 PROCEDURE — 96376 TX/PRO/DX INJ SAME DRUG ADON: CPT

## 2018-06-13 PROCEDURE — 25010000002 ONDANSETRON PER 1 MG: Performed by: EMERGENCY MEDICINE

## 2018-06-13 PROCEDURE — 83605 ASSAY OF LACTIC ACID: CPT | Performed by: EMERGENCY MEDICINE

## 2018-06-13 PROCEDURE — 93005 ELECTROCARDIOGRAM TRACING: CPT | Performed by: EMERGENCY MEDICINE

## 2018-06-13 PROCEDURE — 80053 COMPREHEN METABOLIC PANEL: CPT | Performed by: EMERGENCY MEDICINE

## 2018-06-13 PROCEDURE — 25010000002 MORPHINE PER 10 MG: Performed by: EMERGENCY MEDICINE

## 2018-06-13 PROCEDURE — 25010000002 PROMETHAZINE PER 50 MG: Performed by: EMERGENCY MEDICINE

## 2018-06-13 PROCEDURE — 96375 TX/PRO/DX INJ NEW DRUG ADDON: CPT

## 2018-06-13 PROCEDURE — 83690 ASSAY OF LIPASE: CPT | Performed by: EMERGENCY MEDICINE

## 2018-06-13 PROCEDURE — 81001 URINALYSIS AUTO W/SCOPE: CPT | Performed by: EMERGENCY MEDICINE

## 2018-06-13 PROCEDURE — 96361 HYDRATE IV INFUSION ADD-ON: CPT

## 2018-06-13 PROCEDURE — 85025 COMPLETE CBC W/AUTO DIFF WBC: CPT | Performed by: EMERGENCY MEDICINE

## 2018-06-13 PROCEDURE — 99285 EMERGENCY DEPT VISIT HI MDM: CPT

## 2018-06-13 PROCEDURE — 93010 ELECTROCARDIOGRAM REPORT: CPT | Performed by: INTERNAL MEDICINE

## 2018-06-13 PROCEDURE — 25010000002 LEVOFLOXACIN PER 250 MG: Performed by: EMERGENCY MEDICINE

## 2018-06-13 PROCEDURE — 74177 CT ABD & PELVIS W/CONTRAST: CPT

## 2018-06-13 PROCEDURE — 25010000002 IOPAMIDOL 61 % SOLUTION: Performed by: EMERGENCY MEDICINE

## 2018-06-13 PROCEDURE — 96366 THER/PROPH/DIAG IV INF ADDON: CPT

## 2018-06-13 PROCEDURE — 96365 THER/PROPH/DIAG IV INF INIT: CPT

## 2018-06-13 RX ORDER — PROMETHAZINE HYDROCHLORIDE 25 MG/ML
12.5 INJECTION, SOLUTION INTRAMUSCULAR; INTRAVENOUS ONCE
Status: COMPLETED | OUTPATIENT
Start: 2018-06-13 | End: 2018-06-13

## 2018-06-13 RX ORDER — MORPHINE SULFATE 10 MG/ML
4 INJECTION INTRAMUSCULAR; INTRAVENOUS; SUBCUTANEOUS ONCE
Status: COMPLETED | OUTPATIENT
Start: 2018-06-13 | End: 2018-06-13

## 2018-06-13 RX ORDER — ONDANSETRON 4 MG/1
4 TABLET, ORALLY DISINTEGRATING ORAL EVERY 8 HOURS PRN
Qty: 10 TABLET | Refills: 0 | Status: SHIPPED | OUTPATIENT
Start: 2018-06-13 | End: 2019-09-26

## 2018-06-13 RX ORDER — ONDANSETRON 2 MG/ML
4 INJECTION INTRAMUSCULAR; INTRAVENOUS ONCE
Status: COMPLETED | OUTPATIENT
Start: 2018-06-13 | End: 2018-06-13

## 2018-06-13 RX ORDER — CIPROFLOXACIN 500 MG/1
500 TABLET, FILM COATED ORAL EVERY 12 HOURS
Qty: 14 TABLET | Refills: 0 | Status: SHIPPED | OUTPATIENT
Start: 2018-06-13 | End: 2018-06-17 | Stop reason: HOSPADM

## 2018-06-13 RX ORDER — SODIUM CHLORIDE 0.9 % (FLUSH) 0.9 %
1-10 SYRINGE (ML) INJECTION AS NEEDED
Status: DISCONTINUED | OUTPATIENT
Start: 2018-06-13 | End: 2018-06-17 | Stop reason: HOSPADM

## 2018-06-13 RX ORDER — LABETALOL HYDROCHLORIDE 5 MG/ML
10 INJECTION, SOLUTION INTRAVENOUS ONCE
Status: COMPLETED | OUTPATIENT
Start: 2018-06-13 | End: 2018-06-13

## 2018-06-13 RX ORDER — HYDROCODONE BITARTRATE AND ACETAMINOPHEN 5; 325 MG/1; MG/1
1 TABLET ORAL ONCE
Status: COMPLETED | OUTPATIENT
Start: 2018-06-13 | End: 2018-06-13

## 2018-06-13 RX ORDER — SODIUM CHLORIDE 0.9 % (FLUSH) 0.9 %
10 SYRINGE (ML) INJECTION AS NEEDED
Status: DISCONTINUED | OUTPATIENT
Start: 2018-06-13 | End: 2018-06-17 | Stop reason: HOSPADM

## 2018-06-13 RX ORDER — PHENAZOPYRIDINE HYDROCHLORIDE 100 MG/1
100 TABLET, FILM COATED ORAL 3 TIMES DAILY PRN
Qty: 6 TABLET | Refills: 0 | Status: SHIPPED | OUTPATIENT
Start: 2018-06-13 | End: 2020-09-28

## 2018-06-13 RX ORDER — ONDANSETRON 2 MG/ML
4 INJECTION INTRAMUSCULAR; INTRAVENOUS EVERY 6 HOURS PRN
Status: DISCONTINUED | OUTPATIENT
Start: 2018-06-13 | End: 2018-06-14

## 2018-06-13 RX ORDER — FAMOTIDINE 10 MG/ML
20 INJECTION, SOLUTION INTRAVENOUS ONCE
Status: COMPLETED | OUTPATIENT
Start: 2018-06-13 | End: 2018-06-13

## 2018-06-13 RX ORDER — LEVOFLOXACIN 5 MG/ML
750 INJECTION, SOLUTION INTRAVENOUS ONCE
Status: COMPLETED | OUTPATIENT
Start: 2018-06-13 | End: 2018-06-13

## 2018-06-13 RX ORDER — SODIUM CHLORIDE 9 MG/ML
75 INJECTION, SOLUTION INTRAVENOUS CONTINUOUS
Status: DISCONTINUED | OUTPATIENT
Start: 2018-06-13 | End: 2018-06-17 | Stop reason: HOSPADM

## 2018-06-13 RX ADMIN — FAMOTIDINE 20 MG: 10 INJECTION, SOLUTION INTRAVENOUS at 18:53

## 2018-06-13 RX ADMIN — LABETALOL HYDROCHLORIDE 5 MG: 5 INJECTION INTRAVENOUS at 20:47

## 2018-06-13 RX ADMIN — SODIUM CHLORIDE 75 ML/HR: 900 INJECTION, SOLUTION INTRAVENOUS at 22:45

## 2018-06-13 RX ADMIN — PROMETHAZINE HYDROCHLORIDE 12.5 MG: 25 INJECTION INTRAMUSCULAR; INTRAVENOUS at 22:44

## 2018-06-13 RX ADMIN — LEVOFLOXACIN 750 MG: 5 INJECTION, SOLUTION INTRAVENOUS at 20:48

## 2018-06-13 RX ADMIN — HYDROCODONE BITARTRATE AND ACETAMINOPHEN 1 TABLET: 5; 325 TABLET ORAL at 22:44

## 2018-06-13 RX ADMIN — DIATRIZOATE MEGLUMINE AND DIATRIZOATE SODIUM 30 ML: 660; 100 LIQUID ORAL; RECTAL at 18:54

## 2018-06-13 RX ADMIN — ONDANSETRON 4 MG: 2 INJECTION INTRAMUSCULAR; INTRAVENOUS at 23:44

## 2018-06-13 RX ADMIN — MORPHINE SULFATE 4 MG: 10 INJECTION INTRAVENOUS at 20:46

## 2018-06-13 RX ADMIN — IOPAMIDOL 95 ML: 612 INJECTION, SOLUTION INTRAVENOUS at 20:24

## 2018-06-13 RX ADMIN — SODIUM CHLORIDE 75 ML/HR: 900 INJECTION, SOLUTION INTRAVENOUS at 18:51

## 2018-06-13 RX ADMIN — ONDANSETRON 4 MG: 2 INJECTION, SOLUTION INTRAMUSCULAR; INTRAVENOUS at 18:52

## 2018-06-14 PROBLEM — I71.20 THORACIC AORTIC ANEURYSM WITHOUT RUPTURE (HCC): Chronic | Status: ACTIVE | Noted: 2017-07-06

## 2018-06-14 LAB
ANION GAP SERPL CALCULATED.3IONS-SCNC: 6 MMOL/L (ref 5–15)
BASOPHILS # BLD AUTO: 0.03 10*3/MM3 (ref 0–0.2)
BASOPHILS NFR BLD AUTO: 0.3 % (ref 0–2)
BUN BLD-MCNC: 8 MG/DL (ref 7–21)
BUN/CREAT SERPL: 14.3 (ref 7–25)
CALCIUM SPEC-SCNC: 8.3 MG/DL (ref 8.4–10.2)
CHLORIDE SERPL-SCNC: 99 MMOL/L (ref 95–110)
CO2 SERPL-SCNC: 31 MMOL/L (ref 22–31)
CREAT BLD-MCNC: 0.56 MG/DL (ref 0.5–1)
DEPRECATED RDW RBC AUTO: 46.2 FL (ref 36.4–46.3)
EOSINOPHIL # BLD AUTO: 0.43 10*3/MM3 (ref 0–0.7)
EOSINOPHIL NFR BLD AUTO: 4.5 % (ref 0–7)
ERYTHROCYTE [DISTWIDTH] IN BLOOD BY AUTOMATED COUNT: 14.4 % (ref 11.5–14.5)
GFR SERPL CREATININE-BSD FRML MDRD: 104 ML/MIN/1.73 (ref 39–90)
GLUCOSE BLD-MCNC: 89 MG/DL (ref 60–100)
HCT VFR BLD AUTO: 38.7 % (ref 35–45)
HGB BLD-MCNC: 12.7 G/DL (ref 12–15.5)
IMM GRANULOCYTES # BLD: 0.02 10*3/MM3 (ref 0–0.02)
IMM GRANULOCYTES NFR BLD: 0.2 % (ref 0–0.5)
LYMPHOCYTES # BLD AUTO: 2.3 10*3/MM3 (ref 0.6–4.2)
LYMPHOCYTES NFR BLD AUTO: 23.9 % (ref 10–50)
MCH RBC QN AUTO: 28.5 PG (ref 26.5–34)
MCHC RBC AUTO-ENTMCNC: 32.8 G/DL (ref 31.4–36)
MCV RBC AUTO: 86.8 FL (ref 80–98)
MONOCYTES # BLD AUTO: 0.63 10*3/MM3 (ref 0–0.9)
MONOCYTES NFR BLD AUTO: 6.5 % (ref 0–12)
NEUTROPHILS # BLD AUTO: 6.21 10*3/MM3 (ref 2–8.6)
NEUTROPHILS NFR BLD AUTO: 64.6 % (ref 37–80)
NRBC BLD MANUAL-RTO: 0 /100 WBC (ref 0–0)
PLATELET # BLD AUTO: 214 10*3/MM3 (ref 150–450)
PMV BLD AUTO: 8.9 FL (ref 8–12)
POTASSIUM BLD-SCNC: 3.5 MMOL/L (ref 3.5–5.1)
RBC # BLD AUTO: 4.46 10*6/MM3 (ref 3.77–5.16)
SODIUM BLD-SCNC: 136 MMOL/L (ref 137–145)
WBC NRBC COR # BLD: 9.62 10*3/MM3 (ref 3.2–9.8)

## 2018-06-14 PROCEDURE — 25010000002 PROMETHAZINE PER 50 MG: Performed by: FAMILY MEDICINE

## 2018-06-14 PROCEDURE — 96361 HYDRATE IV INFUSION ADD-ON: CPT

## 2018-06-14 PROCEDURE — 25010000002 LEVOFLOXACIN PER 250 MG: Performed by: INTERNAL MEDICINE

## 2018-06-14 PROCEDURE — 80048 BASIC METABOLIC PNL TOTAL CA: CPT | Performed by: FAMILY MEDICINE

## 2018-06-14 PROCEDURE — 25010000002 ONDANSETRON PER 1 MG: Performed by: FAMILY MEDICINE

## 2018-06-14 PROCEDURE — 85025 COMPLETE CBC W/AUTO DIFF WBC: CPT | Performed by: FAMILY MEDICINE

## 2018-06-14 PROCEDURE — 96376 TX/PRO/DX INJ SAME DRUG ADON: CPT

## 2018-06-14 PROCEDURE — 96366 THER/PROPH/DIAG IV INF ADDON: CPT

## 2018-06-14 PROCEDURE — G0378 HOSPITAL OBSERVATION PER HR: HCPCS

## 2018-06-14 RX ORDER — LEVOFLOXACIN 5 MG/ML
750 INJECTION, SOLUTION INTRAVENOUS EVERY 24 HOURS
Status: DISCONTINUED | OUTPATIENT
Start: 2018-06-14 | End: 2018-06-15 | Stop reason: DRUGHIGH

## 2018-06-14 RX ORDER — CARVEDILOL 6.25 MG/1
6.25 TABLET ORAL 2 TIMES DAILY WITH MEALS
Status: DISCONTINUED | OUTPATIENT
Start: 2018-06-14 | End: 2018-06-15

## 2018-06-14 RX ORDER — PROMETHAZINE HYDROCHLORIDE 25 MG/ML
12.5 INJECTION, SOLUTION INTRAMUSCULAR; INTRAVENOUS EVERY 6 HOURS PRN
Status: DISCONTINUED | OUTPATIENT
Start: 2018-06-14 | End: 2018-06-16

## 2018-06-14 RX ORDER — TRAZODONE HYDROCHLORIDE 150 MG/1
150 TABLET ORAL NIGHTLY
Status: DISCONTINUED | OUTPATIENT
Start: 2018-06-14 | End: 2018-06-17 | Stop reason: HOSPADM

## 2018-06-14 RX ORDER — ONDANSETRON 4 MG/1
8 TABLET, FILM COATED ORAL EVERY 8 HOURS PRN
Status: DISCONTINUED | OUTPATIENT
Start: 2018-06-14 | End: 2018-06-14

## 2018-06-14 RX ORDER — ENALAPRILAT 2.5 MG/2ML
1.25 INJECTION INTRAVENOUS EVERY 6 HOURS PRN
Status: DISCONTINUED | OUTPATIENT
Start: 2018-06-14 | End: 2018-06-17 | Stop reason: HOSPADM

## 2018-06-14 RX ORDER — DONEPEZIL HYDROCHLORIDE 10 MG/1
10 TABLET, FILM COATED ORAL NIGHTLY
Status: DISCONTINUED | OUTPATIENT
Start: 2018-06-14 | End: 2018-06-17 | Stop reason: HOSPADM

## 2018-06-14 RX ORDER — SUCRALFATE 1 G/1
1 TABLET ORAL 4 TIMES DAILY
Status: DISCONTINUED | OUTPATIENT
Start: 2018-06-14 | End: 2018-06-17 | Stop reason: HOSPADM

## 2018-06-14 RX ORDER — CLOPIDOGREL BISULFATE 75 MG/1
75 TABLET ORAL DAILY
Status: DISCONTINUED | OUTPATIENT
Start: 2018-06-14 | End: 2018-06-17 | Stop reason: HOSPADM

## 2018-06-14 RX ORDER — HYDROCODONE BITARTRATE AND ACETAMINOPHEN 7.5; 325 MG/1; MG/1
1 TABLET ORAL EVERY 8 HOURS PRN
Status: DISCONTINUED | OUTPATIENT
Start: 2018-06-14 | End: 2018-06-17 | Stop reason: HOSPADM

## 2018-06-14 RX ORDER — ONDANSETRON 2 MG/ML
4 INJECTION INTRAMUSCULAR; INTRAVENOUS EVERY 6 HOURS PRN
Status: DISCONTINUED | OUTPATIENT
Start: 2018-06-14 | End: 2018-06-16

## 2018-06-14 RX ORDER — GABAPENTIN 300 MG/1
300 CAPSULE ORAL 3 TIMES DAILY
Status: DISCONTINUED | OUTPATIENT
Start: 2018-06-14 | End: 2018-06-17 | Stop reason: HOSPADM

## 2018-06-14 RX ORDER — ATORVASTATIN CALCIUM 10 MG/1
10 TABLET, FILM COATED ORAL DAILY
Status: DISCONTINUED | OUTPATIENT
Start: 2018-06-14 | End: 2018-06-17 | Stop reason: HOSPADM

## 2018-06-14 RX ADMIN — SUCRALFATE 1 G: 1 TABLET ORAL at 20:50

## 2018-06-14 RX ADMIN — CLOPIDOGREL BISULFATE 75 MG: 75 TABLET ORAL at 08:12

## 2018-06-14 RX ADMIN — ATORVASTATIN CALCIUM 10 MG: 10 TABLET, FILM COATED ORAL at 08:12

## 2018-06-14 RX ADMIN — CARVEDILOL 6.25 MG: 6.25 TABLET, FILM COATED ORAL at 08:12

## 2018-06-14 RX ADMIN — GABAPENTIN 300 MG: 300 CAPSULE ORAL at 20:50

## 2018-06-14 RX ADMIN — SODIUM CHLORIDE 75 ML/HR: 900 INJECTION, SOLUTION INTRAVENOUS at 11:05

## 2018-06-14 RX ADMIN — LEVOFLOXACIN 750 MG: 5 INJECTION, SOLUTION INTRAVENOUS at 20:49

## 2018-06-14 RX ADMIN — PROMETHAZINE HYDROCHLORIDE 12.5 MG: 25 INJECTION INTRAMUSCULAR; INTRAVENOUS at 20:50

## 2018-06-14 RX ADMIN — ONDANSETRON 4 MG: 2 INJECTION INTRAMUSCULAR; INTRAVENOUS at 06:17

## 2018-06-14 RX ADMIN — SERTRALINE HYDROCHLORIDE 50 MG: 50 TABLET ORAL at 08:12

## 2018-06-14 RX ADMIN — TRAZODONE HYDROCHLORIDE 150 MG: 150 TABLET ORAL at 20:50

## 2018-06-14 RX ADMIN — SUCRALFATE 1 G: 1 TABLET ORAL at 11:05

## 2018-06-14 RX ADMIN — GABAPENTIN 300 MG: 300 CAPSULE ORAL at 15:49

## 2018-06-14 RX ADMIN — ONDANSETRON 4 MG: 2 INJECTION INTRAMUSCULAR; INTRAVENOUS at 12:49

## 2018-06-14 RX ADMIN — GABAPENTIN 300 MG: 300 CAPSULE ORAL at 08:13

## 2018-06-14 RX ADMIN — DONEPEZIL HYDROCHLORIDE 10 MG: 10 TABLET ORAL at 20:50

## 2018-06-14 RX ADMIN — HYDROCODONE BITARTRATE AND ACETAMINOPHEN 1 TABLET: 7.5; 325 TABLET ORAL at 06:17

## 2018-06-14 RX ADMIN — SUCRALFATE 1 G: 1 TABLET ORAL at 17:01

## 2018-06-14 RX ADMIN — PROMETHAZINE HYDROCHLORIDE 12.5 MG: 25 INJECTION INTRAMUSCULAR; INTRAVENOUS at 15:43

## 2018-06-14 RX ADMIN — CARVEDILOL 6.25 MG: 6.25 TABLET, FILM COATED ORAL at 17:01

## 2018-06-14 RX ADMIN — SUCRALFATE 1 G: 1 TABLET ORAL at 08:13

## 2018-06-14 NOTE — PLAN OF CARE
Problem: Patient Care Overview  Goal: Plan of Care Review  Outcome: Ongoing (interventions implemented as appropriate)   06/14/18 0438   Coping/Psychosocial   Plan of Care Reviewed With patient   Plan of Care Review   Progress improving   OTHER   Outcome Summary Patient is reluctant to take pain medication. Nausa seems to have subsided.       Problem: Pain, Acute (Adult)  Goal: Identify Related Risk Factors and Signs and Symptoms  Outcome: Ongoing (interventions implemented as appropriate)    Goal: Acceptable Pain Control/Comfort Level  Outcome: Ongoing (interventions implemented as appropriate)

## 2018-06-14 NOTE — PLAN OF CARE
Problem: Patient Care Overview  Goal: Plan of Care Review  Outcome: Ongoing (interventions implemented as appropriate)   06/14/18 5356   Coping/Psychosocial   Plan of Care Reviewed With patient   Plan of Care Review   Progress improving   OTHER   Outcome Summary vss. continue iv abx. pain well controlled.        Problem: Pain, Acute (Adult)  Goal: Identify Related Risk Factors and Signs and Symptoms  Outcome: Ongoing (interventions implemented as appropriate)    Goal: Acceptable Pain Control/Comfort Level  Outcome: Ongoing (interventions implemented as appropriate)      Problem: Urinary Tract Infection (Adult)  Goal: Signs and Symptoms of Listed Potential Problems Will be Absent, Minimized or Managed (Urinary Tract Infection)  Outcome: Ongoing (interventions implemented as appropriate)

## 2018-06-14 NOTE — ED PROVIDER NOTES
Subjective   76yo female pmh significant mdd/htn/hyperlipidemia/anemia/dm2/ddd/dementia/copd/pud presents ED c/o 2d hx epigastric abdominal pain radiating right flank 'sharp'/associated nausea.  ROS neg fever/chills/vomiting/diarrhea/ melena/hematochoezia/hematemesis/chest pain/soa.        History provided by:  Patient  Illness   Severity:  Moderate  Onset quality:  Gradual  Duration:  2 days  Timing:  Constant  Progression:  Waxing and waning  Chronicity:  New  Associated symptoms: abdominal pain and nausea    Associated symptoms: no vomiting        Review of Systems   Constitutional: Negative.    HENT: Negative.    Respiratory: Negative.    Cardiovascular: Negative.    Gastrointestinal: Positive for abdominal pain and nausea. Negative for blood in stool and vomiting.   Endocrine: Negative.    Genitourinary: Positive for flank pain. Negative for dysuria.   Skin: Negative.    All other systems reviewed and are negative.      Past Medical History:   Diagnosis Date   • Altered mental status     unspecified    • Anxiety    • Artificial lens present     Artificial lens in position   • Depressive disorder    • Diabetes mellitus     no retinopathy      • Diabetic polyneuropathy    • Diarrhea     unspecified    • Diverticular disease of colon    • Dizziness    • Gastroesophageal reflux disease    • Generalized abdominal pain    • Headache    • Hemorrhoids    • Hiatal hernia    • History of colonic polyps     Personal history of colonic polyps   • History of echocardiogram 09/17/2008    Echocardiogram W/O color flow 00229 (1) - Evidence of LVH & diastolic dysfunction & mild LA enlargement, otherwise NRL echocradiogram   • History of mammogram 05/27/2011    MAMMOGRAM DIAGNOSTIC BILATERAL 87032 ([facility]) (1) - benign Birads category 2   • History of transfusion    • Hypercholesterolemia    • Hypertensive disorder    • Lumbar radiculopathy    • Memory impairment     Multifactorial      • Nausea    • Neurologic disorder associated  with diabetes mellitus    • Pain in thoracic spine    • Palpitations    • Polyp of colon     History of polyp of colon   • Stroke    • Vitamin D deficiency        Allergies   Allergen Reactions   • Aspirin    • Codeine      UNKNOWN REACTION   • Macrobid [Nitrofurantoin Macrocrystal] Nausea And Vomiting   • Other      Cipro   • Penicillins    • Sulfa Antibiotics      Sulfa (Sulfonamide Antibiotics)   • Dexilant [Dexlansoprazole] Rash       Past Surgical History:   Procedure Laterality Date   • ANKLE SURGERY  2008    Ankle surgery (4) - Open ankle arthrodesis with intermedullary bella fixation from the heel. Non union of the ankle, tibial plafond with failed ankle arthrodesis   • APPENDECTOMY  1965    Appendectomy (1)   • BLADDER SURGERY  1973    Bladder surgery (1) - Kendall-Raul repair. Cystocele with stress incontinence & endometriosis.Same with P.I.D   • BREAST BIOPSY  1997    Stereotactic breast biopsy (1) - Stereotactic needle localization with aspiration. Non palpable U/S solid mass, R breast by one interpretation & cluster cyst by another   • CARDIAC CATHETERIZATION  1986    Cardiac cath 20414 (1) - NRL LV function w/ NRL hemodynamics. NRL coronary arteries w/o evidence of atherosclerotic heart disease   • CARPAL TUNNEL RELEASE Left 08/15/1995    Carpal tunnel surgery (1) - Left carpal tunnel release, endoscopic procedure.   • CATARACT EXTRACTION Right 2007    Remove cataract, insert lens (2) - right   • CATARACT EXTRACTION Bilateral    • COLONOSCOPY W/ POLYPECTOMY  2016    Colonoscopy remove polyps 62053 (1) - One polyp in the ascending colon.Resected and retrieved.   • DILATATION AND CURETTAGE  10/02/1964    D&C (1) - D&C & colpotomy. Incomplete  or tubal pregnancy. Dysfunctional uterine bleeding   • ENDOSCOPY      Colon endoscopy 08436 (2)   • ENDOSCOPY  2016    EGD w/ biopsy 69329 (1) - Gastritis.Normal duodenum.Biopsied.Normal  esophagus.Several biopsies obtained in the gastric antrum.   • ENDOSCOPY  05/21/2008    EGD w/ tube 48458 (1) - Hiatal hernia. GERD. Esophageal motility disorder.   • ENDOSCOPY N/A 3/29/2018    Procedure: ESOPHAGOGASTRODUODENOSCOPY;  Surgeon: Ezra Meyers DO;  Location: [facility] ENDOSCOPY;  Service: Gastroenterology   • ENDOSCOPY N/A 5/10/2018    Procedure: ESOPHAGOGASTRODUODENOSCOPY;  Surgeon: Ezra Meyers DO;  Location: [facility] ENDOSCOPY;  Service: Gastroenterology   • INJECTION OF MEDICATION  09/06/2011    Kenalog (3) - Ordered By: RADHA JIMENEZ ()    • LEG SURGERY  04/29/1985    Extensive leg surgery (1) - Arthroscopy, arthrotomy lateral release. Chomdromalcia, patella, right knee   • LUMBAR DISC SURGERY  2004    Low back disk surgery (1)   • OTHER SURGICAL HISTORY  01/13/1981    Bowel surgery procedure (1) - Lysis of adhesions, resection of terminal ileum with end-to-end anastomosis. partial small bowel ibstruction. Same plus ulceration of distal ileum   • OTHER SURGICAL HISTORY  06/16/1965    Suspension of uterus (1) - Uterine suspension & appendectomy, lysis of adhesion. Chronic pelvic inflammatory disease vs endometriosis. Chronic pelvic inflannatory disease   • TOTAL ABDOMINAL HYSTERECTOMY WITH SALPINGO OOPHORECTOMY  08/05/1966    YAEL/BSO (1) - Chronic pelvic inflammatory disease.Same plus endometriosis of the ovary   • WRIST ARTHROSCOPY  05/09/1988    Wrist arthroscopy/surgery (2) - excision ganglion cyst left wrist.       Family History   Problem Relation Age of Onset   • Hypertension Mother    • Stroke Mother    • Diabetes Other        Social History     Social History   • Marital status:      Social History Main Topics   • Smoking status: Former Smoker     Packs/day: 0.50     Types: Cigarettes     Quit date: 06/2016   • Smokeless tobacco: Never Used      Comment: 0.5 - 1 Pack(s) Of Cigarettes Per Day   • Alcohol use No   • Drug use: No   • Sexual activity: Defer      Comment:  - 59  Years     Other Topics Concern   • Not on file           Objective   Physical Exam   Constitutional: She is oriented to person, place, and time. She appears well-developed and well-nourished.   HENT:   Head: Normocephalic and atraumatic.   Mouth/Throat: Oropharynx is clear and moist.   Eyes: Pupils are equal, round, and reactive to light.   Neck: Neck supple. No JVD present. No tracheal deviation present.   Cardiovascular: Normal rate, regular rhythm, normal heart sounds and intact distal pulses.  Exam reveals no gallop and no friction rub.    No murmur heard.  Pulmonary/Chest: Effort normal and breath sounds normal. She has no wheezes. She has no rales.   Abdominal: Soft. Normal appearance. There is tenderness in the right upper quadrant and epigastric area. There is CVA tenderness. There is no rigidity, no rebound, no guarding, no tenderness at McBurney's point and negative Coello's sign.   Genitourinary: Rectum normal. Rectal exam shows guaiac negative stool.   Musculoskeletal: Normal range of motion. She exhibits no edema.   Lymphadenopathy:     She has no cervical adenopathy.   Neurological: She is alert and oriented to person, place, and time. GCS eye subscore is 4. GCS verbal subscore is 5. GCS motor subscore is 6.   Skin: Skin is warm and dry.   Nursing note and vitals reviewed.      ECG 12 Lead    Date/Time: 6/13/2018 9:46 PM  Performed by: JONATHAN LITTLE  Authorized by: JONATHAN LITTLE   Interpreted by physician  Rhythm: sinus rhythm  Rate: normal  BPM: 85  QRS axis: left  Conduction: conduction normal  ST Segments: ST segments normal  T Waves: T waves normal  Other findings: PRWP  Q waves: V1 and V2  Clinical impression: abnormal ECG                 ED Course  ED Course as of Jun 13 2229 Wed Jun 13, 2018 2139 Pt denies abdominal pain. No evidence emesis in ER. Repeat abdominal exam: soft nontender. Neg r/r/g/hsm. No evidence peritonitis. Neg sepsis. Pt requests discharge home. GCS 15. Family at  bedside. Stable discharge.  [SD]   2227 Pt now complaints recurrent pain/nausea. Will plan 23 observation.  [SD]      ED Course User Index  [SD] Saurabh Paula MD      Labs Reviewed   COMPREHENSIVE METABOLIC PANEL - Abnormal; Notable for the following:        Result Value    Glucose 109 (*)     CO2 34.0 (*)     Total Protein 6.2 (*)     eGFR Non  Amer 93 (*)     All other components within normal limits    Narrative:     The MDRD GFR formula is only valid for adults with stable renal function between ages 18 and 70.   URINALYSIS W/ MICROSCOPIC IF INDICATED (NO CULTURE) - Abnormal; Notable for the following:     Ketones, UA Trace (*)     Blood, UA Small (1+) (*)     Leuk Esterase, UA Small (1+) (*)     Nitrite, UA Positive (*)     All other components within normal limits   CBC WITH AUTO DIFFERENTIAL - Abnormal; Notable for the following:     WBC 11.75 (*)     Immature Grans, Absolute 0.03 (*)     All other components within normal limits   URINALYSIS, MICROSCOPIC ONLY - Abnormal; Notable for the following:     WBC, UA Too Numerous to Count (*)     Bacteria, UA 2+ (*)     All other components within normal limits   LIPASE - Normal   LACTIC ACID, PLASMA - Normal   RAINBOW DRAW    Narrative:     The following orders were created for panel order [place] Draw.  Procedure                               Abnormality         Status                     ---------                               -----------         ------                     Light Blue Top[[id number]]                                   Final result               Green Top (Gel)[[id number]]                                  Final result               Lavender Top[[id number]]                                     Final result               Gold Top - [facility][[id number]]                                   Final result                 Please view results for these tests on the individual orders.   CBC AND DIFFERENTIAL    Narrative:     The following orders were created for  panel order CBC & Differential.  Procedure                               Abnormality         Status                     ---------                               -----------         ------                     CBC Auto Differential[[id number]]        Abnormal            Final result                 Please view results for these tests on the individual orders.   LIGHT BLUE TOP   GREEN TOP   LAVENDER TOP   GOLD TOP - SST     Ct Abdomen Pelvis With Contrast    Result Date: 6/13/2018  Narrative: Patient Name: ELISE CONTE ORDERING: JONATHAN LITTLE ATTENDING: JONATHAN LITTLE REFERRING: JONATHAN LITTLE ----------------------- EXAM DESCRIPTION: CT ABDOMEN PELVIS W CONTRAST HISTORY: Abdominal pain. COMPARISON: 6/3/2016 Dose Length Product: 475.1. This exam was performed according to our departmental dose optimization program, which includes automated exposure control, adjustment of the mA and/or KV according to patient size and/or use of iterative reconstruction technique. CONTRAST: Oral and 95 cc intravenous Isovue 300.  TECHNIQUE: Multiple contiguous contrast enhanced axial images are obtained of the abdomen and pelvis. FINDINGS: LOWER CHEST: There is fibrotic changes within the lung bases. No consolidation or pleural effusion. No cardiac enlargement or pericardial effusion. HEPATOBILIARY: No suspicious hepatic lesion or ductal dilation. No calcified gallstones or pericholecystic inflammatory change. No extrahepatic biliary dilation. SPLEEN: Unremarkable. PANCREAS: Atrophic changes particularly of the body and tail. No acute or suspicious interval change. ADRENAL GLANDS: Unremarkable. KIDNEYS/URETERS: Redemonstration of renal cyst predominantly on the right. Tiny subcentimeter hypodensities on the left statistically represents small cyst as well. No suspicious renal lesion or findings of obstructive uropathy. GASTROINTESTINAL: Mild thickening of the wall of the first second portion of the duodenum without associated inflammatory  changes. No finding of small bowel obstruction or inflammatory changes of bowel. The terminal ileum and ileocecal junction region are unremarkable. Moderate volume of stool within the colon. No acute inflammatory changes. REPRODUCTIVE ORGANS: Hysterectomy. URINARY BLADDER: Unremarkable VASCULAR: There is atherosclerotic vascular changes involving the aorta and its branches. The middistal thoracic aorta is ectatic and mildly dilated measuring 3.1 x 4.4 cm in its AP and transverse dimensions. At the esophageal hiatus the aorta measures 3.5 x 4.8 cm. No dilation low the level of the renal arteries. LYMPH NODES: No pathologically enlarged nodes by size criteria. PERITONEUM/RETROPERITONEUM: No free air or free fluid. OSSEOUS STRUCTURES: Redemonstration of postsurgical changes at L5-S1 with posterior fusion with pedicle screws and rods. The intervertebral disc spacer is positioned anteriorly at the disc space level. There is anterolisthesis at L4-5 similar to the prior exam. Similar appearance of treated compression fracture at T12. ADDITIONAL FINDINGS: None     Impression: Findings of COPD and fibrotic changes within the lung bases. No CT evidence of acute abdominal or pelvic finding. Redemonstration of ectatic/aneurysmal changes of the descending thoracic and thoracoabdominal aorta measuring 3.1 x 4.4 cm and 3.5 x 4.8 cm respectively. This has increased compared to prior study. Electronically signed by:  Main Medrano MD  6/13/2018 9:11 PM CDT Workstation: [id number]    Xr Chest 1 View    Result Date: 6/13/2018  Narrative: [facility] [place] Patient Name: MRS. ELISE CONTE ORDERING: JONATHAN ILTTLE ATTENDING: JONATHAN LITTLE REFERRING: JONATHAN LITTLE ----------------------- PROCEDURE: Chest Single View TECHNIQUE: Single AP view of the chest COMPARISON: 8/8/2016 HISTORY: Abdominal pain. FINDINGS:  Life-support devices: None. Lungs/pleura: Mild chronic interstitial markings. No consolidation, effusion, or  pneumothorax.. Heart, hilar and mediastinal structures: The heart size is enlarged. Mediastinal contours within limits of normal. Vascular calcification and uncoiling of the aorta. The trachea is midline. Skeletal Structures: No acute findings. No free air beneath the diaphragm.     Impression: No acute pulmonary or pleural finding. Electronically signed by:  Main Medrano MD  6/13/2018 7:50 PM CDT Workstation: [id number]                [facility]      Final diagnoses:   Pyelonephritis   Hypertensive urgency   Thoracoabdominal aortic aneurysm (TAAA) without rupture            Saurabh Paula MD  06/13/18 2147       Saurabh Paula MD  06/13/18 2229       Saurabh Paula MD  06/13/18 223

## 2018-06-14 NOTE — H&P
HISTORY AND PHYSICAL  NAME: Mariluz Arango  : 1940  MRN: [id number]    DATE OF ADMISSION: 18    DATE & TIME SEEN: 18 10:34 PM    PCP: Denisa Powell MD    CODE STATUS: Conditional Code    CHIEF COMPLAINT Nausea; abdominal pain    HPI:  Mariluz Arango is a 77 y.o. female with GERD, h/o peptic ulcer disease, HTN, DM presents with nausea, abdominal pain, and flank pain for the last 48 hours.    Patient states that Monday she developed some nausea, subjective fevers, chills, and right sided flank pain.  It waxed and waned but worsened today so she came to the ED.  She has some chronic abdominal pain and intermittent diarrhea.  At time of exam patient states that her symptoms have waxed and waned since being in the ED.     CONCURRENT MEDICAL HISTORY:  Past Medical History:   Diagnosis Date   • Altered mental status     unspecified    • Anxiety    • Artificial lens present     Artificial lens in position   • Depressive disorder    • Diabetes mellitus     no retinopathy      • Diabetic polyneuropathy    • Diarrhea     unspecified    • Diverticular disease of colon    • Dizziness    • Gastroesophageal reflux disease    • Generalized abdominal pain    • Headache    • Hemorrhoids    • Hiatal hernia    • History of colonic polyps     Personal history of colonic polyps   • History of echocardiogram 2008    Echocardiogram W/O color flow 35873 (1) - Evidence of LVH & diastolic dysfunction & mild LA enlargement, otherwise NRL echocradiogram   • History of mammogram 2011    MAMMOGRAM DIAGNOSTIC BILATERAL 92683 ([facility]) (1) - benign Birads category 2   • History of transfusion    • Hypercholesterolemia    • Hypertensive disorder    • Lumbar radiculopathy    • Memory impairment     Multifactorial      • Nausea    • Neurologic disorder associated with diabetes mellitus    • Pain in thoracic spine    • Palpitations    • Polyp of colon     History of polyp of colon   • Stroke    • Vitamin D  deficiency        PAST SURGICAL HISTORY:  Past Surgical History:   Procedure Laterality Date   • ANKLE SURGERY  2008    Ankle surgery (4) - Open ankle arthrodesis with intermedullary bella fixation from the heel. Non union of the ankle, tibial plafond with failed ankle arthrodesis   • APPENDECTOMY  1965    Appendectomy (1)   • BLADDER SURGERY  1973    Bladder surgery (1) - Kendall-Raul repair. Cystocele with stress incontinence & endometriosis.Same with P.I.D   • BREAST BIOPSY  1997    Stereotactic breast biopsy (1) - Stereotactic needle localization with aspiration. Non palpable U/S solid mass, R breast by one interpretation & cluster cyst by another   • CARDIAC CATHETERIZATION  1986    Cardiac cath 20436 (1) - NRL LV function w/ NRL hemodynamics. NRL coronary arteries w/o evidence of atherosclerotic heart disease   • CARPAL TUNNEL RELEASE Left 08/15/1995    Carpal tunnel surgery (1) - Left carpal tunnel release, endoscopic procedure.   • CATARACT EXTRACTION Right 2007    Remove cataract, insert lens (2) - right   • CATARACT EXTRACTION Bilateral    • COLONOSCOPY W/ POLYPECTOMY  2016    Colonoscopy remove polyps 00807 (1) - One polyp in the ascending colon.Resected and retrieved.   • DILATATION AND CURETTAGE  10/02/1964    D&C (1) - D&C & colpotomy. Incomplete  or tubal pregnancy. Dysfunctional uterine bleeding   • ENDOSCOPY      Colon endoscopy 46180 (2)   • ENDOSCOPY  2016    EGD w/ biopsy 47343 (1) - Gastritis.Normal duodenum.Biopsied.Normal esophagus.Several biopsies obtained in the gastric antrum.   • ENDOSCOPY  2008    EGD w/ tube 63659 (1) - Hiatal hernia. GERD. Esophageal motility disorder.   • ENDOSCOPY N/A 3/29/2018    Procedure: ESOPHAGOGASTRODUODENOSCOPY;  Surgeon: Ezra Meyers DO;  Location: [facility] ENDOSCOPY;  Service: Gastroenterology   • ENDOSCOPY N/A 5/10/2018    Procedure: ESOPHAGOGASTRODUODENOSCOPY;  Surgeon: Ezra BARBOZA  DO Luigi;  Location: [facility] ENDOSCOPY;  Service: Gastroenterology   • INJECTION OF MEDICATION  09/06/2011    Kenalog (3) - Ordered By: RADHA JIMENEZ (FP)    • LEG SURGERY  04/29/1985    Extensive leg surgery (1) - Arthroscopy, arthrotomy lateral release. Chomdromalcia, patella, right knee   • LUMBAR DISC SURGERY  2004    Low back disk surgery (1)   • OTHER SURGICAL HISTORY  01/13/1981    Bowel surgery procedure (1) - Lysis of adhesions, resection of terminal ileum with end-to-end anastomosis. partial small bowel ibstruction. Same plus ulceration of distal ileum   • OTHER SURGICAL HISTORY  06/16/1965    Suspension of uterus (1) - Uterine suspension & appendectomy, lysis of adhesion. Chronic pelvic inflammatory disease vs endometriosis. Chronic pelvic inflannatory disease   • TOTAL ABDOMINAL HYSTERECTOMY WITH SALPINGO OOPHORECTOMY  08/05/1966    YAEL/BSO (1) - Chronic pelvic inflammatory disease.Same plus endometriosis of the ovary   • WRIST ARTHROSCOPY  05/09/1988    Wrist arthroscopy/surgery (2) - excision ganglion cyst left wrist.       FAMILY HISTORY:  Family History   Problem Relation Age of Onset   • Hypertension Mother    • Stroke Mother    • Diabetes Other         SOCIAL HISTORY:  Social History     Social History   • Marital status:      Spouse name: N/A   • Number of children: N/A   • Years of education: N/A     Occupational History   • Not on file.     Social History Main Topics   • Smoking status: Former Smoker     Packs/day: 0.50     Types: Cigarettes     Quit date: 06/2016   • Smokeless tobacco: Never Used      Comment: 0.5 - 1 Pack(s) Of Cigarettes Per Day   • Alcohol use No   • Drug use: No   • Sexual activity: Defer      Comment:  - 59 Years     Other Topics Concern   • Not on file     Social History Narrative   • No narrative on file       HOME MEDICATIONS:  Prior to Admission medications    Medication Sig Start Date End Date Taking? Authorizing Provider   carvedilol (COREG) 6.25 MG tablet  TAKE 1 TABLET BY MOUTH 2 TIMES A DAY WITH MEALS 5/7/18   Denisa Powell MD   ciprofloxacin (CIPRO) 500 MG tablet Take 1 tablet by mouth Every 12 (Twelve) Hours. 6/13/18   Saurabh Paula MD   clobetasol (TEMOVATE) 0.05 % external solution Apply 1 application topically Daily. 8/29/17   Historical Provider, MD   clopidogrel (PLAVIX) 75 MG tablet Take 75 mg by mouth Daily.    Historical Provider, MD   Cranberry 500 MG capsule Take 1 tablet by mouth Daily.    Historical Provider, MD   cyanocobalamin 1000 MCG/ML injection Inject 1,000 mcg into the shoulder, thigh, or buttocks Every 28 (Twenty-Eight) Days.    Historical Provider, MD   donepezil (ARICEPT) 10 MG tablet TAKE 1 TABLET BY MOUTH EVERY NIGHT. 4/23/18   Denisa Powell MD   DULoxetine (CYMBALTA) 20 MG capsule Take 1 capsule by mouth Daily. 6/12/18   Denisa Powell MD   FLUOCINOLONE ACETONIDE SCALP 0.01 % oil Apply 1 application topically Daily. 8/29/17   Historical Provider, MD   fluticasone (FLONASE) 50 MCG/ACT nasal spray INSTILL 2 SPRAYS INTO EACH NOSTRIL DAILY 7/11/17   Denisa oPwell MD   gabapentin (NEURONTIN) 300 MG capsule Take 1 capsule by mouth 3 (Three) Times a Day. 6/12/18   Denisa Powell MD   HYDROcodone-acetaminophen (NORCO) 7.5-325 MG per tablet 1 tablet As Needed for Moderate Pain . 2/7/18   Historical Provider, MD   hydrocortisone 1 % lotion Apply  topically 3 (Three) Times a Day As Needed for Irritation or Rash. 6/12/18   Denisa Powell MD   ketoconazole (NIZORAL) 2 % shampoo  11/6/17   Historical Provider, MD   lidocaine (LIDODERM) 5 % PLACE ONE PATCH ONE THE SKIN EVERY DAY. REMOVE AND DISCARD THE PATCH WITHIN 12 HOURS AFTER APPLICATION EVERY DAY OR AS DIRECTED BY MD. 11/6/17   Denisa Powell MD   Multiple Vitamins-Minerals (MULTIVITAMIN GUMMIES ADULT PO) Take 1 each by mouth Daily.    Historical Provider, MD   nystatin (MYCOSTATIN) 068100 UNIT/GM powder Apply 1 application topically  "4 (Four) Times a Day.    Historical Provider, MD   nystatin (MYCOSTATIN) 933249 UNIT/GM powder APPLY TO THE AFFECTED AREA THREE TIMES DAILY 5/21/18   Denisa Powell MD   omeprazole (priLOSEC) 40 MG capsule Take 40 mg by mouth Daily.    Historical Provider, MD   ondansetron (ZOFRAN) 8 MG tablet Take 8 mg by mouth Every 8 (Eight) Hours As Needed for Nausea or Vomiting.    Historical Provider, MD   ondansetron ODT (ZOFRAN-ODT) 4 MG disintegrating tablet Take 1 tablet by mouth Every 8 (Eight) Hours As Needed for Nausea or Vomiting. 6/13/18   Saurabh Paula MD   ONETOUCH VERIO test strip  7/12/17   Historical Provider, MD   phenazopyridine (PYRIDIUM) 100 MG tablet Take 1 tablet by mouth 3 (Three) Times a Day As Needed for bladder spasms. 6/13/18   Saurabh Paula MD   pravastatin (PRAVACHOL) 40 MG tablet Take 40 mg by mouth Every Night.    Historical Provider, MD   promethazine (PHENERGAN) 25 MG tablet Take 25 mg by mouth Every 6 (Six) Hours As Needed for Nausea or Vomiting.    Historical Provider, MD   sucralfate (CARAFATE) 1 g tablet Take 1 g by mouth 4 (Four) Times a Day. 3/29/18   Historical Provider, MD   Syringe/Needle, Disp, 25G X 5/8\" 3 ML misc USE 1 SYRINGE FOR MONTHLY VITAMIN B-12 SHOTS 11/27/17   Denisa Powell MD   traZODone (DESYREL) 100 MG tablet Take 150 mg by mouth Every Night.    Historical Provider, MD       ALLERGIES:  Aspirin; Codeine; Macrobid [nitrofurantoin macrocrystal]; Other; Penicillins; Sulfa antibiotics; and Dexilant [dexlansoprazole]    REVIEW OF SYSTEMS  Review of Systems   Constitutional: Positive for activity change, appetite change, chills, fatigue and fever (Subjective).   HENT: Negative for ear pain and sore throat.    Eyes: Negative for pain and visual disturbance.   Respiratory: Negative for cough and shortness of breath.    Cardiovascular: Negative for chest pain and palpitations.   Gastrointestinal: Positive for diarrhea (Intermittent) and nausea. Negative for " abdominal pain, constipation and vomiting.   Endocrine: Negative for cold intolerance and heat intolerance.   Genitourinary: Positive for flank pain. Negative for difficulty urinating and dysuria.   Musculoskeletal: Negative for arthralgias and gait problem.   Skin: Negative for color change and rash.   Neurological: Negative for dizziness, weakness and headaches.   Hematological: Negative for adenopathy. Does not bruise/bleed easily.   Psychiatric/Behavioral: Negative for agitation, confusion and sleep disturbance.       PHYSICAL EXAM:  Temp:  [99.1 °F (37.3 °C)] 99.1 °F (37.3 °C)  Heart Rate:  [75-94] 75  Resp:  [16-20] 16  BP: (162-214)/() 174/82  Body mass index is 27.44 kg/m².     Physical Exam   Constitutional: She is oriented to person, place, and time. She appears well-developed and well-nourished. No distress.   HENT:   Head: Normocephalic and atraumatic.   Right Ear: External ear normal.   Left Ear: External ear normal.   Nose: Nose normal.   Eyes: Conjunctivae and EOM are normal. Pupils are equal, round, and reactive to light.   Neck: Normal range of motion. Neck supple.   Cardiovascular: Normal rate, regular rhythm, normal heart sounds and intact distal pulses.    Pulmonary/Chest: Effort normal and breath sounds normal. No respiratory distress. She has no wheezes. She has no rales. She exhibits no tenderness.   Abdominal: Soft. Bowel sounds are normal. She exhibits no distension and no mass. There is tenderness. There is CVA tenderness (Right sided.). There is no rebound and no guarding.   Musculoskeletal: Normal range of motion. She exhibits no edema or tenderness.   Neurological: She is alert and oriented to person, place, and time.   Skin: Skin is warm and dry. No rash noted. She is not diaphoretic. No erythema. No pallor.   Psychiatric: She has a normal mood and affect. Her behavior is normal.   Nursing note and vitals reviewed.      DIAGNOSTIC DATA:   Lab Results (last 24 hours)     Procedure  Component Value Units Date/Time    [place] Draw [[id number]] Collected:  06/13/18 1823    Specimen:  Blood Updated:  06/13/18 1930    Narrative:       The following orders were created for panel order [place] Draw.  Procedure                               Abnormality         Status                     ---------                               -----------         ------                     Light Blue Top[[id number]]                                   Final result               Green Top (Gel)[[id number]]                                  Final result               Lavender Top[[id number]]                                     Final result               Gold Top - SST[[id number]]                                   Final result                 Please view results for these tests on the individual orders.    Light Blue Top [[id number]] Collected:  06/13/18 1823    Specimen:  Blood Updated:  06/13/18 1930     Extra Tube hold for add-on     Comment: Auto resulted       Green Top (Gel) [[id number]] Collected:  06/13/18 1823    Specimen:  Blood Updated:  06/13/18 1930     Extra Tube Hold for add-ons.     Comment: Auto resulted.       Lavender Top [[id number]] Collected:  06/13/18 1823    Specimen:  Blood Updated:  06/13/18 1930     Extra Tube hold for add-on     Comment: Auto resulted       Gold Top - SST [[id number]] Collected:  06/13/18 1823    Specimen:  Blood Updated:  06/13/18 1930     Extra Tube Hold for add-ons.     Comment: Auto resulted.       Urinalysis, Microscopic Only - Urine, Clean Catch [[id number]]  (Abnormal) Collected:  06/13/18 1846    Specimen:  Urine from Urine, Clean Catch Updated:  06/13/18 1917     RBC, UA None Seen /HPF      WBC, UA Too Numerous to Count (A) /HPF      Bacteria, UA 2+ (A) /HPF      Squamous Epithelial Cells, UA 0-2 /HPF      Hyaline Casts, UA None Seen /LPF      WBC Clumps, UA Small/1+ /HPF      Methodology Automated Microscopy    Urinalysis With / Microscopic If Indicated (No Culture) - Urine, Clean  Catch [[id number]]  (Abnormal) Collected:  06/13/18 1846    Specimen:  Urine from Urine, Clean Catch Updated:  06/13/18 1913     Color, UA Yellow     Appearance, UA Clear     pH, UA 7.0     Specific Gravity, UA 1.015     Glucose, UA Negative     Ketones, UA Trace (A)     Bilirubin, UA Negative     Blood, UA Small (1+) (A)     Protein, UA Negative     Leuk Esterase, UA Small (1+) (A)     Nitrite, UA Positive (A)     Urobilinogen, UA 0.2 E.U./dL    Comprehensive Metabolic Panel [[id number]]  (Abnormal) Collected:  06/13/18 1823    Specimen:  Blood Updated:  06/13/18 1844     Glucose 109 (H) mg/dL      BUN 10 mg/dL      Creatinine 0.62 mg/dL      Sodium 137 mmol/L      Potassium 3.5 mmol/L      Chloride 96 mmol/L      CO2 34.0 (H) mmol/L      Calcium 9.2 mg/dL      Total Protein 6.2 (L) g/dL      Albumin 3.50 g/dL      ALT (SGPT) 29 U/L      AST (SGOT) 21 U/L      Alkaline Phosphatase 41 U/L      Total Bilirubin 0.5 mg/dL      eGFR Non African Amer 93 (H) mL/min/1.73      Globulin 2.7 gm/dL      A/G Ratio 1.3 g/dL      BUN/Creatinine Ratio 16.1     Anion Gap 7.0 mmol/L     Narrative:       The MDRD GFR formula is only valid for adults with stable renal function between ages 18 and 70.    Lipase [[id number]]  (Normal) Collected:  06/13/18 1823    Specimen:  Blood Updated:  06/13/18 1844     Lipase 32 U/L     Lactic Acid, Plasma [[id number]]  (Normal) Collected:  06/13/18 1823    Specimen:  Blood Updated:  06/13/18 1844     Lactate 0.6 mmol/L     CBC & Differential [[id number]] Collected:  06/13/18 1823    Specimen:  Blood Updated:  06/13/18 1833    Narrative:       The following orders were created for panel order CBC & Differential.  Procedure                               Abnormality         Status                     ---------                               -----------         ------                     CBC Auto Differential[[id number]]        Abnormal            Final result                 Please view results for these  tests on the individual orders.    CBC Auto Differential [[id number]]  (Abnormal) Collected:  06/13/18 1823    Specimen:  Blood Updated:  06/13/18 1833     WBC 11.75 (H) 10*3/mm3      RBC 4.49 10*6/mm3      Hemoglobin 12.6 g/dL      Hematocrit 38.4 %      MCV 85.5 fL      MCH 28.1 pg      MCHC 32.8 g/dL      RDW 14.1 %      RDW-SD 44.2 fl      MPV 9.1 fL      Platelets 188 10*3/mm3      Neutrophil % 72.5 %      Lymphocyte % 17.3 %      Monocyte % 5.0 %      Eosinophil % 4.5 %      Basophil % 0.4 %      Immature Grans % 0.3 %      Neutrophils, Absolute 8.52 10*3/mm3      Lymphocytes, Absolute 2.03 10*3/mm3      Monocytes, Absolute 0.59 10*3/mm3      Eosinophils, Absolute 0.53 10*3/mm3      Basophils, Absolute 0.05 10*3/mm3      Immature Grans, Absolute 0.03 (H) 10*3/mm3         Estimated Creatinine Clearance: 51.6 mL/min (by C-G formula based on SCr of 0.62 mg/dL).     Imaging Results (last 24 hours)     Procedure Component Value Units Date/Time    CT Abdomen Pelvis With Contrast [[id number]] Collected:  06/13/18 2019     Updated:  06/13/18 2112    Narrative:         Patient Name: ELISE CONTE    ORDERING: JONATHAN LITTLE    ATTENDING: JONATHAN LITTLE     REFERRING: JONATHAN LITTLE    -----------------------  EXAM DESCRIPTION: CT ABDOMEN PELVIS W CONTRAST    HISTORY: Abdominal pain.    COMPARISON: 6/3/2016    Dose Length Product: 475.1.    This exam was performed according to our departmental dose  optimization program, which includes automated exposure control,  adjustment of the mA and/or KV according to patient size and/or  use of iterative reconstruction technique.      CONTRAST:   Oral and 95 cc intravenous Isovue 300.      TECHNIQUE: Multiple contiguous contrast enhanced axial images are  obtained of the abdomen and pelvis.     FINDINGS:     LOWER CHEST: There is fibrotic changes within the lung bases. No  consolidation or pleural effusion. No cardiac enlargement or  pericardial effusion.    HEPATOBILIARY: No suspicious  hepatic lesion or ductal dilation.  No calcified gallstones or pericholecystic inflammatory change.  No extrahepatic biliary dilation.  SPLEEN: Unremarkable.  PANCREAS: Atrophic changes particularly of the body and tail. No  acute or suspicious interval change.  ADRENAL GLANDS: Unremarkable.  KIDNEYS/URETERS: Redemonstration of renal cyst predominantly on  the right. Tiny subcentimeter hypodensities on the left  statistically represents small cyst as well. No suspicious renal  lesion or findings of obstructive uropathy.    GASTROINTESTINAL: Mild thickening of the wall of the first second  portion of the duodenum without associated inflammatory changes.  No finding of small bowel obstruction or inflammatory changes of  bowel. The terminal ileum and ileocecal junction region are  unremarkable. Moderate volume of stool within the colon. No acute  inflammatory changes.  REPRODUCTIVE ORGANS: Hysterectomy.  URINARY BLADDER: Unremarkable    VASCULAR: There is atherosclerotic vascular changes involving the  aorta and its branches. The middistal thoracic aorta is ectatic  and mildly dilated measuring 3.1 x 4.4 cm in its AP and  transverse dimensions. At the esophageal hiatus the aorta  measures 3.5 x 4.8 cm. No dilation low the level of the renal  arteries.  LYMPH NODES: No pathologically enlarged nodes by size criteria.  PERITONEUM/RETROPERITONEUM: No free air or free fluid.     OSSEOUS STRUCTURES: Redemonstration of postsurgical changes at  L5-S1 with posterior fusion with pedicle screws and rods. The  intervertebral disc spacer is positioned anteriorly at the disc  space level. There is anterolisthesis at L4-5 similar to the  prior exam. Similar appearance of treated compression fracture at  T12.    ADDITIONAL FINDINGS: None      Impression:       Findings of COPD and fibrotic changes within the lung bases.    No CT evidence of acute abdominal or pelvic finding.    Redemonstration of ectatic/aneurysmal changes of the  descending  thoracic and thoracoabdominal aorta measuring 3.1 x 4.4 cm and  3.5 x 4.8 cm respectively. This has increased compared to prior  study.    Electronically signed by:  Main Medrano MD  6/13/2018 9:11 PM  CDT Workstation: [id number]    XR Chest 1 View [[id number]] Collected:  06/13/18 1921     Updated:  06/13/18 1951    Narrative:       [facility] [place]    Patient Name: MRS. ELISE CONTE    ORDERING: JONATHAN LITTLE     ATTENDING: JONATHAN LITTLE     REFERRING: JONATHAN LITTLE    -----------------------    PROCEDURE: Chest Single View    TECHNIQUE: Single AP view of the chest    COMPARISON: 8/8/2016    HISTORY: Abdominal pain.    FINDINGS:     Life-support devices: None.    Lungs/pleura: Mild chronic interstitial markings. No  consolidation, effusion, or pneumothorax..    Heart, hilar and mediastinal structures: The heart size is  enlarged. Mediastinal contours within limits of normal. Vascular  calcification and uncoiling of the aorta. The trachea is midline.    Skeletal Structures: No acute findings. No free air beneath the  diaphragm.      Impression:       No acute pulmonary or pleural finding.    Electronically signed by:  Main Medrano MD  6/13/2018 7:50 PM  CDT Workstation: [id number]          I reviewed the patient's new clinical results.    ASSESSMENT AND PLAN: This is a 77 y.o. female with:    Active Hospital Problems (** Indicates Principal Problem)    Diagnosis Date Noted   • **Acute cystitis without hematuria [N30.00] 06/13/2018   • Early onset Alzheimer's dementia without behavioral disturbance [G30.0, F02.80] 07/12/2017   • Thoracic aortic aneurysm without rupture [I71.2] 07/06/2017   • Hypertensive disorder [I10] 08/19/2016      Resolved Hospital Problems    Diagnosis Date Noted Date Resolved   No resolved problems to display.       1. Acute cystitis. Patient with positive UA. She is afebrile. CT with contrast reviewed. Minimally elevated WBC. Patient placed in observation. Given  Levaquin in ED, but has Cipro allergy.  On discussing with patient it is GI intolerance and not a true allergy.  Will monitor for tolerance.  If tolerates complete course of Levaquin. Await urine culture. Continue supportive care.  2. TAA. Chronic. Monitor. Currently under surveillance at [facility].  3. Dementia. Aricept.  4. GERD w/ h/o PUD. PPI. Carafate.  5. HTN. Coreg.    Will monitor patient's hospital course and adjust treatment as hospital course dictates.    DVT prophylaxis: SCD/TEDs  Code status is Conditional Code   [facility] # [id number], reviewed and consistent with patient reported medications.      I discussed the patients findings and my recommendations with patient and nursing staff.           This document has been electronically signed by Jose A Patel MD on June 13, 2018 10:34 PM

## 2018-06-14 NOTE — DISCHARGE INSTRUCTIONS
Return ED fever, vomiting, dehydration, abdominal pain, flank pain, syncope, dizziness, worse condition, other concerns

## 2018-06-14 NOTE — PROGRESS NOTES
[facility] Medicine Services  INPATIENT PROGRESS NOTE    Length of Stay: 0  Date of Admission: 6/13/2018  Primary Care Physician: Denisa Powell MD    Subjective   Chief Complaint: Abdominal discomfort and nausea.  HPI:  Patient is seen for follow-up today.  She was admitted yesterday for acute cystitis and hypertensive urgency.  She remains on IV antibiotics, IV hydration and blood pressure has since improved.  She continues to have lower abdominal discomfort, nausea and diminished oral intake.    Review of Systems   Constitutional: Positive for activity change, appetite change and fatigue. Negative for chills, diaphoresis and fever.   HENT: Negative for trouble swallowing and voice change.    Eyes: Negative for photophobia and visual disturbance.   Respiratory: Negative for cough, choking, chest tightness, shortness of breath, wheezing and stridor.    Cardiovascular: Negative for chest pain, palpitations and leg swelling.   Gastrointestinal: Positive for abdominal pain and nausea. Negative for abdominal distention, blood in stool, constipation, diarrhea and vomiting.   Endocrine: Negative for cold intolerance, heat intolerance, polydipsia, polyphagia and polyuria.   Genitourinary: Negative for decreased urine volume, difficulty urinating, dysuria, enuresis, flank pain, frequency, hematuria and urgency.   Musculoskeletal: Negative for arthralgias, gait problem, myalgias, neck pain and neck stiffness.   Skin: Negative for pallor, rash and wound.   Neurological: Positive for weakness. Negative for dizziness, tremors, seizures, syncope, facial asymmetry, speech difficulty, light-headedness, numbness and headaches.   Hematological: Does not bruise/bleed easily.   Psychiatric/Behavioral: Negative for agitation, behavioral problems and confusion.       Objective    Temp:  [97.8 °F (36.6 °C)-99.1 °F (37.3 °C)] 98.2 °F (36.8 °C)  Heart Rate:  [64-94] 77  Resp:  [16-20]  18  BP: (130-214)/() 154/71    Physical Exam   Constitutional: She is oriented to person, place, and time. She appears well-developed and well-nourished. She is cooperative. No distress.   HENT:   Head: Normocephalic and atraumatic.   Right Ear: External ear normal.   Left Ear: External ear normal.   Nose: Nose normal.   Mouth/Throat: Oropharynx is clear and moist.   Eyes: Conjunctivae and EOM are normal. Pupils are equal, round, and reactive to light.   Neck: Normal range of motion. Neck supple. No JVD present. No thyromegaly present.   Cardiovascular: Normal rate, regular rhythm, normal heart sounds and intact distal pulses.  Exam reveals no gallop and no friction rub.    No murmur heard.  Pulmonary/Chest: Effort normal and breath sounds normal. No stridor. No respiratory distress. She has no wheezes. She has no rales. She exhibits no tenderness.   Abdominal: Soft. Bowel sounds are normal. She exhibits no distension and no mass. There is no tenderness. There is no rebound and no guarding. No hernia.   Musculoskeletal: Normal range of motion. She exhibits no edema, tenderness or deformity.   Neurological: She is alert and oriented to person, place, and time. She has normal reflexes. No cranial nerve deficit or sensory deficit. She exhibits normal muscle tone.   Skin: Skin is warm and dry. No rash noted. She is not diaphoretic. No erythema. No pallor.   Psychiatric: She has a normal mood and affect. Her behavior is normal. Judgment and thought content normal.   Nursing note and vitals reviewed.        Medication Review:    Current Facility-Administered Medications:   •  atorvastatin (LIPITOR) tablet 10 mg, 10 mg, Oral, Daily, Jose A Patel MD, 10 mg at 06/14/18 0812  •  carvedilol (COREG) tablet 6.25 mg, 6.25 mg, Oral, BID With Meals, Jose A Patel MD, 6.25 mg at 06/14/18 0812  •  clopidogrel (PLAVIX) tablet 75 mg, 75 mg, Oral, Daily, Jose A Patel MD, 75 mg at 06/14/18 0812  •  donepezil (ARICEPT) tablet 10  mg, 10 mg, Oral, Nightly, Jose A Patel MD  •  gabapentin (NEURONTIN) capsule 300 mg, 300 mg, Oral, TID, Jose A Patel MD, 300 mg at 06/14/18 0813  •  HYDROcodone-acetaminophen (NORCO) 7.5-325 MG per tablet 1 tablet, 1 tablet, Oral, Q8H PRN, Jose A Patel MD, 1 tablet at 06/14/18 0617  •  levoFLOXacin (LEVAQUIN) 750 mg/150 mL D5W (premix) (LEVAQUIN) 750 mg, 750 mg, Intravenous, Q24H, Rashad Huggins MD  •  ondansetron (ZOFRAN) injection 4 mg, 4 mg, Intravenous, Q6H PRN, Jose A Patel MD, 4 mg at 06/14/18 0617  •  promethazine (PHENERGAN) injection 12.5 mg, 12.5 mg, Intravenous, Q6H PRN, Jose A Patel MD  •  sertraline (ZOLOFT) tablet 50 mg, 50 mg, Oral, Daily, Jose A Patel MD, 50 mg at 06/14/18 0812  •  sodium chloride 0.9 % flush 1-10 mL, 1-10 mL, Intravenous, PRN, Jose A Patel MD  •  Insert peripheral IV, , , Once **AND** sodium chloride 0.9 % flush 10 mL, 10 mL, Intravenous, PRN, Saurabh Paula MD  •  sodium chloride 0.9 % infusion, 75 mL/hr, Intravenous, Continuous, Saurabh Paula MD, Last Rate: 75 mL/hr at 06/13/18 2245, 75 mL/hr at 06/13/18 2245  •  sucralfate (CARAFATE) tablet 1 g, 1 g, Oral, 4x Daily, Jose A Patel MD, 1 g at 06/14/18 0813  •  traZODone (DESYREL) tablet 150 mg, 150 mg, Oral, Nightly, Jose A Patel MD    Results Review:  I have reviewed the labs, radiology results, and diagnostic studies.    Laboratory Data:     Results from last 7 days  Lab Units 06/14/18  0558 06/13/18  1823   SODIUM mmol/L 136* 137   POTASSIUM mmol/L 3.5 3.5   CHLORIDE mmol/L 99 96   CO2 mmol/L 31.0 34.0*   BUN mg/dL 8 10   CREATININE mg/dL 0.56 0.62   GLUCOSE mg/dL 89 109*   CALCIUM mg/dL 8.3* 9.2   BILIRUBIN mg/dL  --  0.5   ALK PHOS U/L  --  41   ALT (SGPT) U/L  --  29   AST (SGOT) U/L  --  21   ANION GAP mmol/L 6.0 7.0     Estimated Creatinine Clearance: 51.6 mL/min (by C-G formula based on SCr of 0.56 mg/dL).            Results from last 7 days  Lab Units 06/14/18  0558 06/13/18  1823   WBC 10*3/mm3 9.62 11.75*    HEMOGLOBIN g/dL 12.7 12.6   HEMATOCRIT % 38.7 38.4   PLATELETS 10*3/mm3 214 188           Culture Data:   No results found for: BLOODCX  No results found for: URINECX  No results found for: RESPCX  No results found for: WOUNDCX  No results found for: STOOLCX  No components found for: BODYFLD    Radiology Data:   Imaging Results (last 24 hours)     Procedure Component Value Units Date/Time    CT Abdomen Pelvis With Contrast [[id number]] Collected:  06/13/18 2019     Updated:  06/13/18 2112    Narrative:         Patient Name: ELISE CONTE    ORDERING: JONATHAN LITTLE    ATTENDING: JONATHAN LITTLE     REFERRING: JONATHAN LITTLE    -----------------------  EXAM DESCRIPTION: CT ABDOMEN PELVIS W CONTRAST    HISTORY: Abdominal pain.    COMPARISON: 6/3/2016    Dose Length Product: 475.1.    This exam was performed according to our departmental dose  optimization program, which includes automated exposure control,  adjustment of the mA and/or KV according to patient size and/or  use of iterative reconstruction technique.      CONTRAST:   Oral and 95 cc intravenous Isovue 300.      TECHNIQUE: Multiple contiguous contrast enhanced axial images are  obtained of the abdomen and pelvis.     FINDINGS:     LOWER CHEST: There is fibrotic changes within the lung bases. No  consolidation or pleural effusion. No cardiac enlargement or  pericardial effusion.    HEPATOBILIARY: No suspicious hepatic lesion or ductal dilation.  No calcified gallstones or pericholecystic inflammatory change.  No extrahepatic biliary dilation.  SPLEEN: Unremarkable.  PANCREAS: Atrophic changes particularly of the body and tail. No  acute or suspicious interval change.  ADRENAL GLANDS: Unremarkable.  KIDNEYS/URETERS: Redemonstration of renal cyst predominantly on  the right. Tiny subcentimeter hypodensities on the left  statistically represents small cyst as well. No suspicious renal  lesion or findings of obstructive uropathy.    GASTROINTESTINAL: Mild thickening of  the wall of the first second  portion of the duodenum without associated inflammatory changes.  No finding of small bowel obstruction or inflammatory changes of  bowel. The terminal ileum and ileocecal junction region are  unremarkable. Moderate volume of stool within the colon. No acute  inflammatory changes.  REPRODUCTIVE ORGANS: Hysterectomy.  URINARY BLADDER: Unremarkable    VASCULAR: There is atherosclerotic vascular changes involving the  aorta and its branches. The middistal thoracic aorta is ectatic  and mildly dilated measuring 3.1 x 4.4 cm in its AP and  transverse dimensions. At the esophageal hiatus the aorta  measures 3.5 x 4.8 cm. No dilation low the level of the renal  arteries.  LYMPH NODES: No pathologically enlarged nodes by size criteria.  PERITONEUM/RETROPERITONEUM: No free air or free fluid.     OSSEOUS STRUCTURES: Redemonstration of postsurgical changes at  L5-S1 with posterior fusion with pedicle screws and rods. The  intervertebral disc spacer is positioned anteriorly at the disc  space level. There is anterolisthesis at L4-5 similar to the  prior exam. Similar appearance of treated compression fracture at  T12.    ADDITIONAL FINDINGS: None      Impression:       Findings of COPD and fibrotic changes within the lung bases.    No CT evidence of acute abdominal or pelvic finding.    Redemonstration of ectatic/aneurysmal changes of the descending  thoracic and thoracoabdominal aorta measuring 3.1 x 4.4 cm and  3.5 x 4.8 cm respectively. This has increased compared to prior  study.    Electronically signed by:  Main Medrano MD  6/13/2018 9:11 PM  CDT Workstation: [id number]    XR Chest 1 View [[id number]] Collected:  06/13/18 1921     Updated:  06/13/18 1951    Narrative:       [facility] [place]    Patient Name: MRS. ELISE CONTE    ORDERING: JONATHAN LITTLE     ATTENDING: JONATHAN LITTLE     REFERRING: JONATHAN LITTLE    -----------------------    PROCEDURE: Chest Single  View    TECHNIQUE: Single AP view of the chest    COMPARISON: 8/8/2016    HISTORY: Abdominal pain.    FINDINGS:     Life-support devices: None.    Lungs/pleura: Mild chronic interstitial markings. No  consolidation, effusion, or pneumothorax..    Heart, hilar and mediastinal structures: The heart size is  enlarged. Mediastinal contours within limits of normal. Vascular  calcification and uncoiling of the aorta. The trachea is midline.    Skeletal Structures: No acute findings. No free air beneath the  diaphragm.      Impression:       No acute pulmonary or pleural finding.    Electronically signed by:  Main Medrano MD  6/13/2018 7:50 PM  CDT Workstation: [id number]          I have reviewed the patient current medications.     Assessment/Plan     Hospital Problem List:  Principal Problem:    -Acute cystitis without hematuria: Continue IV antibiotics and await outcome of urine and blood cultures.  - Hypertensive urgency: Much improved.  Continue current medications and make adjustments as needed to achieve optimal blood pressure control.   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 Gill as outpatient.   - Early onset Alzheimer's dementia without behavioral disturbance: Continue home medications.  - History of coronary artery disease: Continue home medications.  - Continue DVT prophylaxis and PPI for GERD.        Discharge Planning: Likely discharge in 1-2 days.    Rashad Huggins MD   06/14/18   10:01 AM

## 2018-06-15 LAB
ADV 40+41 DNA STL QL NAA+NON-PROBE: NOT DETECTED
ANION GAP SERPL CALCULATED.3IONS-SCNC: 6 MMOL/L (ref 5–15)
ASTRO TYP 1-8 RNA STL QL NAA+NON-PROBE: NOT DETECTED
BACTERIA UR QL AUTO: ABNORMAL /HPF
BASOPHILS # BLD AUTO: 0.06 10*3/MM3 (ref 0–0.2)
BASOPHILS NFR BLD AUTO: 0.7 % (ref 0–2)
BILIRUB UR QL STRIP: ABNORMAL
BUN BLD-MCNC: 8 MG/DL (ref 7–21)
BUN/CREAT SERPL: 14.3 (ref 7–25)
C CAYETANENSIS DNA STL QL NAA+NON-PROBE: NOT DETECTED
C DIFF TOX GENS STL QL NAA+PROBE: NOT DETECTED
CALCIUM SPEC-SCNC: 8 MG/DL (ref 8.4–10.2)
CAMPY SP DNA.DIARRHEA STL QL NAA+PROBE: NOT DETECTED
CHLORIDE SERPL-SCNC: 102 MMOL/L (ref 95–110)
CLARITY UR: CLEAR
CO2 SERPL-SCNC: 28 MMOL/L (ref 22–31)
COLOR UR: YELLOW
CREAT BLD-MCNC: 0.56 MG/DL (ref 0.5–1)
CRYPTOSP STL CULT: NOT DETECTED
DEPRECATED RDW RBC AUTO: 45.6 FL (ref 36.4–46.3)
E COLI DNA SPEC QL NAA+PROBE: NOT DETECTED
E HISTOLYT AG STL-ACNC: NOT DETECTED
EAEC PAA PLAS AGGR+AATA ST NAA+NON-PRB: NOT DETECTED
EC STX1 + STX2 GENES STL NAA+PROBE: NOT DETECTED
EOSINOPHIL # BLD AUTO: 0.51 10*3/MM3 (ref 0–0.7)
EOSINOPHIL NFR BLD AUTO: 5.8 % (ref 0–7)
EPEC EAE GENE STL QL NAA+NON-PROBE: NOT DETECTED
ERYTHROCYTE [DISTWIDTH] IN BLOOD BY AUTOMATED COUNT: 14.6 % (ref 11.5–14.5)
ETEC LTA+ST1A+ST1B TOX ST NAA+NON-PROBE: NOT DETECTED
G LAMBLIA DNA SPEC QL NAA+PROBE: NOT DETECTED
GFR SERPL CREATININE-BSD FRML MDRD: 104 ML/MIN/1.73 (ref 39–90)
GLUCOSE BLD-MCNC: 84 MG/DL (ref 60–100)
GLUCOSE UR STRIP-MCNC: NEGATIVE MG/DL
HCT VFR BLD AUTO: 36.1 % (ref 35–45)
HGB BLD-MCNC: 11.6 G/DL (ref 12–15.5)
HGB UR QL STRIP.AUTO: ABNORMAL
HYALINE CASTS UR QL AUTO: ABNORMAL /LPF
IMM GRANULOCYTES # BLD: 0.07 10*3/MM3 (ref 0–0.02)
IMM GRANULOCYTES NFR BLD: 0.8 % (ref 0–0.5)
KETONES UR QL STRIP: ABNORMAL
LEUKOCYTE ESTERASE UR QL STRIP.AUTO: ABNORMAL
LYMPHOCYTES # BLD AUTO: 2.38 10*3/MM3 (ref 0.6–4.2)
LYMPHOCYTES NFR BLD AUTO: 27 % (ref 10–50)
MCH RBC QN AUTO: 27.6 PG (ref 26.5–34)
MCHC RBC AUTO-ENTMCNC: 32.1 G/DL (ref 31.4–36)
MCV RBC AUTO: 86 FL (ref 80–98)
MONOCYTES # BLD AUTO: 0.63 10*3/MM3 (ref 0–0.9)
MONOCYTES NFR BLD AUTO: 7.2 % (ref 0–12)
NEUTROPHILS # BLD AUTO: 5.15 10*3/MM3 (ref 2–8.6)
NEUTROPHILS NFR BLD AUTO: 58.5 % (ref 37–80)
NITRITE UR QL STRIP: NEGATIVE
NOROVIRUS GI+II RNA STL QL NAA+NON-PROBE: NOT DETECTED
NRBC BLD MANUAL-RTO: 0 /100 WBC (ref 0–0)
P SHIGELLOIDES DNA STL QL NAA+NON-PROBE: NOT DETECTED
PH UR STRIP.AUTO: 6.5 [PH] (ref 5–9)
PLATELET # BLD AUTO: 188 10*3/MM3 (ref 150–450)
PMV BLD AUTO: 9.2 FL (ref 8–12)
POTASSIUM BLD-SCNC: 3.5 MMOL/L (ref 3.5–5.1)
PROT UR QL STRIP: NEGATIVE
RBC # BLD AUTO: 4.2 10*6/MM3 (ref 3.77–5.16)
RBC # UR: ABNORMAL /HPF
REF LAB TEST METHOD: ABNORMAL
RV RNA STL NAA+PROBE: NOT DETECTED
SALMONELLA DNA SPEC QL NAA+PROBE: NOT DETECTED
SAPO I+II+IV+V RNA STL QL NAA+NON-PROBE: NOT DETECTED
SHIGELLA SP+EIEC IPAH STL QL NAA+PROBE: NOT DETECTED
SODIUM BLD-SCNC: 136 MMOL/L (ref 137–145)
SP GR UR STRIP: 1.02 (ref 1–1.03)
SQUAMOUS #/AREA URNS HPF: ABNORMAL /HPF
UROBILINOGEN UR QL STRIP: ABNORMAL
V CHOLERAE DNA SPEC QL NAA+PROBE: NOT DETECTED
VIBRIO DNA SPEC NAA+PROBE: NOT DETECTED
WBC NRBC COR # BLD: 8.8 10*3/MM3 (ref 3.2–9.8)
WBC UR QL AUTO: ABNORMAL /HPF
YERSINIA STL CULT: NOT DETECTED

## 2018-06-15 PROCEDURE — 25010000002 ONDANSETRON PER 1 MG: Performed by: FAMILY MEDICINE

## 2018-06-15 PROCEDURE — 81001 URINALYSIS AUTO W/SCOPE: CPT | Performed by: INTERNAL MEDICINE

## 2018-06-15 PROCEDURE — 80048 BASIC METABOLIC PNL TOTAL CA: CPT | Performed by: FAMILY MEDICINE

## 2018-06-15 PROCEDURE — 96376 TX/PRO/DX INJ SAME DRUG ADON: CPT

## 2018-06-15 PROCEDURE — 87999 UNLISTED MICROBIOLOGY PX: CPT | Performed by: INTERNAL MEDICINE

## 2018-06-15 PROCEDURE — 25010000002 PROMETHAZINE PER 50 MG: Performed by: FAMILY MEDICINE

## 2018-06-15 PROCEDURE — 85025 COMPLETE CBC W/AUTO DIFF WBC: CPT | Performed by: FAMILY MEDICINE

## 2018-06-15 PROCEDURE — 96361 HYDRATE IV INFUSION ADD-ON: CPT

## 2018-06-15 PROCEDURE — G0378 HOSPITAL OBSERVATION PER HR: HCPCS

## 2018-06-15 RX ORDER — CARVEDILOL 12.5 MG/1
12.5 TABLET ORAL 2 TIMES DAILY WITH MEALS
Status: DISCONTINUED | OUTPATIENT
Start: 2018-06-15 | End: 2018-06-17 | Stop reason: HOSPADM

## 2018-06-15 RX ORDER — LEVOFLOXACIN 750 MG/1
750 TABLET ORAL EVERY 24 HOURS
Status: DISCONTINUED | OUTPATIENT
Start: 2018-06-15 | End: 2018-06-16

## 2018-06-15 RX ORDER — HYDRALAZINE HYDROCHLORIDE 25 MG/1
25 TABLET, FILM COATED ORAL EVERY 8 HOURS SCHEDULED
Status: DISCONTINUED | OUTPATIENT
Start: 2018-06-15 | End: 2018-06-17 | Stop reason: HOSPADM

## 2018-06-15 RX ADMIN — SERTRALINE HYDROCHLORIDE 50 MG: 50 TABLET ORAL at 09:22

## 2018-06-15 RX ADMIN — GABAPENTIN 300 MG: 300 CAPSULE ORAL at 09:22

## 2018-06-15 RX ADMIN — DONEPEZIL HYDROCHLORIDE 10 MG: 10 TABLET ORAL at 20:50

## 2018-06-15 RX ADMIN — ONDANSETRON 4 MG: 2 INJECTION INTRAMUSCULAR; INTRAVENOUS at 07:22

## 2018-06-15 RX ADMIN — TRAZODONE HYDROCHLORIDE 150 MG: 150 TABLET ORAL at 20:50

## 2018-06-15 RX ADMIN — CLOPIDOGREL BISULFATE 75 MG: 75 TABLET ORAL at 09:21

## 2018-06-15 RX ADMIN — PROMETHAZINE HYDROCHLORIDE 12.5 MG: 25 INJECTION INTRAMUSCULAR; INTRAVENOUS at 11:16

## 2018-06-15 RX ADMIN — SUCRALFATE 1 G: 1 TABLET ORAL at 11:16

## 2018-06-15 RX ADMIN — ATORVASTATIN CALCIUM 10 MG: 10 TABLET, FILM COATED ORAL at 09:21

## 2018-06-15 RX ADMIN — CARVEDILOL 6.25 MG: 6.25 TABLET, FILM COATED ORAL at 09:22

## 2018-06-15 RX ADMIN — SUCRALFATE 1 G: 1 TABLET ORAL at 20:50

## 2018-06-15 RX ADMIN — LEVOFLOXACIN 750 MG: 750 TABLET, FILM COATED ORAL at 20:50

## 2018-06-15 RX ADMIN — HYDRALAZINE HYDROCHLORIDE 25 MG: 25 TABLET, FILM COATED ORAL at 14:33

## 2018-06-15 RX ADMIN — HYDRALAZINE HYDROCHLORIDE 25 MG: 25 TABLET, FILM COATED ORAL at 20:51

## 2018-06-15 RX ADMIN — CARVEDILOL 12.5 MG: 12.5 TABLET, FILM COATED ORAL at 17:00

## 2018-06-15 RX ADMIN — ONDANSETRON 4 MG: 2 INJECTION INTRAMUSCULAR; INTRAVENOUS at 14:38

## 2018-06-15 RX ADMIN — SODIUM CHLORIDE 75 ML/HR: 900 INJECTION, SOLUTION INTRAVENOUS at 20:50

## 2018-06-15 RX ADMIN — GABAPENTIN 300 MG: 300 CAPSULE ORAL at 20:51

## 2018-06-15 RX ADMIN — ONDANSETRON 4 MG: 2 INJECTION INTRAMUSCULAR; INTRAVENOUS at 20:54

## 2018-06-15 RX ADMIN — SUCRALFATE 1 G: 1 TABLET ORAL at 09:21

## 2018-06-15 RX ADMIN — HYDROCODONE BITARTRATE AND ACETAMINOPHEN 1 TABLET: 7.5; 325 TABLET ORAL at 09:22

## 2018-06-15 RX ADMIN — PROMETHAZINE HYDROCHLORIDE 12.5 MG: 25 INJECTION INTRAMUSCULAR; INTRAVENOUS at 18:25

## 2018-06-15 RX ADMIN — GABAPENTIN 300 MG: 300 CAPSULE ORAL at 16:44

## 2018-06-15 RX ADMIN — SUCRALFATE 1 G: 1 TABLET ORAL at 17:00

## 2018-06-15 NOTE — PROGRESS NOTES
[facility] Medicine Services  INPATIENT PROGRESS NOTE    Length of Stay: 0  Date of Admission: 6/13/2018  Primary Care Physician: Denisa Powell MD    Subjective   Chief Complaint: Abdominal discomfort.  HPI:  Patient is seen for follow-up today.  She was admitted for acute cystitis and hypertensive urgency.  She remains on IV antibiotics, IV hydration and blood pressure has since improved.  She continues to have lower abdominal discomfort but much less nauseated.     Review of Systems   Constitutional: Positive for fatigue. Negative for activity change, appetite change, chills, diaphoresis and fever.   HENT: Negative for trouble swallowing and voice change.    Eyes: Negative for photophobia and visual disturbance.   Respiratory: Negative for cough, choking, chest tightness, shortness of breath, wheezing and stridor.    Cardiovascular: Negative for chest pain, palpitations and leg swelling.   Gastrointestinal: Positive for abdominal pain. Negative for abdominal distention, blood in stool, constipation, diarrhea, nausea and vomiting.   Endocrine: Negative for cold intolerance, heat intolerance, polydipsia, polyphagia and polyuria.   Genitourinary: Negative for decreased urine volume, difficulty urinating, dysuria, enuresis, flank pain, frequency, hematuria and urgency.   Musculoskeletal: Negative for arthralgias, gait problem, myalgias, neck pain and neck stiffness.   Skin: Negative for pallor, rash and wound.   Neurological: Negative for dizziness, tremors, seizures, syncope, facial asymmetry, speech difficulty, weakness, light-headedness, numbness and headaches.   Hematological: Does not bruise/bleed easily.   Psychiatric/Behavioral: Negative for agitation, behavioral problems and confusion.       Objective    Temp:  [98 °F (36.7 °C)-99.6 °F (37.6 °C)] 98.5 °F (36.9 °C)  Heart Rate:  [70-83] 77  Resp:  [18] 18  BP: (141-158)/() 158/115    Physical Exam    Constitutional: She is oriented to person, place, and time. She appears well-developed and well-nourished. She is cooperative. No distress.   HENT:   Head: Normocephalic and atraumatic.   Right Ear: External ear normal.   Left Ear: External ear normal.   Nose: Nose normal.   Mouth/Throat: Oropharynx is clear and moist.   Eyes: Conjunctivae and EOM are normal. Pupils are equal, round, and reactive to light.   Neck: Normal range of motion. Neck supple. No JVD present. No thyromegaly present.   Cardiovascular: Normal rate, regular rhythm, normal heart sounds and intact distal pulses.  Exam reveals no gallop and no friction rub.    No murmur heard.  Pulmonary/Chest: Effort normal and breath sounds normal. No stridor. No respiratory distress. She has no wheezes. She has no rales. She exhibits no tenderness.   Abdominal: Soft. Bowel sounds are normal. She exhibits no distension and no mass. There is no tenderness. There is no rebound and no guarding. No hernia.   Musculoskeletal: Normal range of motion. She exhibits no edema, tenderness or deformity.   Neurological: She is alert and oriented to person, place, and time. She has normal reflexes. No cranial nerve deficit or sensory deficit. She exhibits normal muscle tone.   Skin: Skin is warm and dry. No rash noted. She is not diaphoretic. No erythema. No pallor.   Psychiatric: She has a normal mood and affect. Her behavior is normal. Judgment and thought content normal.   Nursing note and vitals reviewed.        Medication Review:    Current Facility-Administered Medications:   •  atorvastatin (LIPITOR) tablet 10 mg, 10 mg, Oral, Daily, Jose A Patel MD, 10 mg at 06/15/18 0921  •  carvedilol (COREG) tablet 6.25 mg, 6.25 mg, Oral, BID With Meals, Jose A Patel MD, 6.25 mg at 06/15/18 0922  •  clopidogrel (PLAVIX) tablet 75 mg, 75 mg, Oral, Daily, Jose A Patel MD, 75 mg at 06/15/18 0921  •  donepezil (ARICEPT) tablet 10 mg, 10 mg, Oral, Nightly, Jose A Patel MD, 10 mg at  06/14/18 2050  •  enalaprilat (VASOTEC) injection 1.25 mg, 1.25 mg, Intravenous, Q6H PRN, Rashad Huggins MD  •  gabapentin (NEURONTIN) capsule 300 mg, 300 mg, Oral, TID, Jose A Patel MD, 300 mg at 06/15/18 0922  •  HYDROcodone-acetaminophen (NORCO) 7.5-325 MG per tablet 1 tablet, 1 tablet, Oral, Q8H PRN, Jose A Patel MD, 1 tablet at 06/15/18 0922  •  levoFLOXacin (LEVAQUIN) 750 mg/150 mL D5W (premix) (LEVAQUIN) 750 mg, 750 mg, Intravenous, Q24H, Rashad Huggins MD, Stopped at 06/14/18 2314  •  ondansetron (ZOFRAN) injection 4 mg, 4 mg, Intravenous, Q6H PRN, Jose A Patel MD, 4 mg at 06/15/18 0722  •  promethazine (PHENERGAN) injection 12.5 mg, 12.5 mg, Intravenous, Q6H PRN, Jose A Patel MD, 12.5 mg at 06/14/18 2050  •  sertraline (ZOLOFT) tablet 50 mg, 50 mg, Oral, Daily, Jose A Patel MD, 50 mg at 06/15/18 0922  •  sodium chloride 0.9 % flush 1-10 mL, 1-10 mL, Intravenous, PRN, Jose A Patel MD  •  Insert peripheral IV, , , Once **AND** sodium chloride 0.9 % flush 10 mL, 10 mL, Intravenous, PRN, Saurabh Paula MD  •  sodium chloride 0.9 % infusion, 75 mL/hr, Intravenous, Continuous, Saurabh Paula MD, Last Rate: 75 mL/hr at 06/15/18 1013, 75 mL/hr at 06/15/18 1013  •  sucralfate (CARAFATE) tablet 1 g, 1 g, Oral, 4x Daily, Jose A Patel MD, 1 g at 06/15/18 0921  •  traZODone (DESYREL) tablet 150 mg, 150 mg, Oral, Nightly, Jose A Patel MD, 150 mg at 06/14/18 2050    Results Review:  I have reviewed the labs, radiology results, and diagnostic studies.    Laboratory Data:     Results from last 7 days  Lab Units 06/15/18  0603 06/14/18  0558 06/13/18  1823   SODIUM mmol/L 136* 136* 137   POTASSIUM mmol/L 3.5 3.5 3.5   CHLORIDE mmol/L 102 99 96   CO2 mmol/L 28.0 31.0 34.0*   BUN mg/dL 8 8 10   CREATININE mg/dL 0.56 0.56 0.62   GLUCOSE mg/dL 84 89 109*   CALCIUM mg/dL 8.0* 8.3* 9.2   BILIRUBIN mg/dL  --   --  0.5   ALK PHOS U/L  --   --  41   ALT (SGPT) U/L  --   --  29   AST (SGOT) U/L  --   --  21   ANION GAP  mmol/L 6.0 6.0 7.0     Estimated Creatinine Clearance: 51.6 mL/min (by C-G formula based on SCr of 0.56 mg/dL).            Results from last 7 days  Lab Units 06/15/18  0603 06/14/18  0558 06/13/18  1823   WBC 10*3/mm3 8.80 9.62 11.75*   HEMOGLOBIN g/dL 11.6* 12.7 12.6   HEMATOCRIT % 36.1 38.7 38.4   PLATELETS 10*3/mm3 188 214 188           Culture Data:   No results found for: BLOODCX  No results found for: URINECX  No results found for: RESPCX  No results found for: WOUNDCX  No results found for: STOOLCX  No components found for: BODYFLD    Radiology Data:   Imaging Results (last 24 hours)     ** No results found for the last 24 hours. **          I have reviewed the patient current medications.     Assessment/Plan     Hospital Problem List:  Principal Problem:    -Acute cystitis without hematuria: Continue IV antibiotics and await outcome of urine and blood cultures.  - Hypertensive urgency: Much improved.  Continue current medications and make adjustments as needed to achieve optimal blood pressure control.   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 Gill as outpatient.   - Early onset Alzheimer's dementia without behavioral disturbance: Continue home medications.  - History of coronary artery disease: Continue home medications.  - Continue DVT prophylaxis and PPI for GERD.  - Consult PT and OT secondary to deconditioning.        Discharge Planning: Likely discharge in 1-2 days.    Rashad Huggins MD   06/15/18   10:51 AM

## 2018-06-15 NOTE — PLAN OF CARE
Problem: Patient Care Overview  Goal: Plan of Care Review  Outcome: Ongoing (interventions implemented as appropriate)   06/15/18 1547   Coping/Psychosocial   Plan of Care Reviewed With patient   Plan of Care Review   Progress no change       Problem: Pain, Acute (Adult)  Goal: Identify Related Risk Factors and Signs and Symptoms  Outcome: Outcome(s) achieved Date Met: 06/15/18    Goal: Acceptable Pain Control/Comfort Level  Outcome: Ongoing (interventions implemented as appropriate)      Problem: Urinary Tract Infection (Adult)  Goal: Signs and Symptoms of Listed Potential Problems Will be Absent, Minimized or Managed (Urinary Tract Infection)  Outcome: Ongoing (interventions implemented as appropriate)

## 2018-06-15 NOTE — PLAN OF CARE
Problem: Patient Care Overview  Goal: Individualization and Mutuality  Outcome: Ongoing (interventions implemented as appropriate)      Problem: Pain, Acute (Adult)  Goal: Identify Related Risk Factors and Signs and Symptoms  Outcome: Ongoing (interventions implemented as appropriate)   06/14/18 1445   Pain, Acute (Adult)   Related Risk Factors (Acute Pain) disease process   Signs and Symptoms (Acute Pain) verbalization of pain descriptors       Problem: Urinary Tract Infection (Adult)  Goal: Signs and Symptoms of Listed Potential Problems Will be Absent, Minimized or Managed (Urinary Tract Infection)  Outcome: Ongoing (interventions implemented as appropriate)   06/14/18 1445 06/15/18 0341   Goal/Outcome Evaluation   Problems Assessed (Urinary Tract Infection) --  all   Problems Present (UTI) infection progression --

## 2018-06-16 LAB
ANION GAP SERPL CALCULATED.3IONS-SCNC: 6 MMOL/L (ref 5–15)
BASOPHILS # BLD AUTO: 0.03 10*3/MM3 (ref 0–0.2)
BASOPHILS NFR BLD AUTO: 0.3 % (ref 0–2)
BUN BLD-MCNC: 9 MG/DL (ref 7–21)
BUN/CREAT SERPL: 16.7 (ref 7–25)
CALCIUM SPEC-SCNC: 7.6 MG/DL (ref 8.4–10.2)
CHLORIDE SERPL-SCNC: 101 MMOL/L (ref 95–110)
CO2 SERPL-SCNC: 28 MMOL/L (ref 22–31)
CREAT BLD-MCNC: 0.54 MG/DL (ref 0.5–1)
DEPRECATED RDW RBC AUTO: 45.8 FL (ref 36.4–46.3)
EOSINOPHIL # BLD AUTO: 0.61 10*3/MM3 (ref 0–0.7)
EOSINOPHIL NFR BLD AUTO: 6.1 % (ref 0–7)
ERYTHROCYTE [DISTWIDTH] IN BLOOD BY AUTOMATED COUNT: 14.5 % (ref 11.5–14.5)
GFR SERPL CREATININE-BSD FRML MDRD: 109 ML/MIN/1.73 (ref 39–90)
GLUCOSE BLD-MCNC: 87 MG/DL (ref 60–100)
HCT VFR BLD AUTO: 34.2 % (ref 35–45)
HGB BLD-MCNC: 11.1 G/DL (ref 12–15.5)
IMM GRANULOCYTES # BLD: 0.02 10*3/MM3 (ref 0–0.02)
IMM GRANULOCYTES NFR BLD: 0.2 % (ref 0–0.5)
LYMPHOCYTES # BLD AUTO: 2.48 10*3/MM3 (ref 0.6–4.2)
LYMPHOCYTES NFR BLD AUTO: 24.9 % (ref 10–50)
MCH RBC QN AUTO: 27.8 PG (ref 26.5–34)
MCHC RBC AUTO-ENTMCNC: 32.5 G/DL (ref 31.4–36)
MCV RBC AUTO: 85.7 FL (ref 80–98)
MONOCYTES # BLD AUTO: 0.67 10*3/MM3 (ref 0–0.9)
MONOCYTES NFR BLD AUTO: 6.7 % (ref 0–12)
NEUTROPHILS # BLD AUTO: 6.13 10*3/MM3 (ref 2–8.6)
NEUTROPHILS NFR BLD AUTO: 61.8 % (ref 37–80)
PLATELET # BLD AUTO: 183 10*3/MM3 (ref 150–450)
PMV BLD AUTO: 9.7 FL (ref 8–12)
POTASSIUM BLD-SCNC: 3.1 MMOL/L (ref 3.5–5.1)
RBC # BLD AUTO: 3.99 10*6/MM3 (ref 3.77–5.16)
SODIUM BLD-SCNC: 135 MMOL/L (ref 137–145)
WBC NRBC COR # BLD: 9.94 10*3/MM3 (ref 3.2–9.8)

## 2018-06-16 PROCEDURE — 85025 COMPLETE CBC W/AUTO DIFF WBC: CPT | Performed by: FAMILY MEDICINE

## 2018-06-16 PROCEDURE — 63710000001 PROMETHAZINE PER 12.5 MG: Performed by: INTERNAL MEDICINE

## 2018-06-16 PROCEDURE — 96361 HYDRATE IV INFUSION ADD-ON: CPT

## 2018-06-16 PROCEDURE — G8988 SELF CARE GOAL STATUS: HCPCS

## 2018-06-16 PROCEDURE — G8987 SELF CARE CURRENT STATUS: HCPCS

## 2018-06-16 PROCEDURE — G0378 HOSPITAL OBSERVATION PER HR: HCPCS

## 2018-06-16 PROCEDURE — G8978 MOBILITY CURRENT STATUS: HCPCS | Performed by: PHYSICAL THERAPIST

## 2018-06-16 PROCEDURE — 97162 PT EVAL MOD COMPLEX 30 MIN: CPT | Performed by: PHYSICAL THERAPIST

## 2018-06-16 PROCEDURE — 25010000002 PROMETHAZINE PER 50 MG: Performed by: FAMILY MEDICINE

## 2018-06-16 PROCEDURE — 96376 TX/PRO/DX INJ SAME DRUG ADON: CPT

## 2018-06-16 PROCEDURE — 97166 OT EVAL MOD COMPLEX 45 MIN: CPT

## 2018-06-16 PROCEDURE — G8979 MOBILITY GOAL STATUS: HCPCS | Performed by: PHYSICAL THERAPIST

## 2018-06-16 PROCEDURE — 80048 BASIC METABOLIC PNL TOTAL CA: CPT | Performed by: FAMILY MEDICINE

## 2018-06-16 RX ORDER — POTASSIUM CHLORIDE 750 MG/1
30 CAPSULE, EXTENDED RELEASE ORAL ONCE
Status: COMPLETED | OUTPATIENT
Start: 2018-06-16 | End: 2018-06-16

## 2018-06-16 RX ORDER — LEVOFLOXACIN 750 MG/1
750 TABLET ORAL EVERY 24 HOURS
Status: DISCONTINUED | OUTPATIENT
Start: 2018-06-16 | End: 2018-06-17 | Stop reason: HOSPADM

## 2018-06-16 RX ORDER — PROMETHAZINE HYDROCHLORIDE 12.5 MG/1
12.5 TABLET ORAL EVERY 6 HOURS PRN
Status: DISCONTINUED | OUTPATIENT
Start: 2018-06-16 | End: 2018-06-17 | Stop reason: HOSPADM

## 2018-06-16 RX ORDER — LOPERAMIDE HYDROCHLORIDE 2 MG/1
2 CAPSULE ORAL 4 TIMES DAILY PRN
Status: DISCONTINUED | OUTPATIENT
Start: 2018-06-16 | End: 2018-06-17 | Stop reason: HOSPADM

## 2018-06-16 RX ADMIN — PROMETHAZINE HYDROCHLORIDE 12.5 MG: 25 INJECTION INTRAMUSCULAR; INTRAVENOUS at 00:01

## 2018-06-16 RX ADMIN — GABAPENTIN 300 MG: 300 CAPSULE ORAL at 17:12

## 2018-06-16 RX ADMIN — PROMETHAZINE HYDROCHLORIDE 12.5 MG: 12.5 TABLET ORAL at 18:59

## 2018-06-16 RX ADMIN — SUCRALFATE 1 G: 1 TABLET ORAL at 09:12

## 2018-06-16 RX ADMIN — SERTRALINE HYDROCHLORIDE 50 MG: 50 TABLET ORAL at 09:12

## 2018-06-16 RX ADMIN — PROMETHAZINE HYDROCHLORIDE 12.5 MG: 12.5 TABLET ORAL at 09:49

## 2018-06-16 RX ADMIN — SUCRALFATE 1 G: 1 TABLET ORAL at 11:09

## 2018-06-16 RX ADMIN — GABAPENTIN 300 MG: 300 CAPSULE ORAL at 09:12

## 2018-06-16 RX ADMIN — GABAPENTIN 300 MG: 300 CAPSULE ORAL at 20:37

## 2018-06-16 RX ADMIN — HYDRALAZINE HYDROCHLORIDE 25 MG: 25 TABLET, FILM COATED ORAL at 05:45

## 2018-06-16 RX ADMIN — CARVEDILOL 12.5 MG: 12.5 TABLET, FILM COATED ORAL at 09:12

## 2018-06-16 RX ADMIN — DONEPEZIL HYDROCHLORIDE 10 MG: 10 TABLET ORAL at 20:37

## 2018-06-16 RX ADMIN — POTASSIUM CHLORIDE 30 MEQ: 750 CAPSULE, EXTENDED RELEASE ORAL at 11:09

## 2018-06-16 RX ADMIN — ATORVASTATIN CALCIUM 10 MG: 10 TABLET, FILM COATED ORAL at 09:12

## 2018-06-16 RX ADMIN — CARVEDILOL 12.5 MG: 12.5 TABLET, FILM COATED ORAL at 17:12

## 2018-06-16 RX ADMIN — CLOPIDOGREL BISULFATE 75 MG: 75 TABLET ORAL at 09:12

## 2018-06-16 RX ADMIN — SUCRALFATE 1 G: 1 TABLET ORAL at 17:12

## 2018-06-16 RX ADMIN — HYDRALAZINE HYDROCHLORIDE 25 MG: 25 TABLET, FILM COATED ORAL at 14:59

## 2018-06-16 RX ADMIN — LOPERAMIDE HYDROCHLORIDE 2 MG: 2 CAPSULE ORAL at 02:00

## 2018-06-16 RX ADMIN — LEVOFLOXACIN 750 MG: 750 TABLET, FILM COATED ORAL at 20:37

## 2018-06-16 RX ADMIN — SUCRALFATE 1 G: 1 TABLET ORAL at 20:37

## 2018-06-16 RX ADMIN — LOPERAMIDE HYDROCHLORIDE 2 MG: 2 CAPSULE ORAL at 12:47

## 2018-06-16 RX ADMIN — HYDRALAZINE HYDROCHLORIDE 25 MG: 25 TABLET, FILM COATED ORAL at 20:38

## 2018-06-16 RX ADMIN — TRAZODONE HYDROCHLORIDE 150 MG: 150 TABLET ORAL at 20:38

## 2018-06-16 NOTE — PLAN OF CARE
Problem: Patient Care Overview  Goal: Plan of Care Review  Outcome: Ongoing (interventions implemented as appropriate)   06/16/18 5886   Coping/Psychosocial   Plan of Care Reviewed With patient   Plan of Care Review   Progress no change   OTHER   Outcome Summary PO potassium given, changed to PO phenergan and PO Levaquin due to loss of IV access, no IV ok per MD, ambulated halls with PT, emesis x1 this shift, immodium given x1        Problem: Pain, Acute (Adult)  Goal: Acceptable Pain Control/Comfort Level  Outcome: Ongoing (interventions implemented as appropriate)      Problem: Urinary Tract Infection (Adult)  Goal: Signs and Symptoms of Listed Potential Problems Will be Absent, Minimized or Managed (Urinary Tract Infection)  Outcome: Ongoing (interventions implemented as appropriate)

## 2018-06-16 NOTE — THERAPY EVALUATION
Acute Care - Physical Therapy Initial Evaluation  [facility]     Patient Name: Mariluz Arango  : 1940  MRN: [id number]  Today's Date: 2018   Onset of Illness/Injury or Date of Surgery: 18  Date of Referral to PT: 06/15/18  Referring Physician: Dr Huggins      Admit Date: 2018    Visit Dx:     ICD-10-CM ICD-9-CM   1. Pyelonephritis N12 590.80   2. Hypertensive urgency I16.0 401.9   3. Thoracoabdominal aortic aneurysm (TAAA) without rupture I71.6 441.7   4. Impaired mobility and ADLs Z74.09 799.89   5. Impaired physical mobility Z74.09 781.99     Patient Active Problem List   Diagnosis   • Anxiety   • Artificial lens present   • Depressive disorder   • Diabetes mellitus   • Diabetic polyneuropathy   • Diverticular disease of colon   • Gastroesophageal reflux disease   • Hemorrhoid   • Hiatal hernia   • History of colon polyps   • Hypercholesterolemia   • Hypertensive disorder   • Lumbar radiculopathy   • Neurologic disorder associated with diabetes mellitus   • Pain in thoracic spine   • Vitamin D deficiency   • Paroxysmal atrial fibrillation   • Thoracic aortic aneurysm without rupture   • Early onset Alzheimer's dementia without behavioral disturbance   • Iron deficiency anemia secondary to inadequate dietary iron intake   • Pyelonephritis   • Acute cystitis without hematuria     Past Medical History:   Diagnosis Date   • Altered mental status     unspecified    • Anxiety    • Artificial lens present     Artificial lens in position   • Depressive disorder    • Diabetes mellitus     no retinopathy      • Diabetic polyneuropathy    • Diarrhea     unspecified    • Diverticular disease of colon    • Dizziness    • Gastroesophageal reflux disease    • Generalized abdominal pain    • Headache    • Hemorrhoids    • Hiatal hernia    • History of colonic polyps     Personal history of colonic polyps   • History of echocardiogram 2008    Echocardiogram W/O color flow 49853 (1) - Evidence  of LVH & diastolic dysfunction & mild LA enlargement, otherwise NRL echocradiogram   • History of mammogram 2011    MAMMOGRAM DIAGNOSTIC BILATERAL 89511 ([facility]) (1) - benign Birads category 2   • History of transfusion    • Hypercholesterolemia    • Hypertensive disorder    • Lumbar radiculopathy    • Memory impairment     Multifactorial      • Nausea    • Neurologic disorder associated with diabetes mellitus    • Pain in thoracic spine    • Palpitations    • Polyp of colon     History of polyp of colon   • Stroke    • Vitamin D deficiency      Past Surgical History:   Procedure Laterality Date   • ANKLE SURGERY  2008    Ankle surgery (4) - Open ankle arthrodesis with intermedullary bella fixation from the heel. Non union of the ankle, tibial plafond with failed ankle arthrodesis   • APPENDECTOMY  1965    Appendectomy (1)   • BLADDER SURGERY  1973    Bladder surgery (1) - Kendall-Raul repair. Cystocele with stress incontinence & endometriosis.Same with P.I.D   • BREAST BIOPSY  1997    Stereotactic breast biopsy (1) - Stereotactic needle localization with aspiration. Non palpable U/S solid mass, R breast by one interpretation & cluster cyst by another   • CARDIAC CATHETERIZATION  1986    Cardiac cath 02781 (1) - NRL LV function w/ NRL hemodynamics. NRL coronary arteries w/o evidence of atherosclerotic heart disease   • CARPAL TUNNEL RELEASE Left 08/15/1995    Carpal tunnel surgery (1) - Left carpal tunnel release, endoscopic procedure.   • CATARACT EXTRACTION Right 2007    Remove cataract, insert lens (2) - right   • CATARACT EXTRACTION Bilateral    • COLONOSCOPY W/ POLYPECTOMY  2016    Colonoscopy remove polyps 37607 (1) - One polyp in the ascending colon.Resected and retrieved.   • DILATATION AND CURETTAGE  10/02/1964    D&C (1) - D&C & colpotomy. Incomplete  or tubal pregnancy. Dysfunctional uterine bleeding   • ENDOSCOPY      Colon endoscopy 52231 (2)    • ENDOSCOPY  04/04/2016    EGD w/ biopsy 64839 (1) - Gastritis.Normal duodenum.Biopsied.Normal esophagus.Several biopsies obtained in the gastric antrum.   • ENDOSCOPY  05/21/2008    EGD w/ tube 04754 (1) - Hiatal hernia. GERD. Esophageal motility disorder.   • ENDOSCOPY N/A 3/29/2018    Procedure: ESOPHAGOGASTRODUODENOSCOPY;  Surgeon: Ezra Meyers DO;  Location: [facility] ENDOSCOPY;  Service: Gastroenterology   • ENDOSCOPY N/A 5/10/2018    Procedure: ESOPHAGOGASTRODUODENOSCOPY;  Surgeon: Ezra Meyers DO;  Location: [facility] ENDOSCOPY;  Service: Gastroenterology   • INJECTION OF MEDICATION  09/06/2011    Kenalog (3) - Ordered By: RADHA JIMENEZ ()    • LEG SURGERY  04/29/1985    Extensive leg surgery (1) - Arthroscopy, arthrotomy lateral release. Chomdromalcia, patella, right knee   • LUMBAR DISC SURGERY  2004    Low back disk surgery (1)   • OTHER SURGICAL HISTORY  01/13/1981    Bowel surgery procedure (1) - Lysis of adhesions, resection of terminal ileum with end-to-end anastomosis. partial small bowel ibstruction. Same plus ulceration of distal ileum   • OTHER SURGICAL HISTORY  06/16/1965    Suspension of uterus (1) - Uterine suspension & appendectomy, lysis of adhesion. Chronic pelvic inflammatory disease vs endometriosis. Chronic pelvic inflannatory disease   • TOTAL ABDOMINAL HYSTERECTOMY WITH SALPINGO OOPHORECTOMY  08/05/1966    YAEL/BSO (1) - Chronic pelvic inflammatory disease.Same plus endometriosis of the ovary   • WRIST ARTHROSCOPY  05/09/1988    Wrist arthroscopy/surgery (2) - excision ganglion cyst left wrist.        PT ASSESSMENT (last 12 hours)      Physical Therapy Evaluation     Row Name 06/16/18 1011          PT Evaluation Time/Intention    Subjective Information complains of;nausea/vomiting  -CB     Document Type evaluation  -CB     Mode of Treatment co-treatment;occupational therapy;physical therapy  -CB     Total Evaluation Minutes, Physical Therapy 34  -CB     Patient Effort good  -CB      Symptoms Noted During/After Treatment significant change in vital signs  -CB     Comment BP increased 70 points systolic -RN informed  -CB     Row Name 06/16/18 1011          General Information    Patient Profile Reviewed? yes  -CB     Onset of Illness/Injury or Date of Surgery 06/13/18  -CB     Referring Physician Dr Huggins  -CB     Patient Observations alert;cooperative;agree to therapy  -CB     Patient/Family Observations spouse present thruout  -CB     General Observations of Patient sidelying with HOB elevatedIV removed due to infiltrattion  -CB     Prior Level of Function independent:;gait;ADL's    assists with getting into and out of tub   -CB     Equipment Currently Used at Home rollator;walker, rolling;shower chair;grab bar   has SC, manual w/c, BSC  -CB     Pertinent History of Current Functional Problem lower abdominal discomfort  -CB     Existing Precautions/Restrictions fall  -CB     Risks Reviewed patient and family:;nausea/vomiting;increased discomfort;change in vital signs  -CB     Benefits Reviewed patient and family:;improve function;increase independence;increase strength;decrease risk of DVT  -CB     Row Name 06/16/18 1011          Relationship/Environment    Lives With spouse  -CB     Row Name 06/16/18 1011          Resource/Environmental Concerns    Current Living Arrangements home/apartment/condo  -CB     Resource/Environmental Concerns home accessibility  -CB     Home Accessibility Concerns stairs to enter home  -CB     Transportation Concerns car, none  -CB     Row Name 06/16/18 1011          Home Main Entrance    Number of Stairs, Main Entrance two  -CB     Stair Railings, Main Entrance railing on left side (ascending)  -CB     Row Name 06/16/18 1011          Cognitive Assessment/Interventions    Additional Documentation Cognitive Assessment/Intervention (Group)  -CB     Row Name 06/16/18 1011          Cognitive Assessment/Intervention- PT/OT    Affect/Mental Status (Cognitive) WFL   -CB     Orientation Status (Cognition) oriented x 3  -CB     Follows Commands (Cognition) WFL  -CB     Cognitive Function (Cognitive) WFL  -CB     Personal Safety Interventions fall prevention program maintained;gait belt;nonskid shoes/slippers when out of bed  -CB     Row Name 06/16/18 1011          Safety Issues, Functional Mobility    Impairments Affecting Function (Mobility) balance;endurance/activity tolerance;strength  -CB     Comment, Safety Issues/Impairments (Mobility) Patient seems depressed/lack of hipe  -CB     Row Name 06/16/18 1011          Bed Mobility Assessment/Treatment    Bed Mobility Assessment/Treatment supine-sit;sit-supine;rolling left;rolling right  -CB     Rolling Left [place] (Bed Mobility) conditional independence  -CB     Rolling Right [place] (Bed Mobility) conditional independence  -CB     Supine-Sit [place] (Bed Mobility) supervision  -CB     Sit-Supine [place] (Bed Mobility) supervision  -CB     Assistive Device (Bed Mobility) bed rails;head of bed elevated  -CB     Row Name 06/16/18 1011          Transfer Assessment/Treatment    Transfer Assessment/Treatment sit-stand transfer;stand-sit transfer;toilet transfer  -CB     Comment (Transfers) did not use walker going into bathroom but held onto door frame and was unsteady  -CB     Sit-Stand [place] (Transfers) contact guard  -CB     Stand-Sit [place] (Transfers) contact guard  -CB     [place] Level (Toilet Transfer) contact guard  -CB     Assistive Device (Toilet Transfer) commode, 3-in-1  -CB     Row Name 06/16/18 1011          Sit-Stand Transfer    Assistive Device (Sit-Stand Transfers) walker, front-wheeled  -CB     Row Name 06/16/18 1011          Stand-Sit Transfer    Assistive Device (Stand-Sit Transfers) walker, front-wheeled  -CB     Row Name 06/16/18 1011          Toilet Transfer    Type (Toilet Transfer) sit-stand;stand-sit  -CB     Row Name 06/16/18 1011          Gait/Stairs  Assessment/Training    Gait/Stairs Assessment/Training gait/ambulation independence;gait/ambulation assistive device;distance ambulated  -CB     [place] Level (Gait) contact guard  -CB     Assistive Device (Gait) walker, front-wheeled  -CB     Distance in Feet (Gait) 60 feet  -CB     Row Name 06/16/18 1011          General ROM    GENERAL ROM COMMENTS AROM WFL BLE  -CB     Row Name 06/16/18 1011          General Assessment (Manual Muscle Testing)    General Manual Muscle Testing (MMT) Assessment lower extremity strength deficits identified  -CB     Row Name 06/16/18 1011          Upper Extremity (Manual Muscle Testing)    Upper Extremity: Manual Muscle Testing (MMT) left shoulder strength deficit;right shoulder strength deficit;left elbow/forearm strength deficit;right elbow/forearm strength deficit;left wrist strength deficit;right wrist strength deficit  -CB     Row Name 06/16/18 1011          Left Shoulder (Manual Muscle Testing)    Left Shoulder Manual Muscle Testing (MMT) flexion  -CB     MMT: Flexion, Left Shoulder flexion  -CB     MMT, Gross Movement: Left Shoulder Flexion (3-/5) fair minus  -CB     Row Name 06/16/18 1011          Right Shoulder (Manual Muscle Testing)    Right Shoulder Manual Muscle Testing (MMT) flexion  -CB     MMT: Flexion, Right Shoulder flexion  -CB     MMT, Gross Movement: Right Shoulder Flexion (3-/5) fair minus  -CB     Row Name 06/16/18 1011          Left Elbow/Forearm (Manual Muscle Testing)    Left Elbow/Forearm Manual Muscle Testing (MMT) flexion  -CB     MMT: Flexion, Left Elbow flexion  -CB     MMT, Gross Movement: Left Elbow Flexion (3+/5) fair plus  -CB     Row Name 06/16/18 1011          Right Elbow/Forearm (Manual Muscle Testing)    Right Elbow/Forearm Manual Muscle Testing (MMT) flexion  -CB     MMT: Flexion, Right Elbow flexion  -CB     MMT, Gross Movement: Right Elbow Flexion (3+/5) fair plus  -CB     Row Name 06/16/18 1011          Left Wrist (Manual Muscle Testing)     Left Wrist Manual Muscle Testing (MMT) extension  -CB     MMT, Gross Movement: Left Wrist Extension (3+/5) fair plus  -CB     Row Name 06/16/18 1011          Right Wrist (Manual Muscle Testing)    Right Wrist Manual Muscle Testing (MMT) extension  -CB     MMT, Gross Movement: Right Wrist Extension (3+/5) fair plus  -CB     Row Name 06/16/18 1011          Static Sitting Balance    Level of [place] (Unsupported Sitting, Static Balance) supervision  -CB     Sitting Position (Unsupported Sitting, Static Balance) sitting on edge of bed  -CB     Time Able to Maintain Position (Unsupported Sitting, Static Balance) 4 to 5 minutes  -CB     Row Name 06/16/18 1011          Dynamic Sitting Balance    Level of [place], Reaches Outside Midline (Sitting, Dynamic Balance) supervision  -CB     Sitting Position, Reaches Outside Midline (Sitting, Dynamic Balance) other (see comments)   on BSC over toilet  -CB     Row Name 06/16/18 1011          Dynamic Standing Balance    Level of [place], Reaches Outside Midline (Standing, Dynamic Balance) contact guard assist  -CB     Time Able to Maintain Position, Reaches Outside Midline (Standing, Dynamic Balance) 30 to 45 seconds  -CB     [facility] Name 06/16/18 1011          Sensory Assessment/Intervention    Sensory General Assessment no sensation deficits identified  -CB     [facility] Name 06/16/18 1011          Hearing Assessment    Hearing Status hearing impairment, bilaterally  -CB     [facility] Name 06/16/18 1011          Vision Assessment/Intervention    Visual Impairment/Limitations WFL  -CB     [facility] Name 06/16/18 1011          Light Touch Sensation Assessment    Left Upper Extremity: Light Touch Sensation Assessment intact  -CB     Right Upper Extremity: Light Touch Sensation Assessment intact  -CB     [facility] Name 06/16/18 1011          Pain Scale: Numbers Pre/Post-Treatment    Pain Scale: Numbers, Pretreatment 0/10 - no pain  -CB     Pain Scale: Numbers, Post-Treatment 0/10 - no pain  -CB      Row Name 06/16/18 1011          Physical Therapy Clinical Impression    Date of Referral to PT 06/15/18  -CB     PT Diagnosis (PT Clinical Impression) impaired physical mobility due to pyelonephritis/TAAA without rupture  -CB     Criteria for Skilled Interventions Met (PT Clinical Impression) treatment indicated;yes  -CB     Pathology/Pathophysiology Noted (Describe Specifically for Each System) musculoskeletal  -CB     Impairments Found (describe specific impairments) aerobic capacity/endurance;gait, locomotion, and balance  -CB     Rehab Potential (PT Clinical Summary) fair, will monitor progress closely  -CB     Predicted Duration of Therapy (PT) until goals met or discharged  -CB     Care Plan Review (PT) care plan/treatment goals reviewed  -CB     Referral Needed to Another Service (PT) home health care  -CB     Row Name 06/16/18 1011          Vital Signs    Pre Systolic BP Rehab 110  -CB     Pre Treatment Diastolic BP 62  -CB     Post Systolic BP Rehab 180  -CB     Post Treatment Diastolic BP 78  -CB     Pretreatment Heart Rate (beats/min) 86  -CB     Posttreatment Heart Rate (beats/min) 82  -CB     Pre SpO2 (%) 94  -CB     O2 Delivery Pre Treatment room air  -CB     Post SpO2 (%) 95  -CB     O2 Delivery Post Treatment room air  -CB     Pre Patient Position Side Lying  -CB     Intra Patient Position Standing  -CB     Post Patient Position Side Lying  -CB     Row Name 06/16/18 1011          Physical Therapy Goals    Bed Mobility Goal Selection (PT) bed mobility, PT goal 1  -CB     Transfer Goal Selection (PT) transfer, PT goal 1  -CB     Gait Training Goal Selection (PT) gait training, PT goal 1  -CB     Stairs Goal Selection (PT) stairs, PT goal 1  -CB     Additional Documentation Stairs Goal Selection (PT) (Row)  -CB     Row Name 06/16/18 1011          Bed Mobility Goal 1 (PT)    Activity/Assistive Device (Bed Mobility Goal 1, PT) sit to supine;supine to sit   HOB down and no rails  -CB     [place]  Level/Cues Needed (Bed Mobility Goal 1, PT) independent  -CB     Time Frame (Bed Mobility Goal 1, PT) 3 days;2 days  -CB     Progress/Outcomes (Bed Mobility Goal 1, PT) goal not met  -CB     Row Name 06/16/18 1011          Transfer Goal 1 (PT)    Activity/Assistive Device (Transfer Goal 1, PT) sit-to-stand/stand-to-sit;bed-to-chair/chair-to-bed;walker, rolling  -CB     [place] Level/Cues Needed (Transfer Goal 1, PT) conditional independence  -CB     Time Frame (Transfer Goal 1, PT) 2 - 3 days  -CB     Progress/Outcome (Transfer Goal 1, PT) goal not met  -CB     Row Name 06/16/18 1011          Gait Training Goal 1 (PT)    Activity/Assistive Device (Gait Training Goal 1, PT) gait (walking locomotion);assistive device use;turning, left;turning, right;walker, rolling  -CB     [place] Level (Gait Training Goal 1, PT) conditional independence  -CB     Distance (Gait Goal 1, PT) 100 feet  -CB     Time Frame (Gait Training Goal 1, PT) 2 - 3 days  -CB     Progress/Outcome (Gait Training Goal 1, PT) goal not met  -CB     Row Name 06/16/18 1011          Stairs Goal 1 (PT)    Activity/Assistive Device (Stairs Goal 1, PT) ascending stairs;descending stairs;improve balance and speed;using handrail, left  -CB     [place] Level/Cues Needed (Stairs Goal 1, PT) contact guard assist  -CB     Number of Stairs (Stairs Goal 1, PT) 2  -CB     Time Frame (Stairs Goal 1, PT) 2 days  -CB     Progress/Outcome (Stairs Goal 1, PT) goal not met  -CB     Row Name 06/16/18 1011          Patient Education Goal (PT)    Activity (Patient Education Goal, PT) HEP  -CB     [place]/Cues/Accuracy (Memory Goal 2, PT) demonstrates adequately;verbalizes understanding  -CB     Time Frame (Patient Education Goal, PT) 2 days  -CB     Progress/Outcome (Patient Education Goal, PT) goal not met  -CB     Row Name 06/16/18 1011          Positioning and Restraints    Pre-Treatment Position in bed  -CB     Post Treatment Position bed  -CB     In  Bed side lying right;call light within reach;encouraged to call for assist;exit alarm on;with family/caregiver  -CB     Row Name 06/16/18 1011          Living Environment    Home Accessibility stairs to enter home  -CB       User Key  (r) = Recorded By, (t) = Taken By, (c) = Cosigned By    Initials Name Provider Type    CB Amina Singh, PT Physical Therapist          Physical Therapy Education     Title: PT OT SLP Therapies (Active)     Topic: Physical Therapy (Active)     Point: Mobility training (Active)    Learning Progress Summary     Learner Status Readiness Method Response Comment Documented by    Patient Active Acceptance E,D NR   06/16/18 1319    Family Active Acceptance E,D NR  CB 06/16/18 1319          Point: Precautions (Active)    Learning Progress Summary     Learner Status Readiness Method Response Comment Documented by    Patient Active Acceptance E,D NR   06/16/18 1319    Family Active Acceptance E,D NR  CB 06/16/18 1319                      User Key     Initials Effective Dates Name Provider Type Discipline     04/06/17 -  Amina Singh, PT Physical Therapist PT                PT Recommendation and Plan  Anticipated Discharge Disposition (PT): home with home health  Planned Therapy Interventions (PT Eval): bed mobility training, gait training, home exercise program, transfer training, stair training, strengthening, patient/family education  Therapy Frequency (PT Clinical Impression): other (see comments) (5-14)  Outcome Summary/Treatment Plan (PT)  Anticipated Equipment Needs at Discharge (PT):  (none)  Anticipated Discharge Disposition (PT): home with home health  Referral Needed to Another Service (PT): home health care  Plan of Care Reviewed With: patient, spouse  Outcome Summary: PT eval completed, able to come to sit EOB with HOB up  with supervision, able to come to stand at WEOB and walk into bathroom holding onto door facing and was unsteady, able to ambulate with rolling walker  60 feet with CGA of one and then backe to bed, Patient seens depressed and down thru out treatment, perked up some what with more interaction but quickly fell back into being down and out, coul benefit from skilled PT to regain and return to highest level of function in bed mobility, transfers and gait          Outcome Measures     Row Name 06/16/18 1011 06/16/18 1010          How much help from another person do you currently need...    Turning from your back to your side while in flat bed without using bedrails? 4  -CB  --     Moving from lying on back to sitting on the side of a flat bed without bedrails? 3  -CB  --     Moving to and from a bed to a chair (including a wheelchair)? 3  -CB  --     Standing up from a chair using your arms (e.g., wheelchair, bedside chair)? 3  -CB  --     Climbing 3-5 steps with a railing? 2  -CB  --     To walk in hospital room? 3  -CB  --     AM-PAC 6 Clicks Score 18  -CB  --        How much help from another is currently needed...    Putting on and taking off regular lower body clothing?  -- 3  -RW     Bathing (including washing, rinsing, and drying)  -- 3  -RW     Toileting (which includes using toilet bed pan or urinal)  -- 3  -RW     Putting on and taking off regular upper body clothing  -- 3  -RW     Taking care of personal grooming (such as brushing teeth)  -- 3  -RW     Eating meals  -- 4  -RW     Score  -- 19  -RW        Functional Assessment    Outcome Measure Options AM-PAC 6 Clicks Basic Mobility (PT)  -CB AM-PAC 6 Clicks Daily Activity (OT)  -RW       User Key  (r) = Recorded By, (t) = Taken By, (c) = Cosigned By    Initials Name Provider Type    CB Amina Singh, PT Physical Therapist    RW Gabbie Jay, OTR/L Occupational Therapist           Time Calculation:         PT Charges     Row Name 06/16/18 1324             Time Calculation    Start Time 1010  -CB      Stop Time 1044  -CB      Time Calculation (min) 34 min  -CB      PT Received On 06/16/18  -CB       PT Goal Re-Cert Due Date 06/29/18  -CB        User Key  (r) = Recorded By, (t) = Taken By, (c) = Cosigned By    Initials Name Provider Type    CB Amina Singh, PT Physical Therapist        Therapy Suggested Charges     Code   Minutes Charges    None           Therapy Charges for Today     Code Description Service Date Service Provider Modifiers Qty    80919255223 HC PT MOBILITY CURRENT 6/16/2018 Amina Singh, PT GP, CK 1    71911877580 HC PT MOBILITY PROJECTED 6/16/2018 Amina Singh, PT GP, CJ 1    73217296720 HC PT EVAL MOD COMPLEXITY 2 6/16/2018 Amina Singh, PT GP 1          PT G-Codes  PT Professional Judgement Used?: Yes  Outcome Measure Options: AM-PAC 6 Clicks Basic Mobility (PT)  Score: 18  Functional Limitation: Mobility: Walking and moving around  Mobility: Walking and Moving Around Current Status (): At least 40 percent but less than 60 percent impaired, limited or restricted  Mobility: Walking and Moving Around Goal Status (): At least 20 percent but less than 40 percent impaired, limited or restricted      Amina Singh, PT  6/16/2018

## 2018-06-16 NOTE — PLAN OF CARE
Problem: Patient Care Overview  Goal: Plan of Care Review   06/16/18 0143   Coping/Psychosocial   Plan of Care Reviewed With patient   Plan of Care Review   Progress no change   OTHER   Outcome Summary Vss, drowsy but rousable, pain controlled

## 2018-06-16 NOTE — PLAN OF CARE
Problem: Patient Care Overview  Goal: Plan of Care Review  Outcome: Ongoing (interventions implemented as appropriate)   06/16/18 1207   Coping/Psychosocial   Plan of Care Reviewed With patient   OTHER   Outcome Summary OT eval complete. Pt presented with generalized weakness and unsteadiness. Pt SBA for supine to sit, grooming, and toileting. Pt CGA for sit-stand-sit, toilet t/f, and ambulation. Pt dependent in donning socks. Pt ambulated to bathroom 6ft to and from the bathroom w/o AD. Pt used rolling walker to ambulate 60 ft in the hallways due to unsteadiness observed during short ambulation. Pt would benefit from skilled OT services to increase strength, balance, independence and safety with ADLs. Anticipate return home with 24 hour care and home health OT.

## 2018-06-16 NOTE — THERAPY EVALUATION
Acute Care - Occupational Therapy Initial Evaluation  [facility]     Patient Name: Mariluz Arango  : 1940  MRN: [id number]  Today's Date: 2018  Onset of Illness/Injury or Date of Surgery: 18  Date of Referral to OT: 06/15/18  Referring Physician: Dr. Huggins    Admit Date: 2018       ICD-10-CM ICD-9-CM   1. Pyelonephritis N12 590.80   2. Hypertensive urgency I16.0 401.9   3. Thoracoabdominal aortic aneurysm (TAAA) without rupture I71.6 441.7   4. Impaired mobility and ADLs Z74.09 799.89     Patient Active Problem List   Diagnosis   • Anxiety   • Artificial lens present   • Depressive disorder   • Diabetes mellitus   • Diabetic polyneuropathy   • Diverticular disease of colon   • Gastroesophageal reflux disease   • Hemorrhoid   • Hiatal hernia   • History of colon polyps   • Hypercholesterolemia   • Hypertensive disorder   • Lumbar radiculopathy   • Neurologic disorder associated with diabetes mellitus   • Pain in thoracic spine   • Vitamin D deficiency   • Paroxysmal atrial fibrillation   • Thoracic aortic aneurysm without rupture   • Early onset Alzheimer's dementia without behavioral disturbance   • Iron deficiency anemia secondary to inadequate dietary iron intake   • Pyelonephritis   • Acute cystitis without hematuria     Past Medical History:   Diagnosis Date   • Altered mental status     unspecified    • Anxiety    • Artificial lens present     Artificial lens in position   • Depressive disorder    • Diabetes mellitus     no retinopathy      • Diabetic polyneuropathy    • Diarrhea     unspecified    • Diverticular disease of colon    • Dizziness    • Gastroesophageal reflux disease    • Generalized abdominal pain    • Headache    • Hemorrhoids    • Hiatal hernia    • History of colonic polyps     Personal history of colonic polyps   • History of echocardiogram 2008    Echocardiogram W/O color flow 56455 (1) - Evidence of LVH & diastolic dysfunction & mild LA enlargement,  otherwise NRL echocradiogram   • History of mammogram 2011    MAMMOGRAM DIAGNOSTIC BILATERAL 82856 ([facility]) (1) - benign Birads category 2   • History of transfusion    • Hypercholesterolemia    • Hypertensive disorder    • Lumbar radiculopathy    • Memory impairment     Multifactorial      • Nausea    • Neurologic disorder associated with diabetes mellitus    • Pain in thoracic spine    • Palpitations    • Polyp of colon     History of polyp of colon   • Stroke    • Vitamin D deficiency      Past Surgical History:   Procedure Laterality Date   • ANKLE SURGERY  2008    Ankle surgery (4) - Open ankle arthrodesis with intermedullary bella fixation from the heel. Non union of the ankle, tibial plafond with failed ankle arthrodesis   • APPENDECTOMY  1965    Appendectomy (1)   • BLADDER SURGERY  1973    Bladder surgery (1) - Kendall-Raul repair. Cystocele with stress incontinence & endometriosis.Same with P.I.D   • BREAST BIOPSY  1997    Stereotactic breast biopsy (1) - Stereotactic needle localization with aspiration. Non palpable U/S solid mass, R breast by one interpretation & cluster cyst by another   • CARDIAC CATHETERIZATION  1986    Cardiac cath 81553 (1) - NRL LV function w/ NRL hemodynamics. NRL coronary arteries w/o evidence of atherosclerotic heart disease   • CARPAL TUNNEL RELEASE Left 08/15/1995    Carpal tunnel surgery (1) - Left carpal tunnel release, endoscopic procedure.   • CATARACT EXTRACTION Right 2007    Remove cataract, insert lens (2) - right   • CATARACT EXTRACTION Bilateral    • COLONOSCOPY W/ POLYPECTOMY  2016    Colonoscopy remove polyps 18898 (1) - One polyp in the ascending colon.Resected and retrieved.   • DILATATION AND CURETTAGE  10/02/1964    D&C (1) - D&C & colpotomy. Incomplete  or tubal pregnancy. Dysfunctional uterine bleeding   • ENDOSCOPY      Colon endoscopy 21962 (2)   • ENDOSCOPY  2016    EGD w/ biopsy 11619 (1) -  Gastritis.Normal duodenum.Biopsied.Normal esophagus.Several biopsies obtained in the gastric antrum.   • ENDOSCOPY  05/21/2008    EGD w/ tube 39440 (1) - Hiatal hernia. GERD. Esophageal motility disorder.   • ENDOSCOPY N/A 3/29/2018    Procedure: ESOPHAGOGASTRODUODENOSCOPY;  Surgeon: Ezra Meyers DO;  Location: [facility] ENDOSCOPY;  Service: Gastroenterology   • ENDOSCOPY N/A 5/10/2018    Procedure: ESOPHAGOGASTRODUODENOSCOPY;  Surgeon: Ezra Meyers DO;  Location: [facility] ENDOSCOPY;  Service: Gastroenterology   • INJECTION OF MEDICATION  09/06/2011    Kenalog (3) - Ordered By: RADHA JIMENEZ ()    • LEG SURGERY  04/29/1985    Extensive leg surgery (1) - Arthroscopy, arthrotomy lateral release. Chomdromalcia, patella, right knee   • LUMBAR DISC SURGERY  2004    Low back disk surgery (1)   • OTHER SURGICAL HISTORY  01/13/1981    Bowel surgery procedure (1) - Lysis of adhesions, resection of terminal ileum with end-to-end anastomosis. partial small bowel ibstruction. Same plus ulceration of distal ileum   • OTHER SURGICAL HISTORY  06/16/1965    Suspension of uterus (1) - Uterine suspension & appendectomy, lysis of adhesion. Chronic pelvic inflammatory disease vs endometriosis. Chronic pelvic inflannatory disease   • TOTAL ABDOMINAL HYSTERECTOMY WITH SALPINGO OOPHORECTOMY  08/05/1966    YAEL/BSO (1) - Chronic pelvic inflammatory disease.Same plus endometriosis of the ovary   • WRIST ARTHROSCOPY  05/09/1988    Wrist arthroscopy/surgery (2) - excision ganglion cyst left wrist.          OT ASSESSMENT FLOWSHEET (last 72 hours)      Occupational Therapy Evaluation     Row Name 06/16/18 1010                   OT Evaluation Time/Intention    Subjective Information complains of;nausea/vomiting  -RW        Document Type evaluation  -RW        Mode of Treatment co-treatment;occupational therapy;physical therapy  -RW        Total Evaluation Minutes, Occupational Therapy 34  -RW        Patient Effort good  -RW        Symptoms  Noted During/After Treatment none  -RW           General Information    Patient Profile Reviewed? yes  -RW        Onset of Illness/Injury or Date of Surgery 06/13/18  -RW        Referring Physician Dr. Huggins  -RW        Patient Observations alert;cooperative;agree to therapy  -RW        Patient/Family Observations Spouse present.  -RW        General Observations of Patient Sidelying with HOB elevated, IV disconnected, room air.  -RW        Prior Level of Function independent:;ADL's;all household mobility   Spouse does IADLs; assist with tub t/f  -RW        Equipment Currently Used at Home rollator;walker, rolling;shower chair;grab bar   has SC, manual w/c, BSC  -RW        Pertinent History of Current Functional Problem Admitted with acute cystitis, hypertensive urgency, deconditioning.  -RW        Existing Precautions/Restrictions fall  -RW        Risks Reviewed patient:;LOB;nausea/vomiting;dizziness;increased discomfort;change in vital signs  -RW        Benefits Reviewed patient:;increase independence;increase strength;increase balance;increase knowledge  -RW           Relationship/Environment    Primary Source of Support/Comfort spouse  -RW        Lives With spouse  -RW           Resource/Environmental Concerns    Current Living Arrangements home/apartment/condo  -RW        Resource/Environmental Concerns home accessibility  -RW        Home Accessibility Concerns stairs to enter home  -RW           Home Main Entrance    Number of Stairs, Main Entrance one  -RW        Stair Railings, Main Entrance railing on left side (ascending)  -RW           Cognitive Assessment/Interventions    Additional Documentation Cognitive Assessment/Intervention (Group)  -RW           Cognitive Assessment/Intervention- PT/OT    Affect/Mental Status (Cognitive) WFL  -RW        Orientation Status (Cognition) oriented x 3  -RW        Follows Commands (Cognition) WFL  -RW        Cognitive Function (Cognitive) WFL  -RW        Personal  Safety Interventions fall prevention program maintained;gait belt;muscle strengthening facilitated;nonskid shoes/slippers when out of bed  -RW           Safety Issues, Functional Mobility    Impairments Affecting Function (Mobility) balance;endurance/activity tolerance;strength  -RW           Bed Mobility Assessment/Treatment    Bed Mobility Assessment/Treatment supine-sit;sit-supine;rolling left;rolling right  -RW        Rolling Left [place] (Bed Mobility) conditional independence  -RW        Rolling Right [place] (Bed Mobility) conditional independence  -RW        Supine-Sit [place] (Bed Mobility) supervision  -RW        Sit-Supine [place] (Bed Mobility) supervision  -RW        Assistive Device (Bed Mobility) bed rails;head of bed elevated  -RW           Functional Mobility    Functional Mobility- Ind. Level contact guard assist  -RW        Functional Mobility- Device rolling walker  -RW        Functional Mobility-Distance (Feet) 60  -RW        Functional Mobility- Comment Also ambulated x6 ft to bathroom & x6 ft back to bed CGA without AD  -RW           Transfer Assessment/Treatment    Transfer Assessment/Treatment sit-stand transfer;stand-sit transfer;toilet transfer  -RW        Sit-Stand [place] (Transfers) contact guard  -RW        Stand-Sit [place] (Transfers) contact guard  -RW        [place] Level (Toilet Transfer) contact guard  -RW        Assistive Device (Toilet Transfer) commode, 3-in-1  -RW           Toilet Transfer    Type (Toilet Transfer) sit-stand;stand-sit  -RW           ADL Assessment/Intervention    BADL Assessment/Intervention toileting;grooming;lower body dressing  -RW           Lower Body Dressing Assessment/Training    Lower Body Dressing [place] Level dependent (less than 25% patient effort);don;socks  -RW        Lower Body Dressing Position edge of bed sitting  -RW           Grooming Assessment/Training    [place] Level (Grooming) contact guard  assist;wash face, hands  -RW        Grooming Position unsupported standing;sink side  -RW           Toileting Assessment/Training    [place] Level (Toileting) contact guard assist;adjust/manage clothing;perform perineal hygiene  -RW        Assistive Devices (Toileting) commode, 3-in-1  -RW        Toileting Position unsupported sitting;unsupported standing  -RW           BADL Safety/Performance    Impairments, BADL Safety/Performance balance;endurance/activity tolerance;strength  -RW        Skilled BADL Treatment/Intervention BADL process/adaptation training  -RW           General ROM    GENERAL ROM COMMENTS BUE AROM WFL  -RW           MMT (Manual Muscle Testing)    Additional Documentation General Assessment (Manual Muscle Testing) (Group)  -RW           General Assessment (Manual Muscle Testing)    General Manual Muscle Testing (MMT) Assessment upper extremity strength deficits identified  -RW           Upper Extremity (Manual Muscle Testing)    Upper Extremity: Manual Muscle Testing (MMT) left shoulder strength deficit;right shoulder strength deficit;left elbow/forearm strength deficit;right elbow/forearm strength deficit;left wrist strength deficit;right wrist strength deficit  -RW           Left Shoulder (Manual Muscle Testing)    Left Shoulder Manual Muscle Testing (MMT) flexion  -RW        MMT: Flexion, Left Shoulder flexion  -RW        MMT, Gross Movement: Left Shoulder Flexion (3-/5) fair minus  -RW           Right Shoulder (Manual Muscle Testing)    Right Shoulder Manual Muscle Testing (MMT) flexion  -RW        MMT: Flexion, Right Shoulder flexion  -RW        MMT, Gross Movement: Right Shoulder Flexion (3-/5) fair minus  -RW           Left Elbow/Forearm (Manual Muscle Testing)    Left Elbow/Forearm Manual Muscle Testing (MMT) flexion  -RW        MMT: Flexion, Left Elbow flexion  -RW        MMT, Gross Movement: Left Elbow Flexion (3+/5) fair plus  -RW           Right Elbow/Forearm (Manual Muscle  Testing)    Right Elbow/Forearm Manual Muscle Testing (MMT) flexion  -RW        MMT: Flexion, Right Elbow flexion  -RW        MMT, Gross Movement: Right Elbow Flexion (3+/5) fair plus  -RW           Left Wrist (Manual Muscle Testing)    Left Wrist Manual Muscle Testing (MMT) extension  -RW        MMT, Gross Movement: Left Wrist Extension (3+/5) fair plus  -RW           Right Wrist (Manual Muscle Testing)    Right Wrist Manual Muscle Testing (MMT) extension  -RW        MMT, Gross Movement: Right Wrist Extension (3+/5) fair plus  -RW           Motor Assessment/Interventions    Additional Documentation Balance (Group)  -RW           Balance    Balance static sitting balance;static standing balance;dynamic sitting balance;dynamic standing balance  -RW           Static Sitting Balance    Level of [place] (Unsupported Sitting, Static Balance) supervision  -RW        Sitting Position (Unsupported Sitting, Static Balance) sitting on edge of bed  -RW        Time Able to Maintain Position (Unsupported Sitting, Static Balance) 4 to 5 minutes  -RW           Dynamic Sitting Balance    Level of [place], Reaches Outside Midline (Sitting, Dynamic Balance) supervision  -RW        Sitting Position, Reaches Outside Midline (Sitting, Dynamic Balance) other (see comments)   3-in-1 over commode  -RW        Comment, Reaches Outside Midline (Sitting, Dynamic Balance) toileting tasks  -RW           Static Standing Balance    Level of [place] (Unsupported Standing, Static Balance) contact guard assist  -RW        Time Able to Maintain Position (Unsupported Standing, Static Balance) 15 to 30 seconds  -RW           Dynamic Standing Balance    Level of [place], Reaches Outside Midline (Standing, Dynamic Balance) contact guard assist  -RW        Time Able to Maintain Position, Reaches Outside Midline (Standing, Dynamic Balance) less than 15 seconds  -RW           Sensory Assessment/Intervention    Additional Documentation  Hearing Assessment (Group);Vision Assessment/Intervention (Group)  -RW           Light Touch Sensation Assessment    Left Upper Extremity: Light Touch Sensation Assessment intact  -RW        Right Upper Extremity: Light Touch Sensation Assessment intact  -RW           Hearing Assessment    Hearing Status WFL  -RW           Vision Assessment/Intervention    Visual Impairment/Limitations WFL  -RW           Positioning and Restraints    Pre-Treatment Position in bed  -RW        Post Treatment Position bed  -RW        In Bed side lying right;call light within reach;encouraged to call for assist;exit alarm on;with family/caregiver;notified nsg  -RW           Pain Assessment    Additional Documentation Pain Scale: Numbers Pre/Post-Treatment (Group)  -RW           Pain Scale: Numbers Pre/Post-Treatment    Pain Scale: Numbers, Pretreatment 0/10 - no pain  -RW        Pain Scale: Numbers, Post-Treatment 0/10 - no pain  -RW           Clinical Impression (OT)    Date of Referral to OT 06/15/18  -RW        OT Diagnosis impaired mobility & ADLs  -RW        Patient/Family Goals Statement (OT Eval) home  -RW        Criteria for Skilled Therapeutic Interventions Met (OT Eval) yes;treatment indicated  -RW        Rehab Potential (OT Eval) good, to achieve stated therapy goals  -RW        Therapy Frequency (OT Eval) other (see comments)   3-14 times/wk  -RW        Predicted Duration of Therapy Intervention (Therapy Eval) until discharge from facility  -RW        Care Plan Review (OT) evaluation/treatment results reviewed;care plan/treatment goals reviewed;risks/benefits reviewed;current/potential barriers reviewed;patient/other agree to care plan  -RW        Care Plan Review, Other Participant (OT Eval) spouse  -RW        Anticipated Discharge Disposition (OT) home with 24/7 care;home with home health  -RW           Vital Signs    Pre Systolic BP Rehab 110   uncertain accuracy  -RW        Pre Treatment Diastolic BP 62  -RW        Post  Systolic BP Rehab 180   RN notified  -RW        Post Treatment Diastolic BP 78  -RW        Pretreatment Heart Rate (beats/min) 86  -RW        Posttreatment Heart Rate (beats/min) 82  -RW        Pre SpO2 (%) 94  -RW        O2 Delivery Pre Treatment room air  -RW        Post SpO2 (%) 95  -RW        O2 Delivery Post Treatment room air  -RW        Pre Patient Position Side Lying  -RW        Post Patient Position Side Lying  -RW           Planned OT Interventions    Planned Therapy Interventions (OT Eval) activity tolerance training;adaptive equipment training;BADL retraining;functional balance retraining;occupation/activity based interventions;patient/caregiver education/training;ROM/therapeutic exercise;strengthening exercise;transfer/mobility retraining  -RW           OT Goals    Transfer Goal Selection (OT) transfer, OT goal 1;transfer, OT goal 2  -RW        Bathing Goal Selection (OT) bathing, OT goal 1  -RW        Dressing Goal Selection (OT) dressing, OT goal 1  -RW           Transfer Goal 1 (OT)    Activity/Assistive Device (Transfer Goal 1, OT) toilet  -RW        [place] Level/Cues Needed (Transfer Goal 1, OT) standby assist  -RW        Time Frame (Transfer Goal 1, OT) long term goal (LTG);by discharge  -RW        Progress/Outcome (Transfer Goal 1, OT) goal not met  -RW           Transfer Goal 2 (OT)    Activity/Assistive Device (Transfer Goal 2, OT) walk-in shower  -RW        [place] Level/Cues Needed (Transfer Goal 2, OT) standby assist  -RW        Time Frame (Transfer Goal 2, OT) long term goal (LTG);by discharge  -RW        Progress/Outcome (Transfer Goal 2, OT) goal not met  -RW           Bathing Goal 1 (OT)    Activity/Assistive Device (Bathing Goal 1, OT) bathing skills, all  -RW        [place] Level/Cues Needed (Bathing Goal 1, OT) standby assist  -RW        Time Frame (Bathing Goal 1, OT) long term goal (LTG);by discharge  -RW        Progress/Outcomes (Bathing Goal 1, OT) goal not met   -RW           Dressing Goal 1 (OT)    Activity/Assistive Device (Dressing Goal 1, OT) dressing skills, all  -RW        [place]/Cues Needed (Dressing Goal 1, OT) standby assist  -RW        Time Frame (Dressing Goal 1, OT) long term goal (LTG);by discharge  -RW        Progress/Outcome (Dressing Goal 1, OT) goal not met  -RW           Living Environment    Home Accessibility stairs to enter home;tub/shower is not walk in  -RW          User Key  (r) = Recorded By, (t) = Taken By, (c) = Cosigned By    Initials Name Effective Dates    RW Gabbie Jay, OTR/L 04/03/18 -            Occupational Therapy Education     Title: PT OT SLP Therapies (Active)     Topic: Occupational Therapy (Active)     Point: ADL training (Active)     Description: Instruct learner(s) on proper safety adaptation and remediation techniques during self care or transfers.   Instruct in proper use of assistive devices.   Learning Progress Summary     Learner Status Readiness Method Response Comment Documented by    Patient Active Acceptance E NR fall precautions, ADL safety/technique, transfer safety/technique, OT role/POC AK 06/16/18 1206          Point: Precautions (Active)     Description: Instruct learner(s) on prescribed precautions during self-care and functional transfers.   Learning Progress Summary     Learner Status Readiness Method Response Comment Documented by    Patient Active Acceptance E NR fall precautions, ADL safety/technique, transfer safety/technique, OT role/POC AK 06/16/18 1206                      User Key     Initials Effective Dates Name Provider Type Discipline    AK 05/09/18 -  Liliane Vaughn, OT Student OT Student OT                  OT Recommendation and Plan  Outcome Summary/Treatment Plan (OT)  Anticipated Discharge Disposition (OT): home with 24/7 care, home with home health  Planned Therapy Interventions (OT Eval): activity tolerance training, adaptive equipment training, BADL retraining, functional  balance retraining, occupation/activity based interventions, patient/caregiver education/training, ROM/therapeutic exercise, strengthening exercise, transfer/mobility retraining  Therapy Frequency (OT Eval): other (see comments) (3-14 times/wk)             Outcome Measures     Row Name 06/16/18 1010             How much help from another is currently needed...    Putting on and taking off regular lower body clothing? 3  -RW      Bathing (including washing, rinsing, and drying) 3  -RW      Toileting (which includes using toilet bed pan or urinal) 3  -RW      Putting on and taking off regular upper body clothing 3  -RW      Taking care of personal grooming (such as brushing teeth) 3  -RW      Eating meals 4  -RW      Score 19  -RW         Functional Assessment    Outcome Measure Options AM-PAC 6 Clicks Daily Activity (OT)  -RW        User Key  (r) = Recorded By, (t) = Taken By, (c) = Cosigned By    Initials Name Provider Type    RW Gabbie Jay, OTR/L Occupational Therapist          Time Calculation:   OT Start Time: 1010  OT Stop Time: 1044  OT Time Calculation (min): 34 min  Therapy Suggested Charges     Code   Minutes Charges    None           Therapy Charges for Today     Code Description Service Date Service Provider Modifiers Qty    [id number]  OT SELFCARE CURRENT 6/16/2018 Gabbie Jay, OTR/L TERESA, CK 1    [id number]  OT SELFCARE PROJECTED 6/16/2018 Gabbie Jay, OTR/L TERESA, CJ 1    [id number]  OT EVAL MOD COMPLEXITY 2 6/16/2018 Gabbie Jay, OTR/L GO 1          OT G-codes  OT Professional Judgement Used?: Yes  OT Functional Scales Options: AM-PAC 6 Clicks Daily Activity (OT)  Score: 19  Functional Limitation: Self care  Self Care Current Status (): At least 40 percent but less than 60 percent impaired, limited or restricted  Self Care Goal Status (): At least 20 percent but less than 40 percent impaired, limited or restricted    Gabbie Jay, OTR/L  6/16/2018

## 2018-06-16 NOTE — PLAN OF CARE
Problem: Patient Care Overview  Goal: Plan of Care Review  Outcome: Ongoing (interventions implemented as appropriate)   06/16/18 1011   Coping/Psychosocial   Plan of Care Reviewed With patient;spouse   OTHER   Outcome Summary PT eval completed, able to come to sit EOB with HOB up with supervision, able to come to stand at WEOB and walk into bathroom holding onto door facing and was unsteady, able to ambulate with rolling walker 60 feet with CGA of one and then backe to bed, Patient seens depressed and down thru out treatment, perked up some what with more interaction but quickly fell back into being down and out, coul benefit from skilled PT to regain and return to highest level of function in bed mobility, transfers and gait     Goal: Discharge Needs Assessment  Outcome: Ongoing (interventions implemented as appropriate)   06/16/18 1011   Discharge Needs Assessment   Concerns to be Addressed adjustment to diagnosis/illness   Patient/Family Anticipates Transition to home with family   Patient/Family Anticipated Services at Transition home health care   Transportation Concerns car, none   Transportation Anticipated family or friend will provide   Anticipated Changes Related to Illness none   Equipment Needed After Discharge none   Outpatient/Agency/Support Group Needs homecare agency   Discharge Facility/Level of Care Needs home with home health   Current Discharge Risk chronically ill   Disability   Equipment Currently Used at Home shower chair;walker, rolling

## 2018-06-16 NOTE — PROGRESS NOTES
[facility] Medicine Services  INPATIENT PROGRESS NOTE    Length of Stay: 0  Date of Admission: 6/13/2018  Primary Care Physician: Denisa Powell MD    Subjective   Chief Complaint: Nausea.    HPI:  Patient is seen for follow-up today.  She was admitted for acute cystitis and hypertensive urgency.    She complains of nausea this a.m. but lower abdominal discomfort has resolved.       Review of Systems   Constitutional: Positive for fatigue. Negative for activity change, appetite change, chills, diaphoresis and fever.   HENT: Negative for trouble swallowing and voice change.    Eyes: Negative for photophobia and visual disturbance.   Respiratory: Negative for cough, choking, chest tightness, shortness of breath, wheezing and stridor.    Cardiovascular: Negative for chest pain, palpitations and leg swelling.   Gastrointestinal: Positive for nausea. Negative for abdominal distention, abdominal pain, blood in stool, constipation, diarrhea and vomiting.   Endocrine: Negative for cold intolerance, heat intolerance, polydipsia, polyphagia and polyuria.   Genitourinary: Negative for decreased urine volume, difficulty urinating, dysuria, enuresis, flank pain, frequency, hematuria and urgency.   Musculoskeletal: Negative for arthralgias, gait problem, myalgias, neck pain and neck stiffness.   Skin: Negative for pallor, rash and wound.   Neurological: Negative for dizziness, tremors, seizures, syncope, facial asymmetry, speech difficulty, weakness, light-headedness, numbness and headaches.   Hematological: Does not bruise/bleed easily.   Psychiatric/Behavioral: Negative for agitation, behavioral problems and confusion.       Objective    Temp:  [97.8 °F (36.6 °C)-99.5 °F (37.5 °C)] 98.3 °F (36.8 °C)  Heart Rate:  [66-76] 70  Resp:  [16-18] 18  BP: (129-158)/(59-78) 139/59    Physical Exam   Constitutional: She is oriented to person, place, and time. She appears well-developed  and well-nourished. She is cooperative. No distress.   HENT:   Head: Normocephalic and atraumatic.   Right Ear: External ear normal.   Left Ear: External ear normal.   Nose: Nose normal.   Mouth/Throat: Oropharynx is clear and moist.   Eyes: Conjunctivae and EOM are normal. Pupils are equal, round, and reactive to light.   Neck: Normal range of motion. Neck supple. No JVD present. No thyromegaly present.   Cardiovascular: Normal rate, regular rhythm, normal heart sounds and intact distal pulses.  Exam reveals no gallop and no friction rub.    No murmur heard.  Pulmonary/Chest: Effort normal and breath sounds normal. No stridor. No respiratory distress. She has no wheezes. She has no rales. She exhibits no tenderness.   Abdominal: Soft. Bowel sounds are normal. She exhibits no distension and no mass. There is no tenderness. There is no rebound and no guarding. No hernia.   Musculoskeletal: Normal range of motion. She exhibits no edema, tenderness or deformity.   Neurological: She is alert and oriented to person, place, and time. She has normal reflexes. No cranial nerve deficit or sensory deficit. She exhibits normal muscle tone.   Skin: Skin is warm and dry. No rash noted. She is not diaphoretic. No erythema. No pallor.   Psychiatric: She has a normal mood and affect. Her behavior is normal. Judgment and thought content normal.   Nursing note and vitals reviewed.        Medication Review:    Current Facility-Administered Medications:   •  atorvastatin (LIPITOR) tablet 10 mg, 10 mg, Oral, Daily, Jose A Patel MD, 10 mg at 06/16/18 0912  •  carvedilol (COREG) tablet 12.5 mg, 12.5 mg, Oral, BID With Meals, Rashad Huggins MD, 12.5 mg at 06/16/18 0912  •  clopidogrel (PLAVIX) tablet 75 mg, 75 mg, Oral, Daily, Jose A Patel MD, 75 mg at 06/16/18 0912  •  donepezil (ARICEPT) tablet 10 mg, 10 mg, Oral, Nightly, Jose A Patel MD, 10 mg at 06/15/18 2050  •  enalaprilat (VASOTEC) injection 1.25 mg, 1.25 mg, Intravenous,  Q6H PRN, Rashad Huggins MD  •  gabapentin (NEURONTIN) capsule 300 mg, 300 mg, Oral, TID, Jose A Patel MD, 300 mg at 06/16/18 0912  •  hydrALAZINE (APRESOLINE) tablet 25 mg, 25 mg, Oral, Q8H, Rashad Huggins MD, 25 mg at 06/16/18 0545  •  HYDROcodone-acetaminophen (NORCO) 7.5-325 MG per tablet 1 tablet, 1 tablet, Oral, Q8H PRN, Jose A Patel MD, 1 tablet at 06/15/18 0922  •  levoFLOXacin (LEVAQUIN) tablet 750 mg, 750 mg, Oral, Q24H, Rashad Huggins MD  •  loperamide (IMODIUM) capsule 2 mg, 2 mg, Oral, 4x Daily PRN, Jose A Patel MD, 2 mg at 06/16/18 0200  •  promethazine (PHENERGAN) tablet 12.5 mg, 12.5 mg, Oral, Q6H PRN, Rashad Huggins MD, 12.5 mg at 06/16/18 0949  •  sertraline (ZOLOFT) tablet 50 mg, 50 mg, Oral, Daily, Jose A Patel MD, 50 mg at 06/16/18 0912  •  sodium chloride 0.9 % flush 1-10 mL, 1-10 mL, Intravenous, PRN, Jose A Patel MD  •  Insert peripheral IV, , , Once **AND** sodium chloride 0.9 % flush 10 mL, 10 mL, Intravenous, PRN, Saurabh Paula MD  •  sodium chloride 0.9 % infusion, 75 mL/hr, Intravenous, Continuous, Saurabh Paula MD, Stopped at 06/16/18 0910  •  sucralfate (CARAFATE) tablet 1 g, 1 g, Oral, 4x Daily, Jose A Patel MD, 1 g at 06/16/18 1109  •  traZODone (DESYREL) tablet 150 mg, 150 mg, Oral, Nightly, Jose A Patel MD, 150 mg at 06/15/18 2050    Results Review:  I have reviewed the labs, radiology results, and diagnostic studies.    Laboratory Data:     Results from last 7 days  Lab Units 06/16/18  0653 06/15/18  0603 06/14/18  0558 06/13/18  1823   SODIUM mmol/L 135* 136* 136* 137   POTASSIUM mmol/L 3.1* 3.5 3.5 3.5   CHLORIDE mmol/L 101 102 99 96   CO2 mmol/L 28.0 28.0 31.0 34.0*   BUN mg/dL 9 8 8 10   CREATININE mg/dL 0.54 0.56 0.56 0.62   GLUCOSE mg/dL 87 84 89 109*   CALCIUM mg/dL 7.6* 8.0* 8.3* 9.2   BILIRUBIN mg/dL  --   --   --  0.5   ALK PHOS U/L  --   --   --  41   ALT (SGPT) U/L  --   --   --  29   AST (SGOT) U/L  --   --   --  21   ANION GAP mmol/L 6.0 6.0  6.0 7.0     Estimated Creatinine Clearance: 51.6 mL/min (by C-G formula based on SCr of 0.54 mg/dL).            Results from last 7 days  Lab Units 06/16/18  0653 06/15/18  0603 06/14/18  0558 06/13/18  1823   WBC 10*3/mm3 9.94* 8.80 9.62 11.75*   HEMOGLOBIN g/dL 11.1* 11.6* 12.7 12.6   HEMATOCRIT % 34.2* 36.1 38.7 38.4   PLATELETS 10*3/mm3 183 188 214 188           Culture Data:   No results found for: BLOODCX  No results found for: URINECX  No results found for: RESPCX  No results found for: WOUNDCX  No results found for: STOOLCX  No components found for: BODYFLD    Radiology Data:   Imaging Results (last 24 hours)     ** No results found for the last 24 hours. **          I have reviewed the patient current medications.     Assessment/Plan     Hospital Problem List:  Principal Problem:    -Acute cystitis without hematuria: Continue antibiotics.  Repeat urinalysis is much improved and urine culture wasn't done.   - Hypertensive urgency: Much improved.  Continue current medications and make adjustments as needed to achieve optimal blood pressure control.  - Hypokalemia: Replete potassium and monitor levels.   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 Gill as outpatient.   - Early onset Alzheimer's dementia without behavioral disturbance: Continue home medications.  - History of coronary artery disease: Continue home medications.  - Continue DVT prophylaxis and PPI for GERD.  - Consult PT and OT secondary to deconditioning.        Discharge Planning: Likely discharge in am.    Rashad Huggins MD   06/16/18   11:28 AM

## 2018-06-17 VITALS
RESPIRATION RATE: 18 BRPM | WEIGHT: 150 LBS | DIASTOLIC BLOOD PRESSURE: 83 MMHG | OXYGEN SATURATION: 93 % | HEART RATE: 78 BPM | BODY MASS INDEX: 27.6 KG/M2 | HEIGHT: 62 IN | SYSTOLIC BLOOD PRESSURE: 143 MMHG | TEMPERATURE: 98.7 F

## 2018-06-17 LAB
ANION GAP SERPL CALCULATED.3IONS-SCNC: 6 MMOL/L (ref 5–15)
BASOPHILS # BLD AUTO: 0.03 10*3/MM3 (ref 0–0.2)
BASOPHILS NFR BLD AUTO: 0.3 % (ref 0–2)
BUN BLD-MCNC: 8 MG/DL (ref 7–21)
BUN/CREAT SERPL: 15.7 (ref 7–25)
CALCIUM SPEC-SCNC: 7.9 MG/DL (ref 8.4–10.2)
CHLORIDE SERPL-SCNC: 102 MMOL/L (ref 95–110)
CO2 SERPL-SCNC: 28 MMOL/L (ref 22–31)
CREAT BLD-MCNC: 0.51 MG/DL (ref 0.5–1)
DEPRECATED RDW RBC AUTO: 46.2 FL (ref 36.4–46.3)
EOSINOPHIL # BLD AUTO: 0.54 10*3/MM3 (ref 0–0.7)
EOSINOPHIL NFR BLD AUTO: 6.1 % (ref 0–7)
ERYTHROCYTE [DISTWIDTH] IN BLOOD BY AUTOMATED COUNT: 14.8 % (ref 11.5–14.5)
GFR SERPL CREATININE-BSD FRML MDRD: 116 ML/MIN/1.73 (ref 39–90)
GLUCOSE BLD-MCNC: 75 MG/DL (ref 60–100)
HCT VFR BLD AUTO: 35.5 % (ref 35–45)
HGB BLD-MCNC: 11.4 G/DL (ref 12–15.5)
IMM GRANULOCYTES # BLD: 0.02 10*3/MM3 (ref 0–0.02)
IMM GRANULOCYTES NFR BLD: 0.2 % (ref 0–0.5)
LYMPHOCYTES # BLD AUTO: 2.26 10*3/MM3 (ref 0.6–4.2)
LYMPHOCYTES NFR BLD AUTO: 25.6 % (ref 10–50)
MCH RBC QN AUTO: 27.5 PG (ref 26.5–34)
MCHC RBC AUTO-ENTMCNC: 32.1 G/DL (ref 31.4–36)
MCV RBC AUTO: 85.7 FL (ref 80–98)
MONOCYTES # BLD AUTO: 0.56 10*3/MM3 (ref 0–0.9)
MONOCYTES NFR BLD AUTO: 6.3 % (ref 0–12)
NEUTROPHILS # BLD AUTO: 5.43 10*3/MM3 (ref 2–8.6)
NEUTROPHILS NFR BLD AUTO: 61.5 % (ref 37–80)
PLATELET # BLD AUTO: 198 10*3/MM3 (ref 150–450)
PMV BLD AUTO: 9.4 FL (ref 8–12)
POTASSIUM BLD-SCNC: 3.6 MMOL/L (ref 3.5–5.1)
RBC # BLD AUTO: 4.14 10*6/MM3 (ref 3.77–5.16)
SODIUM BLD-SCNC: 136 MMOL/L (ref 137–145)
WBC NRBC COR # BLD: 8.84 10*3/MM3 (ref 3.2–9.8)

## 2018-06-17 PROCEDURE — 80048 BASIC METABOLIC PNL TOTAL CA: CPT | Performed by: FAMILY MEDICINE

## 2018-06-17 PROCEDURE — G0378 HOSPITAL OBSERVATION PER HR: HCPCS

## 2018-06-17 PROCEDURE — 85025 COMPLETE CBC W/AUTO DIFF WBC: CPT | Performed by: FAMILY MEDICINE

## 2018-06-17 RX ORDER — LEVOFLOXACIN 750 MG/1
750 TABLET ORAL EVERY 24 HOURS
Qty: 3 TABLET | Refills: 0 | Status: SHIPPED | OUTPATIENT
Start: 2018-06-17 | End: 2018-06-20

## 2018-06-17 RX ORDER — HYDRALAZINE HYDROCHLORIDE 25 MG/1
25 TABLET, FILM COATED ORAL EVERY 8 HOURS SCHEDULED
Qty: 90 TABLET | Refills: 1 | Status: SHIPPED | OUTPATIENT
Start: 2018-06-17 | End: 2018-06-21 | Stop reason: DRUGHIGH

## 2018-06-17 RX ADMIN — HYDRALAZINE HYDROCHLORIDE 25 MG: 25 TABLET, FILM COATED ORAL at 05:37

## 2018-06-17 RX ADMIN — CARVEDILOL 12.5 MG: 12.5 TABLET, FILM COATED ORAL at 08:35

## 2018-06-17 RX ADMIN — SERTRALINE HYDROCHLORIDE 50 MG: 50 TABLET ORAL at 08:35

## 2018-06-17 RX ADMIN — GABAPENTIN 300 MG: 300 CAPSULE ORAL at 08:35

## 2018-06-17 RX ADMIN — ATORVASTATIN CALCIUM 10 MG: 10 TABLET, FILM COATED ORAL at 08:35

## 2018-06-17 RX ADMIN — CLOPIDOGREL BISULFATE 75 MG: 75 TABLET ORAL at 08:35

## 2018-06-17 RX ADMIN — SUCRALFATE 1 G: 1 TABLET ORAL at 08:35

## 2018-06-17 NOTE — PLAN OF CARE
Problem: Patient Care Overview  Goal: Plan of Care Review   06/16/18 0728   Coping/Psychosocial   Plan of Care Reviewed With patient   Plan of Care Review   Progress improving   OTHER   Outcome Summary low grade temp this shift, nausea/diarrhea seems to have improved, resting between care.

## 2018-06-17 NOTE — DISCHARGE INSTR - LAB
Dr Powell  1 Day  [phone]  Info was sent to the office, if they do not contact you within one business day with appointment info, please call.    Dr Phillips  1 Week  [phone]  Info was sent to the office, if they do not contact you within one business day with appointment info, please call.

## 2018-06-17 NOTE — THERAPY DISCHARGE NOTE
Acute Care - Physical Therapy Discharge Summary  [facility]       Patient Name: Mariluz Arango  : 1940  MRN: [id number]    Today's Date: 2018  Onset of Illness/Injury or Date of Surgery: 18    Date of Referral to PT: 06/15/18  Referring Physician: Dr Huggins      Admit Date: 2018      PT Recommendation and Plan    Visit Dx:    ICD-10-CM ICD-9-CM   1. Pyelonephritis N12 590.80   2. Hypertensive urgency I16.0 401.9   3. Thoracoabdominal aortic aneurysm (TAAA) without rupture I71.6 441.7   4. Impaired mobility and ADLs Z74.09 799.89   5. Impaired physical mobility Z74.09 781.99             Outcome Measures     Row Name 18 1011 18 1010          How much help from another person do you currently need...    Turning from your back to your side while in flat bed without using bedrails? 4  -CB  --     Moving from lying on back to sitting on the side of a flat bed without bedrails? 3  -CB  --     Moving to and from a bed to a chair (including a wheelchair)? 3  -CB  --     Standing up from a chair using your arms (e.g., wheelchair, bedside chair)? 3  -CB  --     Climbing 3-5 steps with a railing? 2  -CB  --     To walk in hospital room? 3  -CB  --     AM-PAC 6 Clicks Score 18  -CB  --        How much help from another is currently needed...    Putting on and taking off regular lower body clothing?  -- 3  -RW     Bathing (including washing, rinsing, and drying)  -- 3  -RW     Toileting (which includes using toilet bed pan or urinal)  -- 3  -RW     Putting on and taking off regular upper body clothing  -- 3  -RW     Taking care of personal grooming (such as brushing teeth)  -- 3  -RW     Eating meals  -- 4  -RW     Score  -- 19  -RW        Functional Assessment    Outcome Measure Options AM-PAC 6 Clicks Basic Mobility (PT)  -CB AM-PAC 6 Clicks Daily Activity (OT)  -RW       User Key  (r) = Recorded By, (t) = Taken By, (c) = Cosigned By    Initials Name Provider Type    DINAH PATTERSON  Francisco, PT Physical Therapist    BENJAMIN Jay, OTR/L Occupational Therapist            Therapy Suggested Charges     Code   Minutes Charges    None                 Therapy Charges for Today     Code Description Service Date Service Provider Modifiers Qty    95650335451 HC PT MOBILITY CURRENT 6/16/2018 Amina Singh, PT GP, CK 1    80229158008 HC PT MOBILITY PROJECTED 6/16/2018 Amina Singh, PT GP, CJ 1    52501513348 HC PT EVAL MOD COMPLEXITY 2 6/16/2018 Amina Singh, PT GP 1          PT Discharge Summary  Anticipated Discharge Disposition (PT): home with home health  Reason for Discharge: Discharge from facility, Per MD order  Outcomes Achieved: Patient able to partially acheive established goals  Discharge Destination: Home      Amina Singh, PT   6/17/2018

## 2018-06-17 NOTE — DISCHARGE SUMMARY
[facility] Medicine [facility]  DISCHARGE SUMMARY       Date of Admission: 6/13/2018  Date of Discharge:  6/17/2018  Primary Care Physician: Denisa Powell MD    Presenting Problem/History of Present Illness:  Pyelonephritis [N12]  Hypertensive urgency [I16.0]  Thoracoabdominal aortic aneurysm (TAAA) without rupture [I71.6]   Mariluz Arango is a 77 y.o. female with GERD, h/o peptic ulcer disease, HTN, DM who presented with 2 day history of nausea, abdominal pain, and right-sided flank pain associated with subjective fever and chills.  She has some chronic abdominal pain and intermittent diarrhea.  At time of exam patient states that her symptoms have waxed and waned since being in the ED.    She was noted to have elevated blood pressure on triage and urinalysis showed evidence of urinary tract infection.    Final Discharge Diagnoses:  Hospital Problem List     * (Principal)Acute cystitis without hematuria    Hypertensive disorder    Thoracic aortic aneurysm without rupture (Chronic)    Early onset Alzheimer's dementia without behavioral disturbance          Consults:   Consults     Date and Time Order Name Status Description    6/13/2018 2229 Hospitalist (on-call MD unless specified)            Procedures Performed: None                Pertinent Test Results:   Lab Results (last 7 days)     Procedure Component Value Units Date/Time    Basic Metabolic Panel [[id number]]  (Abnormal) Collected:  06/17/18 0607    Specimen:  Blood Updated:  06/17/18 0640     Glucose 75 mg/dL      BUN 8 mg/dL      Creatinine 0.51 mg/dL      Sodium 136 (L) mmol/L      Potassium 3.6 mmol/L      Chloride 102 mmol/L      CO2 28.0 mmol/L      Calcium 7.9 (L) mg/dL      eGFR Non African Amer 116 (H) mL/min/1.73      BUN/Creatinine Ratio 15.7     Anion Gap 6.0 mmol/L     Narrative:       The MDRD GFR formula is only valid for adults with stable renal function between ages 18 and 70.    CBC &  Differential [[id number]] Collected:  06/17/18 0607    Specimen:  Blood Updated:  06/17/18 0631    Narrative:       The following orders were created for panel order CBC & Differential.  Procedure                               Abnormality         Status                     ---------                               -----------         ------                     CBC Auto Differential[[id number]]        Abnormal            Final result                 Please view results for these tests on the individual orders.    CBC Auto Differential [[id number]]  (Abnormal) Collected:  06/17/18 0607    Specimen:  Blood Updated:  06/17/18 0631     WBC 8.84 10*3/mm3      RBC 4.14 10*6/mm3      Hemoglobin 11.4 (L) g/dL      Hematocrit 35.5 %      MCV 85.7 fL      MCH 27.5 pg      MCHC 32.1 g/dL      RDW 14.8 (H) %      RDW-SD 46.2 fl      MPV 9.4 fL      Platelets 198 10*3/mm3      Neutrophil % 61.5 %      Lymphocyte % 25.6 %      Monocyte % 6.3 %      Eosinophil % 6.1 %      Basophil % 0.3 %      Immature Grans % 0.2 %      Neutrophils, Absolute 5.43 10*3/mm3      Lymphocytes, Absolute 2.26 10*3/mm3      Monocytes, Absolute 0.56 10*3/mm3      Eosinophils, Absolute 0.54 10*3/mm3      Basophils, Absolute 0.03 10*3/mm3      Immature Grans, Absolute 0.02 10*3/mm3     Basic Metabolic Panel [[id number]]  (Abnormal) Collected:  06/16/18 0653    Specimen:  Blood Updated:  06/16/18 0712     Glucose 87 mg/dL      BUN 9 mg/dL      Creatinine 0.54 mg/dL      Sodium 135 (L) mmol/L      Potassium 3.1 (L) mmol/L      Chloride 101 mmol/L      CO2 28.0 mmol/L      Calcium 7.6 (L) mg/dL      eGFR Non African Amer 109 (H) mL/min/1.73      BUN/Creatinine Ratio 16.7     Anion Gap 6.0 mmol/L     Narrative:       The MDRD GFR formula is only valid for adults with stable renal function between ages 18 and 70.    CBC & Differential [[id number]] Collected:  06/16/18 0653    Specimen:  Blood Updated:  06/16/18 0659    Narrative:       The following orders were  created for panel order CBC & Differential.  Procedure                               Abnormality         Status                     ---------                               -----------         ------                     CBC Auto Differential[[id number]]        Abnormal            Final result                 Please view results for these tests on the individual orders.    CBC Auto Differential [[id number]]  (Abnormal) Collected:  06/16/18 0653    Specimen:  Blood Updated:  06/16/18 0659     WBC 9.94 (H) 10*3/mm3      RBC 3.99 10*6/mm3      Hemoglobin 11.1 (L) g/dL      Hematocrit 34.2 (L) %      MCV 85.7 fL      MCH 27.8 pg      MCHC 32.5 g/dL      RDW 14.5 %      RDW-SD 45.8 fl      MPV 9.7 fL      Platelets 183 10*3/mm3      Neutrophil % 61.8 %      Lymphocyte % 24.9 %      Monocyte % 6.7 %      Eosinophil % 6.1 %      Basophil % 0.3 %      Immature Grans % 0.2 %      Neutrophils, Absolute 6.13 10*3/mm3      Lymphocytes, Absolute 2.48 10*3/mm3      Monocytes, Absolute 0.67 10*3/mm3      Eosinophils, Absolute 0.61 10*3/mm3      Basophils, Absolute 0.03 10*3/mm3      Immature Grans, Absolute 0.02 10*3/mm3     Gastrointestinal Panel, PCR - Stool, Per Rectum [[id number]]  (Normal) Collected:  06/15/18 2004    Specimen:  Stool from Per Rectum Updated:  06/15/18 2233     Campylobacter Not Detected     Clostridium difficile (toxin A/B) Not Detected     Plesiomonas shigelloides Not Detected     Salmonella Not Detected     Vibrio Not Detected     Vibrio cholerae Not Detected     Yersinia enterocolitica Not Detected     Enteroaggregative E. coli (EAEC) Not Detected     Enteropathogenic E. coli (EPEC) Not Detected     Enterotoxigenic E. coli (ETEC) lt/st Not Detected     Shiga-like toxin-producing E. coli (STEC) stx1/stx2 Not Detected     E. coli O157 Not Detected     Shigella/Enteroinvasive E. coli (EIEC) Not Detected     Cryptosporidium Not Detected     Cyclospora cayetanensis Not Detected     Entamoeba histolytica Not  Detected     Giardia lamblia Not Detected     Adenovirus F40/41 Not Detected     Astrovirus Not Detected     Norovirus GI/GII Not Detected     Rotavirus A Not Detected     Sapovirus (I, II, IV or V) Not Detected    Narrative:         Testing performed by multiplex PCR system.    Urinalysis, Microscopic Only - Urine, Clean Catch [[id number]]  (Abnormal) Collected:  06/15/18 1009    Specimen:  Urine from Urine, Clean Catch Updated:  06/15/18 1132     RBC, UA None Seen /HPF      WBC, UA None Seen /HPF      Bacteria, UA None Seen /HPF      Squamous Epithelial Cells, UA 3-5 (A) /HPF      Hyaline Casts, UA None Seen /LPF      Methodology Automated Microscopy    Urinalysis With / Culture If Indicated - Urine, Clean Catch [[id number]]  (Abnormal) Collected:  06/15/18 1009    Specimen:  Urine from Urine, Clean Catch Updated:  06/15/18 1025     Color, UA Yellow     Appearance, UA Clear     pH, UA 6.5     Specific Gravity, UA 1.020     Glucose, UA Negative     Ketones, UA 80 mg/dL (3+) (A)     Bilirubin, UA Small (1+) (A)     Blood, UA Small (1+) (A)     Protein, UA Negative     Leuk Esterase, UA Trace (A)     Nitrite, UA Negative     Urobilinogen, UA 1.0 E.U./dL    Basic Metabolic Panel [[id number]]  (Abnormal) Collected:  06/15/18 0603    Specimen:  Blood Updated:  06/15/18 0627     Glucose 84 mg/dL      BUN 8 mg/dL      Creatinine 0.56 mg/dL      Sodium 136 (L) mmol/L      Potassium 3.5 mmol/L      Chloride 102 mmol/L      CO2 28.0 mmol/L      Calcium 8.0 (L) mg/dL      eGFR Non African Amer 104 (H) mL/min/1.73      BUN/Creatinine Ratio 14.3     Anion Gap 6.0 mmol/L     Narrative:       The MDRD GFR formula is only valid for adults with stable renal function between ages 18 and 70.    CBC & Differential [[id number]] Collected:  06/15/18 0603    Specimen:  Blood Updated:  06/15/18 0616    Narrative:       The following orders were created for panel order CBC & Differential.  Procedure                               Abnormality          Status                     ---------                               -----------         ------                     CBC Auto Differential[[id number]]        Abnormal            Final result                 Please view results for these tests on the individual orders.    CBC Auto Differential [[id number]]  (Abnormal) Collected:  06/15/18 0603    Specimen:  Blood Updated:  06/15/18 0616     WBC 8.80 10*3/mm3      RBC 4.20 10*6/mm3      Hemoglobin 11.6 (L) g/dL      Hematocrit 36.1 %      MCV 86.0 fL      MCH 27.6 pg      MCHC 32.1 g/dL      RDW 14.6 (H) %      RDW-SD 45.6 fl      MPV 9.2 fL      Platelets 188 10*3/mm3      Neutrophil % 58.5 %      Lymphocyte % 27.0 %      Monocyte % 7.2 %      Eosinophil % 5.8 %      Basophil % 0.7 %      Immature Grans % 0.8 (H) %      Neutrophils, Absolute 5.15 10*3/mm3      Lymphocytes, Absolute 2.38 10*3/mm3      Monocytes, Absolute 0.63 10*3/mm3      Eosinophils, Absolute 0.51 10*3/mm3      Basophils, Absolute 0.06 10*3/mm3      Immature Grans, Absolute 0.07 (H) 10*3/mm3      nRBC 0.0 /100 WBC     Basic Metabolic Panel [[id number]]  (Abnormal) Collected:  06/14/18 0558    Specimen:  Blood Updated:  06/14/18 0621     Glucose 89 mg/dL      BUN 8 mg/dL      Creatinine 0.56 mg/dL      Sodium 136 (L) mmol/L      Potassium 3.5 mmol/L      Chloride 99 mmol/L      CO2 31.0 mmol/L      Calcium 8.3 (L) mg/dL      eGFR Non African Amer 104 (H) mL/min/1.73      BUN/Creatinine Ratio 14.3     Anion Gap 6.0 mmol/L     Narrative:       The MDRD GFR formula is only valid for adults with stable renal function between ages 18 and 70.    CBC & Differential [[id number]] Collected:  06/14/18 0558    Specimen:  Blood Updated:  06/14/18 0614    Narrative:       The following orders were created for panel order CBC & Differential.  Procedure                               Abnormality         Status                     ---------                               -----------         ------                      CBC Auto Differential[[id number]]        Normal              Final result                 Please view results for these tests on the individual orders.    CBC Auto Differential [[id number]]  (Normal) Collected:  06/14/18 0558    Specimen:  Blood Updated:  06/14/18 0614     WBC 9.62 10*3/mm3      RBC 4.46 10*6/mm3      Hemoglobin 12.7 g/dL      Hematocrit 38.7 %      MCV 86.8 fL      MCH 28.5 pg      MCHC 32.8 g/dL      RDW 14.4 %      RDW-SD 46.2 fl      MPV 8.9 fL      Platelets 214 10*3/mm3      Neutrophil % 64.6 %      Lymphocyte % 23.9 %      Monocyte % 6.5 %      Eosinophil % 4.5 %      Basophil % 0.3 %      Immature Grans % 0.2 %      Neutrophils, Absolute 6.21 10*3/mm3      Lymphocytes, Absolute 2.30 10*3/mm3      Monocytes, Absolute 0.63 10*3/mm3      Eosinophils, Absolute 0.43 10*3/mm3      Basophils, Absolute 0.03 10*3/mm3      Immature Grans, Absolute 0.02 10*3/mm3      nRBC 0.0 /100 WBC     [place] Draw [[id number]] Collected:  06/13/18 1823    Specimen:  Blood Updated:  06/13/18 1930    Narrative:       The following orders were created for panel order [place] Draw.  Procedure                               Abnormality         Status                     ---------                               -----------         ------                     Light Blue Top[[id number]]                                   Final result               Green Top (Gel)[[id number]]                                  Final result               Lavender Top[[id number]]                                     Final result               Gold Top - [facility][[id number]]                                   Final result                 Please view results for these tests on the individual orders.    Light Blue Top [[id number]] Collected:  06/13/18 1823    Specimen:  Blood Updated:  06/13/18 1930     Extra Tube hold for add-on     Comment: Auto resulted       Green Top (Gel) [[id number]] Collected:  06/13/18 1823    Specimen:  Blood Updated:  06/13/18 1930      Extra Tube Hold for add-ons.     Comment: Auto resulted.       Lavender Top [[id number]] Collected:  06/13/18 1823    Specimen:  Blood Updated:  06/13/18 1930     Extra Tube hold for add-on     Comment: Auto resulted       Gold Top - [facility] [[id number]] Collected:  06/13/18 1823    Specimen:  Blood Updated:  06/13/18 1930     Extra Tube Hold for add-ons.     Comment: Auto resulted.       Urinalysis, Microscopic Only - Urine, Clean Catch [[id number]]  (Abnormal) Collected:  06/13/18 1846    Specimen:  Urine from Urine, Clean Catch Updated:  06/13/18 1917     RBC, UA None Seen /HPF      WBC, UA Too Numerous to Count (A) /HPF      Bacteria, UA 2+ (A) /HPF      Squamous Epithelial Cells, UA 0-2 /HPF      Hyaline Casts, UA None Seen /LPF      WBC Clumps, UA Small/1+ /HPF      Methodology Automated Microscopy    Urinalysis With / Microscopic If Indicated (No Culture) - Urine, Clean Catch [[id number]]  (Abnormal) Collected:  06/13/18 1846    Specimen:  Urine from Urine, Clean Catch Updated:  06/13/18 1913     Color, UA Yellow     Appearance, UA Clear     pH, UA 7.0     Specific Gravity, UA 1.015     Glucose, UA Negative     Ketones, UA Trace (A)     Bilirubin, UA Negative     Blood, UA Small (1+) (A)     Protein, UA Negative     Leuk Esterase, UA Small (1+) (A)     Nitrite, UA Positive (A)     Urobilinogen, UA 0.2 E.U./dL    Comprehensive Metabolic Panel [[id number]]  (Abnormal) Collected:  06/13/18 1823    Specimen:  Blood Updated:  06/13/18 1844     Glucose 109 (H) mg/dL      BUN 10 mg/dL      Creatinine 0.62 mg/dL      Sodium 137 mmol/L      Potassium 3.5 mmol/L      Chloride 96 mmol/L      CO2 34.0 (H) mmol/L      Calcium 9.2 mg/dL      Total Protein 6.2 (L) g/dL      Albumin 3.50 g/dL      ALT (SGPT) 29 U/L      AST (SGOT) 21 U/L      Alkaline Phosphatase 41 U/L      Total Bilirubin 0.5 mg/dL      eGFR Non African Amer 93 (H) mL/min/1.73      Globulin 2.7 gm/dL      A/G Ratio 1.3 g/dL      BUN/Creatinine Ratio 16.1     Anion  Gap 7.0 mmol/L     Narrative:       The MDRD GFR formula is only valid for adults with stable renal function between ages 18 and 70.    Lipase [[id number]]  (Normal) Collected:  06/13/18 1823    Specimen:  Blood Updated:  06/13/18 1844     Lipase 32 U/L     Lactic Acid, Plasma [[id number]]  (Normal) Collected:  06/13/18 1823    Specimen:  Blood Updated:  06/13/18 1844     Lactate 0.6 mmol/L     CBC & Differential [[id number]] Collected:  06/13/18 1823    Specimen:  Blood Updated:  06/13/18 1833    Narrative:       The following orders were created for panel order CBC & Differential.  Procedure                               Abnormality         Status                     ---------                               -----------         ------                     CBC Auto Differential[[id number]]        Abnormal            Final result                 Please view results for these tests on the individual orders.    CBC Auto Differential [[id number]]  (Abnormal) Collected:  06/13/18 1823    Specimen:  Blood Updated:  06/13/18 1833     WBC 11.75 (H) 10*3/mm3      RBC 4.49 10*6/mm3      Hemoglobin 12.6 g/dL      Hematocrit 38.4 %      MCV 85.5 fL      MCH 28.1 pg      MCHC 32.8 g/dL      RDW 14.1 %      RDW-SD 44.2 fl      MPV 9.1 fL      Platelets 188 10*3/mm3      Neutrophil % 72.5 %      Lymphocyte % 17.3 %      Monocyte % 5.0 %      Eosinophil % 4.5 %      Basophil % 0.4 %      Immature Grans % 0.3 %      Neutrophils, Absolute 8.52 10*3/mm3      Lymphocytes, Absolute 2.03 10*3/mm3      Monocytes, Absolute 0.59 10*3/mm3      Eosinophils, Absolute 0.53 10*3/mm3      Basophils, Absolute 0.05 10*3/mm3      Immature Grans, Absolute 0.03 (H) 10*3/mm3         Imaging Results (last 7 days)     Procedure Component Value Units Date/Time    CT Abdomen Pelvis With Contrast [[id number]] Collected:  06/13/18 2019     Updated:  06/13/18 2112    Narrative:         Patient Name: INÉSELISE    ORDERING: JONATHAN LITTLE    ATTENDING: JONATHAN LITTLE      REFERRING: JONATHAN LITTLE    -----------------------  EXAM DESCRIPTION: CT ABDOMEN PELVIS W CONTRAST    HISTORY: Abdominal pain.    COMPARISON: 6/3/2016    Dose Length Product: 475.1.    This exam was performed according to our departmental dose  optimization program, which includes automated exposure control,  adjustment of the mA and/or KV according to patient size and/or  use of iterative reconstruction technique.      CONTRAST:   Oral and 95 cc intravenous Isovue 300.      TECHNIQUE: Multiple contiguous contrast enhanced axial images are  obtained of the abdomen and pelvis.     FINDINGS:     LOWER CHEST: There is fibrotic changes within the lung bases. No  consolidation or pleural effusion. No cardiac enlargement or  pericardial effusion.    HEPATOBILIARY: No suspicious hepatic lesion or ductal dilation.  No calcified gallstones or pericholecystic inflammatory change.  No extrahepatic biliary dilation.  SPLEEN: Unremarkable.  PANCREAS: Atrophic changes particularly of the body and tail. No  acute or suspicious interval change.  ADRENAL GLANDS: Unremarkable.  KIDNEYS/URETERS: Redemonstration of renal cyst predominantly on  the right. Tiny subcentimeter hypodensities on the left  statistically represents small cyst as well. No suspicious renal  lesion or findings of obstructive uropathy.    GASTROINTESTINAL: Mild thickening of the wall of the first second  portion of the duodenum without associated inflammatory changes.  No finding of small bowel obstruction or inflammatory changes of  bowel. The terminal ileum and ileocecal junction region are  unremarkable. Moderate volume of stool within the colon. No acute  inflammatory changes.  REPRODUCTIVE ORGANS: Hysterectomy.  URINARY BLADDER: Unremarkable    VASCULAR: There is atherosclerotic vascular changes involving the  aorta and its branches. The middistal thoracic aorta is ectatic  and mildly dilated measuring 3.1 x 4.4 cm in its AP and  transverse dimensions. At  the esophageal hiatus the aorta  measures 3.5 x 4.8 cm. No dilation low the level of the renal  arteries.  LYMPH NODES: No pathologically enlarged nodes by size criteria.  PERITONEUM/RETROPERITONEUM: No free air or free fluid.     OSSEOUS STRUCTURES: Redemonstration of postsurgical changes at  L5-S1 with posterior fusion with pedicle screws and rods. The  intervertebral disc spacer is positioned anteriorly at the disc  space level. There is anterolisthesis at L4-5 similar to the  prior exam. Similar appearance of treated compression fracture at  T12.    ADDITIONAL FINDINGS: None      Impression:       Findings of COPD and fibrotic changes within the lung bases.    No CT evidence of acute abdominal or pelvic finding.    Redemonstration of ectatic/aneurysmal changes of the descending  thoracic and thoracoabdominal aorta measuring 3.1 x 4.4 cm and  3.5 x 4.8 cm respectively. This has increased compared to prior  study.    Electronically signed by:  Main Medrano MD  6/13/2018 9:11 PM  CDT Workstation: [id number]    XR Chest 1 View [[id number]] Collected:  06/13/18 1921     Updated:  06/13/18 1951    Narrative:       [facility] [place]    Patient Name: MRS. ELISE CONTE    ORDERING: JONATHAN LITTLE     ATTENDING: JONATHAN LITTLE     REFERRING: JONATHAN LITTLE    -----------------------    PROCEDURE: Chest Single View    TECHNIQUE: Single AP view of the chest    COMPARISON: 8/8/2016    HISTORY: Abdominal pain.    FINDINGS:     Life-support devices: None.    Lungs/pleura: Mild chronic interstitial markings. No  consolidation, effusion, or pneumothorax..    Heart, hilar and mediastinal structures: The heart size is  enlarged. Mediastinal contours within limits of normal. Vascular  calcification and uncoiling of the aorta. The trachea is midline.    Skeletal Structures: No acute findings. No free air beneath the  diaphragm.      Impression:       No acute pulmonary or pleural finding.    Electronically signed by:   "Main Medrano MD  6/13/2018 7:50 PM  CDT Workstation: [id number]            Chief Complaint on Day of Discharge: None    Hospital Course:  -The patient was admitted for acute cystitis with possible pyelonephritis and commenced on broad-spectrum antibiotics and IV hydration.  She became less symptomatic and was transitioned to oral antibiotics.  Blood and urine cultures were not done unfortunately.  - Hypertensive urgency: Patient was commenced on antihypertensives and her blood pressure remained stable with appropriate medication adjustments.   - Hypokalemia: Resolved with potassium repletion.     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 Gill as outpatient.   - Early onset Alzheimer's dementia without behavioral disturbance: She was continued on her routine home medications.  - History of coronary artery disease: Stable. She was continued on her home medications.    Condition on Discharge:  Stable and improved.    Physical Exam on Discharge:  /83 (BP Location: Left arm, Patient Position: Lying)   Pulse 78   Temp 98.7 °F (37.1 °C) (Oral)   Resp 18   Ht 157.5 cm (62\")   Wt 68 kg (150 lb)   LMP  (LMP Unknown)   SpO2 93%   BMI 27.44 kg/m²   Physical Exam  Constitutional: She is oriented to person, place, and time. She appears well-developed and well-nourished. She is cooperative. No distress.   HENT:   Head: Normocephalic and atraumatic.   Right Ear: External ear normal.   Left Ear: External ear normal.   Nose: Nose normal.   Mouth/Throat: Oropharynx is clear and moist.   Eyes: Conjunctivae and EOM are normal. Pupils are equal, round, and reactive to light.   Neck: Normal range of motion. Neck supple. No JVD present. No thyromegaly present.   Cardiovascular: Normal rate, regular rhythm, normal heart sounds and intact distal pulses.  Exam reveals no gallop and no friction rub.    No murmur heard.  Pulmonary/Chest: Effort normal and breath sounds " normal. No stridor. No respiratory distress. She has no wheezes. She has no rales. She exhibits no tenderness.   Abdominal: Soft. Bowel sounds are normal. She exhibits no distension and no mass. There is no tenderness. There is no rebound and no guarding. No hernia.   Musculoskeletal: Normal range of motion. She exhibits no edema, tenderness or deformity.   Neurological: She is alert and oriented to person, place, and time. She has normal reflexes. No cranial nerve deficit or sensory deficit. She exhibits normal muscle tone.   Skin: Skin is warm and dry. No rash noted. She is not diaphoretic. No erythema. No pallor.   Psychiatric: She has a normal mood and affect. Her behavior is normal. Judgment and thought content normal.     Discharge Disposition:  Home or Self Care    Discharge Medications:     Discharge Medications      New Medications      Instructions Start Date   ciprofloxacin 500 MG tablet  Commonly known as:  CIPRO   Take 1 tablet by mouth Every 12 (Twelve) Hours.      ondansetron ODT 4 MG disintegrating tablet  Commonly known as:  ZOFRAN-ODT   Take 1 tablet by mouth Every 8 (Eight) Hours As Needed for Nausea or Vomiting.      phenazopyridine 100 MG tablet  Commonly known as:  PYRIDIUM   Take 1 tablet by mouth 3 (Three) Times a Day As Needed for bladder spasms.         Continue These Medications      Instructions Start Date   carvedilol 6.25 MG tablet  Commonly known as:  COREG   TAKE 1 TABLET BY MOUTH 2 TIMES A DAY WITH MEALS      clobetasol 0.05 % external solution  Commonly known as:  TEMOVATE   Apply 1 application topically Daily.      clopidogrel 75 MG tablet  Commonly known as:  PLAVIX   Take 75 mg by mouth Daily.      Cranberry 500 MG capsule   Take 1 tablet by mouth Daily.      cyanocobalamin 1000 MCG/ML injection   Inject 1,000 mcg into the shoulder, thigh, or buttocks Every 28 (Twenty-Eight) Days.      donepezil 10 MG tablet  Commonly known as:  ARICEPT   TAKE 1 TABLET BY MOUTH EVERY NIGHT.     "  DULoxetine 20 MG capsule  Commonly known as:  CYMBALTA   Take 1 capsule by mouth Daily.      Fluocinolone Acetonide Scalp 0.01 % oil   Apply 1 application topically Daily.      fluticasone 50 MCG/ACT nasal spray  Commonly known as:  FLONASE   INSTILL 2 SPRAYS INTO EACH NOSTRIL DAILY      gabapentin 300 MG capsule  Commonly known as:  NEURONTIN   Take 1 capsule by mouth 3 (Three) Times a Day.      HYDROcodone-acetaminophen 7.5-325 MG per tablet  Commonly known as:  NORCO   1 tablet As Needed for Moderate Pain .      hydrocortisone 1 % lotion   Apply  topically 3 (Three) Times a Day As Needed for Irritation or Rash.      ketoconazole 2 % shampoo  Commonly known as:  NIZORAL       lidocaine 5 %  Commonly known as:  LIDODERM   PLACE ONE PATCH ONE THE SKIN EVERY DAY. REMOVE AND DISCARD THE PATCH WITHIN 12 HOURS AFTER APPLICATION EVERY DAY OR AS DIRECTED BY MD.      MULTIVITAMIN GUMMIES ADULT PO   Take 1 each by mouth Daily.      nystatin 299649 UNIT/GM powder  Commonly known as:  MYCOSTATIN   Apply 1 application topically 4 (Four) Times a Day.      nystatin 252510 UNIT/GM powder  Commonly known as:  MYCOSTATIN   APPLY TO THE AFFECTED AREA THREE TIMES DAILY      omeprazole 40 MG capsule  Commonly known as:  priLOSEC   Take 40 mg by mouth Daily.      ondansetron 8 MG tablet  Commonly known as:  ZOFRAN   Take 8 mg by mouth Every 8 (Eight) Hours As Needed for Nausea or Vomiting.      ONETOUCH VERIO test strip  Generic drug:  glucose blood       pravastatin 40 MG tablet  Commonly known as:  PRAVACHOL   Take 40 mg by mouth Every Night.      promethazine 25 MG tablet  Commonly known as:  PHENERGAN   Take 25 mg by mouth Every 6 (Six) Hours As Needed for Nausea or Vomiting.      sucralfate 1 g tablet  Commonly known as:  CARAFATE   Take 1 g by mouth 4 (Four) Times a Day.      Syringe/Needle (Disp) 25G X 5/8\" 3 ML misc   USE 1 SYRINGE FOR MONTHLY VITAMIN B-12 SHOTS      traZODone 100 MG tablet  Commonly known as:  DESYREL   Take " 150 mg by mouth Every Night.             Discharge Diet:  heart healthy    Activity at Discharge:  as tolerated    Discharge Care Plan/Instructions: Patient has been advised to take her medications as prescribed and to return to the emergency department in the event any worsening symptoms.    Follow-up Appointments:   Future Appointments  Date Time Provider Department Center   6/28/2018 8:30 AM Hima Gill MD PhD [facility] None   7/10/2018 4:15 PM Denisa Powell MD [facility]  [facility] None       Test Results Pending at Discharge:     Rashad Huggins MD  06/17/18  9:45 AM    Time: 35 minutes

## 2018-06-18 NOTE — THERAPY DISCHARGE NOTE
Acute Care - Occupational Therapy Discharge Summary  [facility]     Patient Name: Mariluz Arango  : 1940  MRN: [id number]    Today's Date: 2018  Onset of Illness/Injury or Date of Surgery: 18    Date of Referral to OT: 06/15/18  Referring Physician: Dr Huggins      Admit Date: 2018        OT Recommendation and Plan    Visit Dx:    ICD-10-CM ICD-9-CM   1. Pyelonephritis N12 590.80   2. Hypertensive urgency I16.0 401.9   3. Thoracoabdominal aortic aneurysm (TAAA) without rupture I71.6 441.7   4. Impaired mobility and ADLs Z74.09 799.89   5. Impaired physical mobility Z74.09 781.99                     OT Rehab Goals     Row Name 18 0744             Transfer Goal 1 (OT)    Activity/Assistive Device (Transfer Goal 1, OT) toilet  -      Kings Level/Cues Needed (Transfer Goal 1, OT) standby assist  -      Time Frame (Transfer Goal 1, OT) long term goal (LTG);by discharge  -      Progress/Outcome (Transfer Goal 1, OT) goal not met  -         Transfer Goal 2 (OT)    Activity/Assistive Device (Transfer Goal 2, OT) walk-in shower  -      Kings Level/Cues Needed (Transfer Goal 2, OT) standby assist  -      Time Frame (Transfer Goal 2, OT) long term goal (LTG);by discharge  -      Progress/Outcome (Transfer Goal 2, OT) goal not met  -         Bathing Goal 1 (OT)    Activity/Assistive Device (Bathing Goal 1, OT) bathing skills, all  -      Kings Level/Cues Needed (Bathing Goal 1, OT) standby assist  -      Time Frame (Bathing Goal 1, OT) long term goal (LTG);by discharge  -      Progress/Outcomes (Bathing Goal 1, OT) goal not met  -         Dressing Goal 1 (OT)    Activity/Assistive Device (Dressing Goal 1, OT) dressing skills, all  -      Kings/Cues Needed (Dressing Goal 1, OT) standby assist  -      Time Frame (Dressing Goal 1, OT) long term goal (LTG);by discharge  -      Progress/Outcome (Dressing Goal 1, OT) goal not met  -         User Key  (r) = Recorded By, (t) = Taken By, (c) = Cosigned By    Initials Name Provider Type [facility] Jonna Capellna, OTR/L Occupational Therapist OT                Outcome Measures     Row Name 06/16/18 1011 06/16/18 1010          How much help from another person do you currently need...    Turning from your back to your side while in flat bed without using bedrails? 4  -CB  --     Moving from lying on back to sitting on the side of a flat bed without bedrails? 3  -CB  --     Moving to and from a bed to a chair (including a wheelchair)? 3  -CB  --     Standing up from a chair using your arms (e.g., wheelchair, bedside chair)? 3  -CB  --     Climbing 3-5 steps with a railing? 2  -CB  --     To walk in hospital room? 3  -CB  --     AM-PAC 6 Clicks Score 18  -CB  --        How much help from another is currently needed...    Putting on and taking off regular lower body clothing?  -- 3  -RW     Bathing (including washing, rinsing, and drying)  -- 3  -RW     Toileting (which includes using toilet bed pan or urinal)  -- 3  -RW     Putting on and taking off regular upper body clothing  -- 3  -RW     Taking care of personal grooming (such as brushing teeth)  -- 3  -RW     Eating meals  -- 4  -RW     Score  -- 19  -RW        Functional Assessment    Outcome Measure Options AM-PAC 6 Clicks Basic Mobility (PT)  -CB AM-PAC 6 Clicks Daily Activity (OT)  -RW       User Key  (r) = Recorded By, (t) = Taken By, (c) = Cosigned By    Initials Name Provider Type    CB Amina Singh, PT Physical Therapist    BENJAMIN Jay OTR/L Occupational Therapist          Therapy Suggested Charges     Code   Minutes Charges    None                 OT Discharge Summary  Anticipated Discharge Disposition (OT): home with home health, home with 24/7 care  Reason for Discharge: Discharge from facility, Per MD order  Outcomes Achieved: Refer to plan of care for updates on goals achieved (no goals met)  Discharge Destination:  Home      Jonna Capellan, OTR/L  6/18/2018

## 2018-06-21 ENCOUNTER — APPOINTMENT (OUTPATIENT)
Dept: LAB | Facility: HOSPITAL | Age: 78
End: 2018-06-21

## 2018-06-21 ENCOUNTER — TELEPHONE (OUTPATIENT)
Dept: FAMILY MEDICINE CLINIC | Facility: CLINIC | Age: 78
End: 2018-06-21

## 2018-06-21 ENCOUNTER — OFFICE VISIT (OUTPATIENT)
Dept: FAMILY MEDICINE CLINIC | Facility: CLINIC | Age: 78
End: 2018-06-21

## 2018-06-21 VITALS
DIASTOLIC BLOOD PRESSURE: 86 MMHG | SYSTOLIC BLOOD PRESSURE: 112 MMHG | HEIGHT: 62 IN | HEART RATE: 84 BPM | OXYGEN SATURATION: 97 % | WEIGHT: 148.2 LBS | BODY MASS INDEX: 27.27 KG/M2

## 2018-06-21 DIAGNOSIS — Z13.220 LIPID SCREENING: ICD-10-CM

## 2018-06-21 DIAGNOSIS — E87.6 HYPOKALEMIA: ICD-10-CM

## 2018-06-21 DIAGNOSIS — I71.20 THORACIC AORTIC ANEURYSM WITHOUT RUPTURE (HCC): ICD-10-CM

## 2018-06-21 DIAGNOSIS — R11.2 NAUSEA AND VOMITING, INTRACTABILITY OF VOMITING NOT SPECIFIED, UNSPECIFIED VOMITING TYPE: ICD-10-CM

## 2018-06-21 DIAGNOSIS — I10 ESSENTIAL HYPERTENSION: ICD-10-CM

## 2018-06-21 DIAGNOSIS — R10.11 RUQ PAIN: Primary | ICD-10-CM

## 2018-06-21 LAB
ANION GAP SERPL CALCULATED.3IONS-SCNC: 10 MMOL/L (ref 5–15)
ARTICHOKE IGE QN: 56 MG/DL (ref 1–129)
BILIRUB UR QL STRIP: NEGATIVE
BUN BLD-MCNC: 8 MG/DL (ref 7–21)
BUN/CREAT SERPL: 13.3 (ref 7–25)
CALCIUM SPEC-SCNC: 8.4 MG/DL (ref 8.4–10.2)
CHLORIDE SERPL-SCNC: 98 MMOL/L (ref 95–110)
CHOLEST SERPL-MCNC: 140 MG/DL (ref 0–199)
CLARITY UR: CLEAR
CO2 SERPL-SCNC: 28 MMOL/L (ref 22–31)
COLOR UR: YELLOW
CREAT BLD-MCNC: 0.6 MG/DL (ref 0.5–1)
GFR SERPL CREATININE-BSD FRML MDRD: 96 ML/MIN/1.73 (ref 39–90)
GLUCOSE BLD-MCNC: 106 MG/DL (ref 60–100)
GLUCOSE UR STRIP-MCNC: NEGATIVE MG/DL
HDLC SERPL-MCNC: 61 MG/DL (ref 60–200)
HGB UR QL STRIP.AUTO: NEGATIVE
KETONES UR QL STRIP: ABNORMAL
LDLC/HDLC SERPL: 0.88 {RATIO} (ref 0–3.22)
LEUKOCYTE ESTERASE UR QL STRIP.AUTO: NEGATIVE
NITRITE UR QL STRIP: NEGATIVE
PH UR STRIP.AUTO: 6.5 [PH] (ref 5–9)
POTASSIUM BLD-SCNC: 3.4 MMOL/L (ref 3.5–5.1)
PROT UR QL STRIP: NEGATIVE
SODIUM BLD-SCNC: 136 MMOL/L (ref 137–145)
SP GR UR STRIP: 1.02 (ref 1–1.03)
TRIGL SERPL-MCNC: 128 MG/DL (ref 20–199)
UROBILINOGEN UR QL STRIP: ABNORMAL

## 2018-06-21 PROCEDURE — 99214 OFFICE O/P EST MOD 30 MIN: CPT | Performed by: FAMILY MEDICINE

## 2018-06-21 PROCEDURE — 80061 LIPID PANEL: CPT | Performed by: FAMILY MEDICINE

## 2018-06-21 PROCEDURE — 36415 COLL VENOUS BLD VENIPUNCTURE: CPT | Performed by: FAMILY MEDICINE

## 2018-06-21 PROCEDURE — 80048 BASIC METABOLIC PNL TOTAL CA: CPT | Performed by: FAMILY MEDICINE

## 2018-06-21 PROCEDURE — 81003 URINALYSIS AUTO W/O SCOPE: CPT | Performed by: FAMILY MEDICINE

## 2018-06-21 RX ORDER — HYDRALAZINE HYDROCHLORIDE 10 MG/1
10 TABLET, FILM COATED ORAL 2 TIMES DAILY
Qty: 60 TABLET | Refills: 1 | Status: SHIPPED | OUTPATIENT
Start: 2018-06-21 | End: 2018-07-10 | Stop reason: SINTOL

## 2018-06-21 RX ORDER — POTASSIUM CHLORIDE 20 MEQ/1
20 TABLET, EXTENDED RELEASE ORAL 2 TIMES DAILY
Qty: 2 TABLET | Refills: 0 | Status: SHIPPED | OUTPATIENT
Start: 2018-06-21 | End: 2018-06-22

## 2018-06-21 NOTE — TELEPHONE ENCOUNTER
Pr Dr. JENNIFER Powell, Ms. Arango has been called with her recent lab results & recommendations.  Continue her current medications and follow-up as planned or sooner if any problems.      ----- Message from Denisa Powell MD sent at 6/21/2018  2:00 PM CDT -----  Please let her know that her cholesterol looks good. Her UA was negative for UTI. Her potassium was a little low again. I will call in supplement for her to take BID for 1 day. If diarrhea is better shouldn't be an issue anymore.

## 2018-06-21 NOTE — PROGRESS NOTES
"Subjective   Mariluz Arango is a 77 y.o. female.     Nausea   This is a recurrent problem. The current episode started more than 1 month ago. The problem has been gradually worsening. Associated symptoms include abdominal pain, anorexia, fatigue, nausea and vomiting. Pertinent negatives include no arthralgias, change in bowel habit, chest pain, chills, congestion, coughing, diaphoresis, fever, headaches, joint swelling, myalgias, neck pain, numbness, rash, sore throat, swollen glands, urinary symptoms, vertigo, visual change or weakness. Exacerbated by: eating makes the pain worse. Treatments tried: She had an ulcer and she had a repeat EGD that showed it was resolved. The treatment provided no relief.   She has been having issues with abdominal pain. She has had a recent CT and that was normal. Appearance of gallbladder was normal.  She wonders if this could be her gallbladder.       She was recently admitted for UTI and she doesn't think that was the issue. She has not been having any urinary symptoms and wasn't when she went to the ED either.    She had hypertensive emergency and was started on hydralazine. She has been checking her BP at home and that has been running \"normal\".  She has had more issues with feeling tired and feeling more off balance.  She has had dizziness when she stands up.        Current Outpatient Prescriptions:   •  carvedilol (COREG) 6.25 MG tablet, TAKE 1 TABLET BY MOUTH 2 TIMES A DAY WITH MEALS, Disp: 60 tablet, Rfl: 2  •  clobetasol (TEMOVATE) 0.05 % external solution, Apply 1 application topically Daily., Disp: , Rfl:   •  clopidogrel (PLAVIX) 75 MG tablet, Take 75 mg by mouth Daily., Disp: , Rfl:   •  Cranberry 500 MG capsule, Take 1 tablet by mouth Daily., Disp: , Rfl:   •  cyanocobalamin 1000 MCG/ML injection, Inject 1,000 mcg into the shoulder, thigh, or buttocks Every 28 (Twenty-Eight) Days., Disp: , Rfl:   •  donepezil (ARICEPT) 10 MG tablet, TAKE 1 TABLET BY MOUTH EVERY " NIGHT., Disp: 90 tablet, Rfl: 2  •  DULoxetine (CYMBALTA) 20 MG capsule, Take 1 capsule by mouth Daily., Disp: 30 capsule, Rfl: 2  •  FLUOCINOLONE ACETONIDE SCALP 0.01 % oil, Apply 1 application topically Daily., Disp: , Rfl:   •  fluticasone (FLONASE) 50 MCG/ACT nasal spray, INSTILL 2 SPRAYS INTO EACH NOSTRIL DAILY, Disp: 16 g, Rfl: 5  •  gabapentin (NEURONTIN) 300 MG capsule, Take 1 capsule by mouth 3 (Three) Times a Day., Disp: 90 capsule, Rfl: 2  •  hydrALAZINE (APRESOLINE) 25 MG tablet, Take 1 tablet by mouth Every 8 (Eight) Hours., Disp: 90 tablet, Rfl: 1  •  HYDROcodone-acetaminophen (NORCO) 7.5-325 MG per tablet, 1 tablet As Needed for Moderate Pain ., Disp: , Rfl: 0  •  hydrocortisone 1 % lotion, Apply  topically 3 (Three) Times a Day As Needed for Irritation or Rash., Disp: 1 bottle, Rfl: 1  •  ketoconazole (NIZORAL) 2 % shampoo, , Disp: , Rfl:   •  lidocaine (LIDODERM) 5 %, PLACE ONE PATCH ONE THE SKIN EVERY DAY. REMOVE AND DISCARD THE PATCH WITHIN 12 HOURS AFTER APPLICATION EVERY DAY OR AS DIRECTED BY MD., Disp: 30 patch, Rfl: 1  •  Multiple Vitamins-Minerals (MULTIVITAMIN GUMMIES ADULT PO), Take 1 each by mouth Daily., Disp: , Rfl:   •  nystatin (MYCOSTATIN) 719241 UNIT/GM powder, Apply 1 application topically 4 (Four) Times a Day., Disp: , Rfl:   •  nystatin (MYCOSTATIN) 219137 UNIT/GM powder, APPLY TO THE AFFECTED AREA THREE TIMES DAILY, Disp: 60 g, Rfl: 2  •  omeprazole (priLOSEC) 40 MG capsule, Take 40 mg by mouth Daily., Disp: , Rfl:   •  ondansetron (ZOFRAN) 8 MG tablet, Take 8 mg by mouth Every 8 (Eight) Hours As Needed for Nausea or Vomiting., Disp: , Rfl:   •  ondansetron ODT (ZOFRAN-ODT) 4 MG disintegrating tablet, Take 1 tablet by mouth Every 8 (Eight) Hours As Needed for Nausea or Vomiting., Disp: 10 tablet, Rfl: 0  •  ONETOUCH VERIO test strip, , Disp: , Rfl: 11  •  phenazopyridine (PYRIDIUM) 100 MG tablet, Take 1 tablet by mouth 3 (Three) Times a Day As Needed for bladder spasms., Disp: 6  "tablet, Rfl: 0  •  pravastatin (PRAVACHOL) 40 MG tablet, Take 40 mg by mouth Every Night., Disp: , Rfl:   •  promethazine (PHENERGAN) 25 MG tablet, Take 25 mg by mouth Every 6 (Six) Hours As Needed for Nausea or Vomiting., Disp: , Rfl:   •  sertraline (ZOLOFT) 50 MG tablet, Take 50 mg by mouth Daily., Disp: , Rfl:   •  sucralfate (CARAFATE) 1 g tablet, Take 1 g by mouth 4 (Four) Times a Day., Disp: , Rfl: 2  •  Syringe/Needle, Disp, 25G X 5/8\" 3 ML misc, USE 1 SYRINGE FOR MONTHLY VITAMIN B-12 SHOTS, Disp: 6 each, Rfl: 2  •  traZODone (DESYREL) 100 MG tablet, Take 150 mg by mouth Every Night., Disp: , Rfl:     The following portions of the patient's history were reviewed and updated as appropriate: allergies, current medications, past family history, past medical history, past social history, past surgical history and problem list.    Review of Systems   Constitutional: Positive for activity change, appetite change and fatigue. Negative for chills, diaphoresis, fever and unexpected weight change.   HENT: Negative for congestion, rhinorrhea, sinus pain, sinus pressure and sore throat.    Respiratory: Negative for cough, shortness of breath and wheezing.    Cardiovascular: Negative for chest pain, palpitations and leg swelling.   Gastrointestinal: Positive for abdominal distention, abdominal pain, anorexia, diarrhea, nausea and vomiting. Negative for anal bleeding, blood in stool, change in bowel habit and constipation.   Genitourinary: Negative for difficulty urinating and dysuria.   Musculoskeletal: Negative for arthralgias, joint swelling, myalgias and neck pain.   Skin: Negative for color change, rash and wound.   Neurological: Negative for dizziness, vertigo, weakness, numbness and headaches.   Psychiatric/Behavioral: Positive for decreased concentration, dysphoric mood and sleep disturbance. Negative for agitation, behavioral problems, confusion, self-injury and suicidal ideas.       Objective    Vitals:    " "06/21/18 1114   BP: 112/86   Pulse: 84   SpO2: 97%   Weight: 67.2 kg (148 lb 3.2 oz)   Height: 157.5 cm (62\")       Physical Exam   Constitutional: She is oriented to person, place, and time. She appears well-developed and well-nourished. No distress.   Cardiovascular: Normal rate, regular rhythm and normal heart sounds.    No murmur heard.  No LE edema.   Pulmonary/Chest: Effort normal and breath sounds normal. No respiratory distress.   Abdominal: Soft. Bowel sounds are normal. She exhibits no distension. There is tenderness in the right upper quadrant.   Neurological: She is alert and oriented to person, place, and time.   Psychiatric: She has a normal mood and affect. Her behavior is normal. Judgment and thought content normal.   Nursing note and vitals reviewed.      Assessment/Plan   Problems Addressed this Visit        Cardiovascular and Mediastinum    Thoracic aortic aneurysm without rupture (Chronic)    Relevant Orders    Ambulatory Referral to Cardiovascular Surgery    Hypertensive disorder    Relevant Medications    hydrALAZINE (APRESOLINE) 10 MG tablet      Other Visit Diagnoses     RUQ pain    -  Primary    Relevant Orders    NM HIDA scan with pharmacological intervention    Nausea and vomiting, intractability of vomiting not specified, unspecified vomiting type        Relevant Orders    NM HIDA scan with pharmacological intervention    Urinalysis With / Culture If Indicated - Urine, Clean Catch (Completed)    Hypokalemia        Relevant Orders    Basic metabolic panel (Completed)    Lipid screening        Relevant Orders    Lipid Panel (Completed)        1.) RUQ pain and nausea-  She has had recurrent UTI.  UA negative for UTI today.  CT abd done recently in the hospital appeared normal.  She has had repeat endoscopy that showed resolution of her ulcers.  Will get HIDA scans. She has had a lot of pain related to eating.  Worse with greasy or fatty foods.    2.) HTN-  She typically hadn't been having " issues with her BP. Hydralazine is a high risk med and I would like to try to taper her off that.  Continue to monitor BP at home.  3.) Hypokalemia-  Will recheck today. She had been having issues with diarrhea and that may have caused that.  She has been having less diarrhea now.  4.) Lipid screening with labs today.  Follow-up as previously scheduled in a couple weeks.  RTC in 0.5 months or sooner PRN

## 2018-06-25 DIAGNOSIS — G47.00 INSOMNIA, UNSPECIFIED TYPE: ICD-10-CM

## 2018-06-25 RX ORDER — HYDROXYZINE PAMOATE 50 MG/1
CAPSULE ORAL
Qty: 90 CAPSULE | Refills: 1 | OUTPATIENT
Start: 2018-06-25

## 2018-06-25 RX ORDER — TRAZODONE HYDROCHLORIDE 100 MG/1
TABLET ORAL
Qty: 135 TABLET | Refills: 1 | Status: SHIPPED | OUTPATIENT
Start: 2018-06-25 | End: 2018-07-11

## 2018-06-26 ENCOUNTER — HOSPITAL ENCOUNTER (OUTPATIENT)
Dept: NUCLEAR MEDICINE | Facility: HOSPITAL | Age: 78
Discharge: HOME | End: 2018-06-26

## 2018-06-26 ENCOUNTER — TELEPHONE (OUTPATIENT)
Dept: FAMILY MEDICINE CLINIC | Facility: CLINIC | Age: 78
End: 2018-06-26

## 2018-06-26 DIAGNOSIS — R11.2 NAUSEA AND VOMITING, INTRACTABILITY OF VOMITING NOT SPECIFIED, UNSPECIFIED VOMITING TYPE: ICD-10-CM

## 2018-06-26 DIAGNOSIS — R10.11 RUQ PAIN: ICD-10-CM

## 2018-06-26 PROCEDURE — 0 TECHNETIUM SESTAMIBI: Performed by: FAMILY MEDICINE

## 2018-06-26 PROCEDURE — A9500 TC99M SESTAMIBI: HCPCS | Performed by: FAMILY MEDICINE

## 2018-06-26 PROCEDURE — 78226 HEPATOBILIARY SYSTEM IMAGING: CPT

## 2018-06-26 RX ADMIN — TECHNETIUM TC 99M SESTAMIBI 1 DOSE: 1 INJECTION INTRAVENOUS at 07:21

## 2018-06-26 NOTE — TELEPHONE ENCOUNTER
Pr Dr. JENNIFER Powell, Ms. Arango has been called with her recent HIDA Scan results & recommendations.  Continue her current medications and follow-up as planned or sooner if any problems.  ----- Message from Denisa Powell MD sent at 6/26/2018  1:59 PM CDT -----  Please let her know that her HIDA scan was normal. Gallbladder is functioning normal.

## 2018-06-26 NOTE — PROGRESS NOTES
Pr Dr. JENNIFER Powell, Ms. Arango has been called with her recent HIDA Scan results & recommendations.  Continue her current medications and follow-up as planned or sooner if any problems.

## 2018-06-28 ENCOUNTER — OFFICE VISIT (OUTPATIENT)
Dept: CARDIOLOGY | Facility: CLINIC | Age: 78
End: 2018-06-28

## 2018-06-28 VITALS
OXYGEN SATURATION: 94 % | WEIGHT: 148 LBS | HEIGHT: 62 IN | SYSTOLIC BLOOD PRESSURE: 130 MMHG | DIASTOLIC BLOOD PRESSURE: 80 MMHG | HEART RATE: 88 BPM | BODY MASS INDEX: 27.23 KG/M2

## 2018-06-28 DIAGNOSIS — E78.00 HYPERCHOLESTEROLEMIA: ICD-10-CM

## 2018-06-28 DIAGNOSIS — I71.20 THORACIC AORTIC ANEURYSM WITHOUT RUPTURE (HCC): Primary | Chronic | ICD-10-CM

## 2018-06-28 DIAGNOSIS — I48.0 PAROXYSMAL ATRIAL FIBRILLATION (HCC): ICD-10-CM

## 2018-06-28 DIAGNOSIS — I10 ESSENTIAL HYPERTENSION: ICD-10-CM

## 2018-06-28 PROCEDURE — 99214 OFFICE O/P EST MOD 30 MIN: CPT | Performed by: INTERNAL MEDICINE

## 2018-06-28 NOTE — PROGRESS NOTES
Cardiovascular Medicine   Hima Gill M.D., Ph.D., [facility]      The patient returns to cardiovascular clinic for follow-up of the following:    PROBLEM LIST:  1. NVAF  A. Anticoagulation CI  2. Dizziness  3. HTN  4. HLD  5.  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  Subclinical PAD  -CT scan June 2016 with moderate to severe aortoiliac calcifications  -ABIs normal 2017  7.  Subclinical atherosclerosis  -CT scan June 2016 with coronary artery calcifications  -MPS normal 2017  8.  Former smoker  9. Dementia  10. ASA intolerance  -possible prior GI bleed  11. Possible cerebral aneurysm    Arrhythmia  Patient is a 77 y.o. female who presents for follow-up of atrial fibrillation. Symptoms include none. Clinical course: stable. Associated symptoms include: palpitations. The patient denies cough, dizziness and fatigue. he patient is currently prescribed beta blocker-Coreg. The patient does not have a prescription for a rhythm control medication.  The patient is not prescribed anticoagulation due to prior bleeding episodes.  The patient denies easy bleeding.  The patient denies bleeding episodes. The patient denies increased cardiac awareness.  The patient denies dizziness, lightheadedness or syncope.      Sub-clinical atherosclerosis  Patient's prior CT scan for other reasons revealed calcification of the coronary arteries.  She was not endorsing any overt angina.  She did perform a myocardial perfusion stress which was unremarkable.     Thoracoabdominal aortic aneurysm  Patient recently had a CT scan dated June 2016.  This showed a thoracoabdominal aortic aneurysm at the level of the diaphragmatic hiatus that measured 3.3 cm.  Patient has no family history of aneurysmal diseases.  She does have follow-up imaging with Dr. Phillips.      Sub-clinical PAD  Patient's most recent CT scan showed moderate to severe aortoiliac calcifications.She had had some pain in her  legs primarily when walking.  ABIs returned as normal.      Review of Systems   Cardiovascular: Positive for dyspnea on exertion and irregular heartbeat. Negative for chest pain, claudication, leg swelling, orthopnea, palpitations, paroxysmal nocturnal dyspnea and syncope.   Respiratory: Positive for shortness of breath. Negative for cough, hemoptysis, sleep disturbances due to breathing, snoring, sputum production and wheezing.      family history includes Diabetes in her other; Hypertension in her mother; Stroke in her mother.   reports that she quit smoking about 2 years ago. Her smoking use included Cigarettes. She smoked 0.50 packs per day. She has never used smokeless tobacco. She reports that she does not drink alcohol or use drugs.  Allergies   Allergen Reactions   • Aspirin    • Codeine      UNKNOWN REACTION   • Macrobid [Nitrofurantoin Macrocrystal] Nausea And Vomiting   • Other      Cipro   • Penicillins    • Sulfa Antibiotics      Sulfa (Sulfonamide Antibiotics)   • Dexilant [Dexlansoprazole] Rash       Current Outpatient Prescriptions:   •  clobetasol (TEMOVATE) 0.05 % external solution, Apply 1 application topically Daily., Disp: , Rfl:   •  clopidogrel (PLAVIX) 75 MG tablet, Take 75 mg by mouth Daily., Disp: , Rfl:   •  Cranberry 500 MG capsule, Take 1 tablet by mouth Daily., Disp: , Rfl:   •  cyanocobalamin 1000 MCG/ML injection, Inject 1,000 mcg into the shoulder, thigh, or buttocks Every 28 (Twenty-Eight) Days., Disp: , Rfl:   •  donepezil (ARICEPT) 10 MG tablet, TAKE 1 TABLET BY MOUTH EVERY NIGHT., Disp: 90 tablet, Rfl: 2  •  DULoxetine (CYMBALTA) 20 MG capsule, Take 1 capsule by mouth Daily., Disp: 30 capsule, Rfl: 2  •  FLUOCINOLONE ACETONIDE SCALP 0.01 % oil, Apply 1 application topically Daily., Disp: , Rfl:   •  fluticasone (FLONASE) 50 MCG/ACT nasal spray, INSTILL 2 SPRAYS INTO EACH NOSTRIL DAILY, Disp: 16 g, Rfl: 5  •  gabapentin (NEURONTIN) 300 MG capsule, Take 1 capsule by mouth 3 (Three)  "Times a Day., Disp: 90 capsule, Rfl: 2  •  hydrALAZINE (APRESOLINE) 10 MG tablet, Take 1 tablet by mouth 2 (Two) Times a Day., Disp: 60 tablet, Rfl: 1  •  HYDROcodone-acetaminophen (NORCO) 7.5-325 MG per tablet, 1 tablet As Needed for Moderate Pain ., Disp: , Rfl: 0  •  hydrocortisone 1 % lotion, Apply  topically 3 (Three) Times a Day As Needed for Irritation or Rash., Disp: 1 bottle, Rfl: 1  •  ketoconazole (NIZORAL) 2 % shampoo, , Disp: , Rfl:   •  lidocaine (LIDODERM) 5 %, PLACE ONE PATCH ONE THE SKIN EVERY DAY. REMOVE AND DISCARD THE PATCH WITHIN 12 HOURS AFTER APPLICATION EVERY DAY OR AS DIRECTED BY MD., Disp: 30 patch, Rfl: 1  •  Multiple Vitamins-Minerals (MULTIVITAMIN GUMMIES ADULT PO), Take 1 each by mouth Daily., Disp: , Rfl:   •  nystatin (MYCOSTATIN) 601373 UNIT/GM powder, Apply 1 application topically 4 (Four) Times a Day., Disp: , Rfl:   •  omeprazole (priLOSEC) 40 MG capsule, Take 40 mg by mouth Daily., Disp: , Rfl:   •  ONETOUCH VERIO test strip, , Disp: , Rfl: 11  •  phenazopyridine (PYRIDIUM) 100 MG tablet, Take 1 tablet by mouth 3 (Three) Times a Day As Needed for bladder spasms., Disp: 6 tablet, Rfl: 0  •  pravastatin (PRAVACHOL) 40 MG tablet, Take 40 mg by mouth Every Night., Disp: , Rfl:   •  promethazine (PHENERGAN) 25 MG tablet, Take 25 mg by mouth Every 6 (Six) Hours As Needed for Nausea or Vomiting., Disp: , Rfl:   •  sucralfate (CARAFATE) 1 g tablet, Take 1 g by mouth 4 (Four) Times a Day., Disp: , Rfl: 2  •  Syringe/Needle, Disp, 25G X 5/8\" 3 ML misc, USE 1 SYRINGE FOR MONTHLY VITAMIN B-12 SHOTS, Disp: 6 each, Rfl: 2  •  traZODone (DESYREL) 100 MG tablet, TAKE 1.5 TABLETS BY MOUTH EVERY NIGHT FOR 90 DAYS., Disp: 135 tablet, Rfl: 1  •  carvedilol (COREG) 6.25 MG tablet, TAKE 1 TABLET BY MOUTH 2 TIMES A DAY WITH MEALS, Disp: 60 tablet, Rfl: 2  •  nystatin (MYCOSTATIN) 657650 UNIT/GM powder, APPLY TO THE AFFECTED AREA THREE TIMES DAILY, Disp: 60 g, Rfl: 2  •  ondansetron (ZOFRAN) 8 MG " tablet, Take 8 mg by mouth Every 8 (Eight) Hours As Needed for Nausea or Vomiting., Disp: , Rfl:   •  ondansetron ODT (ZOFRAN-ODT) 4 MG disintegrating tablet, Take 1 tablet by mouth Every 8 (Eight) Hours As Needed for Nausea or Vomiting., Disp: 10 tablet, Rfl: 0  •  sertraline (ZOLOFT) 50 MG tablet, Take 50 mg by mouth Daily., Disp: , Rfl:   •  traZODone (DESYREL) 100 MG tablet, Take 150 mg by mouth Every Night., Disp: , Rfl:     Physical Exam:  Vitals:    06/28/18 0833   BP: 130/80   Pulse: 88   SpO2: 94%     Body mass index is 27.07 kg/m².   Last Weights:   Wt Readings from Last 3 Encounters:   06/28/18 67.1 kg (148 lb)   06/21/18 67.2 kg (148 lb 3.2 oz)   06/13/18 68 kg (150 lb)     Pulse Ox: Normal   General: alert, appears stated age and cooperative  Body Habitus: well-nourished  HEENT: Head: Normocephalic, no lesions, without obvious abnormality. No arcus senilis, xanthelasma or xanthomas.  JVP: 6 cm of water at 45 degrees   Volume/Pulsation: Normal  Heart rate: normal    Heart Rhythm: regular     Heart Sounds: S1: normal intensity  S2: normal intensity  S3: absent   S4: absent  Opening Snap: absent  A2-OS:  absent.   Pericardial rub: absent    Ejection click: None      Murmurs:  absent   Extremity: moves all extremities equally.       DATA REVIEWED:     ---------------------------------------------------  TTE/ASHLEIGH:  Results for orders placed in visit on 05/19/17   Adult Transthoracic Echo Complete    Narrative · Left Ventricle: Left ventricular systolic function is normal. Estimated   EF appears to be in the range of 61 - 65%. Normal left ventricular cavity   size noted  · Left ventricular wall thickness is consistent with mild concentric   hypertrophy  · Left ventricular diastolic dysfunction is noted (grade I a w/high LAP)   consistent with impaired relaxation. Elevated left atrial pressure  · Right Ventricle: Normal right ventricular cavity size, wall thickness,   systolic function and septal motion  noted  · Small patent foramen ovale present with right to left shunting indicated   by saline contrast  · There is mild pulmonic valve regurgitation present.  · No evidence of pulmonary hypertension is present  · There is no evidence of pericardial effusion            --------------------------------------------------------------------------------------------------  LABS:   Lab Results   Component Value Date    GLUCOSE 106 (H) 06/21/2018    BUN 8 06/21/2018    CREATININE 0.60 06/21/2018    EGFRIFNONA 96 (H) 06/21/2018    BCR 13.3 06/21/2018    K 3.4 (L) 06/21/2018    CO2 28.0 06/21/2018    CALCIUM 8.4 06/21/2018    ALBUMIN 3.50 06/13/2018    LABIL2 1.3 06/13/2018    AST 21 06/13/2018    ALT 29 06/13/2018     Lab Results   Component Value Date    WBC 8.84 06/17/2018    HGB 11.4 (L) 06/17/2018    HCT 35.5 06/17/2018    MCV 85.7 06/17/2018     06/17/2018     Lab Results   Component Value Date    CHOL 140 06/21/2018    CHLPL 167 06/30/2016    TRIG 128 06/21/2018    HDL 61 06/21/2018    LDL 56 06/21/2018     Lab Results   Component Value Date    TSH 1.390 02/14/2018    G6QGAPN 111 07/12/2016    J0LXFXO 7.8 07/12/2016     Lab Results   Component Value Date    CKTOTAL <20 (L) 08/09/2016    CKMB <0.2 08/09/2016    TROPONINI <0.012 08/09/2016     Lab Results   Component Value Date    HGBA1C 6.3 (H) 04/02/2018     No results found for: DDIMER  Lab Results   Component Value Date    ALT 29 06/13/2018     Lab Results   Component Value Date    HGBA1C 6.3 (H) 04/02/2018    HGBA1C 6.1 (H) 08/11/2017    HGBA1C 6.29 (H) 01/30/2017     Lab Results   Component Value Date    MICROALBUR 5.4 05/19/2017    CREATININE 0.60 06/21/2018     Lab Results   Component Value Date    IRON 31 (L) 05/01/2018    TIBC 381 05/01/2018    FERRITIN 15.00 11/09/2017     No results found for: INR, PROTIME    Assessment/Plan     1. paroxysmal - 7 days or less Atrial fibrillation. The patient is currently in NSR with a well controlled ventricular rate.   .  She clearly meets indications for anticoagulation, but she's had issues with bleeding in the past with ASA.  I think she is at elevated risk of hemorrhage.    Anticoagulation CI: bleeding disorder, aneurysm  Rate control agents:ordered.  - Agent: beta blocker-Coreg  -Rhythm control agents:not indicated     2.  Subclinical atherosclerosis.  Patient's CT scan documented significant calcification of the coronary arteries.  Myocardial perfusion stress 2017 was without evidence of inducible ischemia.  -Continue risk factor modifications     3.  Subclinical PAD.  Most recent CT scan showed moderate to severe aortoiliac atherosclerosis calcification.  There is also a large calcium plaque at the origin of the celiac artery but celiac arterial flow appeared patent.  She does not ambulate on a regular basis, so it's difficult to obtain claudication symptoms.  Fortunately, ABIs returned as normal in 2017.  -Risk factor modifications     4.  Thoracoabdominal aortic aneurysm at the diaphragmatic hiatus most recently documented at 4.3cm.    - Dr. Phillips for surveillance  -Continue beta blocker and risk factor modifications     5. Cardiac Risk Assessment and need for statin therapy: High Risk.   -Statin:  Yes.  -Recommended moderate-intensity statin therapy  -Lipid-lowering medications: Pravachol (pravastatin)  -Recommended ASA    6. Nicotine status: is a former smoker    7. Weight: Body mass index is 27.07 kg/m²..  BMI above upper level, FU Plan documented .  Class: Overweight: 25.0-29.9kg/m2   -General patient education:  -Weight loss hand-out  -Exercise intervention:   Hand out, increase aerobic activity  -PCP Follow-up          Return in about 1 year (around 6/28/2019).          There are no discontinued medications.  Mariluz was seen today for atrial fibrillation.    Diagnoses and all orders for this visit:    Thoracic aortic aneurysm without rupture    Hypercholesterolemia    Essential hypertension    Paroxysmal atrial  fibrillation

## 2018-06-28 NOTE — PATIENT INSTRUCTIONS

## 2018-07-03 ENCOUNTER — HOSPITAL ENCOUNTER (OUTPATIENT)
Dept: CT IMAGING | Facility: HOSPITAL | Age: 78
Discharge: HOME | End: 2018-07-03
Admitting: THORACIC SURGERY (CARDIOTHORACIC VASCULAR SURGERY)

## 2018-07-03 DIAGNOSIS — I71.20 THORACIC AORTIC ANEURYSM WITHOUT RUPTURE (HCC): ICD-10-CM

## 2018-07-03 PROCEDURE — 71250 CT THORAX DX C-: CPT

## 2018-07-10 ENCOUNTER — APPOINTMENT (OUTPATIENT)
Dept: LAB | Facility: HOSPITAL | Age: 78
End: 2018-07-10

## 2018-07-10 ENCOUNTER — OFFICE VISIT (OUTPATIENT)
Dept: FAMILY MEDICINE CLINIC | Facility: CLINIC | Age: 78
End: 2018-07-10

## 2018-07-10 VITALS
SYSTOLIC BLOOD PRESSURE: 116 MMHG | BODY MASS INDEX: 27.23 KG/M2 | DIASTOLIC BLOOD PRESSURE: 66 MMHG | HEIGHT: 62 IN | WEIGHT: 148 LBS

## 2018-07-10 DIAGNOSIS — I10 ESSENTIAL HYPERTENSION: ICD-10-CM

## 2018-07-10 DIAGNOSIS — R21 RASH: ICD-10-CM

## 2018-07-10 DIAGNOSIS — N39.0 RECURRENT UTI: Primary | ICD-10-CM

## 2018-07-10 DIAGNOSIS — L20.89 OTHER ATOPIC DERMATITIS: ICD-10-CM

## 2018-07-10 PROCEDURE — 86003 ALLG SPEC IGE CRUDE XTRC EA: CPT | Performed by: FAMILY MEDICINE

## 2018-07-10 PROCEDURE — 36415 COLL VENOUS BLD VENIPUNCTURE: CPT | Performed by: FAMILY MEDICINE

## 2018-07-10 PROCEDURE — 99214 OFFICE O/P EST MOD 30 MIN: CPT | Performed by: FAMILY MEDICINE

## 2018-07-10 NOTE — PROGRESS NOTES
Subjective   Mariluz Arango is a 77 y.o. female.     Urinary Tract Infection    This is a recurrent problem. The current episode started in the past 7 days. The problem occurs intermittently. The problem has been gradually worsening. The quality of the pain is described as burning. The pain is at a severity of 3/10. The pain is moderate. There has been no fever. The fever has been present for less than 1 day. She is not sexually active. There is no history of pyelonephritis. Associated symptoms include chills, flank pain and frequency. Pertinent negatives include no discharge, hematuria, hesitancy, nausea, possible pregnancy, sweats, urgency or vomiting. She has tried increased fluids (She has been treated for several UTI in the past.  ) for the symptoms. The treatment provided moderate relief. Her past medical history is significant for recurrent UTIs. There is no history of catheterization, kidney stones, a single kidney, urinary stasis or a urological procedure.   Rash   This is a chronic problem. The current episode started 1 to 4 weeks ago. The problem has been gradually worsening since onset. The rash is diffuse. The rash is characterized by redness, itchiness and dryness. She was exposed to nothing. Associated symptoms include fatigue. Pertinent negatives include no anorexia, congestion, cough, diarrhea, eye pain, facial edema, fever, joint pain, nail changes, rhinorrhea, shortness of breath, sore throat or vomiting. Treatments tried: visteril and topical steroids. The treatment provided no relief. Her past medical history is significant for allergies. There is no history of asthma, eczema or varicella.   Hypertension   This is a chronic problem. The current episode started more than 1 year ago. The problem has been rapidly improving since onset. The problem is controlled. Associated symptoms include anxiety and malaise/fatigue. Pertinent negatives include no blurred vision, chest pain, headaches, neck  pain, orthopnea, palpitations, peripheral edema, PND, shortness of breath or sweats. Risk factors for coronary artery disease include dyslipidemia, diabetes mellitus, post-menopausal state, sedentary lifestyle and stress. Current antihypertension treatment includes beta blockers and direct vasodilators. The current treatment provides moderate improvement. There are no compliance problems.  There is no history of angina or kidney disease.        Current Outpatient Prescriptions:   •  carvedilol (COREG) 6.25 MG tablet, TAKE 1 TABLET BY MOUTH 2 TIMES A DAY WITH MEALS, Disp: 60 tablet, Rfl: 2  •  clobetasol (TEMOVATE) 0.05 % external solution, Apply 1 application topically Daily., Disp: , Rfl:   •  clopidogrel (PLAVIX) 75 MG tablet, Take 75 mg by mouth Daily., Disp: , Rfl:   •  Cranberry 500 MG capsule, Take 1 tablet by mouth Daily., Disp: , Rfl:   •  cyanocobalamin 1000 MCG/ML injection, Inject 1,000 mcg into the shoulder, thigh, or buttocks Every 28 (Twenty-Eight) Days., Disp: , Rfl:   •  donepezil (ARICEPT) 10 MG tablet, TAKE 1 TABLET BY MOUTH EVERY NIGHT., Disp: 90 tablet, Rfl: 2  •  DULoxetine (CYMBALTA) 20 MG capsule, Take 1 capsule by mouth Daily., Disp: 30 capsule, Rfl: 2  •  FLUOCINOLONE ACETONIDE SCALP 0.01 % oil, Apply 1 application topically Daily., Disp: , Rfl:   •  fluticasone (FLONASE) 50 MCG/ACT nasal spray, INSTILL 2 SPRAYS INTO EACH NOSTRIL DAILY, Disp: 16 g, Rfl: 5  •  gabapentin (NEURONTIN) 300 MG capsule, Take 1 capsule by mouth 3 (Three) Times a Day., Disp: 90 capsule, Rfl: 2  •  hydrALAZINE (APRESOLINE) 10 MG tablet, Take 1 tablet by mouth 2 (Two) Times a Day., Disp: 60 tablet, Rfl: 1  •  HYDROcodone-acetaminophen (NORCO) 7.5-325 MG per tablet, 1 tablet As Needed for Moderate Pain ., Disp: , Rfl: 0  •  hydrocortisone 1 % lotion, Apply  topically 3 (Three) Times a Day As Needed for Irritation or Rash., Disp: 1 bottle, Rfl: 1  •  ketoconazole (NIZORAL) 2 % shampoo, , Disp: , Rfl:   •  lidocaine  "(LIDODERM) 5 %, PLACE ONE PATCH ONE THE SKIN EVERY DAY. REMOVE AND DISCARD THE PATCH WITHIN 12 HOURS AFTER APPLICATION EVERY DAY OR AS DIRECTED BY MD., Disp: 30 patch, Rfl: 1  •  Multiple Vitamins-Minerals (MULTIVITAMIN GUMMIES ADULT PO), Take 1 each by mouth Daily., Disp: , Rfl:   •  nystatin (MYCOSTATIN) 396323 UNIT/GM powder, Apply 1 application topically 4 (Four) Times a Day., Disp: , Rfl:   •  nystatin (MYCOSTATIN) 497626 UNIT/GM powder, APPLY TO THE AFFECTED AREA THREE TIMES DAILY, Disp: 60 g, Rfl: 2  •  omeprazole (priLOSEC) 40 MG capsule, Take 40 mg by mouth Daily., Disp: , Rfl:   •  ondansetron (ZOFRAN) 8 MG tablet, Take 8 mg by mouth Every 8 (Eight) Hours As Needed for Nausea or Vomiting., Disp: , Rfl:   •  ondansetron ODT (ZOFRAN-ODT) 4 MG disintegrating tablet, Take 1 tablet by mouth Every 8 (Eight) Hours As Needed for Nausea or Vomiting., Disp: 10 tablet, Rfl: 0  •  ONETOUCH VERIO test strip, , Disp: , Rfl: 11  •  phenazopyridine (PYRIDIUM) 100 MG tablet, Take 1 tablet by mouth 3 (Three) Times a Day As Needed for bladder spasms., Disp: 6 tablet, Rfl: 0  •  pravastatin (PRAVACHOL) 40 MG tablet, Take 40 mg by mouth Every Night., Disp: , Rfl:   •  promethazine (PHENERGAN) 25 MG tablet, Take 25 mg by mouth Every 6 (Six) Hours As Needed for Nausea or Vomiting., Disp: , Rfl:   •  sertraline (ZOLOFT) 50 MG tablet, Take 50 mg by mouth Daily., Disp: , Rfl:   •  sucralfate (CARAFATE) 1 g tablet, Take 1 g by mouth 4 (Four) Times a Day., Disp: , Rfl: 2  •  Syringe/Needle, Disp, 25G X 5/8\" 3 ML misc, USE 1 SYRINGE FOR MONTHLY VITAMIN B-12 SHOTS, Disp: 6 each, Rfl: 2  •  traZODone (DESYREL) 100 MG tablet, Take 150 mg by mouth Every Night., Disp: , Rfl:   •  traZODone (DESYREL) 100 MG tablet, TAKE 1.5 TABLETS BY MOUTH EVERY NIGHT FOR 90 DAYS., Disp: 135 tablet, Rfl: 1    The following portions of the patient's history were reviewed and updated as appropriate: allergies, current medications, past family history, past " "medical history, past social history, past surgical history and problem list.    Review of Systems   Constitutional: Positive for chills, fatigue and malaise/fatigue. Negative for fever.   HENT: Negative for congestion, rhinorrhea and sore throat.    Eyes: Negative for blurred vision and pain.   Respiratory: Negative for cough and shortness of breath.    Cardiovascular: Negative for chest pain, palpitations, orthopnea and PND.   Gastrointestinal: Negative for anorexia, diarrhea, nausea and vomiting.   Genitourinary: Positive for flank pain and frequency. Negative for hematuria, hesitancy and urgency.   Musculoskeletal: Negative for joint pain and neck pain.   Skin: Positive for rash. Negative for nail changes.   Neurological: Negative for headaches.       Objective    Vitals:    07/10/18 1556   BP: 116/66   Weight: 67.1 kg (148 lb)   Height: 157.5 cm (62\")     Physical Exam   Constitutional: She is oriented to person, place, and time. She appears well-developed and well-nourished. No distress.   Cardiovascular: Normal rate and regular rhythm.    Murmur heard.  Trace LE edema.   Pulmonary/Chest: Effort normal and breath sounds normal. No respiratory distress.   Abdominal: Soft. Bowel sounds are normal. She exhibits no distension. There is no tenderness.   Neurological: She is alert and oriented to person, place, and time.   Skin:   She has erythematous, flat patch on her right forearm.  Excoriation on the top of her right thigh.   Psychiatric: She has a normal mood and affect. Her behavior is normal. Judgment and thought content normal.   Nursing note and vitals reviewed.      Assessment/Plan   Problems Addressed this Visit        Cardiovascular and Mediastinum    Hypertensive disorder      Other Visit Diagnoses     Recurrent UTI    -  Primary    Relevant Orders    Ambulatory Referral to Urology    Rash        Relevant Orders    Allergens, Zone 5    Food Allergy Profile    Ambulatory Referral to Dermatology " (Completed)    Other atopic dermatitis         Relevant Orders    Allergens, Zone 5        1.) Recurrent UTI-  Looked at old culture. She was better and this has come back. She is allergic to a lot of medications and was resistant to others. Will call in Ceftin for her and send another culture. This is the 4th UTI in the last 6 months. Will refer to urology for further evaluation.  2.) HTN- She was put on hydralazine when she was in the hospital. Her BP has been running low. I had decreased her dose last time and she has been doing well. She is a high fall risk and I am concerned that she doesn't need the hydralazine. Will stop that medication and monitor closely.  If starts to run higher would readd lisinopril since she has diabetes.  Continue coreg.  3.) Rash/Puritis-  This has been going on for months now.  No new medications were added when that started.  She had restarted taking visteril even though it increases her risk of falls and/or confusion.  Will do some allergy testing. Will try to keep track of the things that she eats before it starts to itch. The rash comes and goes. I think that this is a reaction to that.  Will refer to dermatology as well. She has done topical steroids and the rash is too diffuse.  RTC in 1-2 months or sooner PRN      Current outpatient and discharge medications have been reconciled for the patient.  Reviewed by: Denisa Powell MD

## 2018-07-11 ENCOUNTER — APPOINTMENT (OUTPATIENT)
Dept: LAB | Facility: HOSPITAL | Age: 78
End: 2018-07-11

## 2018-07-11 ENCOUNTER — TELEPHONE (OUTPATIENT)
Dept: FAMILY MEDICINE CLINIC | Facility: CLINIC | Age: 78
End: 2018-07-11

## 2018-07-11 DIAGNOSIS — N39.0 RECURRENT UTI: Primary | ICD-10-CM

## 2018-07-11 LAB
BACTERIA UR QL AUTO: ABNORMAL /HPF
BILIRUB UR QL STRIP: NEGATIVE
CLARITY UR: ABNORMAL
COLOR UR: ABNORMAL
GLUCOSE UR STRIP-MCNC: NEGATIVE MG/DL
HGB UR QL STRIP.AUTO: NEGATIVE
HYALINE CASTS UR QL AUTO: ABNORMAL /LPF
KETONES UR QL STRIP: NEGATIVE
LEUKOCYTE ESTERASE UR QL STRIP.AUTO: ABNORMAL
NITRITE UR QL STRIP: POSITIVE
PH UR STRIP.AUTO: 6 [PH] (ref 5–9)
PROT UR QL STRIP: NEGATIVE
RBC # UR: ABNORMAL /HPF
REF LAB TEST METHOD: ABNORMAL
SP GR UR STRIP: 1.02 (ref 1–1.03)
SQUAMOUS #/AREA URNS HPF: ABNORMAL /HPF
UROBILINOGEN UR QL STRIP: ABNORMAL
WBC UR QL AUTO: ABNORMAL /HPF

## 2018-07-11 PROCEDURE — 87186 SC STD MICRODIL/AGAR DIL: CPT | Performed by: FAMILY MEDICINE

## 2018-07-11 PROCEDURE — 87077 CULTURE AEROBIC IDENTIFY: CPT | Performed by: FAMILY MEDICINE

## 2018-07-11 PROCEDURE — 87086 URINE CULTURE/COLONY COUNT: CPT | Performed by: FAMILY MEDICINE

## 2018-07-11 PROCEDURE — 81001 URINALYSIS AUTO W/SCOPE: CPT | Performed by: FAMILY MEDICINE

## 2018-07-11 RX ORDER — CEFUROXIME AXETIL 250 MG/1
250 TABLET ORAL 2 TIMES DAILY
Qty: 14 TABLET | Refills: 0 | Status: SHIPPED | OUTPATIENT
Start: 2018-07-11 | End: 2018-07-18

## 2018-07-11 RX ORDER — TRAZODONE HYDROCHLORIDE 100 MG/1
150 TABLET ORAL NIGHTLY
Qty: 45 TABLET | Refills: 3 | Status: SHIPPED | OUTPATIENT
Start: 2018-07-11 | End: 2019-07-18 | Stop reason: SDUPTHER

## 2018-07-11 RX ORDER — CLOPIDOGREL BISULFATE 75 MG/1
TABLET ORAL
Qty: 30 TABLET | Refills: 2 | Status: SHIPPED | OUTPATIENT
Start: 2018-07-11 | End: 2018-11-19 | Stop reason: SDUPTHER

## 2018-07-11 RX ORDER — CARVEDILOL 6.25 MG/1
TABLET ORAL
Qty: 60 TABLET | Refills: 2 | Status: SHIPPED | OUTPATIENT
Start: 2018-07-11 | End: 2018-10-10 | Stop reason: SDUPTHER

## 2018-07-13 ENCOUNTER — TELEPHONE (OUTPATIENT)
Dept: FAMILY MEDICINE CLINIC | Facility: CLINIC | Age: 78
End: 2018-07-13

## 2018-07-13 LAB — BACTERIA SPEC AEROBE CULT: ABNORMAL

## 2018-07-13 NOTE — PROGRESS NOTES
Pr Dr. JENNIFER Powell, Ms. Arango's daughter Maya has been called with her mother's recent lab results & recommendations.  Continue her current medications and follow-up as planned or sooner if any problems.

## 2018-07-13 NOTE — TELEPHONE ENCOUNTER
Pr Dr. JENNIFER Powell, Ms. Arango's daughter Maya has been called with her mother's recent lab results & recommendations.  Continue her current medications and follow-up as planned or sooner if any problems.      ----- Message from Denisa Powell MD sent at 7/13/2018  9:41 AM CDT -----  Please let her or her daughter know that her culture was sensitive to the abx that was sent when she was here. THANKS

## 2018-07-14 LAB
A ALTERNATA IGE QN: <0.1 KU/L
A FUMIGATUS IGE QN: <0.1 KU/L
AMER ROACH IGE QN: <0.1 KU/L
BAHIA GRASS IGE QN: <0.1 KU/L
BAYBERRY POLN IGE QN: <0.1 KU/L
BERMUDA GRASS IGE QN: <0.1 KU/L
BOXELDER IGE QN: <0.1 KU/L
C HERBARUM IGE QN: <0.1 KU/L
CALIF WALNUT POLN IGE QN: <0.1 KU/L
CAT DANDER IGG QN: <0.1 KU/L
CLAM IGE QN: <0.1 KU/L
CODFISH IGE QN: <0.1 KU/L
COMMON RAGWEED IGE QN: <0.1 KU/L
CONV CLASS DESCRIPTION: ABNORMAL
CONV CLASS DESCRIPTION: NORMAL
CORN IGE QN: <0.1 KU/L
COW MILK IGE QN: 0.12 KU/L
D FARINAE IGE QN: <0.1 KU/L
D PTERONYSS IGE QN: <0.1 KU/L
DOG DANDER IGE QN: <0.1 KU/L
DOG FENNEL IGE QN: <0.1 KU/L
EGG WHITE IGE QN: <0.1 KU/L
ENGL PLANTAIN IGE QN: <0.1 KU/L
GOOSEFOOT IGE QN: <0.1 KU/L
GUM-TREE IGE QN: <0.1 KU/L
ITALIAN CYPRESS IGE QN: <0.1 KU/L
JOHNSON GRASS IGE QN: <0.1 KU/L
M RACEMOSUS IGE QN: <0.1 KU/L
P NOTATUM IGE QN: <0.1 KU/L
PEANUT IGE QN: <0.1 KU/L
PEPPER TREE IGE QN: <0.1 KU/L
PER RYE GRASS IGE QN: <0.1 KU/L
QUEEN PALM IGE QN: <0.1 KU/L
S BOTRYOSUM IGE QN: <0.1 KU/L
SCALLOP IGE QN: <0.1 KU/L
SESAME SEED IGE: <0.1 KU/L
SHEEP SORREL IGE QN: <0.1 KU/L
SHRIMP IGE: <0.1 KU/L
SOYBEAN IGE QN: <0.1 KU/L
T210-IGE PRIVET, COMMON: <0.1 KU/L
VIRG LIVE OAK IGE QN: <0.1 KU/L
WHEAT IGE QN: <0.1 KU/L
WHITE ELM IGE QN: <0.1 KU/L

## 2018-07-16 ENCOUNTER — OFFICE VISIT (OUTPATIENT)
Dept: CARDIAC SURGERY | Facility: CLINIC | Age: 78
End: 2018-07-16

## 2018-07-16 VITALS
HEIGHT: 63 IN | HEART RATE: 93 BPM | DIASTOLIC BLOOD PRESSURE: 88 MMHG | OXYGEN SATURATION: 93 % | SYSTOLIC BLOOD PRESSURE: 142 MMHG | BODY MASS INDEX: 26.26 KG/M2 | TEMPERATURE: 97.3 F | WEIGHT: 148.2 LBS

## 2018-07-16 DIAGNOSIS — I71.20 THORACIC AORTIC ANEURYSM WITHOUT RUPTURE (HCC): Primary | Chronic | ICD-10-CM

## 2018-07-16 DIAGNOSIS — E78.00 HYPERCHOLESTEROLEMIA: ICD-10-CM

## 2018-07-16 DIAGNOSIS — I10 ESSENTIAL HYPERTENSION: ICD-10-CM

## 2018-07-16 DIAGNOSIS — E11.49 NEUROLOGIC DISORDER ASSOCIATED WITH DIABETES MELLITUS (HCC): ICD-10-CM

## 2018-07-16 DIAGNOSIS — E11.41 TYPE 2 DIABETES MELLITUS WITH DIABETIC MONONEUROPATHY, WITH LONG-TERM CURRENT USE OF INSULIN (HCC): ICD-10-CM

## 2018-07-16 DIAGNOSIS — Z79.4 TYPE 2 DIABETES MELLITUS WITH DIABETIC MONONEUROPATHY, WITH LONG-TERM CURRENT USE OF INSULIN (HCC): ICD-10-CM

## 2018-07-16 DIAGNOSIS — I48.0 PAROXYSMAL ATRIAL FIBRILLATION (HCC): ICD-10-CM

## 2018-07-16 PROCEDURE — 99214 OFFICE O/P EST MOD 30 MIN: CPT | Performed by: THORACIC SURGERY (CARDIOTHORACIC VASCULAR SURGERY)

## 2018-07-24 RX ORDER — HYDROXYZINE PAMOATE 50 MG/1
50 CAPSULE ORAL 3 TIMES DAILY PRN
Qty: 90 CAPSULE | Refills: 1 | Status: SHIPPED | OUTPATIENT
Start: 2018-07-24 | End: 2018-08-28 | Stop reason: SDUPTHER

## 2018-07-29 NOTE — PROGRESS NOTES
2018    Mariluz Arango  1940    Chief Complaint:  Thoracic aneurysm      HPI:      PCP:  No primary care provider on file.  Cardiology:  Dr Gill     77y.o. female with Afib(stable), hyperlipidemia(stable), DM2(stable), GERD, hiatal hernia, peripheral neuropathy.  former smoker.  Moderate palpitations x 1 year.  Moderate burning, stinging pain bilateral feet legs x years.  Asymptomatic thoracic aortic aneurysm found on imaging.  Grandmother  with ruptured abdominal aortic aneurysm.  BP runs 120s at home. No TIA stroke amaurosis.  No claudication.  Walks with cane..  No other associated signs, symptoms or modifying factors.     2015 CT Abdomen Pelvis:  38mm aorta at diaphragm, 22mm infrarenal.  2016 CT Abdomen Pelvis:  38mm aorta at diaphragm, 22mm infrarenal.  2017 CT Chest:  Ascending aorta 39mm, arch 31mm, descending thoracic aorta 43mm, diaphragm 36mm, infrarenal abdominal 21mm.  2018  CT Chest:  Ascending aorta 39mm, arch 30mm, descending thoracic aorta 42mm, diaphragm 35mm, infrarenal abdominal 22mm.    2017 Carotid Duplex:  WILEY 0-49%, antegrade vert.  LICA 0-49%, antegrade vert.  2017 SILVIA:  RIGHT 1.0, LEFT 1.0     The following portions of the patient's history were reviewed and updated as appropriate: allergies, current medications, past family history, past medical history, past social history, past surgical history and problem list.  Recent images independently reviewed.  Available laboratory values reviewed.    PMH:  Past Medical History:   Diagnosis Date   • Altered mental status     unspecified    • Anxiety    • Artificial lens present     Artificial lens in position   • Depressive disorder    • Diabetes mellitus (CMS/HCC)     no retinopathy      • Diabetic polyneuropathy (CMS/HCC)    • Diarrhea     unspecified    • Diverticular disease of colon    • Dizziness    • Gastroesophageal reflux disease    • Generalized abdominal pain    • Headache    • Hiatal hernia    •  History of colonic polyps     Personal history of colonic polyps   • History of echocardiogram 09/17/2008    Echocardiogram W/O color flow 74409 (1) - Evidence of LVH & diastolic dysfunction & mild LA enlargement, otherwise NRL echocradiogram   • History of mammogram 05/27/2011    MAMMOGRAM DIAGNOSTIC BILATERAL 18740 ([facility]) (1) - benign Birads category 2   • History of transfusion    • Hypercholesterolemia    • Lumbar radiculopathy    • Memory impairment     Multifactorial      • Nausea    • 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)    • Pain in thoracic spine    • Palpitations    • Polyp of colon     History of polyp of colon   • Stroke (CMS/HCC)    • Vitamin D deficiency        ALLERGIES:  Allergies   Allergen Reactions   • Aspirin    • Codeine      UNKNOWN REACTION   • Macrobid [Nitrofurantoin Macrocrystal] Nausea And Vomiting   • Other      Cipro   • Penicillins    • Sulfa Antibiotics      Sulfa (Sulfonamide Antibiotics)   • Dexilant [Dexlansoprazole] Rash         MEDICATIONS:    Current Outpatient Prescriptions:   •  carvedilol (COREG) 6.25 MG tablet, TAKE 1 TABLET BY MOUTH 2 TIMES A DAY WITH MEALS, Disp: 60 tablet, Rfl: 2  •  clobetasol (TEMOVATE) 0.05 % external solution, Apply 1 application topically Daily., Disp: , Rfl:   •  clopidogrel (PLAVIX) 75 MG tablet, TAKE 1 TABLET BY MOUTH DAILY., Disp: 30 tablet, Rfl: 2  •  Cranberry 500 MG capsule, Take 1 tablet by mouth Daily., Disp: , Rfl:   •  cyanocobalamin 1000 MCG/ML injection, Inject 1,000 mcg into the shoulder, thigh, or buttocks Every 28 (Twenty-Eight) Days., Disp: , Rfl:   •  donepezil (ARICEPT) 10 MG tablet, TAKE 1 TABLET BY MOUTH EVERY NIGHT., Disp: 90 tablet, Rfl: 2  •  DULoxetine (CYMBALTA) 20 MG capsule, Take 1 capsule by mouth Daily., Disp: 30 capsule, Rfl: 2  •  FLUOCINOLONE ACETONIDE SCALP 0.01 % oil, Apply 1 application topically Daily., Disp: , Rfl:   •  fluticasone (FLONASE) 50 MCG/ACT nasal spray, INSTILL 2 SPRAYS INTO  "EACH NOSTRIL DAILY, Disp: 16 g, Rfl: 5  •  gabapentin (NEURONTIN) 300 MG capsule, Take 1 capsule by mouth 3 (Three) Times a Day., Disp: 90 capsule, Rfl: 2  •  HYDROcodone-acetaminophen (NORCO) 7.5-325 MG per tablet, 1 tablet As Needed for Moderate Pain ., Disp: , Rfl: 0  •  hydrocortisone 1 % lotion, Apply  topically 3 (Three) Times a Day As Needed for Irritation or Rash., Disp: 1 bottle, Rfl: 1  •  ketoconazole (NIZORAL) 2 % shampoo, , Disp: , Rfl:   •  lidocaine (LIDODERM) 5 %, PLACE ONE PATCH ONE THE SKIN EVERY DAY. REMOVE AND DISCARD THE PATCH WITHIN 12 HOURS AFTER APPLICATION EVERY DAY OR AS DIRECTED BY MD., Disp: 30 patch, Rfl: 1  •  Multiple Vitamins-Minerals (MULTIVITAMIN GUMMIES ADULT PO), Take 1 each by mouth Daily., Disp: , Rfl:   •  nystatin (MYCOSTATIN) 687536 UNIT/GM powder, APPLY TO THE AFFECTED AREA THREE TIMES DAILY, Disp: 60 g, Rfl: 2  •  omeprazole (priLOSEC) 40 MG capsule, Take 40 mg by mouth Daily., Disp: , Rfl:   •  ondansetron (ZOFRAN) 8 MG tablet, Take 8 mg by mouth Every 8 (Eight) Hours As Needed for Nausea or Vomiting., Disp: , Rfl:   •  ondansetron ODT (ZOFRAN-ODT) 4 MG disintegrating tablet, Take 1 tablet by mouth Every 8 (Eight) Hours As Needed for Nausea or Vomiting., Disp: 10 tablet, Rfl: 0  •  ONETOUCH VERIO test strip, , Disp: , Rfl: 11  •  phenazopyridine (PYRIDIUM) 100 MG tablet, Take 1 tablet by mouth 3 (Three) Times a Day As Needed for bladder spasms., Disp: 6 tablet, Rfl: 0  •  pravastatin (PRAVACHOL) 40 MG tablet, Take 40 mg by mouth Every Night., Disp: , Rfl:   •  promethazine (PHENERGAN) 25 MG tablet, Take 25 mg by mouth Every 6 (Six) Hours As Needed for Nausea or Vomiting., Disp: , Rfl:   •  sertraline (ZOLOFT) 50 MG tablet, Take 50 mg by mouth Daily., Disp: , Rfl:   •  sucralfate (CARAFATE) 1 g tablet, Take 1 g by mouth 4 (Four) Times a Day., Disp: , Rfl: 2  •  Syringe/Needle, Disp, 25G X 5/8\" 3 ML misc, USE 1 SYRINGE FOR MONTHLY VITAMIN B-12 SHOTS, Disp: 6 each, Rfl: 2  •  " traZODone (DESYREL) 100 MG tablet, Take 1.5 tablets by mouth Every Night., Disp: 45 tablet, Rfl: 3  •  hydrOXYzine (VISTARIL) 50 MG capsule, Take 1 capsule by mouth 3 (Three) Times a Day As Needed for Itching., Disp: 90 capsule, Rfl: 1    Review of Systems   Review of Systems   Constitution: Positive for weakness and malaise/fatigue. Negative for weight loss.   Cardiovascular: Positive for palpitations. Negative for chest pain, claudication and dyspnea on exertion.   Respiratory: Negative for cough and shortness of breath.    Skin: Positive for rash. Negative for color change and poor wound healing.   Musculoskeletal: Positive for arthritis, back pain, muscle weakness and neck pain.   Neurological: Positive for difficulty with concentration, dizziness, numbness and paresthesias.   Psychiatric/Behavioral: Positive for depression and memory loss. The patient is nervous/anxious.        Physical Exam   Physical Exam   Constitutional: She is oriented to person, place, and time. She is active and cooperative. She does not appear ill. No distress.   HENT:   Right Ear: Hearing normal.   Left Ear: Hearing normal.   Nose: No nasal deformity. No epistaxis.   Mouth/Throat: She does not have dentures. Normal dentition.   Cardiovascular: Normal rate and regular rhythm.    No murmur heard.  Pulses:       Carotid pulses are 2+ on the right side, and 2+ on the left side.       Radial pulses are 2+ on the right side, and 2+ on the left side.        Dorsalis pedis pulses are 2+ on the right side, and 2+ on the left side.        Posterior tibial pulses are 1+ on the right side, and 1+ on the left side.   Pulmonary/Chest: Effort normal and breath sounds normal.   Abdominal: Soft. She exhibits no distension and no mass. There is no tenderness.   Musculoskeletal: She exhibits no deformity.   Gait normal.    Neurological: She is alert and oriented to person, place, and time. She has normal strength.   Skin: Skin is warm and dry. No  cyanosis or erythema. No pallor.   No venous staining   Psychiatric: She has a normal mood and affect. Her speech is normal. Judgment and thought content normal.     Results for MARILUZ ARANGO (MRN [id number]) as of 7/29/2018 16:10  GFR 96 Ref. Range 6/21/2018 12:09   Creatinine Latest Ref Range: 0.50 - 1.00 mg/dL 0.60   BUN Latest Ref Range: 7 - 21 mg/dL 8     ASSESSMENT:  Mariluz was seen today for thoracic aneurysm.    Diagnoses and all orders for this visit:    Thoracic aortic aneurysm without rupture (CMS/HCC)  -     CT Chest Without Contrast; Future    Paroxysmal atrial fibrillation (CMS/HCC)    Hypercholesterolemia    Essential hypertension    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)    Type 2 diabetes mellitus with diabetic mononeuropathy, with long-term current use of insulin (CMS/HCC)    PLAN:  Detailed discussion with Ms Arango regarding situation and options.  Aneurysm descending thoracic Aorta.  Surgical intervention not indicated at this time.  Will plan to follow with interval imaging.  BP control in 120 range.  Discussed symptoms of rupture, details of surgical repair including  Endovascular and open repair.  Risks, benefits discussed.  Understands and wishes to proceed with plan     Return in 1 year with CT Chest without contrast    Return after above studies complete  Recommended regular physical activity, progressive walking program.  Continue current medications as directed.    Thank you for the opportunity to participate in this patient's care.    Copy to primary care provider.    EMR Dragon/Transcription disclaimer:   Much of this encounter note is an electronic transcription/translation of spoken language to printed text. The electronic translation of spoken language may permit erroneous, or at times, nonsensical words or phrases to be inadvertently transcribed; Although I have reviewed the note for such errors, some may still exist.

## 2018-08-07 ENCOUNTER — TRANSCRIBE ORDERS (OUTPATIENT)
Dept: LAB | Facility: HOSPITAL | Age: 78
End: 2018-08-07

## 2018-08-07 ENCOUNTER — LAB (OUTPATIENT)
Dept: LAB | Facility: HOSPITAL | Age: 78
End: 2018-08-07

## 2018-08-07 DIAGNOSIS — L29.9 PRURITUS: ICD-10-CM

## 2018-08-07 DIAGNOSIS — L29.9 PRURITUS: Primary | ICD-10-CM

## 2018-08-07 DIAGNOSIS — N39.0 URINARY TRACT INFECTION WITHOUT HEMATURIA, SITE UNSPECIFIED: ICD-10-CM

## 2018-08-07 LAB
BACTERIA UR QL AUTO: ABNORMAL /HPF
BILIRUB UR QL STRIP: ABNORMAL
CLARITY UR: ABNORMAL
COLOR UR: ABNORMAL
GLUCOSE UR STRIP-MCNC: NEGATIVE MG/DL
HGB UR QL STRIP.AUTO: ABNORMAL
HYALINE CASTS UR QL AUTO: ABNORMAL /LPF
KETONES UR QL STRIP: NEGATIVE
LEUKOCYTE ESTERASE UR QL STRIP.AUTO: ABNORMAL
NITRITE UR QL STRIP: NEGATIVE
PH UR STRIP.AUTO: 5.5 [PH] (ref 5–9)
PROT UR QL STRIP: ABNORMAL
RBC # UR: ABNORMAL /HPF
REF LAB TEST METHOD: ABNORMAL
SP GR UR STRIP: 1.02 (ref 1–1.03)
SQUAMOUS #/AREA URNS HPF: ABNORMAL /HPF
UROBILINOGEN UR QL STRIP: ABNORMAL
WBC UR QL AUTO: ABNORMAL /HPF

## 2018-08-07 PROCEDURE — 86038 ANTINUCLEAR ANTIBODIES: CPT

## 2018-08-07 PROCEDURE — 81001 URINALYSIS AUTO W/SCOPE: CPT

## 2018-08-07 PROCEDURE — 86430 RHEUMATOID FACTOR TEST QUAL: CPT

## 2018-08-07 PROCEDURE — 36415 COLL VENOUS BLD VENIPUNCTURE: CPT

## 2018-08-07 PROCEDURE — 87077 CULTURE AEROBIC IDENTIFY: CPT

## 2018-08-07 PROCEDURE — 87086 URINE CULTURE/COLONY COUNT: CPT

## 2018-08-07 PROCEDURE — 87186 SC STD MICRODIL/AGAR DIL: CPT

## 2018-08-08 LAB — ANA SER QL IA: NEGATIVE

## 2018-08-09 LAB
BACTERIA SPEC AEROBE CULT: ABNORMAL
RHEUMATOID FACT SERPL-ACNC: NEGATIVE [IU]/ML

## 2018-08-09 RX ORDER — DOXYCYCLINE HYCLATE 100 MG/1
100 CAPSULE ORAL 2 TIMES DAILY
Qty: 10 CAPSULE | Refills: 0 | Status: SHIPPED | OUTPATIENT
Start: 2018-08-09 | End: 2018-09-18

## 2018-08-14 ENCOUNTER — OFFICE VISIT (OUTPATIENT)
Dept: FAMILY MEDICINE CLINIC | Facility: CLINIC | Age: 78
End: 2018-08-14

## 2018-08-14 ENCOUNTER — LAB (OUTPATIENT)
Dept: LAB | Facility: HOSPITAL | Age: 78
End: 2018-08-14

## 2018-08-14 VITALS
BODY MASS INDEX: 25.87 KG/M2 | HEART RATE: 67 BPM | DIASTOLIC BLOOD PRESSURE: 70 MMHG | OXYGEN SATURATION: 98 % | WEIGHT: 146 LBS | HEIGHT: 63 IN | SYSTOLIC BLOOD PRESSURE: 125 MMHG

## 2018-08-14 DIAGNOSIS — E78.2 MIXED HYPERLIPIDEMIA: Chronic | ICD-10-CM

## 2018-08-14 DIAGNOSIS — N39.0 RECURRENT UTI: ICD-10-CM

## 2018-08-14 DIAGNOSIS — Z78.0 MENOPAUSE: ICD-10-CM

## 2018-08-14 DIAGNOSIS — E11.41 TYPE 2 DIABETES MELLITUS WITH DIABETIC MONONEUROPATHY, WITH LONG-TERM CURRENT USE OF INSULIN (HCC): Chronic | ICD-10-CM

## 2018-08-14 DIAGNOSIS — Z79.4 TYPE 2 DIABETES MELLITUS WITH DIABETIC MONONEUROPATHY, WITH LONG-TERM CURRENT USE OF INSULIN (HCC): Chronic | ICD-10-CM

## 2018-08-14 DIAGNOSIS — I10 ESSENTIAL HYPERTENSION: Primary | Chronic | ICD-10-CM

## 2018-08-14 DIAGNOSIS — Z12.31 ENCOUNTER FOR SCREENING MAMMOGRAM FOR BREAST CANCER: ICD-10-CM

## 2018-08-14 LAB
ALBUMIN SERPL-MCNC: 4.2 G/DL (ref 3.4–4.8)
ALBUMIN/GLOB SERPL: 1.3 G/DL (ref 1.1–1.8)
ALP SERPL-CCNC: 40 U/L (ref 38–126)
ALT SERPL W P-5'-P-CCNC: 22 U/L (ref 9–52)
ANION GAP SERPL CALCULATED.3IONS-SCNC: 11 MMOL/L (ref 5–15)
AST SERPL-CCNC: 26 U/L (ref 14–36)
BACTERIA UR QL AUTO: ABNORMAL /HPF
BASOPHILS # BLD AUTO: 0.05 10*3/MM3 (ref 0–0.2)
BASOPHILS NFR BLD AUTO: 0.5 % (ref 0–2)
BILIRUB SERPL-MCNC: 0.8 MG/DL (ref 0.2–1.3)
BILIRUB UR QL STRIP: ABNORMAL
BUN BLD-MCNC: 15 MG/DL (ref 7–21)
BUN/CREAT SERPL: 21.4 (ref 7–25)
CALCIUM SPEC-SCNC: 8.9 MG/DL (ref 8.4–10.2)
CHLORIDE SERPL-SCNC: 98 MMOL/L (ref 95–110)
CLARITY UR: CLEAR
CO2 SERPL-SCNC: 28 MMOL/L (ref 22–31)
COLOR UR: ABNORMAL
CREAT BLD-MCNC: 0.7 MG/DL (ref 0.5–1)
DEPRECATED RDW RBC AUTO: 47.7 FL (ref 36.4–46.3)
EOSINOPHIL # BLD AUTO: 0.33 10*3/MM3 (ref 0–0.7)
EOSINOPHIL NFR BLD AUTO: 3.4 % (ref 0–7)
ERYTHROCYTE [DISTWIDTH] IN BLOOD BY AUTOMATED COUNT: 15.4 % (ref 11.5–14.5)
GFR SERPL CREATININE-BSD FRML MDRD: 81 ML/MIN/1.73 (ref 39–90)
GLOBULIN UR ELPH-MCNC: 3.2 GM/DL (ref 2.3–3.5)
GLUCOSE BLD-MCNC: 108 MG/DL (ref 60–100)
GLUCOSE UR STRIP-MCNC: NEGATIVE MG/DL
HCT VFR BLD AUTO: 39.8 % (ref 35–45)
HGB BLD-MCNC: 13.3 G/DL (ref 12–15.5)
HGB UR QL STRIP.AUTO: NEGATIVE
HYALINE CASTS UR QL AUTO: ABNORMAL /LPF
IMM GRANULOCYTES # BLD: 0.02 10*3/MM3 (ref 0–0.02)
IMM GRANULOCYTES NFR BLD: 0.2 % (ref 0–0.5)
KETONES UR QL STRIP: NEGATIVE
LEUKOCYTE ESTERASE UR QL STRIP.AUTO: ABNORMAL
LYMPHOCYTES # BLD AUTO: 3.33 10*3/MM3 (ref 0.6–4.2)
LYMPHOCYTES NFR BLD AUTO: 34.5 % (ref 10–50)
MCH RBC QN AUTO: 28.4 PG (ref 26.5–34)
MCHC RBC AUTO-ENTMCNC: 33.4 G/DL (ref 31.4–36)
MCV RBC AUTO: 85 FL (ref 80–98)
MONOCYTES # BLD AUTO: 0.69 10*3/MM3 (ref 0–0.9)
MONOCYTES NFR BLD AUTO: 7.2 % (ref 0–12)
MUCOUS THREADS URNS QL MICRO: ABNORMAL /HPF
NEUTROPHILS # BLD AUTO: 5.23 10*3/MM3 (ref 2–8.6)
NEUTROPHILS NFR BLD AUTO: 54.2 % (ref 37–80)
NITRITE UR QL STRIP: NEGATIVE
PH UR STRIP.AUTO: 5.5 [PH] (ref 5–9)
PLATELET # BLD AUTO: 227 10*3/MM3 (ref 150–450)
PMV BLD AUTO: 9.4 FL (ref 8–12)
POTASSIUM BLD-SCNC: 3.9 MMOL/L (ref 3.5–5.1)
PROT SERPL-MCNC: 7.4 G/DL (ref 6.3–8.6)
PROT UR QL STRIP: ABNORMAL
RBC # BLD AUTO: 4.68 10*6/MM3 (ref 3.77–5.16)
RBC # UR: ABNORMAL /HPF
REF LAB TEST METHOD: ABNORMAL
SODIUM BLD-SCNC: 137 MMOL/L (ref 137–145)
SP GR UR STRIP: 1.02 (ref 1–1.03)
SQUAMOUS #/AREA URNS HPF: ABNORMAL /HPF
UROBILINOGEN UR QL STRIP: ABNORMAL
WBC NRBC COR # BLD: 9.65 10*3/MM3 (ref 3.2–9.8)
WBC UR QL AUTO: ABNORMAL /HPF

## 2018-08-14 PROCEDURE — 85025 COMPLETE CBC W/AUTO DIFF WBC: CPT | Performed by: GENERAL PRACTICE

## 2018-08-14 PROCEDURE — 80053 COMPREHEN METABOLIC PANEL: CPT | Performed by: GENERAL PRACTICE

## 2018-08-14 PROCEDURE — 87086 URINE CULTURE/COLONY COUNT: CPT

## 2018-08-14 PROCEDURE — 36415 COLL VENOUS BLD VENIPUNCTURE: CPT | Performed by: GENERAL PRACTICE

## 2018-08-14 PROCEDURE — 99214 OFFICE O/P EST MOD 30 MIN: CPT | Performed by: GENERAL PRACTICE

## 2018-08-14 PROCEDURE — 81001 URINALYSIS AUTO W/SCOPE: CPT

## 2018-08-14 NOTE — PROGRESS NOTES
Subjective   Mariluz Arango is a 77 y.o. female.   Chief Complaint   Patient presents with   • Follow-up   • Hypertension   • Back Pain     New patient to me. Here to establish care. For review and evaluation of management of chronic medical problems. Labs reviewed. Due for mammogram and bone density.   Hypertension   This is a chronic problem. The current episode started more than 1 year ago. The problem has been rapidly improving since onset. The problem is controlled. Associated symptoms include malaise/fatigue. Pertinent negatives include no blurred vision, headaches, neck pain, orthopnea, peripheral edema, PND or sweats. Risk factors for coronary artery disease include dyslipidemia, diabetes mellitus, post-menopausal state, sedentary lifestyle and stress. Current antihypertension treatment includes beta blockers and direct vasodilators. The current treatment provides moderate improvement. There are no compliance problems.  There is no history of angina or kidney disease.   Urinary Tract Infection    This is a recurrent problem. The current episode started in the past 7 days. The problem occurs intermittently. The problem has been gradually worsening. The quality of the pain is described as burning. The pain is at a severity of 3/10. The pain is moderate. There has been no fever. The fever has been present for less than 1 day. She is not sexually active. There is no history of pyelonephritis. Associated symptoms include chills, flank pain and frequency. Pertinent negatives include no discharge, hematuria, hesitancy, possible pregnancy, sweats, urgency or vomiting. She has tried increased fluids (She has been treated for several UTI in the past.  ) for the symptoms. The treatment provided moderate relief. Her past medical history is significant for recurrent UTIs. There is no history of catheterization, kidney stones, a single kidney, urinary stasis or a urological procedure.   Hyperlipidemia   This is a  chronic problem. The current episode started more than 1 year ago. The problem is controlled. Recent lipid tests were reviewed and are normal. There are no known factors aggravating her hyperlipidemia. Pertinent negatives include no myalgias. Current antihyperlipidemic treatment includes statins. The current treatment provides significant improvement of lipids. There are no compliance problems.    Diabetes   She presents for her follow-up diabetic visit. She has type 2 diabetes mellitus. Her disease course has been stable. Pertinent negatives for hypoglycemia include no headaches or sweats. Pertinent negatives for diabetes include no blurred vision, no fatigue and no weakness. There are no hypoglycemic complications. Risk factors for coronary artery disease include diabetes mellitus, dyslipidemia, stress, sedentary lifestyle and post-menopausal. Current diabetic treatment includes diet. She is compliant with treatment most of the time. Her weight is fluctuating minimally. She is following a generally healthy and diabetic diet. There is no change in her home blood glucose trend. An ACE inhibitor/angiotensin II receptor blocker is being taken. She does not see a podiatrist.Eye exam is current.      The following portions of the patient's history were reviewed and updated as appropriate: allergies, current medications, past social history and problem list.    Outpatient Medications Prior to Visit   Medication Sig Dispense Refill   • carvedilol (COREG) 6.25 MG tablet TAKE 1 TABLET BY MOUTH 2 TIMES A DAY WITH MEALS 60 tablet 2   • clobetasol (TEMOVATE) 0.05 % external solution Apply 1 application topically Daily.     • clopidogrel (PLAVIX) 75 MG tablet TAKE 1 TABLET BY MOUTH DAILY. 30 tablet 2   • Cranberry 500 MG capsule Take 1 tablet by mouth Daily.     • cyanocobalamin 1000 MCG/ML injection Inject 1,000 mcg into the shoulder, thigh, or buttocks Every 28 (Twenty-Eight) Days.     • donepezil (ARICEPT) 10 MG tablet TAKE  "1 TABLET BY MOUTH EVERY NIGHT. 90 tablet 2   • doxycycline (VIBRAMYCIN) 100 MG capsule Take 1 capsule by mouth 2 (Two) Times a Day. 10 capsule 0   • DULoxetine (CYMBALTA) 20 MG capsule Take 1 capsule by mouth Daily. 30 capsule 2   • FLUOCINOLONE ACETONIDE SCALP 0.01 % oil Apply 1 application topically Daily.     • fluticasone (FLONASE) 50 MCG/ACT nasal spray INSTILL 2 SPRAYS INTO EACH NOSTRIL DAILY 16 g 5   • gabapentin (NEURONTIN) 300 MG capsule Take 1 capsule by mouth 3 (Three) Times a Day. 90 capsule 2   • HYDROcodone-acetaminophen (NORCO) 7.5-325 MG per tablet 1 tablet As Needed for Moderate Pain .  0   • hydrocortisone 1 % lotion Apply  topically 3 (Three) Times a Day As Needed for Irritation or Rash. 1 bottle 1   • ketoconazole (NIZORAL) 2 % shampoo      • lidocaine (LIDODERM) 5 % PLACE ONE PATCH ONE THE SKIN EVERY DAY. REMOVE AND DISCARD THE PATCH WITHIN 12 HOURS AFTER APPLICATION EVERY DAY OR AS DIRECTED BY MD. 30 patch 1   • Multiple Vitamins-Minerals (MULTIVITAMIN GUMMIES ADULT PO) Take 1 each by mouth Daily.     • nystatin (MYCOSTATIN) 867656 UNIT/GM powder APPLY TO THE AFFECTED AREA THREE TIMES DAILY 60 g 2   • omeprazole (priLOSEC) 40 MG capsule Take 40 mg by mouth Daily.     • ondansetron (ZOFRAN) 8 MG tablet Take 8 mg by mouth Every 8 (Eight) Hours As Needed for Nausea or Vomiting.     • ondansetron ODT (ZOFRAN-ODT) 4 MG disintegrating tablet Take 1 tablet by mouth Every 8 (Eight) Hours As Needed for Nausea or Vomiting. 10 tablet 0   • ONETOUCH VERIO test strip   11   • phenazopyridine (PYRIDIUM) 100 MG tablet Take 1 tablet by mouth 3 (Three) Times a Day As Needed for bladder spasms. 6 tablet 0   • pravastatin (PRAVACHOL) 40 MG tablet Take 40 mg by mouth Every Night.     • promethazine (PHENERGAN) 25 MG tablet Take 25 mg by mouth Every 6 (Six) Hours As Needed for Nausea or Vomiting.     • sucralfate (CARAFATE) 1 g tablet Take 1 g by mouth 4 (Four) Times a Day.  2   • Syringe/Needle, Disp, 25G X 5/8\" 3 " "ML misc USE 1 SYRINGE FOR MONTHLY VITAMIN B-12 SHOTS 6 each 2   • traZODone (DESYREL) 100 MG tablet Take 1.5 tablets by mouth Every Night. 45 tablet 3   • hydrOXYzine (VISTARIL) 50 MG capsule Take 1 capsule by mouth 3 (Three) Times a Day As Needed for Itching. 90 capsule 1   • sertraline (ZOLOFT) 50 MG tablet Take 50 mg by mouth Daily.       No facility-administered medications prior to visit.        Review of Systems   Constitutional: Positive for chills and malaise/fatigue. Negative for fatigue, fever and unexpected weight change.   HENT: Negative.  Negative for congestion, ear pain, hearing loss, nosebleeds, rhinorrhea, sneezing, sore throat and tinnitus.    Eyes: Negative.  Negative for blurred vision and discharge.   Respiratory: Negative.  Negative for cough and wheezing.    Cardiovascular: Negative.  Negative for orthopnea and PND.   Gastrointestinal: Negative.  Negative for abdominal pain, constipation, diarrhea and vomiting.   Endocrine: Negative.    Genitourinary: Positive for flank pain and frequency. Negative for dysuria, hematuria, hesitancy and urgency.   Musculoskeletal: Negative for arthralgias, back pain, joint swelling, myalgias and neck pain.   Skin: Negative.  Negative for rash.   Allergic/Immunologic: Negative.    Neurological: Negative.  Negative for weakness, numbness and headaches.   Hematological: Negative.  Negative for adenopathy.   Psychiatric/Behavioral: Negative.  Negative for dysphoric mood and sleep disturbance.       Objective   Visit Vitals  /70 (BP Location: Left arm, Patient Position: Sitting, Cuff Size: Adult)   Pulse 67   Ht 158.8 cm (62.5\")   Wt 66.2 kg (146 lb)   LMP  (LMP Unknown)   SpO2 98%   BMI 26.28 kg/m²     Physical Exam   Constitutional: She is oriented to person, place, and time. She appears well-developed and well-nourished. No distress.   HENT:   Head: Normocephalic and atraumatic.   Nose: Nose normal.   Mouth/Throat: Oropharynx is clear and moist.   Eyes: " Pupils are equal, round, and reactive to light. Conjunctivae and EOM are normal. Right eye exhibits no discharge. Left eye exhibits no discharge.   Neck: No thyromegaly present.   Cardiovascular: Normal rate, regular rhythm, normal heart sounds and intact distal pulses.    Pulmonary/Chest: Effort normal and breath sounds normal.   Lymphadenopathy:     She has no cervical adenopathy.   Neurological: She is alert and oriented to person, place, and time.   Skin: Skin is warm and dry.   Psychiatric: She has a normal mood and affect.   Nursing note and vitals reviewed.      Assessment/Plan   Problem List Items Addressed This Visit        Cardiovascular and Mediastinum    Essential hypertension - Primary (Chronic)    Relevant Orders    CBC & Differential (Completed)    Comprehensive Metabolic Panel (Completed)    CBC Auto Differential (Completed)    Mixed hyperlipidemia (Chronic)       Endocrine    Diabetes mellitus (CMS/HCC)      Other Visit Diagnoses     Encounter for screening mammogram for breast cancer        Relevant Orders    Mammo Screening Digital Tomosynthesis Bilateral With CAD (Completed)    Menopause        Relevant Orders    DEXA Bone Density Axial (Completed)    Recurrent UTI        Relevant Orders    Urinalysis With Microscopic - Urine, Clean Catch (Completed)    Urine Culture - Urine, Urine, Clean Catch (Completed)          Will notify regarding results.     No orders of the defined types were placed in this encounter.    Return in about 1 month (around 9/14/2018) for Recheck, medicare wellness visit.

## 2018-08-16 LAB — BACTERIA SPEC AEROBE CULT: NORMAL

## 2018-08-22 ENCOUNTER — TELEPHONE (OUTPATIENT)
Dept: FAMILY MEDICINE CLINIC | Facility: CLINIC | Age: 78
End: 2018-08-22

## 2018-08-22 DIAGNOSIS — M81.0 OSTEOPOROSIS WITHOUT CURRENT PATHOLOGICAL FRACTURE, UNSPECIFIED OSTEOPOROSIS TYPE: Primary | ICD-10-CM

## 2018-08-22 NOTE — TELEPHONE ENCOUNTER
Per Dr. Quispe,  has been called with her recent DEXA Bone Density Study results & recommendations.  Continue her current medications and follow-up as planned or sooner if any problems.    Referral Sent to Bone Health      ----- Message from Ami Quispe MD sent at 8/22/2018  2:16 PM CDT -----  Call and tell she has worsening osteoporosis. Recommend she be referred to the bone health clinic to discuss the best treatment for this?

## 2018-08-28 PROBLEM — E78.2 MIXED HYPERLIPIDEMIA: Chronic | Status: ACTIVE | Noted: 2018-08-28

## 2018-08-28 RX ORDER — HYDROXYZINE PAMOATE 50 MG/1
CAPSULE ORAL
Qty: 90 CAPSULE | Refills: 1 | Status: SHIPPED | OUTPATIENT
Start: 2018-08-28 | End: 2018-11-19 | Stop reason: SDUPTHER

## 2018-08-30 RX ORDER — CYANOCOBALAMIN 1000 UG/ML
1000 INJECTION, SOLUTION INTRAMUSCULAR; SUBCUTANEOUS
Qty: 1 ML | Refills: 11 | Status: SHIPPED | OUTPATIENT
Start: 2018-08-30 | End: 2019-08-26 | Stop reason: SDUPTHER

## 2018-08-30 RX ORDER — DULOXETIN HYDROCHLORIDE 20 MG/1
20 CAPSULE, DELAYED RELEASE ORAL DAILY
Qty: 30 CAPSULE | Refills: 5 | Status: SHIPPED | OUTPATIENT
Start: 2018-08-30 | End: 2019-02-13 | Stop reason: SDUPTHER

## 2018-08-30 RX ORDER — PRAVASTATIN SODIUM 40 MG
40 TABLET ORAL NIGHTLY
Qty: 30 TABLET | Refills: 5 | Status: SHIPPED | OUTPATIENT
Start: 2018-08-30 | End: 2018-12-20

## 2018-09-18 ENCOUNTER — OFFICE VISIT (OUTPATIENT)
Dept: FAMILY MEDICINE CLINIC | Facility: CLINIC | Age: 78
End: 2018-09-18

## 2018-09-18 VITALS
BODY MASS INDEX: 26.03 KG/M2 | HEIGHT: 63 IN | SYSTOLIC BLOOD PRESSURE: 140 MMHG | WEIGHT: 146.9 LBS | DIASTOLIC BLOOD PRESSURE: 72 MMHG | HEART RATE: 76 BPM | OXYGEN SATURATION: 98 %

## 2018-09-18 DIAGNOSIS — E11.42 DIABETIC POLYNEUROPATHY ASSOCIATED WITH TYPE 2 DIABETES MELLITUS (HCC): Chronic | ICD-10-CM

## 2018-09-18 DIAGNOSIS — Z00.00 MEDICARE ANNUAL WELLNESS VISIT, SUBSEQUENT: Primary | ICD-10-CM

## 2018-09-18 PROCEDURE — G0439 PPPS, SUBSEQ VISIT: HCPCS | Performed by: GENERAL PRACTICE

## 2018-09-18 PROCEDURE — 99212 OFFICE O/P EST SF 10 MIN: CPT | Performed by: GENERAL PRACTICE

## 2018-09-18 RX ORDER — GABAPENTIN 300 MG/1
CAPSULE ORAL
Qty: 90 CAPSULE | Refills: 2 | Status: SHIPPED | OUTPATIENT
Start: 2018-09-18 | End: 2018-12-20 | Stop reason: SDUPTHER

## 2018-09-18 NOTE — PROGRESS NOTES
QUICK REFERENCE INFORMATION:  The ABCs of the Annual Wellness Visit    Subsequent Medicare Wellness Visit    HEALTH RISK ASSESSMENT    1940    Recent Hospitalizations:  Recently treated at the following:  [facility].        Current Medical Providers:  Patient Care Team:  Ami Quispe MD as PCP - General (Family Medicine)  Denisa Powell MD as PCP - Claims Attributed  Hima Gill MD PhD as Consulting Physician (Cardiology)  Ezra Meyers DO as Consulting Physician (Gastroenterology)  Axel Phillips MD as Surgeon (Cardiothoracic Surgery)  [place], Adan AZAR MD as Consulting Physician (Urology)  Lamin Dimas MD as Consulting Physician (Dermatology)        Smoking Status:  History   Smoking Status   • Former Smoker   • Packs/day: 0.50   • Types: Cigarettes   • Quit date: 06/2016   Smokeless Tobacco   • Never Used     Comment: 0.5 - 1 Pack(s) Of Cigarettes Per Day       Alcohol Consumption:  History   Alcohol Use No       Depression Screen:   PHQ-2/PHQ-9 Depression Screening 9/18/2018   Little interest or pleasure in doing things 2   Feeling down, depressed, or hopeless 2   Trouble falling or staying asleep, or sleeping too much 2   Feeling tired or having little energy 2   Poor appetite or overeating 2   Feeling bad about yourself - or that you are a failure or have let yourself or your family down 2   Trouble concentrating on things, such as reading the newspaper or watching television 1   Moving or speaking so slowly that other people could have noticed. Or the opposite - being so fidgety or restless that you have been moving around a lot more than usual 0   Thoughts that you would be better off dead, or of hurting yourself in some way 0   Total Score 13   If you checked off any problems, how difficult have these problems made it for you to do your work, take care of things at home, or get along with other people? Very difficult       Health Habits  and Functional and Cognitive Screening:  Functional & Cognitive Status 9/18/2018   Do you have difficulty preparing food and eating? Yes   Do you have difficulty bathing yourself, getting dressed or grooming yourself? Yes   Do you have difficulty using the toilet? No   Do you have difficulty moving around from place to place? Yes   Do you have trouble with steps or getting out of a bed or a chair? Yes   In the past year have you fallen or experienced a near fall? Yes   Current Diet Well Balanced Diet   Dental Exam Up to date   Eye Exam Up to date   Exercise (times per week) 0 times per week   Current Exercise Activities Include -   Do you need help using the phone?  No   Are you deaf or do you have serious difficulty hearing?  No   Do you need help with transportation? Yes   Do you need help shopping? Yes   Do you need help preparing meals?  Yes   Do you need help with housework?  Yes   Do you need help with laundry? Yes   Do you need help taking your medications? Yes   Do you need help managing money? No   Do you ever drive or ride in a car without wearing a seat belt? No   Have you felt unusual stress, anger or loneliness in the last month? Yes   Who do you live with? Spouse   If you need help, do you have trouble finding someone available to you? No   Have you been bothered in the last four weeks by sexual problems? No   Do you have difficulty concentrating, remembering or making decisions? Yes           Does the patient have evidence of cognitive impairment? No    Aspirin use counseling: Contraindicated from taking ASA      Recent Lab Results:  CMP:  Lab Results   Component Value Date    BUN 15 08/14/2018    CREATININE 0.70 08/14/2018    EGFRIFNONA 81 08/14/2018    BCR 21.4 08/14/2018     08/14/2018    K 3.9 08/14/2018    CO2 28.0 08/14/2018    CALCIUM 8.9 08/14/2018    ALBUMIN 4.20 08/14/2018    BILITOT 0.8 08/14/2018    ALKPHOS 40 08/14/2018    AST 26 08/14/2018    ALT 22 08/14/2018     Lipid Panel:  Lab  Results   Component Value Date    CHOL 140 06/21/2018    TRIG 128 06/21/2018    HDL 61 06/21/2018    LDLHDL 0.88 06/21/2018     HbA1c:  Lab Results   Component Value Date    HGBA1C 6.3 (H) 04/02/2018       Visual Acuity:  No exam data present    Age-appropriate Screening Schedule:  Refer to the list below for future screening recommendations based on patient's age, sex and/or medical conditions. Orders for these recommended tests are listed in the plan section. The patient has been provided with a written plan.    Health Maintenance   Topic Date Due   • TDAP/TD VACCINES (1 - Tdap) 11/18/1957   • ZOSTER VACCINE (2 of 2) 11/03/2017   • URINE MICROALBUMIN  05/19/2018   • INFLUENZA VACCINE  08/01/2018   • HEMOGLOBIN A1C  10/02/2018   • COLONOSCOPY  04/04/2019   • LIPID PANEL  06/21/2019   • MAMMOGRAM  08/14/2019   • PNEUMOCOCCAL VACCINES (65+ LOW/MEDIUM RISK)  Addressed        Subjective   History of Present Illness    Mariluz Arango is a 77 y.o. female who presents for an Subsequent Wellness Visit.    The following portions of the patient's history were reviewed and updated as appropriate: allergies, current medications, past family history, past medical history, past social history, past surgical history and problem list.    Outpatient Medications Prior to Visit   Medication Sig Dispense Refill   • carvedilol (COREG) 6.25 MG tablet TAKE 1 TABLET BY MOUTH 2 TIMES A DAY WITH MEALS 60 tablet 2   • clobetasol (TEMOVATE) 0.05 % external solution Apply 1 application topically Daily.     • clopidogrel (PLAVIX) 75 MG tablet TAKE 1 TABLET BY MOUTH DAILY. 30 tablet 2   • Cranberry 500 MG capsule Take 1 tablet by mouth Daily.     • cyanocobalamin 1000 MCG/ML injection Inject 1 mL into the appropriate muscle as directed by prescriber Every 28 (Twenty-Eight) Days. 1 mL 11   • donepezil (ARICEPT) 10 MG tablet TAKE 1 TABLET BY MOUTH EVERY NIGHT. 90 tablet 2   • DULoxetine (CYMBALTA) 20 MG capsule Take 1 capsule by mouth Daily. 30  "capsule 5   • FLUOCINOLONE ACETONIDE SCALP 0.01 % oil Apply 1 application topically Daily.     • fluticasone (FLONASE) 50 MCG/ACT nasal spray INSTILL 2 SPRAYS INTO EACH NOSTRIL DAILY 16 g 5   • HYDROcodone-acetaminophen (NORCO) 7.5-325 MG per tablet 1 tablet As Needed for Moderate Pain .  0   • hydrocortisone 1 % lotion Apply  topically 3 (Three) Times a Day As Needed for Irritation or Rash. 1 bottle 1   • hydrOXYzine (VISTARIL) 50 MG capsule TAKE ONE CAPSULE BY MOUTH THREE TIMES A DAY AS NEEDED FOR ITCHING 90 capsule 1   • ketoconazole (NIZORAL) 2 % shampoo      • lidocaine (LIDODERM) 5 % PLACE ONE PATCH ONE THE SKIN EVERY DAY. REMOVE AND DISCARD THE PATCH WITHIN 12 HOURS AFTER APPLICATION EVERY DAY OR AS DIRECTED BY MD. 30 patch 1   • Multiple Vitamins-Minerals (MULTIVITAMIN GUMMIES ADULT PO) Take 1 each by mouth Daily.     • nystatin (MYCOSTATIN) 412173 UNIT/GM powder APPLY TO THE AFFECTED AREA THREE TIMES DAILY 60 g 2   • omeprazole (priLOSEC) 40 MG capsule Take 40 mg by mouth Daily.     • ondansetron (ZOFRAN) 8 MG tablet Take 8 mg by mouth Every 8 (Eight) Hours As Needed for Nausea or Vomiting.     • ondansetron ODT (ZOFRAN-ODT) 4 MG disintegrating tablet Take 1 tablet by mouth Every 8 (Eight) Hours As Needed for Nausea or Vomiting. 10 tablet 0   • ONETOUCH VERIO test strip   11   • phenazopyridine (PYRIDIUM) 100 MG tablet Take 1 tablet by mouth 3 (Three) Times a Day As Needed for bladder spasms. 6 tablet 0   • pravastatin (PRAVACHOL) 40 MG tablet Take 1 tablet by mouth Every Night. 30 tablet 5   • promethazine (PHENERGAN) 25 MG tablet Take 25 mg by mouth Every 6 (Six) Hours As Needed for Nausea or Vomiting.     • sucralfate (CARAFATE) 1 g tablet Take 1 g by mouth 4 (Four) Times a Day.  2   • Syringe/Needle, Disp, 25G X 5/8\" 3 ML misc USE 1 SYRINGE FOR MONTHLY VITAMIN B-12 SHOTS 6 each 2   • traZODone (DESYREL) 100 MG tablet Take 1.5 tablets by mouth Every Night. 45 tablet 3   • doxycycline (VIBRAMYCIN) 100 MG " capsule Take 1 capsule by mouth 2 (Two) Times a Day. 10 capsule 0   • gabapentin (NEURONTIN) 300 MG capsule Take 1 capsule by mouth 3 (Three) Times a Day. 90 capsule 2     No facility-administered medications prior to visit.        Patient Active Problem List   Diagnosis   • Anxiety   • Artificial lens present   • Depressive disorder   • Diabetes mellitus (CMS/HCC)   • Diabetic polyneuropathy (CMS/HCC)   • Diverticular disease of colon   • Gastroesophageal reflux disease   • Hemorrhoid   • Hiatal hernia   • History of colon polyps   • Hypercholesterolemia   • Essential hypertension   • Lumbar radiculopathy   • 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)   • Pain in thoracic spine   • Vitamin D deficiency   • Paroxysmal atrial fibrillation (CMS/HCC)   • Thoracic aortic aneurysm without rupture (CMS/HCC)   • Early onset Alzheimer's dementia without behavioral disturbance   • Iron deficiency anemia secondary to inadequate dietary iron intake   • Pyelonephritis   • Acute cystitis without hematuria   • Mixed hyperlipidemia       Advance Care Planning:  has an advance directive - a copy has been provided and is in file    Identification of Risk Factors:  Risk factors include: weight , unhealthy diet, inactivity, increased fall risk and depression.    Review of Systems    Compared to one year ago, the patient feels her physical health is the same.  Compared to one year ago, the patient feels her mental health is the same.    Objective     Physical Exam   Constitutional: She is oriented to person, place, and time. She appears well-developed and well-nourished. No distress.   HENT:   Head: Normocephalic and atraumatic.   Nose: Nose normal.   Mouth/Throat: Oropharynx is clear and moist.   Eyes: Pupils are equal, round, and reactive to light. Conjunctivae and EOM are normal. Right eye exhibits no discharge. Left eye exhibits no discharge.   Neck: No thyromegaly present.   Cardiovascular: Normal rate,  "regular rhythm, normal heart sounds and intact distal pulses.    Pulmonary/Chest: Effort normal and breath sounds normal.   Lymphadenopathy:     She has no cervical adenopathy.   Neurological: She is alert and oriented to person, place, and time.   Skin: Skin is warm and dry.   Psychiatric: She has a normal mood and affect.   Nursing note and vitals reviewed.      Vitals:    09/18/18 1322   BP: 140/72   BP Location: Left arm   Patient Position: Sitting   Cuff Size: Adult   Pulse: 76   SpO2: 98%   Weight: 66.6 kg (146 lb 14.4 oz)   Height: 158.8 cm (62.5\")   PainSc: 6  Comment: legs   PainLoc: Back       Patient's Body mass index is 26.44 kg/m². BMI is within normal parameters. No follow-up required.      Assessment/Plan   Patient Self-Management and Personalized Health Advice  The patient has been provided with information about: diet, exercise, weight management, fall prevention and mental health concerns and preventive services including:   · Advance directive, Exercise counseling provided, Fall Risk assessment done, Fall Risk plan of care done, Influenza vaccine, Zostavax vaccine (Herpes Zoster).    Visit Diagnoses:    ICD-10-CM ICD-9-CM   1. Medicare annual wellness visit, subsequent Z00.00 V70.0   2. Diabetic polyneuropathy associated with type 2 diabetes mellitus (CMS/[facility]) E11.42 250.60     357.2       No orders of the defined types were placed in this encounter.      Outpatient Encounter Prescriptions as of 9/18/2018   Medication Sig Dispense Refill   • carvedilol (COREG) 6.25 MG tablet TAKE 1 TABLET BY MOUTH 2 TIMES A DAY WITH MEALS 60 tablet 2   • clobetasol (TEMOVATE) 0.05 % external solution Apply 1 application topically Daily.     • clopidogrel (PLAVIX) 75 MG tablet TAKE 1 TABLET BY MOUTH DAILY. 30 tablet 2   • Cranberry 500 MG capsule Take 1 tablet by mouth Daily.     • cyanocobalamin 1000 MCG/ML injection Inject 1 mL into the appropriate muscle as directed by prescriber Every 28 (Twenty-Eight) Days. 1 " mL 11   • donepezil (ARICEPT) 10 MG tablet TAKE 1 TABLET BY MOUTH EVERY NIGHT. 90 tablet 2   • DULoxetine (CYMBALTA) 20 MG capsule Take 1 capsule by mouth Daily. 30 capsule 5   • FLUOCINOLONE ACETONIDE SCALP 0.01 % oil Apply 1 application topically Daily.     • fluticasone (FLONASE) 50 MCG/ACT nasal spray INSTILL 2 SPRAYS INTO EACH NOSTRIL DAILY 16 g 5   • gabapentin (NEURONTIN) 300 MG capsule 1 cap qam and 2 caps qhs 90 capsule 2   • HYDROcodone-acetaminophen (NORCO) 7.5-325 MG per tablet 1 tablet As Needed for Moderate Pain .  0   • hydrocortisone 1 % lotion Apply  topically 3 (Three) Times a Day As Needed for Irritation or Rash. 1 bottle 1   • hydrOXYzine (VISTARIL) 50 MG capsule TAKE ONE CAPSULE BY MOUTH THREE TIMES A DAY AS NEEDED FOR ITCHING 90 capsule 1   • ketoconazole (NIZORAL) 2 % shampoo      • lidocaine (LIDODERM) 5 % PLACE ONE PATCH ONE THE SKIN EVERY DAY. REMOVE AND DISCARD THE PATCH WITHIN 12 HOURS AFTER APPLICATION EVERY DAY OR AS DIRECTED BY MD. 30 patch 1   • Multiple Vitamins-Minerals (MULTIVITAMIN GUMMIES ADULT PO) Take 1 each by mouth Daily.     • nystatin (MYCOSTATIN) 460591 UNIT/GM powder APPLY TO THE AFFECTED AREA THREE TIMES DAILY 60 g 2   • omeprazole (priLOSEC) 40 MG capsule Take 40 mg by mouth Daily.     • ondansetron (ZOFRAN) 8 MG tablet Take 8 mg by mouth Every 8 (Eight) Hours As Needed for Nausea or Vomiting.     • ondansetron ODT (ZOFRAN-ODT) 4 MG disintegrating tablet Take 1 tablet by mouth Every 8 (Eight) Hours As Needed for Nausea or Vomiting. 10 tablet 0   • ONETOUCH VERIO test strip   11   • phenazopyridine (PYRIDIUM) 100 MG tablet Take 1 tablet by mouth 3 (Three) Times a Day As Needed for bladder spasms. 6 tablet 0   • pravastatin (PRAVACHOL) 40 MG tablet Take 1 tablet by mouth Every Night. 30 tablet 5   • promethazine (PHENERGAN) 25 MG tablet Take 25 mg by mouth Every 6 (Six) Hours As Needed for Nausea or Vomiting.     • sucralfate (CARAFATE) 1 g tablet Take 1 g by mouth 4  "(Four) Times a Day.  2   • Syringe/Needle, Disp, 25G X 5/8\" 3 ML misc USE 1 SYRINGE FOR MONTHLY VITAMIN B-12 SHOTS 6 each 2   • traZODone (DESYREL) 100 MG tablet Take 1.5 tablets by mouth Every Night. 45 tablet 3   • [DISCONTINUED] doxycycline (VIBRAMYCIN) 100 MG capsule Take 1 capsule by mouth 2 (Two) Times a Day. 10 capsule 0   • [DISCONTINUED] gabapentin (NEURONTIN) 300 MG capsule Take 1 capsule by mouth 3 (Three) Times a Day. 90 capsule 2     No facility-administered encounter medications on file as of 9/18/2018.        Reviewed use of high risk medication in the elderly: yes  Reviewed for potential of harmful drug interactions in the elderly: not applicable    Follow Up:  Return in about 3 months (around 12/18/2018) for Recheck.   Information has been scanned into chart.  Discussed importance of taking medications as prescribed. Encouraged healthy eating habits with low fat, low salt choices and working towards maintaining a healthy weight. Recommended regular exercise if able as well as care to prevent falls.    An After Visit Summary and PPPS with all of these plans were given to the patient.         "

## 2018-09-18 NOTE — PATIENT INSTRUCTIONS
Check at pharmacy on Shingrix shingles vaccine   Take gabapentin 1 in the morning and 2 at bedtime    Medicare Wellness  Personal Prevention Plan of Service     Date of Office Visit:  2018  Encounter Provider:  Ami Quispe MD  Place of Service:  [facility]  Patient Name: Mariluz Arango  :  1940    As part of the Medicare Wellness portion of your visit today, we are providing you with this personalized preventive plan of services (PPPS). This plan is based upon recommendations of the [place] Preventive Services Task Force (USPSTF) and the Advisory Committee on Immunization Practices (ACIP).    This lists the preventive care services that should be considered, and provides dates of when you are due. Items listed as completed are up-to-date and do not require any further intervention.    Health Maintenance   Topic Date Due   • TDAP/TD VACCINES (1 - Tdap) 1957   • ZOSTER VACCINE (2 of 2) 2017   • URINE MICROALBUMIN  2018   • INFLUENZA VACCINE  2018   • HEMOGLOBIN A1C  10/02/2018   • COLONOSCOPY  2019   • LIPID PANEL  2019   • MAMMOGRAM  2019   • MEDICARE ANNUAL WELLNESS  2019   • PNEUMOCOCCAL VACCINES (65+ LOW/MEDIUM RISK)  Addressed       No orders of the defined types were placed in this encounter.      Return in about 3 months (around 2018) for Recheck.

## 2018-09-18 NOTE — PROGRESS NOTES
Subjective   Mariluz Arango is a 77 y.o. female.   Chief Complaint   Patient presents with   • Follow-up     MWV   • Hypertension   • Hyperlipidemia     History of Present Illness     For review and evaluation of management of chronic medical problems. Has chronic diabetic neuropathy with significant symptoms. On gabapentin  which helps but makes her a bit sleepy. No aggravating or other alleviating factors.       The following portions of the patient's history were reviewed and updated as appropriate: allergies, current medications, past social history and problem list.    Outpatient Medications Prior to Visit   Medication Sig Dispense Refill   • carvedilol (COREG) 6.25 MG tablet TAKE 1 TABLET BY MOUTH 2 TIMES A DAY WITH MEALS 60 tablet 2   • clobetasol (TEMOVATE) 0.05 % external solution Apply 1 application topically Daily.     • clopidogrel (PLAVIX) 75 MG tablet TAKE 1 TABLET BY MOUTH DAILY. 30 tablet 2   • Cranberry 500 MG capsule Take 1 tablet by mouth Daily.     • cyanocobalamin 1000 MCG/ML injection Inject 1 mL into the appropriate muscle as directed by prescriber Every 28 (Twenty-Eight) Days. 1 mL 11   • donepezil (ARICEPT) 10 MG tablet TAKE 1 TABLET BY MOUTH EVERY NIGHT. 90 tablet 2   • DULoxetine (CYMBALTA) 20 MG capsule Take 1 capsule by mouth Daily. 30 capsule 5   • FLUOCINOLONE ACETONIDE SCALP 0.01 % oil Apply 1 application topically Daily.     • fluticasone (FLONASE) 50 MCG/ACT nasal spray INSTILL 2 SPRAYS INTO EACH NOSTRIL DAILY 16 g 5   • HYDROcodone-acetaminophen (NORCO) 7.5-325 MG per tablet 1 tablet As Needed for Moderate Pain .  0   • hydrocortisone 1 % lotion Apply  topically 3 (Three) Times a Day As Needed for Irritation or Rash. 1 bottle 1   • hydrOXYzine (VISTARIL) 50 MG capsule TAKE ONE CAPSULE BY MOUTH THREE TIMES A DAY AS NEEDED FOR ITCHING 90 capsule 1   • ketoconazole (NIZORAL) 2 % shampoo      • lidocaine (LIDODERM) 5 % PLACE ONE PATCH ONE THE SKIN EVERY DAY. REMOVE AND DISCARD THE  "PATCH WITHIN 12 HOURS AFTER APPLICATION EVERY DAY OR AS DIRECTED BY MD. 30 patch 1   • Multiple Vitamins-Minerals (MULTIVITAMIN GUMMIES ADULT PO) Take 1 each by mouth Daily.     • nystatin (MYCOSTATIN) 401382 UNIT/GM powder APPLY TO THE AFFECTED AREA THREE TIMES DAILY 60 g 2   • omeprazole (priLOSEC) 40 MG capsule Take 40 mg by mouth Daily.     • ondansetron (ZOFRAN) 8 MG tablet Take 8 mg by mouth Every 8 (Eight) Hours As Needed for Nausea or Vomiting.     • ondansetron ODT (ZOFRAN-ODT) 4 MG disintegrating tablet Take 1 tablet by mouth Every 8 (Eight) Hours As Needed for Nausea or Vomiting. 10 tablet 0   • ONETOUCH VERIO test strip   11   • phenazopyridine (PYRIDIUM) 100 MG tablet Take 1 tablet by mouth 3 (Three) Times a Day As Needed for bladder spasms. 6 tablet 0   • pravastatin (PRAVACHOL) 40 MG tablet Take 1 tablet by mouth Every Night. 30 tablet 5   • promethazine (PHENERGAN) 25 MG tablet Take 25 mg by mouth Every 6 (Six) Hours As Needed for Nausea or Vomiting.     • sucralfate (CARAFATE) 1 g tablet Take 1 g by mouth 4 (Four) Times a Day.  2   • Syringe/Needle, Disp, 25G X 5/8\" 3 ML misc USE 1 SYRINGE FOR MONTHLY VITAMIN B-12 SHOTS 6 each 2   • traZODone (DESYREL) 100 MG tablet Take 1.5 tablets by mouth Every Night. 45 tablet 3   • doxycycline (VIBRAMYCIN) 100 MG capsule Take 1 capsule by mouth 2 (Two) Times a Day. 10 capsule 0   • gabapentin (NEURONTIN) 300 MG capsule Take 1 capsule by mouth 3 (Three) Times a Day. 90 capsule 2     No facility-administered medications prior to visit.        Review of Systems   Constitutional: Negative.  Negative for chills, fatigue, fever and unexpected weight change.   HENT: Negative.  Negative for congestion, ear pain, hearing loss, nosebleeds, rhinorrhea, sneezing, sore throat and tinnitus.    Eyes: Negative.  Negative for discharge.   Respiratory: Negative.  Negative for cough, shortness of breath and wheezing.    Cardiovascular: Negative.  Negative for chest pain and " "palpitations.   Gastrointestinal: Negative.  Negative for abdominal pain, constipation, diarrhea, nausea and vomiting.   Endocrine: Negative.    Genitourinary: Negative.  Negative for dysuria, frequency and urgency.   Musculoskeletal: Negative.  Negative for arthralgias, back pain, joint swelling, myalgias and neck pain.   Skin: Negative.  Negative for rash.   Allergic/Immunologic: Negative.    Neurological: Negative.  Negative for dizziness, weakness, numbness and headaches.   Hematological: Negative.  Negative for adenopathy.   Psychiatric/Behavioral: Negative.  Negative for dysphoric mood and sleep disturbance. The patient is not nervous/anxious.        Objective   Visit Vitals  /72 (BP Location: Left arm, Patient Position: Sitting, Cuff Size: Adult)   Pulse 76   Ht 158.8 cm (62.5\")   Wt 66.6 kg (146 lb 14.4 oz)   LMP  (LMP Unknown)   SpO2 98%   BMI 26.44 kg/m²     Physical Exam   Constitutional: She is oriented to person, place, and time. She appears well-developed and well-nourished. No distress.   HENT:   Head: Normocephalic and atraumatic.   Nose: Nose normal.   Mouth/Throat: Oropharynx is clear and moist.   Eyes: Pupils are equal, round, and reactive to light. Conjunctivae and EOM are normal. Right eye exhibits no discharge. Left eye exhibits no discharge.   Neck: No thyromegaly present.   Cardiovascular: Normal rate, regular rhythm, normal heart sounds and intact distal pulses.    Pulmonary/Chest: Effort normal and breath sounds normal.   Lymphadenopathy:     She has no cervical adenopathy.   Neurological: She is alert and oriented to person, place, and time.   Skin: Skin is warm and dry.   Psychiatric: She has a normal mood and affect.   Nursing note and vitals reviewed.    Assessment/Plan   Problem List Items Addressed This Visit        Endocrine    Diabetic polyneuropathy (CMS/HCC) (Chronic)      Other Visit Diagnoses     Medicare annual wellness visit, subsequent    -  Primary          Continue " current treatment. Take gabapentin 1 in the morning and 2 at bedtime. Gurvinder reviewed and appropriate.     New Medications Ordered This Visit   Medications   • gabapentin (NEURONTIN) 300 MG capsule     Si cap qam and 2 caps qhs     Dispense:  90 capsule     Refill:  2     Return in about 3 months (around 2018) for Recheck.

## 2018-10-01 ENCOUNTER — OFFICE VISIT (OUTPATIENT)
Dept: ORTHOPEDIC SURGERY | Facility: CLINIC | Age: 78
End: 2018-10-01

## 2018-10-01 VITALS — WEIGHT: 146 LBS | BODY MASS INDEX: 26.87 KG/M2 | HEIGHT: 62 IN

## 2018-10-01 DIAGNOSIS — G89.29 CHRONIC MIDLINE LOW BACK PAIN WITHOUT SCIATICA: ICD-10-CM

## 2018-10-01 DIAGNOSIS — M81.0 AGE RELATED OSTEOPOROSIS, UNSPECIFIED PATHOLOGICAL FRACTURE PRESENCE: Primary | ICD-10-CM

## 2018-10-01 DIAGNOSIS — M54.50 CHRONIC MIDLINE LOW BACK PAIN WITHOUT SCIATICA: ICD-10-CM

## 2018-10-01 PROCEDURE — 99214 OFFICE O/P EST MOD 30 MIN: CPT | Performed by: NURSE PRACTITIONER

## 2018-10-01 PROCEDURE — 96372 THER/PROPH/DIAG INJ SC/IM: CPT | Performed by: NURSE PRACTITIONER

## 2018-10-01 RX ORDER — TRIAMCINOLONE ACETONIDE 40 MG/ML
80 INJECTION, SUSPENSION INTRA-ARTICULAR; INTRAMUSCULAR ONCE
Status: COMPLETED | OUTPATIENT
Start: 2018-10-01 | End: 2018-10-01

## 2018-10-01 RX ADMIN — TRIAMCINOLONE ACETONIDE 80 MG: 40 INJECTION, SUSPENSION INTRA-ARTICULAR; INTRAMUSCULAR at 08:50

## 2018-10-01 NOTE — PROGRESS NOTES
"Mariluz Arango is a 77 y.o. female   Primary provider:  Ami Quispe MD       Chief Complaint   Patient presents with   • Left Hip - Osteoporosis   • Right Hip - Osteoporosis   • Lumbar Spine - Osteoporosis       HISTORY OF PRESENT ILLNESS:   77-year-old female patient presents [facility] for bone health evaluation and treatment of osteoporosis.  Patient was referred per her primary care physician, Dr. Quispe.  Last bone density study was done on 8/14/2018.  Patient has had no prior treatment of her osteoporosis.  No history of fragility fractures.  Patient did have a right ankle fracture several years ago related to a traumatic fall from her back deck that required surgical fixation and multiple revision surgeries.  Patient reports a decrease in her height over the last several years.  Patient reports a family history of osteoporosis.  In terms of increased risks for osteoporosis, patient has comorbid medical conditions that include diabetes type 2 (non-insulin dependent) and a history of CVA.  Patient has also been treated with PPIs long-term for reflux.  Patient is a postmenopausal  female.  She reports that she had a hysterectomy at a young age, but did have hormone replacement therapy following this.  Patient is a former smoker who smoked for about 50 years.  She quit smoking in 2016.  Patient also complains today of chronic low back pain.  This has been a problem for many years with no new/acute complaints today regarding her back.  The pain is described as intermittent and moderate in severity.  The pain is described as aching and crushing in nature.  Pain is worse with both sitting and standing.  Pain improves mildly with rest and \"pain patches\". She has had prior back surgery with prior fusion of L5-S1.       Osteoporosis   This is a chronic problem. The current episode started more than 1 year ago. The problem occurs constantly. The problem has been gradually worsening. " Associated symptoms include headaches, numbness and weakness. Associated symptoms comments: Chronic low back pain. The symptoms are aggravated by bending. She has tried nothing for the symptoms.        CONCURRENT MEDICAL HISTORY:    Past Medical History:   Diagnosis Date   • Altered mental status     unspecified    • Anxiety    • Artificial lens present     Artificial lens in position   • Depressive disorder    • Diabetes mellitus (CMS/HCC)     no retinopathy      • Diabetic polyneuropathy (CMS/HCC)    • Diarrhea     unspecified    • Diverticular disease of colon    • Dizziness    • Gastroesophageal reflux disease    • Generalized abdominal pain    • Headache    • Hiatal hernia    • History of colonic polyps     Personal history of colonic polyps   • History of echocardiogram 09/17/2008    Echocardiogram W/O color flow 09703 (1) - Evidence of LVH & diastolic dysfunction & mild LA enlargement, otherwise NRL echocradiogram   • History of mammogram 05/27/2011    MAMMOGRAM DIAGNOSTIC BILATERAL 14387 ([facility]) (1) - benign Birads category 2   • History of transfusion    • Hypercholesterolemia    • Lumbar radiculopathy    • Memory impairment     Multifactorial      • Nausea    • 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)    • Pain in thoracic spine    • Palpitations    • Polyp of colon     History of polyp of colon   • Stroke (CMS/HCC)    • Vitamin D deficiency        Allergies   Allergen Reactions   • Aspirin    • Codeine      UNKNOWN REACTION   • Macrobid [Nitrofurantoin Macrocrystal] Nausea And Vomiting   • Other      Cipro   • Penicillins    • Sulfa Antibiotics      Sulfa (Sulfonamide Antibiotics)   • Dexilant [Dexlansoprazole] Rash         Current Outpatient Prescriptions:   •  carvedilol (COREG) 6.25 MG tablet, TAKE 1 TABLET BY MOUTH 2 TIMES A DAY WITH MEALS, Disp: 60 tablet, Rfl: 2  •  clobetasol (TEMOVATE) 0.05 % external solution, Apply 1 application topically Daily., Disp: , Rfl:   •   clopidogrel (PLAVIX) 75 MG tablet, TAKE 1 TABLET BY MOUTH DAILY., Disp: 30 tablet, Rfl: 2  •  Cranberry 500 MG capsule, Take 1 tablet by mouth Daily., Disp: , Rfl:   •  cyanocobalamin 1000 MCG/ML injection, Inject 1 mL into the appropriate muscle as directed by prescriber Every 28 (Twenty-Eight) Days., Disp: 1 mL, Rfl: 11  •  donepezil (ARICEPT) 10 MG tablet, TAKE 1 TABLET BY MOUTH EVERY NIGHT., Disp: 90 tablet, Rfl: 2  •  DULoxetine (CYMBALTA) 20 MG capsule, Take 1 capsule by mouth Daily., Disp: 30 capsule, Rfl: 5  •  FLUOCINOLONE ACETONIDE SCALP 0.01 % oil, Apply 1 application topically Daily., Disp: , Rfl:   •  fluticasone (FLONASE) 50 MCG/ACT nasal spray, INSTILL 2 SPRAYS INTO EACH NOSTRIL DAILY, Disp: 16 g, Rfl: 5  •  gabapentin (NEURONTIN) 300 MG capsule, 1 cap qam and 2 caps qhs, Disp: 90 capsule, Rfl: 2  •  HYDROcodone-acetaminophen (NORCO) 7.5-325 MG per tablet, 1 tablet As Needed for Moderate Pain ., Disp: , Rfl: 0  •  hydrocortisone 1 % lotion, Apply  topically 3 (Three) Times a Day As Needed for Irritation or Rash., Disp: 1 bottle, Rfl: 1  •  hydrOXYzine (VISTARIL) 50 MG capsule, TAKE ONE CAPSULE BY MOUTH THREE TIMES A DAY AS NEEDED FOR ITCHING, Disp: 90 capsule, Rfl: 1  •  ketoconazole (NIZORAL) 2 % shampoo, , Disp: , Rfl:   •  lidocaine (LIDODERM) 5 %, PLACE ONE PATCH ONE THE SKIN EVERY DAY. REMOVE AND DISCARD THE PATCH WITHIN 12 HOURS AFTER APPLICATION EVERY DAY OR AS DIRECTED BY MD., Disp: 30 patch, Rfl: 1  •  Multiple Vitamins-Minerals (MULTIVITAMIN GUMMIES ADULT PO), Take 1 each by mouth Daily., Disp: , Rfl:   •  nystatin (MYCOSTATIN) 707326 UNIT/GM powder, APPLY TO THE AFFECTED AREA THREE TIMES DAILY, Disp: 60 g, Rfl: 2  •  omeprazole (priLOSEC) 40 MG capsule, Take 40 mg by mouth Daily., Disp: , Rfl:   •  ondansetron (ZOFRAN) 8 MG tablet, Take 8 mg by mouth Every 8 (Eight) Hours As Needed for Nausea or Vomiting., Disp: , Rfl:   •  ondansetron ODT (ZOFRAN-ODT) 4 MG disintegrating tablet, Take 1  "tablet by mouth Every 8 (Eight) Hours As Needed for Nausea or Vomiting., Disp: 10 tablet, Rfl: 0  •  ONETOUCH VERIO test strip, , Disp: , Rfl: 11  •  phenazopyridine (PYRIDIUM) 100 MG tablet, Take 1 tablet by mouth 3 (Three) Times a Day As Needed for bladder spasms., Disp: 6 tablet, Rfl: 0  •  pravastatin (PRAVACHOL) 40 MG tablet, Take 1 tablet by mouth Every Night., Disp: 30 tablet, Rfl: 5  •  promethazine (PHENERGAN) 25 MG tablet, Take 25 mg by mouth Every 6 (Six) Hours As Needed for Nausea or Vomiting., Disp: , Rfl:   •  sucralfate (CARAFATE) 1 g tablet, Take 1 g by mouth 4 (Four) Times a Day., Disp: , Rfl: 2  •  Syringe/Needle, Disp, 25G X 5/8\" 3 ML misc, USE 1 SYRINGE FOR MONTHLY VITAMIN B-12 SHOTS, Disp: 6 each, Rfl: 2  •  traZODone (DESYREL) 100 MG tablet, Take 1.5 tablets by mouth Every Night., Disp: 45 tablet, Rfl: 3  No current facility-administered medications for this visit.     Past Surgical History:   Procedure Laterality Date   • ANKLE SURGERY  11/18/2008    Ankle surgery (4) - Open ankle arthrodesis with intermedullary bella fixation from the heel. Non union of the ankle, tibial plafond with failed ankle arthrodesis   • APPENDECTOMY  06/16/1965    Appendectomy (1)   • BLADDER SURGERY  06/08/1973    Bladder surgery (1) - Kendall-Raul repair. Cystocele with stress incontinence & endometriosis.Same with P.I.D   • BREAST BIOPSY  02/06/1997    Stereotactic breast biopsy (1) - Stereotactic needle localization with aspiration. Non palpable U/S solid mass, R breast by one interpretation & cluster cyst by another   • CARDIAC CATHETERIZATION  12/16/1986    Cardiac cath 63402 (1) - NRL LV function w/ NRL hemodynamics. NRL coronary arteries w/o evidence of atherosclerotic heart disease   • CARPAL TUNNEL RELEASE Left 08/15/1995    Carpal tunnel surgery (1) - Left carpal tunnel release, endoscopic procedure.   • CATARACT EXTRACTION Right 11/29/2007    Remove cataract, insert lens (2) - right   • CATARACT " EXTRACTION Bilateral    • COLONOSCOPY W/ POLYPECTOMY  2016    Colonoscopy remove polyps 27071 (1) - One polyp in the ascending colon.Resected and retrieved.   • DILATATION AND CURETTAGE  10/02/1964    D&C (1) - D&C & colpotomy. Incomplete  or tubal pregnancy. Dysfunctional uterine bleeding   • ENDOSCOPY      Colon endoscopy 31943 (2)   • ENDOSCOPY  2016    EGD w/ biopsy 02419 (1) - Gastritis.Normal duodenum.Biopsied.Normal esophagus.Several biopsies obtained in the gastric antrum.   • ENDOSCOPY  2008    EGD w/ tube 50198 (1) - Hiatal hernia. GERD. Esophageal motility disorder.   • ENDOSCOPY N/A 3/29/2018    Procedure: ESOPHAGOGASTRODUODENOSCOPY;  Surgeon: Ezra Meyers DO;  Location: [facility] ENDOSCOPY;  Service: Gastroenterology   • ENDOSCOPY N/A 5/10/2018    Procedure: ESOPHAGOGASTRODUODENOSCOPY;  Surgeon: Ezra Meyers DO;  Location: [facility] ENDOSCOPY;  Service: Gastroenterology   • INJECTION OF MEDICATION  2011    Kenalog (3) - Ordered By: RADHA JIMENEZ ()    • LEG SURGERY  1985    Extensive leg surgery (1) - Arthroscopy, arthrotomy lateral release. Chomdromalcia, patella, right knee   • LUMBAR DISC SURGERY      Low back disk surgery (1)   • OTHER SURGICAL HISTORY  1981    Bowel surgery procedure (1) - Lysis of adhesions, resection of terminal ileum with end-to-end anastomosis. partial small bowel ibstruction. Same plus ulceration of distal ileum   • OTHER SURGICAL HISTORY  1965    Suspension of uterus (1) - Uterine suspension & appendectomy, lysis of adhesion. Chronic pelvic inflammatory disease vs endometriosis. Chronic pelvic inflannatory disease   • TOTAL ABDOMINAL HYSTERECTOMY WITH SALPINGO OOPHORECTOMY  1966    YAEL/BSO (1) - Chronic pelvic inflammatory disease.Same plus endometriosis of the ovary   • WRIST ARTHROSCOPY  1988    Wrist arthroscopy/surgery (2) - excision ganglion cyst left wrist.       Family History   Problem Relation  "Age of Onset   • Hypertension Mother    • Stroke Mother    • Arthritis Mother    • Diabetes Mother    • Hyperlipidemia Mother    • Obesity Mother    • Diabetes Other        Social History     Social History   • Marital status:      Spouse name: N/A   • Number of children: N/A   • Years of education: N/A     Occupational History   • Not on file.     Social History Main Topics   • Smoking status: Former Smoker     Packs/day: 0.50     Types: Cigarettes     Quit date: 06/2016   • Smokeless tobacco: Never Used      Comment: 0.5 - 1 Pack(s) Of Cigarettes Per Day   • Alcohol use No   • Drug use: No   • Sexual activity: Defer      Comment:  - 59 Years     Other Topics Concern   • Not on file     Social History Narrative   • No narrative on file        Review of Systems   Eyes: Positive for visual disturbance.   Respiratory: Negative.    Cardiovascular: Negative.    Gastrointestinal: Negative.    Endocrine: Positive for cold intolerance and heat intolerance.   Genitourinary: Negative.    Musculoskeletal: Positive for back pain and neck stiffness.        Joint stifness   Allergic/Immunologic: Negative.    Neurological: Positive for dizziness, weakness, light-headedness, numbness and headaches.   Hematological: Bruises/bleeds easily.   Psychiatric/Behavioral: Negative.        PHYSICAL EXAMINATION:       Ht 157.5 cm (62\")   Wt 66.2 kg (146 lb)   LMP  (LMP Unknown)   BMI 26.70 kg/m²     Physical Exam   Constitutional: She is oriented to person, place, and time. Vital signs are normal. She appears well-developed and well-nourished. She is active and cooperative. She does not appear ill. No distress.   HENT:   Head: Normocephalic.   Pulmonary/Chest: Effort normal. No respiratory distress.   Abdominal: Soft. She exhibits no distension.   Musculoskeletal: She exhibits tenderness (Lumbar spine). She exhibits no edema or deformity.   Neurological: She is alert and oriented to person, place, and time. GCS eye subscore " is 4. GCS verbal subscore is 5. GCS motor subscore is 6.   Skin: Skin is warm, dry and intact. Capillary refill takes less than 2 seconds. No erythema.   Psychiatric: She has a normal mood and affect. Her speech is normal and behavior is normal. Judgment and thought content normal. Cognition and memory are normal.   Vitals reviewed.      GAIT:     [x]  Normal  [x]  Antalgic    Assistive device: []  None  [x]  Walker     []  Crutches  []  Cane     []  Wheelchair  []  Stretcher    Back Exam     Tenderness   The patient is experiencing tenderness in the lumbar and sacroiliac.    Range of Motion   Extension: 60   Flexion: 60   Rotation Right: 60   Rotation Left: 60     Muscle Strength   Right Quadriceps:  4/5   Left Quadriceps:  4/5     Tests   Straight leg raise right: negative  Straight leg raise left: negative    Reflexes   Patellar: normal    Other   Sensation: normal  Gait: antalgic   Erythema: no back redness  Scars: present    Comments:  Pain and limitations with range of motion. No focal neurological deficits noted.                 PROCEDURE: DXA BONE DENSITY EXTREMITY     HISTORY: screen, Z78.0 Asymptomatic menopausal state     BMD: Bone mineral density.     COMPARISON: 8/26/2014.  Please note that an accurate comparison  with the prior exam may not be possible due to dissimilar scan  types or analysis methods.     T-score: Represents the number of standard deviations (SD) that  an individual is above or below the reference value for young  normals.     Z-score: Represents the amount of bone an individual has compared  with other people in the same age group and gender.     LEFT FEMORAL NECK  BMD (g/cm2): 0.568  T-score (SD vs young adult): -2.5  Z-score (SD vs age-matched): -0.2  Change versus previous: -7.5%     RIGHT FEMORAL NECK  BMD (g/cm2): 0.691  T-score (SD vs young adult): -1.4  Z-score (SD vs age-matched): 0.9  Change versus previous: -7.5%     Comment: World Health Organization (WHO)  classifications:  Normal: T-score at or above -1 SD  Osteopenia: T-score between -1 and -2.5 SD  Osteoporosis: T-score at or below -2.5 SD     IMPRESSION:  CONCLUSION: The patient is considered osteoporotic according to  World Health Organization criteria, due to a T score of -2.5 in  the left femoral neck.           Electronically signed by:  Lamin Adam MD  8/14/2018 6:50 PM CDT  Workstation: [id number]   Imaging     DEXA Bone Density Axial (Order #[id number]) on 8/14/2018 - Imaging Information   Reprint Order Requisition     DEXA BONE DENSITY AXIAL (Order #[id number]) on 8/14/18   Result Notes     Notes recorded by Marisela Lopez LPN on 8/22/2018 at 3:06 PM CDT  Per Dr. Quispe,  has been called with her recent DEXA Bone Density Study results & recommendations.  Continue her current medications and follow-up as planned or sooner if any problems.         ASSESSMENT:    Diagnoses and all orders for this visit:    Age related osteoporosis, unspecified pathological fracture presence    Chronic midline low back pain without sciatica  -     triamcinolone acetonide (KENALOG-40) injection 80 mg; Inject 2 mL into the appropriate muscle as directed by prescriber 1 (One) Time.    PLAN    Bone density study results from 8/14/2018 are reviewed and discussed in detail with patient today. Discussed various pharmaceutical treatment options for her osteoporosis today including oral biphosphonates and Prolia.  Written literature regarding Prolia is provided to patient today.  Discussed with patient that I do not think the oral biphosphonates would be a good option for her considering her long history of reflux and upper GI issues.  Patient currently takes Carafate, Zofran, Phenergan and Prilosec for her GI issues.  Patient is  interested in starting Prolia for treatment of her osteoporosis.  Discussed the importance of proper nutrition with adequate dietary intake of calcium and vitamin D for optimal bone health and  prevention of worsening osteoporosis.  Discussed the importance of consistent weightbearing exercise, such as walking, for optimal bone health and prevention of worsening osteoporosis.  Patient has had prior back surgery and history of CVA.  She does ambulate with a walker.  Patient complains today of chronic low back pain with no new/acute complaints noted.  Patient is unable to take oral NSAIDs due to her current use of Plavix.  It is noted that patient takes hydrocodone as needed for pain, although she states that she does not take the pain medication very often as it causes her to have itching.  Recommend IM injection of Kenalog today for management of pain.  Discussed risk for hyperglycemia with injection of steroid.  Patient is instructed to monitor her blood sugar more closely and follow a diabetic diet.  Patient verbalizes understanding.  Follow-up in 3 months for recheck of tolerance to Prolia.  Anticipate labs at that time.  Recent labs done on 8/14/2018 reveal a normal calcium level, normal alkaline phosphatase and normal renal functions.    Return in about 3 months (around 1/1/2019) for Recheck.      This document has been electronically signed by PATRICK Marx on October 1, 2018 10:24 AM      PATRICK Marx

## 2018-10-08 RX ORDER — NYSTATIN 100000 [USP'U]/G
POWDER TOPICAL 3 TIMES DAILY
Qty: 60 G | Refills: 2 | Status: SHIPPED | OUTPATIENT
Start: 2018-10-08 | End: 2019-04-11 | Stop reason: SDUPTHER

## 2018-10-10 RX ORDER — CARVEDILOL 6.25 MG/1
6.25 TABLET ORAL 2 TIMES DAILY WITH MEALS
Qty: 60 TABLET | Refills: 3 | Status: SHIPPED | OUTPATIENT
Start: 2018-10-10 | End: 2019-01-12 | Stop reason: SDUPTHER

## 2018-11-02 ENCOUNTER — TELEPHONE (OUTPATIENT)
Dept: FAMILY MEDICINE CLINIC | Facility: CLINIC | Age: 78
End: 2018-11-02

## 2018-11-02 RX ORDER — DONEPEZIL HYDROCHLORIDE 10 MG/1
10 TABLET, FILM COATED ORAL NIGHTLY
Qty: 90 TABLET | Refills: 2 | Status: SHIPPED | OUTPATIENT
Start: 2018-11-02 | End: 2019-08-01 | Stop reason: SDUPTHER

## 2018-11-06 ENCOUNTER — INFUSION (OUTPATIENT)
Dept: ONCOLOGY | Facility: HOSPITAL | Age: 78
End: 2018-11-06

## 2018-11-06 DIAGNOSIS — M81.0 AGE RELATED OSTEOPOROSIS, UNSPECIFIED PATHOLOGICAL FRACTURE PRESENCE: Primary | ICD-10-CM

## 2018-11-06 PROCEDURE — 25010000002 DENOSUMAB 60 MG/ML SOLUTION: Performed by: NURSE PRACTITIONER

## 2018-11-06 PROCEDURE — 96372 THER/PROPH/DIAG INJ SC/IM: CPT | Performed by: NURSE PRACTITIONER

## 2018-11-06 RX ADMIN — DENOSUMAB 60 MG: 60 INJECTION SUBCUTANEOUS at 10:12

## 2018-11-19 RX ORDER — CLOPIDOGREL BISULFATE 75 MG/1
75 TABLET ORAL DAILY
Qty: 30 TABLET | Refills: 2 | Status: SHIPPED | OUTPATIENT
Start: 2018-11-19 | End: 2019-01-12 | Stop reason: SDUPTHER

## 2018-11-19 RX ORDER — HYDROXYZINE PAMOATE 50 MG/1
CAPSULE ORAL
Qty: 90 CAPSULE | Refills: 1 | Status: SHIPPED | OUTPATIENT
Start: 2018-11-19 | End: 2019-06-19 | Stop reason: SDUPTHER

## 2018-12-20 ENCOUNTER — OFFICE VISIT (OUTPATIENT)
Dept: FAMILY MEDICINE CLINIC | Facility: CLINIC | Age: 78
End: 2018-12-20

## 2018-12-20 VITALS
BODY MASS INDEX: 26.13 KG/M2 | HEART RATE: 78 BPM | WEIGHT: 142 LBS | SYSTOLIC BLOOD PRESSURE: 120 MMHG | HEIGHT: 62 IN | OXYGEN SATURATION: 98 % | DIASTOLIC BLOOD PRESSURE: 70 MMHG

## 2018-12-20 DIAGNOSIS — E11.41 TYPE 2 DIABETES MELLITUS WITH DIABETIC MONONEUROPATHY, WITH LONG-TERM CURRENT USE OF INSULIN (HCC): Primary | ICD-10-CM

## 2018-12-20 DIAGNOSIS — M51.36 DEGENERATIVE DISC DISEASE, LUMBAR: Chronic | ICD-10-CM

## 2018-12-20 DIAGNOSIS — E11.42 DIABETIC POLYNEUROPATHY ASSOCIATED WITH TYPE 2 DIABETES MELLITUS (HCC): Primary | Chronic | ICD-10-CM

## 2018-12-20 DIAGNOSIS — Z79.4 TYPE 2 DIABETES MELLITUS WITH DIABETIC MONONEUROPATHY, WITH LONG-TERM CURRENT USE OF INSULIN (HCC): Primary | ICD-10-CM

## 2018-12-20 PROCEDURE — 99214 OFFICE O/P EST MOD 30 MIN: CPT | Performed by: GENERAL PRACTICE

## 2018-12-20 PROCEDURE — 96372 THER/PROPH/DIAG INJ SC/IM: CPT | Performed by: GENERAL PRACTICE

## 2018-12-20 RX ORDER — TRIAMCINOLONE ACETONIDE 40 MG/ML
60 INJECTION, SUSPENSION INTRA-ARTICULAR; INTRAMUSCULAR ONCE
Status: COMPLETED | OUTPATIENT
Start: 2018-12-20 | End: 2018-12-20

## 2018-12-20 RX ORDER — GABAPENTIN 300 MG/1
CAPSULE ORAL
Qty: 90 CAPSULE | Refills: 2 | Status: SHIPPED | OUTPATIENT
Start: 2018-12-20 | End: 2019-05-06 | Stop reason: SDUPTHER

## 2018-12-20 RX ORDER — HYDROCODONE BITARTRATE AND ACETAMINOPHEN 5; 325 MG/1; MG/1
1 TABLET ORAL 2 TIMES DAILY PRN
Qty: 60 TABLET | Refills: 0 | Status: SHIPPED | OUTPATIENT
Start: 2018-12-20 | End: 2019-06-27

## 2018-12-20 RX ADMIN — TRIAMCINOLONE ACETONIDE 60 MG: 40 INJECTION, SUSPENSION INTRA-ARTICULAR; INTRAMUSCULAR at 14:12

## 2018-12-20 NOTE — PROGRESS NOTES
Subjective   Mariluz Arango is a 78 y.o. female.   Chief Complaint   Patient presents with   • Hypertension   • Med Refill     For review and evaluation of management of chronic medical problems. Has chronic diabetic neuropathy with significant symptoms. On gabapentin  which helps but makes her a bit sleepy. No aggravating or other alleviating factors. Is having a lot of pain all over, most joints and her back.   Back Pain   This is a chronic problem. The current episode started more than 1 year ago. The problem occurs constantly. The problem is unchanged. The pain is present in the lumbar spine. The quality of the pain is described as aching and shooting. The pain does not radiate. The pain is at a severity of 8/10. The pain is severe. The pain is worse during the day. The symptoms are aggravated by standing, position, bending and twisting. Stiffness is present in the morning. Pertinent negatives include no abdominal pain, dysuria, fever, numbness or weakness. She has tried analgesics for the symptoms. The treatment provided moderate relief.      Patient Name:  MRS. MARILUZ ARANGO  Patient ID:  [id number]   Ordering:  NHAN ELLIS  Referring:  NHAN ELLIS  ------------------------------------------------     DATE OF EXAM: 5/22/2017 10:25 AM CDT     PROCEDURE: MR LUMBAR SPINE WITHOUT THEN WITH IV CONTRAST     INDICATION FOR PROCEDURE: 76 years old patient presents for  evaluation of low back pain and aneurysm.  ICD 10 codes:  M54.5  and I71.4.     TECHNIQUE:  Sagittal and axial T1 and T2 MR images of the lumbar  spine are obtained before and after intravenous administration of  15 mL of ProHance.     COMPARISON:  CT of the abdomen and pelvis dated Angelique 3, 2016 and  MRI of the lumbar spine dated April 12, 2013     FINDINGS:  Patient has been treated with vertebroplasty of a  compression fracture of T12. There is mildly exaggerated lumbar  lordosis. Patient has had surgical fusion of L5 and  S1 vertebral  segments.     The lumbar vertebral bodies have generally normal height and  alignment. There is mild anterolisthesis at L4-5.     Axial images at T11-T12 reveal annular disc bulge and osteophyte  complex. The neural foramina are patent.     Axial images at T12-L1 reveal a mild annular disc bulge. There is  mild arthropathy of the facet joints. The neuroforamina are  mildly narrowed without evidence for nerve impingement. There is  no acquired central canal stenosis.     Axial images at L1-2 reveal a mild annular disc bulge. There is  mild arthropathy of the facet joints. The neuroforamina are  mildly narrowed without evidence for nerve impingement. There is  no acquired central canal stenosis.     Axial images at L2-3 reveal there is a moderate annular disc  bulge and osteophyte complex. There is mild degenerative  arthropathy of facet joints. There is mild central acquired canal  stenosis. Neural foramina are bilaterally narrowed without  evidence for nerve impingement.     Axial images at L3-4 reveal moderate annular disc bulge and  posterior broad central disc protrusion. There is moderate  degenerative arthropathy of the facet joints. There is mild  central acquired canal stenosis. Neuroforamina are bilaterally  narrowed without evidence for nerve impingement.     Axial images at L4-5 reveal there is uncovering of this disc  secondary to the anterolisthesis. There is moderate acquired  canal stenosis. There is moderate degenerative arthropathy of  facet joints. Artifactual signal from the interpedicular screws  are present. Neuroforamina are narrowed, greater on the right  than the left with potential impingement of the L4 nerve roots at  the neural foramina.     Axial images at L5-S1 reveal patient has had discectomy at this  level. Patient has had operative fusion of L5 and S1 vertebral  segments with interpedicular screws. The neural foramina are  mildly narrowed without evidence for nerve  impingement. Patient  may have had laminectomies of S1.     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       Small bilateral renal cysts. There is no evidence for abnormal  enhancement after intravenous administration of contrast.     IMPRESSION:     1.  Status post discectomy and operative fusion of L5 and S1.  2.  Status post vertebroplasty of compression fracture of T12.  3.  Multilevel degenerative disc disease and degenerative  arthropathy of the lumbar spine with acquired canal stenosis,  neural foraminal narrowing and potential nerve impingement, as  described.  4.  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        AAA Size: Follow-up Recommendation (1):     3.5 - 3.9 cm   Every 12 months     (1)Based upon the Society for Vascular Surgery Guidelines:   J Vasc Surg. 2009 Oct;50(4 Suppl):S2-49   (2)For aortas of max luz of 2.6-2.9 cm that meet criteria for AAA     (>= 1.5 x proximal normal segment)     Electronically signed by:  Stephanie Bain MD  5/22/2017 4:40 PM CDT  Workstation: [employer]       The following portions of the patient's history were reviewed and updated as appropriate: allergies, current medications, past social history and problem list.    Outpatient Medications Prior to Visit   Medication Sig Dispense Refill   • carvedilol (COREG) 6.25 MG tablet Take 1 tablet by mouth 2 (Two) Times a Day With Meals. 60 tablet 3   • clobetasol (TEMOVATE) 0.05 % external solution Apply 1 application topically Daily.     • clopidogrel (PLAVIX) 75 MG tablet Take 1 tablet by mouth Daily. 30 tablet 2   • Cranberry 500 MG capsule Take 1 tablet by mouth Daily.     • cyanocobalamin 1000 MCG/ML injection Inject 1 mL into the appropriate muscle as directed by prescriber Every 28 (Twenty-Eight) Days. 1 mL 11   • donepezil (ARICEPT) 10 MG tablet Take 1 tablet by mouth Every Night. 90 tablet 2   • DULoxetine  "(CYMBALTA) 20 MG capsule Take 1 capsule by mouth Daily. 30 capsule 5   • FLUOCINOLONE ACETONIDE SCALP 0.01 % oil Apply 1 application topically Daily.     • fluticasone (FLONASE) 50 MCG/ACT nasal spray INSTILL 2 SPRAYS INTO EACH NOSTRIL DAILY 16 g 5   • hydrocortisone 1 % lotion Apply  topically 3 (Three) Times a Day As Needed for Irritation or Rash. 1 bottle 1   • hydrOXYzine (VISTARIL) 50 MG capsule TAKE ONE CAPSULE BY MOUTH THREE TIMES A DAY AS NEEDED FOR ITCHING 90 capsule 1   • ketoconazole (NIZORAL) 2 % shampoo      • lidocaine (LIDODERM) 5 % PLACE ONE PATCH ONE THE SKIN EVERY DAY. REMOVE AND DISCARD THE PATCH WITHIN 12 HOURS AFTER APPLICATION EVERY DAY OR AS DIRECTED BY MD. 30 patch 1   • Multiple Vitamins-Minerals (MULTIVITAMIN GUMMIES ADULT PO) Take 1 each by mouth Daily.     • nystatin (MYCOSTATIN) 953385 UNIT/GM powder Apply  topically to the appropriate area as directed 3 (Three) Times a Day. 60 g 2   • omeprazole (priLOSEC) 40 MG capsule Take 40 mg by mouth Daily.     • ondansetron (ZOFRAN) 8 MG tablet Take 8 mg by mouth Every 8 (Eight) Hours As Needed for Nausea or Vomiting.     • ondansetron ODT (ZOFRAN-ODT) 4 MG disintegrating tablet Take 1 tablet by mouth Every 8 (Eight) Hours As Needed for Nausea or Vomiting. 10 tablet 0   • ONETOUCH VERIO test strip   11   • phenazopyridine (PYRIDIUM) 100 MG tablet Take 1 tablet by mouth 3 (Three) Times a Day As Needed for bladder spasms. 6 tablet 0   • gabapentin (NEURONTIN) 300 MG capsule 1 cap qam and 2 caps qhs 90 capsule 2   • HYDROcodone-acetaminophen (NORCO) 7.5-325 MG per tablet 1 tablet As Needed for Moderate Pain .  0   • promethazine (PHENERGAN) 25 MG tablet Take 25 mg by mouth Every 6 (Six) Hours As Needed for Nausea or Vomiting.     • sucralfate (CARAFATE) 1 g tablet Take 1 g by mouth 4 (Four) Times a Day.  2   • Syringe/Needle, Disp, 25G X 5/8\" 3 ML misc USE 1 SYRINGE FOR MONTHLY VITAMIN B-12 SHOTS 6 each 2   • traZODone (DESYREL) 100 MG tablet Take " "1.5 tablets by mouth Every Night. 45 tablet 3   • pravastatin (PRAVACHOL) 40 MG tablet Take 1 tablet by mouth Every Night. 30 tablet 5     No facility-administered medications prior to visit.        Review of Systems   Constitutional: Negative.  Negative for chills, fatigue, fever and unexpected weight change.   HENT: Negative.  Negative for congestion, ear pain, hearing loss, nosebleeds, rhinorrhea, sneezing, sore throat and tinnitus.    Eyes: Negative.  Negative for discharge.   Respiratory: Negative.  Negative for cough and wheezing.    Cardiovascular: Negative.    Gastrointestinal: Negative.  Negative for abdominal pain, constipation, diarrhea, nausea and vomiting.   Endocrine: Negative.    Genitourinary: Negative.  Negative for dysuria, frequency and urgency.   Musculoskeletal: Negative.  Negative for arthralgias, back pain, joint swelling and myalgias.   Skin: Negative.  Negative for rash.   Allergic/Immunologic: Negative.    Neurological: Negative.  Negative for dizziness, weakness and numbness.   Hematological: Negative.  Negative for adenopathy.   Psychiatric/Behavioral: Negative.  Negative for dysphoric mood and sleep disturbance. The patient is not nervous/anxious.        Objective   Visit Vitals  /70   Pulse 78   Ht 157.5 cm (62\")   Wt 64.4 kg (142 lb)   LMP  (LMP Unknown)   SpO2 98%   BMI 25.97 kg/m²     Physical Exam   Constitutional: She is oriented to person, place, and time. She appears well-developed and well-nourished. No distress.   HENT:   Head: Normocephalic and atraumatic.   Nose: Nose normal.   Mouth/Throat: Oropharynx is clear and moist.   Eyes: Conjunctivae and EOM are normal. Pupils are equal, round, and reactive to light. Right eye exhibits no discharge. Left eye exhibits no discharge.   Neck: No thyromegaly present.   Cardiovascular: Normal rate, regular rhythm, normal heart sounds and intact distal pulses.   Pulmonary/Chest: Effort normal and breath sounds normal. "   Musculoskeletal:        Right ankle: Tenderness.        Lumbar back: She exhibits decreased range of motion and tenderness.   Lymphadenopathy:     She has no cervical adenopathy.   Neurological: She is alert and oriented to person, place, and time.   Skin: Skin is warm and dry.   Psychiatric: She has a normal mood and affect.   Nursing note and vitals reviewed.    Assessment/Plan   Problem List Items Addressed This Visit        Endocrine    Diabetic polyneuropathy (CMS/HCC) - Primary (Chronic)      Other Visit Diagnoses     Degenerative disc disease, lumbar  (Chronic)       Relevant Medications    triamcinolone acetonide (KENALOG-40) injection 60 mg (Completed)          Continue current treatment. Take gabapentin 1 in the morning and 2 at bedtime. Gurvinder reviewed and appropriate.     New Medications Ordered This Visit   Medications   • HYDROcodone-acetaminophen (NORCO) 5-325 MG per tablet     Sig: Take 1 tablet by mouth 2 (Two) Times a Day As Needed for Moderate Pain .     Dispense:  60 tablet     Refill:  0   • gabapentin (NEURONTIN) 300 MG capsule     Si cap qam and 2 caps qhs     Dispense:  90 capsule     Refill:  2   • triamcinolone acetonide (KENALOG-40) injection 60 mg     Return in about 3 months (around 3/20/2019) for Recheck.

## 2019-01-14 RX ORDER — CLOPIDOGREL BISULFATE 75 MG/1
75 TABLET ORAL DAILY
Qty: 30 TABLET | Refills: 2 | Status: SHIPPED | OUTPATIENT
Start: 2019-01-14 | End: 2019-04-12 | Stop reason: SDUPTHER

## 2019-01-14 RX ORDER — CARVEDILOL 6.25 MG/1
TABLET ORAL
Qty: 60 TABLET | Refills: 3 | Status: SHIPPED | OUTPATIENT
Start: 2019-01-14 | End: 2020-02-17

## 2019-02-13 RX ORDER — DULOXETIN HYDROCHLORIDE 20 MG/1
20 CAPSULE, DELAYED RELEASE ORAL DAILY
Qty: 30 CAPSULE | Refills: 5 | Status: SHIPPED | OUTPATIENT
Start: 2019-02-13 | End: 2019-03-21

## 2019-02-13 RX ORDER — CARVEDILOL 6.25 MG/1
TABLET ORAL
Qty: 60 TABLET | Refills: 3 | Status: SHIPPED | OUTPATIENT
Start: 2019-02-13 | End: 2019-03-21 | Stop reason: SDUPTHER

## 2019-03-14 ENCOUNTER — LAB (OUTPATIENT)
Dept: LAB | Facility: HOSPITAL | Age: 79
End: 2019-03-14

## 2019-03-14 DIAGNOSIS — Z79.4 TYPE 2 DIABETES MELLITUS WITH DIABETIC MONONEUROPATHY, WITH LONG-TERM CURRENT USE OF INSULIN (HCC): ICD-10-CM

## 2019-03-14 DIAGNOSIS — E11.41 TYPE 2 DIABETES MELLITUS WITH DIABETIC MONONEUROPATHY, WITH LONG-TERM CURRENT USE OF INSULIN (HCC): ICD-10-CM

## 2019-03-14 LAB
ALBUMIN SERPL-MCNC: 3.8 G/DL (ref 3.4–4.8)
ALBUMIN/GLOB SERPL: 1.3 G/DL (ref 1.1–1.8)
ALP SERPL-CCNC: 38 U/L (ref 38–126)
ALT SERPL W P-5'-P-CCNC: 13 U/L (ref 9–52)
ANION GAP SERPL CALCULATED.3IONS-SCNC: 7 MMOL/L (ref 5–15)
ARTICHOKE IGE QN: 132 MG/DL (ref 1–129)
AST SERPL-CCNC: 22 U/L (ref 14–36)
BILIRUB SERPL-MCNC: 0.4 MG/DL (ref 0.2–1.3)
BUN BLD-MCNC: 10 MG/DL (ref 7–21)
BUN/CREAT SERPL: 17.5 (ref 7–25)
CALCIUM SPEC-SCNC: 8 MG/DL (ref 8.4–10.2)
CHLORIDE SERPL-SCNC: 99 MMOL/L (ref 95–110)
CO2 SERPL-SCNC: 27 MMOL/L (ref 22–31)
CREAT BLD-MCNC: 0.57 MG/DL (ref 0.5–1)
GFR SERPL CREATININE-BSD FRML MDRD: 102 ML/MIN/1.73 (ref 39–90)
GLOBULIN UR ELPH-MCNC: 2.9 GM/DL (ref 2.3–3.5)
GLUCOSE BLD-MCNC: 158 MG/DL (ref 60–100)
HBA1C MFR BLD: 6.1 % (ref 4–5.6)
POTASSIUM BLD-SCNC: 4.2 MMOL/L (ref 3.5–5.1)
PROT SERPL-MCNC: 6.7 G/DL (ref 6.3–8.6)
SODIUM BLD-SCNC: 133 MMOL/L (ref 137–145)

## 2019-03-14 PROCEDURE — 36415 COLL VENOUS BLD VENIPUNCTURE: CPT

## 2019-03-14 PROCEDURE — 83036 HEMOGLOBIN GLYCOSYLATED A1C: CPT

## 2019-03-14 PROCEDURE — 80053 COMPREHEN METABOLIC PANEL: CPT

## 2019-03-14 PROCEDURE — 83721 ASSAY OF BLOOD LIPOPROTEIN: CPT

## 2019-03-21 ENCOUNTER — OFFICE VISIT (OUTPATIENT)
Dept: FAMILY MEDICINE CLINIC | Facility: CLINIC | Age: 79
End: 2019-03-21

## 2019-03-21 VITALS
HEART RATE: 66 BPM | HEIGHT: 62 IN | OXYGEN SATURATION: 95 % | WEIGHT: 151.6 LBS | SYSTOLIC BLOOD PRESSURE: 158 MMHG | BODY MASS INDEX: 27.9 KG/M2 | DIASTOLIC BLOOD PRESSURE: 80 MMHG

## 2019-03-21 DIAGNOSIS — M51.36 DEGENERATIVE DISC DISEASE, LUMBAR: Chronic | ICD-10-CM

## 2019-03-21 DIAGNOSIS — F33.41 RECURRENT MAJOR DEPRESSIVE DISORDER, IN PARTIAL REMISSION (HCC): Chronic | ICD-10-CM

## 2019-03-21 DIAGNOSIS — E11.41 TYPE 2 DIABETES MELLITUS WITH DIABETIC MONONEUROPATHY, WITH LONG-TERM CURRENT USE OF INSULIN (HCC): Primary | Chronic | ICD-10-CM

## 2019-03-21 DIAGNOSIS — I10 ESSENTIAL HYPERTENSION: Chronic | ICD-10-CM

## 2019-03-21 DIAGNOSIS — Z79.4 TYPE 2 DIABETES MELLITUS WITH DIABETIC MONONEUROPATHY, WITH LONG-TERM CURRENT USE OF INSULIN (HCC): Primary | Chronic | ICD-10-CM

## 2019-03-21 DIAGNOSIS — I48.0 PAROXYSMAL ATRIAL FIBRILLATION (HCC): Chronic | ICD-10-CM

## 2019-03-21 PROCEDURE — 99214 OFFICE O/P EST MOD 30 MIN: CPT | Performed by: GENERAL PRACTICE

## 2019-03-21 RX ORDER — DULOXETIN HYDROCHLORIDE 30 MG/1
30 CAPSULE, DELAYED RELEASE ORAL DAILY
Qty: 90 CAPSULE | Refills: 3 | Status: SHIPPED | OUTPATIENT
Start: 2019-03-21 | End: 2020-03-12

## 2019-03-21 RX ORDER — TRAMADOL HYDROCHLORIDE 50 MG/1
50 TABLET ORAL 3 TIMES DAILY PRN
Qty: 90 TABLET | Refills: 2 | Status: SHIPPED | OUTPATIENT
Start: 2019-03-21 | End: 2020-09-28

## 2019-03-21 RX ORDER — LOVASTATIN 10 MG/1
10 TABLET ORAL EVERY OTHER DAY
Qty: 45 TABLET | Refills: 3 | Status: SHIPPED | OUTPATIENT
Start: 2019-03-21 | End: 2019-07-25 | Stop reason: SINTOL

## 2019-03-21 RX ORDER — CIPROFLOXACIN 250 MG/1
250 TABLET, FILM COATED ORAL DAILY
COMMUNITY
End: 2022-08-29

## 2019-03-21 NOTE — PROGRESS NOTES
Subjective   Mariluz Arango is a 78 y.o. female.   Chief Complaint   Patient presents with   • Diabetes   • Hypertension   • Med Refill   • Hyperlipidemia     For review and evaluation of management of chronic medical problems. Labs reviewed.  Depression is not quite controlled, felt that Trental X helped her but unfortunately this will not be covered by her insurance.  Hypertension   This is a chronic problem. The current episode started more than 1 year ago. The problem has been rapidly improving since onset. The problem is controlled. Associated symptoms include malaise/fatigue. Pertinent negatives include no blurred vision, chest pain, headaches, neck pain, orthopnea, palpitations, peripheral edema, PND or shortness of breath. Risk factors for coronary artery disease include dyslipidemia, diabetes mellitus, post-menopausal state, sedentary lifestyle and stress. Current antihypertension treatment includes beta blockers and direct vasodilators. The current treatment provides moderate improvement. There are no compliance problems.  There is no history of angina or kidney disease.   Hyperlipidemia   This is a chronic problem. The current episode started more than 1 year ago. The problem is controlled. Recent lipid tests were reviewed and are normal. There are no known factors aggravating her hyperlipidemia. Associated symptoms include myalgias. Pertinent negatives include no chest pain or shortness of breath. Current antihyperlipidemic treatment includes statins. The current treatment provides significant improvement of lipids. There are no compliance problems.    Diabetes   She presents for her follow-up diabetic visit. She has type 2 diabetes mellitus. Her disease course has been stable. Pertinent negatives for hypoglycemia include no dizziness, headaches or nervousness/anxiousness. Pertinent negatives for diabetes include no blurred vision, no chest pain, no fatigue and no weakness. There are no hypoglycemic  complications. Risk factors for coronary artery disease include diabetes mellitus, dyslipidemia, stress, sedentary lifestyle and post-menopausal. Current diabetic treatment includes diet. She is compliant with treatment most of the time. Her weight is fluctuating minimally. She is following a generally healthy and diabetic diet. There is no change in her home blood glucose trend. An ACE inhibitor/angiotensin II receptor blocker is being taken. She does not see a podiatrist.Eye exam is current.   Back Pain   This is a chronic problem. The current episode started more than 1 year ago. The problem occurs constantly. The problem is unchanged. Radiates to: Down both legs. The pain is at a severity of 7/10. The pain is moderate. The pain is worse during the day. The symptoms are aggravated by position. Stiffness is present in the morning. Pertinent negatives include no abdominal pain, chest pain, dysuria, fever, headaches, numbness or weakness. She has tried analgesics for the symptoms. The treatment provided moderate relief.      The following portions of the patient's history were reviewed and updated as appropriate: allergies, current medications, past social history and problem list.    Outpatient Medications Prior to Visit   Medication Sig Dispense Refill   • carvedilol (COREG) 6.25 MG tablet TAKE 1 TABLET BY MOUTH TWO TIMES A DAY WITH MEALS. 60 tablet 3   • ciprofloxacin (CIPRO) 250 MG tablet Take 250 mg by mouth Daily.     • clobetasol (TEMOVATE) 0.05 % external solution Apply 1 application topically Daily.     • clopidogrel (PLAVIX) 75 MG tablet TAKE 1 TABLET BY MOUTH DAILY. 30 tablet 2   • Cranberry 500 MG capsule Take 1 tablet by mouth Daily.     • cyanocobalamin 1000 MCG/ML injection Inject 1 mL into the appropriate muscle as directed by prescriber Every 28 (Twenty-Eight) Days. 1 mL 11   • donepezil (ARICEPT) 10 MG tablet Take 1 tablet by mouth Every Night. 90 tablet 2   • FLUOCINOLONE ACETONIDE SCALP 0.01 %  "oil Apply 1 application topically Daily.     • gabapentin (NEURONTIN) 300 MG capsule 1 cap qam and 2 caps qhs 90 capsule 2   • HYDROcodone-acetaminophen (NORCO) 5-325 MG per tablet Take 1 tablet by mouth 2 (Two) Times a Day As Needed for Moderate Pain . 60 tablet 0   • hydrocortisone 1 % lotion Apply  topically 3 (Three) Times a Day As Needed for Irritation or Rash. 1 bottle 1   • hydrOXYzine (VISTARIL) 50 MG capsule TAKE ONE CAPSULE BY MOUTH THREE TIMES A DAY AS NEEDED FOR ITCHING 90 capsule 1   • ketoconazole (NIZORAL) 2 % shampoo      • lidocaine (LIDODERM) 5 % PLACE ONE PATCH ONE THE SKIN EVERY DAY. REMOVE AND DISCARD THE PATCH WITHIN 12 HOURS AFTER APPLICATION EVERY DAY OR AS DIRECTED BY MD. 30 patch 1   • Multiple Vitamins-Minerals (MULTIVITAMIN GUMMIES ADULT PO) Take 1 each by mouth Daily.     • nystatin (MYCOSTATIN) 600043 UNIT/GM powder Apply  topically to the appropriate area as directed 3 (Three) Times a Day. 60 g 2   • omeprazole (priLOSEC) 40 MG capsule Take 40 mg by mouth Daily.     • ondansetron (ZOFRAN) 8 MG tablet Take 8 mg by mouth Every 8 (Eight) Hours As Needed for Nausea or Vomiting.     • ondansetron ODT (ZOFRAN-ODT) 4 MG disintegrating tablet Take 1 tablet by mouth Every 8 (Eight) Hours As Needed for Nausea or Vomiting. 10 tablet 0   • ONETOUCH VERIO test strip   11   • phenazopyridine (PYRIDIUM) 100 MG tablet Take 1 tablet by mouth 3 (Three) Times a Day As Needed for bladder spasms. 6 tablet 0   • promethazine (PHENERGAN) 25 MG tablet Take 25 mg by mouth Every 6 (Six) Hours As Needed for Nausea or Vomiting.     • Syringe/Needle, Disp, 25G X 5/8\" 3 ML misc USE 1 SYRINGE FOR MONTHLY VITAMIN B-12 SHOTS 6 each 2   • traZODone (DESYREL) 100 MG tablet Take 1.5 tablets by mouth Every Night. 45 tablet 3   • DULoxetine (CYMBALTA) 20 MG capsule TAKE 1 CAPSULE BY MOUTH DAILY. 30 capsule 5   • carvedilol (COREG) 6.25 MG tablet TAKE 1 TABLET BY MOUTH TWO TIMES A DAY WITH MEALS. 60 tablet 3   • fluticasone " "(FLONASE) 50 MCG/ACT nasal spray INSTILL 2 SPRAYS INTO EACH NOSTRIL DAILY 16 g 5   • sucralfate (CARAFATE) 1 g tablet Take 1 g by mouth 4 (Four) Times a Day.  2     No facility-administered medications prior to visit.        Review of Systems   Constitutional: Positive for malaise/fatigue. Negative for fatigue, fever and unexpected weight change.   HENT: Negative.  Negative for congestion, ear pain, hearing loss, nosebleeds, rhinorrhea, sneezing, sore throat and tinnitus.    Eyes: Negative.  Negative for blurred vision and discharge.   Respiratory: Negative.  Negative for cough, shortness of breath and wheezing.    Cardiovascular: Negative.  Negative for chest pain, palpitations, orthopnea and PND.   Gastrointestinal: Negative.  Negative for abdominal pain, constipation and diarrhea.   Endocrine: Negative.    Genitourinary: Negative for dysuria.   Musculoskeletal: Positive for arthralgias, back pain and myalgias. Negative for joint swelling and neck pain.   Skin: Negative.  Negative for rash.   Allergic/Immunologic: Negative.    Neurological: Negative.  Negative for dizziness, weakness, numbness and headaches.   Hematological: Negative.  Negative for adenopathy.   Psychiatric/Behavioral: Negative.  Negative for dysphoric mood and sleep disturbance. The patient is not nervous/anxious.        Objective   Visit Vitals  /80   Pulse 66   Ht 157.5 cm (62\")   Wt 68.8 kg (151 lb 9.6 oz)   LMP  (LMP Unknown)   SpO2 95%   BMI 27.73 kg/m²     Physical Exam   Constitutional: She is oriented to person, place, and time. She appears well-developed and well-nourished. No distress.   HENT:   Head: Normocephalic and atraumatic.   Nose: Nose normal.   Mouth/Throat: Oropharynx is clear and moist.   Eyes: Conjunctivae and EOM are normal. Pupils are equal, round, and reactive to light. Right eye exhibits no discharge. Left eye exhibits no discharge.   Neck: No thyromegaly present.   Cardiovascular: Normal rate, regular rhythm, " normal heart sounds and intact distal pulses.   Pulmonary/Chest: Effort normal and breath sounds normal.   Musculoskeletal:        Lumbar back: She exhibits decreased range of motion and tenderness.   Lymphadenopathy:     She has no cervical adenopathy.   Neurological: She is alert and oriented to person, place, and time.   Skin: Skin is warm and dry.   Psychiatric: She has a normal mood and affect.   Nursing note and vitals reviewed.    Results for orders placed or performed in visit on 03/14/19   Comprehensive Metabolic Panel   Result Value Ref Range    Glucose 158 (H) 60 - 100 mg/dL    BUN 10 7 - 21 mg/dL    Creatinine 0.57 0.50 - 1.00 mg/dL    Sodium 133 (L) 137 - 145 mmol/L    Potassium 4.2 3.5 - 5.1 mmol/L    Chloride 99 95 - 110 mmol/L    CO2 27.0 22.0 - 31.0 mmol/L    Calcium 8.0 (L) 8.4 - 10.2 mg/dL    Total Protein 6.7 6.3 - 8.6 g/dL    Albumin 3.80 3.40 - 4.80 g/dL    ALT (SGPT) 13 9 - 52 U/L    AST (SGOT) 22 14 - 36 U/L    Alkaline Phosphatase 38 38 - 126 U/L    Total Bilirubin 0.4 0.2 - 1.3 mg/dL    eGFR Non  Amer 102 (H) 39 - 90 mL/min/1.73    Globulin 2.9 2.3 - 3.5 gm/dL    A/G Ratio 1.3 1.1 - 1.8 g/dL    BUN/Creatinine Ratio 17.5 7.0 - 25.0    Anion Gap 7.0 5.0 - 15.0 mmol/L   Hemoglobin A1c   Result Value Ref Range    Hemoglobin A1C 6.1 (H) 4 - 5.6 %   LDL Cholesterol, Direct   Result Value Ref Range    LDL Cholesterol  132 (H) 1 - 129 mg/dL      Assessment/Plan   Problem List Items Addressed This Visit        Cardiovascular and Mediastinum    Essential hypertension (Chronic)    Paroxysmal atrial fibrillation (CMS/HCC)       Endocrine    Diabetes mellitus (CMS/HCC) - Primary      Other Visit Diagnoses     Recurrent major depressive disorder, in partial remission (CMS/HCC)  (Chronic)       Relevant Medications    DULoxetine (CYMBALTA) 30 MG capsule    Degenerative disc disease, lumbar  (Chronic)       Relevant Medications    traMADol (ULTRAM) 50 MG tablet          Continue current treatment.   Increase Cymbalta to see if this helps with her depression.    New Medications Ordered This Visit   Medications   • lovastatin (MEVACOR) 10 MG tablet     Sig: Take 1 tablet by mouth Every Other Day. cholesterol     Dispense:  45 tablet     Refill:  3   • DULoxetine (CYMBALTA) 30 MG capsule     Sig: Take 1 capsule by mouth Daily.     Dispense:  90 capsule     Refill:  3   • traMADol (ULTRAM) 50 MG tablet     Sig: Take 1 tablet by mouth 3 (Three) Times a Day As Needed for Moderate Pain . ti     Dispense:  90 tablet     Refill:  2     Return in about 3 months (around 6/21/2019) for Recheck.

## 2019-04-11 RX ORDER — NYSTATIN 100000 [USP'U]/G
POWDER TOPICAL
Qty: 60 G | Refills: 2 | Status: SHIPPED | OUTPATIENT
Start: 2019-04-11 | End: 2022-08-29

## 2019-04-12 RX ORDER — CLOPIDOGREL BISULFATE 75 MG/1
75 TABLET ORAL DAILY
Qty: 30 TABLET | Refills: 2 | Status: SHIPPED | OUTPATIENT
Start: 2019-04-12 | End: 2019-07-13 | Stop reason: SDUPTHER

## 2019-05-01 DIAGNOSIS — M81.0 AGE RELATED OSTEOPOROSIS, UNSPECIFIED PATHOLOGICAL FRACTURE PRESENCE: Primary | ICD-10-CM

## 2019-05-06 RX ORDER — GABAPENTIN 300 MG/1
CAPSULE ORAL
Qty: 90 CAPSULE | Refills: 1 | Status: SHIPPED | OUTPATIENT
Start: 2019-05-06 | End: 2019-06-27 | Stop reason: SDUPTHER

## 2019-05-06 RX ORDER — GABAPENTIN 300 MG/1
CAPSULE ORAL
Qty: 90 CAPSULE | Refills: 2 | Status: CANCELLED | OUTPATIENT
Start: 2019-05-06

## 2019-05-22 ENCOUNTER — INFUSION (OUTPATIENT)
Dept: ONCOLOGY | Facility: HOSPITAL | Age: 79
End: 2019-05-22

## 2019-05-22 DIAGNOSIS — M81.0 AGE RELATED OSTEOPOROSIS, UNSPECIFIED PATHOLOGICAL FRACTURE PRESENCE: Primary | ICD-10-CM

## 2019-05-22 LAB
CALCIUM SPEC-SCNC: 8.8 MG/DL (ref 8.6–10.5)
MAGNESIUM SERPL-MCNC: 1.9 MG/DL (ref 1.6–2.4)
PHOSPHATE SERPL-MCNC: 4.1 MG/DL (ref 2.5–4.5)

## 2019-05-22 PROCEDURE — 84100 ASSAY OF PHOSPHORUS: CPT | Performed by: NURSE PRACTITIONER

## 2019-05-22 PROCEDURE — 82310 ASSAY OF CALCIUM: CPT | Performed by: NURSE PRACTITIONER

## 2019-05-22 PROCEDURE — 83735 ASSAY OF MAGNESIUM: CPT | Performed by: NURSE PRACTITIONER

## 2019-05-22 PROCEDURE — 25010000002 DENOSUMAB 60 MG/ML SOLUTION PREFILLED SYRINGE: Performed by: NURSE PRACTITIONER

## 2019-05-22 PROCEDURE — 96372 THER/PROPH/DIAG INJ SC/IM: CPT | Performed by: NURSE PRACTITIONER

## 2019-05-22 PROCEDURE — 36415 COLL VENOUS BLD VENIPUNCTURE: CPT | Performed by: NURSE PRACTITIONER

## 2019-05-22 RX ADMIN — DENOSUMAB 60 MG: 60 INJECTION SUBCUTANEOUS at 10:29

## 2019-06-13 RX ORDER — CARVEDILOL 6.25 MG/1
TABLET ORAL
Qty: 60 TABLET | Refills: 3 | Status: SHIPPED | OUTPATIENT
Start: 2019-06-13 | End: 2019-06-27 | Stop reason: SDUPTHER

## 2019-06-19 RX ORDER — HYDROXYZINE PAMOATE 50 MG/1
CAPSULE ORAL
Qty: 90 CAPSULE | Refills: 1 | Status: SHIPPED | OUTPATIENT
Start: 2019-06-19 | End: 2019-09-13 | Stop reason: SDUPTHER

## 2019-06-27 ENCOUNTER — LAB (OUTPATIENT)
Dept: LAB | Facility: HOSPITAL | Age: 79
End: 2019-06-27

## 2019-06-27 ENCOUNTER — OFFICE VISIT (OUTPATIENT)
Dept: FAMILY MEDICINE CLINIC | Facility: CLINIC | Age: 79
End: 2019-06-27

## 2019-06-27 VITALS
OXYGEN SATURATION: 97 % | HEIGHT: 62 IN | BODY MASS INDEX: 26.85 KG/M2 | DIASTOLIC BLOOD PRESSURE: 74 MMHG | SYSTOLIC BLOOD PRESSURE: 130 MMHG | HEART RATE: 77 BPM | WEIGHT: 145.9 LBS

## 2019-06-27 DIAGNOSIS — E11.42 DIABETIC POLYNEUROPATHY ASSOCIATED WITH TYPE 2 DIABETES MELLITUS (HCC): Chronic | ICD-10-CM

## 2019-06-27 DIAGNOSIS — G47.09 OTHER INSOMNIA: Chronic | ICD-10-CM

## 2019-06-27 DIAGNOSIS — F33.41 RECURRENT MAJOR DEPRESSIVE DISORDER, IN PARTIAL REMISSION (HCC): Chronic | ICD-10-CM

## 2019-06-27 DIAGNOSIS — M51.36 DEGENERATIVE DISC DISEASE, LUMBAR: Chronic | ICD-10-CM

## 2019-06-27 DIAGNOSIS — E78.2 MIXED HYPERLIPIDEMIA: Chronic | ICD-10-CM

## 2019-06-27 DIAGNOSIS — E11.41 TYPE 2 DIABETES MELLITUS WITH DIABETIC MONONEUROPATHY, WITH LONG-TERM CURRENT USE OF INSULIN (HCC): ICD-10-CM

## 2019-06-27 DIAGNOSIS — I10 ESSENTIAL HYPERTENSION: Primary | Chronic | ICD-10-CM

## 2019-06-27 DIAGNOSIS — Z79.4 TYPE 2 DIABETES MELLITUS WITH DIABETIC MONONEUROPATHY, WITH LONG-TERM CURRENT USE OF INSULIN (HCC): ICD-10-CM

## 2019-06-27 DIAGNOSIS — I10 ESSENTIAL HYPERTENSION: Chronic | ICD-10-CM

## 2019-06-27 LAB
ANION GAP SERPL CALCULATED.3IONS-SCNC: 9.5 MMOL/L (ref 5–15)
BUN BLD-MCNC: 10 MG/DL (ref 8–23)
BUN/CREAT SERPL: 14.5 (ref 7–25)
CALCIUM SPEC-SCNC: 8.6 MG/DL (ref 8.6–10.5)
CHLORIDE SERPL-SCNC: 95 MMOL/L (ref 98–107)
CO2 SERPL-SCNC: 27.5 MMOL/L (ref 22–29)
CREAT BLD-MCNC: 0.69 MG/DL (ref 0.57–1)
GFR SERPL CREATININE-BSD FRML MDRD: 82 ML/MIN/1.73
GLUCOSE BLD-MCNC: 116 MG/DL (ref 65–99)
POTASSIUM BLD-SCNC: 3.9 MMOL/L (ref 3.5–5.2)
SODIUM BLD-SCNC: 132 MMOL/L (ref 136–145)

## 2019-06-27 PROCEDURE — 99214 OFFICE O/P EST MOD 30 MIN: CPT | Performed by: GENERAL PRACTICE

## 2019-06-27 PROCEDURE — 36415 COLL VENOUS BLD VENIPUNCTURE: CPT

## 2019-06-27 PROCEDURE — 80048 BASIC METABOLIC PNL TOTAL CA: CPT

## 2019-06-27 RX ORDER — GABAPENTIN 300 MG/1
CAPSULE ORAL
Qty: 90 CAPSULE | Refills: 2 | Status: SHIPPED | OUTPATIENT
Start: 2019-06-27 | End: 2019-09-26 | Stop reason: SDUPTHER

## 2019-06-27 NOTE — PROGRESS NOTES
Subjective   Mariluz Arango is a 78 y.o. female.   Chief Complaint   Patient presents with   • Diabetes   • Hypertension     For review and evaluation of management of chronic medical problems. Labs reviewed.  Depression is not quite controlled, lost her  a month ago. Not sleeping well.   Hypertension   This is a chronic problem. The current episode started more than 1 year ago. The problem has been rapidly improving since onset. The problem is controlled. Associated symptoms include malaise/fatigue. Pertinent negatives include no neck pain, orthopnea, palpitations, peripheral edema or PND. Risk factors for coronary artery disease include dyslipidemia, diabetes mellitus, post-menopausal state, sedentary lifestyle and stress. Current antihypertension treatment includes beta blockers and direct vasodilators. The current treatment provides moderate improvement. There are no compliance problems.  There is no history of angina or kidney disease.   Back Pain   This is a chronic problem. The current episode started more than 1 year ago. The problem occurs constantly. The problem is unchanged. Radiates to: Down both legs. The pain is at a severity of 7/10. The pain is moderate. The pain is worse during the day. The symptoms are aggravated by position. Stiffness is present in the morning. Pertinent negatives include no abdominal pain, dysuria, fever or numbness. She has tried analgesics for the symptoms. The treatment provided moderate relief.      The following portions of the patient's history were reviewed and updated as appropriate: allergies, current medications, past social history and problem list.    Outpatient Medications Prior to Visit   Medication Sig Dispense Refill   • carvedilol (COREG) 6.25 MG tablet TAKE 1 TABLET BY MOUTH TWO TIMES A DAY WITH MEALS. 60 tablet 3   • ciprofloxacin (CIPRO) 250 MG tablet Take 250 mg by mouth Daily.     • clobetasol (TEMOVATE) 0.05 % external solution Apply 1 application  topically Daily.     • clopidogrel (PLAVIX) 75 MG tablet TAKE 1 TABLET BY MOUTH DAILY. 30 tablet 2   • Cranberry 500 MG capsule Take 1 tablet by mouth Daily.     • cyanocobalamin 1000 MCG/ML injection Inject 1 mL into the appropriate muscle as directed by prescriber Every 28 (Twenty-Eight) Days. 1 mL 11   • donepezil (ARICEPT) 10 MG tablet Take 1 tablet by mouth Every Night. 90 tablet 2   • DULoxetine (CYMBALTA) 30 MG capsule Take 1 capsule by mouth Daily. 90 capsule 3   • FLUOCINOLONE ACETONIDE SCALP 0.01 % oil Apply 1 application topically Daily.     • hydrocortisone 1 % lotion Apply  topically 3 (Three) Times a Day As Needed for Irritation or Rash. 1 bottle 1   • hydrOXYzine pamoate (VISTARIL) 50 MG capsule TAKE ONE CAPSULE BY MOUTH THREE TIMES A DAY AS NEEDED FOR ITCHING 90 capsule 1   • ketoconazole (NIZORAL) 2 % shampoo      • lidocaine (LIDODERM) 5 % PLACE ONE PATCH ONE THE SKIN EVERY DAY. REMOVE AND DISCARD THE PATCH WITHIN 12 HOURS AFTER APPLICATION EVERY DAY OR AS DIRECTED BY MD. 30 patch 1   • lovastatin (MEVACOR) 10 MG tablet Take 1 tablet by mouth Every Other Day. cholesterol 45 tablet 3   • Multiple Vitamins-Minerals (MULTIVITAMIN GUMMIES ADULT PO) Take 1 each by mouth Daily.     • nystatin (MYCOSTATIN) 547795 UNIT/GM powder APPLY TOPICALLY TO THE AFFECTED AREA THREE TIMES A DAY 60 g 2   • omeprazole (priLOSEC) 40 MG capsule Take 40 mg by mouth 2 (Two) Times a Day.     • ondansetron (ZOFRAN) 8 MG tablet Take 8 mg by mouth Every 8 (Eight) Hours As Needed for Nausea or Vomiting.     • ondansetron ODT (ZOFRAN-ODT) 4 MG disintegrating tablet Take 1 tablet by mouth Every 8 (Eight) Hours As Needed for Nausea or Vomiting. 10 tablet 0   • ONETOUCH VERIO test strip   11   • phenazopyridine (PYRIDIUM) 100 MG tablet Take 1 tablet by mouth 3 (Three) Times a Day As Needed for bladder spasms. 6 tablet 0   • promethazine (PHENERGAN) 25 MG tablet Take 25 mg by mouth Every 6 (Six) Hours As Needed for Nausea or  "Vomiting.     • Syringe/Needle, Disp, 25G X 5/8\" 3 ML misc USE 1 SYRINGE FOR MONTHLY VITAMIN B-12 SHOTS 6 each 2   • traMADol (ULTRAM) 50 MG tablet Take 1 tablet by mouth 3 (Three) Times a Day As Needed for Moderate Pain . ti 90 tablet 2   • traZODone (DESYREL) 100 MG tablet Take 1.5 tablets by mouth Every Night. 45 tablet 3   • gabapentin (NEURONTIN) 300 MG capsule 1 cap qam and 2 caps qhs 90 capsule 1   • HYDROcodone-acetaminophen (NORCO) 5-325 MG per tablet Take 1 tablet by mouth 2 (Two) Times a Day As Needed for Moderate Pain . 60 tablet 0   • carvedilol (COREG) 6.25 MG tablet TAKE 1 TABLET BY MOUTH TWO TIMES A DAY WITH MEALS. 60 tablet 3     No facility-administered medications prior to visit.        Review of Systems   Constitutional: Positive for malaise/fatigue. Negative for fever and unexpected weight change.   HENT: Negative.  Negative for congestion, ear pain, hearing loss, nosebleeds, rhinorrhea, sneezing, sore throat and tinnitus.    Eyes: Negative.  Negative for discharge.   Respiratory: Negative.  Negative for cough and wheezing.    Cardiovascular: Negative.  Negative for palpitations, orthopnea and PND.   Gastrointestinal: Negative.  Negative for abdominal pain, constipation and diarrhea.   Endocrine: Negative.    Genitourinary: Negative for dysuria.   Musculoskeletal: Positive for arthralgias and back pain. Negative for joint swelling and neck pain.   Skin: Negative.  Negative for rash.   Allergic/Immunologic: Negative.    Neurological: Negative.  Negative for numbness.   Hematological: Negative.  Negative for adenopathy.   Psychiatric/Behavioral: Negative.  Negative for dysphoric mood and sleep disturbance.       Objective   Visit Vitals  /74   Pulse 77   Ht 157.5 cm (62\")   Wt 66.2 kg (145 lb 14.4 oz)   LMP  (LMP Unknown)   SpO2 97%   BMI 26.69 kg/m²     Physical Exam   Constitutional: She is oriented to person, place, and time. She appears well-developed and well-nourished. No distress. "   HENT:   Head: Normocephalic and atraumatic.   Nose: Nose normal.   Mouth/Throat: Oropharynx is clear and moist.   Eyes: Conjunctivae and EOM are normal. Pupils are equal, round, and reactive to light. Right eye exhibits no discharge. Left eye exhibits no discharge.   Neck: No thyromegaly present.   Cardiovascular: Normal rate, regular rhythm, normal heart sounds and intact distal pulses.   Pulmonary/Chest: Effort normal and breath sounds normal.   Musculoskeletal:        Lumbar back: She exhibits decreased range of motion and tenderness.   Lymphadenopathy:     She has no cervical adenopathy.   Neurological: She is alert and oriented to person, place, and time.   Skin: Skin is warm and dry.   Psychiatric: She exhibits a depressed mood.   Nursing note and vitals reviewed.     Assessment/Plan   Problem List Items Addressed This Visit        Cardiovascular and Mediastinum    Essential hypertension - Primary (Chronic)    Relevant Orders    Basic Metabolic Panel    CBC & Differential    Comprehensive Metabolic Panel    Hemoglobin A1c    Lipid Panel    MicroAlbumin, Urine, Random - Urine, Clean Catch    Urinalysis With Microscopic - Urine, Clean Catch    Mixed hyperlipidemia (Chronic)    Relevant Orders    CBC & Differential    Comprehensive Metabolic Panel    Hemoglobin A1c    Lipid Panel    MicroAlbumin, Urine, Random - Urine, Clean Catch    Urinalysis With Microscopic - Urine, Clean Catch       Endocrine    Diabetic polyneuropathy (CMS/HCC) (Chronic)    Diabetes mellitus (CMS/HCC)    Relevant Orders    CBC & Differential    Comprehensive Metabolic Panel    Hemoglobin A1c    Lipid Panel    MicroAlbumin, Urine, Random - Urine, Clean Catch    Urinalysis With Microscopic - Urine, Clean Catch      Other Visit Diagnoses     Degenerative disc disease, lumbar  (Chronic)       Recurrent major depressive disorder, in partial remission (CMS/HCC)  (Chronic)       Other insomnia  (Chronic)             Will notify regarding  results. Continue current treatment. May need to rethink her living situation.     New Medications Ordered This Visit   Medications   • gabapentin (NEURONTIN) 300 MG capsule     Si cap qam and 2 caps qhs     Dispense:  90 capsule     Refill:  2     Return in about 3 months (around 2019) for Recheck, Annual physical, medicare wellness visit.

## 2019-06-28 ENCOUNTER — TELEPHONE (OUTPATIENT)
Dept: FAMILY MEDICINE CLINIC | Facility: CLINIC | Age: 79
End: 2019-06-28

## 2019-06-28 DIAGNOSIS — E11.41 TYPE 2 DIABETES MELLITUS WITH DIABETIC MONONEUROPATHY, WITH LONG-TERM CURRENT USE OF INSULIN (HCC): Primary | ICD-10-CM

## 2019-06-28 DIAGNOSIS — Z79.4 TYPE 2 DIABETES MELLITUS WITH DIABETIC MONONEUROPATHY, WITH LONG-TERM CURRENT USE OF INSULIN (HCC): Primary | ICD-10-CM

## 2019-06-28 DIAGNOSIS — E78.2 MIXED HYPERLIPIDEMIA: Chronic | ICD-10-CM

## 2019-06-28 DIAGNOSIS — I48.0 PAROXYSMAL ATRIAL FIBRILLATION (HCC): ICD-10-CM

## 2019-06-28 DIAGNOSIS — I10 ESSENTIAL HYPERTENSION: Chronic | ICD-10-CM

## 2019-06-28 NOTE — TELEPHONE ENCOUNTER
Per Dr. Quispe, Ms. Arango's daughter Maya has been called with recent lab results & recommendations.  Continue current medications and follow-up as planned or sooner if any problems.      ---- Message from Ami Quispe MD sent at 6/28/2019  8:36 AM CDT -----  Call and tell calcium is back to normal

## 2019-07-05 ENCOUNTER — HOSPITAL ENCOUNTER (OUTPATIENT)
Dept: CT IMAGING | Facility: HOSPITAL | Age: 79
Discharge: HOME | End: 2019-07-05
Admitting: THORACIC SURGERY (CARDIOTHORACIC VASCULAR SURGERY)

## 2019-07-05 DIAGNOSIS — I71.20 THORACIC AORTIC ANEURYSM WITHOUT RUPTURE (HCC): Chronic | ICD-10-CM

## 2019-07-05 PROCEDURE — 71250 CT THORAX DX C-: CPT

## 2019-07-15 RX ORDER — CLOPIDOGREL BISULFATE 75 MG/1
75 TABLET ORAL DAILY
Qty: 30 TABLET | Refills: 2 | Status: SHIPPED | OUTPATIENT
Start: 2019-07-15 | End: 2019-10-16 | Stop reason: SDUPTHER

## 2019-07-18 RX ORDER — TRAZODONE HYDROCHLORIDE 100 MG/1
150 TABLET ORAL NIGHTLY
Qty: 45 TABLET | Refills: 3 | Status: SHIPPED | OUTPATIENT
Start: 2019-07-18 | End: 2019-10-16 | Stop reason: SDUPTHER

## 2019-07-22 ENCOUNTER — OFFICE VISIT (OUTPATIENT)
Dept: CARDIAC SURGERY | Facility: CLINIC | Age: 79
End: 2019-07-22

## 2019-07-22 VITALS
BODY MASS INDEX: 27.42 KG/M2 | DIASTOLIC BLOOD PRESSURE: 75 MMHG | OXYGEN SATURATION: 95 % | HEIGHT: 62 IN | SYSTOLIC BLOOD PRESSURE: 115 MMHG | TEMPERATURE: 97.3 F | HEART RATE: 98 BPM | WEIGHT: 149 LBS

## 2019-07-22 DIAGNOSIS — I48.0 PAROXYSMAL ATRIAL FIBRILLATION (HCC): ICD-10-CM

## 2019-07-22 DIAGNOSIS — E78.2 MIXED HYPERLIPIDEMIA: Chronic | ICD-10-CM

## 2019-07-22 DIAGNOSIS — I71.20 THORACIC AORTIC ANEURYSM WITHOUT RUPTURE (HCC): Primary | Chronic | ICD-10-CM

## 2019-07-22 DIAGNOSIS — I10 ESSENTIAL HYPERTENSION: Chronic | ICD-10-CM

## 2019-07-22 DIAGNOSIS — E78.00 HYPERCHOLESTEROLEMIA: ICD-10-CM

## 2019-07-22 DIAGNOSIS — E11.42 DIABETIC POLYNEUROPATHY ASSOCIATED WITH TYPE 2 DIABETES MELLITUS (HCC): Chronic | ICD-10-CM

## 2019-07-22 PROCEDURE — 99214 OFFICE O/P EST MOD 30 MIN: CPT | Performed by: THORACIC SURGERY (CARDIOTHORACIC VASCULAR SURGERY)

## 2019-07-24 DIAGNOSIS — I48.91 ATRIAL FIBRILLATION, UNSPECIFIED TYPE (HCC): Primary | ICD-10-CM

## 2019-07-25 ENCOUNTER — OFFICE VISIT (OUTPATIENT)
Dept: CARDIOLOGY | Facility: CLINIC | Age: 79
End: 2019-07-25

## 2019-07-25 VITALS
DIASTOLIC BLOOD PRESSURE: 80 MMHG | WEIGHT: 146 LBS | OXYGEN SATURATION: 99 % | HEART RATE: 71 BPM | BODY MASS INDEX: 26.87 KG/M2 | SYSTOLIC BLOOD PRESSURE: 154 MMHG | HEIGHT: 62 IN

## 2019-07-25 DIAGNOSIS — I37.1 NONRHEUMATIC PULMONARY VALVE INSUFFICIENCY: Primary | ICD-10-CM

## 2019-07-25 DIAGNOSIS — I71.20 THORACIC AORTIC ANEURYSM WITHOUT RUPTURE (HCC): Chronic | ICD-10-CM

## 2019-07-25 DIAGNOSIS — R07.2 PRECORDIAL PAIN: ICD-10-CM

## 2019-07-25 DIAGNOSIS — I48.0 PAROXYSMAL ATRIAL FIBRILLATION (HCC): ICD-10-CM

## 2019-07-25 PROCEDURE — 99214 OFFICE O/P EST MOD 30 MIN: CPT | Performed by: INTERNAL MEDICINE

## 2019-07-25 PROCEDURE — 93000 ELECTROCARDIOGRAM COMPLETE: CPT | Performed by: INTERNAL MEDICINE

## 2019-07-25 RX ORDER — FLUTICASONE PROPIONATE 50 MCG
SPRAY, SUSPENSION (ML) NASAL
COMMUNITY
Start: 2017-07-11

## 2019-07-25 NOTE — PROGRESS NOTES
Cardiovascular Medicine   Hima Gill M.D., Ph.D., [facility]      The patient returns to cardiovascular clinic for follow-up of the following:    PROBLEM LIST:      1. NVAF  A. Anticoagulation CI secondary to GI bleeds and possible cerebral aneurysm  2.  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 Risks: PAD, coronary artery calcifications by CT scan 2016 with normal MPS 2017, former smoker, hypertension, hyperlipidemia      Mariluz Arango is a 78 y.o. female who presents for follow-up of atrial fibrillation. Symptoms include none. Clinical course: stable. Associated symptoms include: palpitations. The patient denies cough, dizziness and fatigue. he patient is currently prescribed beta blocker-Coreg. The patient does not have a prescription for a rhythm control medication.  The patient is not prescribed anticoagulation due to prior bleeding episodes.  The patient denies easy bleeding.  The patient denies bleeding episodes. The patient denies increased cardiac awareness.  The patient denies dizziness, lightheadedness or syncope. Patient's prior CT scan for other reasons revealed calcification of the coronary arteries.  She was not endorsing any overt angina.  She did perform a myocardial perfusion stress which was unremarkable. 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 .  Patient has no family history of aneurysmal diseases.  She does have follow-up imaging with Dr. Phillips.     Today, she has been having some intermittent and non-exertional CP. This is left-sided. She has also had some LUE pain.        Review of Systems   Cardiovascular: Positive for chest pain, dyspnea on exertion and irregular heartbeat. Negative for claudication, leg swelling, orthopnea, palpitations, paroxysmal nocturnal dyspnea and syncope.   Respiratory: Positive for shortness of breath. Negative for cough, hemoptysis, sleep disturbances due to breathing, snoring,  sputum production and wheezing.      family history includes Arthritis in her mother; Diabetes in her mother and other; Hyperlipidemia in her mother; Hypertension in her mother; Obesity in her mother; Stroke in her mother.   reports that she quit smoking about 3 years ago. Her smoking use included cigarettes. She smoked 0.50 packs per day. She has never used smokeless tobacco. She reports that she does not drink alcohol or use drugs.  Allergies   Allergen Reactions   • Aspirin    • Codeine      UNKNOWN REACTION   • Macrobid [Nitrofurantoin Macrocrystal] Nausea And Vomiting   • Penicillins    • Sulfa Antibiotics      Sulfa (Sulfonamide Antibiotics)   • Dexilant [Dexlansoprazole] Rash   • Other Other (See Comments)     New informati       Current Outpatient Medications:   •  carvedilol (COREG) 6.25 MG tablet, TAKE 1 TABLET BY MOUTH TWO TIMES A DAY WITH MEALS., Disp: 60 tablet, Rfl: 3  •  ciprofloxacin (CIPRO) 250 MG tablet, Take 250 mg by mouth Daily., Disp: , Rfl:   •  clobetasol (TEMOVATE) 0.05 % external solution, Apply 1 application topically Daily., Disp: , Rfl:   •  clopidogrel (PLAVIX) 75 MG tablet, TAKE 1 TABLET BY MOUTH DAILY., Disp: 30 tablet, Rfl: 2  •  Cranberry 500 MG capsule, Take 1 tablet by mouth Daily., Disp: , Rfl:   •  cyanocobalamin 1000 MCG/ML injection, Inject 1 mL into the appropriate muscle as directed by prescriber Every 28 (Twenty-Eight) Days., Disp: 1 mL, Rfl: 11  •  donepezil (ARICEPT) 10 MG tablet, Take 1 tablet by mouth Every Night., Disp: 90 tablet, Rfl: 2  •  DULoxetine (CYMBALTA) 30 MG capsule, Take 1 capsule by mouth Daily., Disp: 90 capsule, Rfl: 3  •  FLUOCINOLONE ACETONIDE SCALP 0.01 % oil, Apply 1 application topically Daily., Disp: , Rfl:   •  gabapentin (NEURONTIN) 300 MG capsule, 1 cap qam and 2 caps qhs, Disp: 90 capsule, Rfl: 2  •  hydrocortisone 1 % lotion, Apply  topically 3 (Three) Times a Day As Needed for Irritation or Rash., Disp: 1 bottle, Rfl: 1  •  hydrOXYzine  "pamoate (VISTARIL) 50 MG capsule, TAKE ONE CAPSULE BY MOUTH THREE TIMES A DAY AS NEEDED FOR ITCHING, Disp: 90 capsule, Rfl: 1  •  ketoconazole (NIZORAL) 2 % shampoo, , Disp: , Rfl:   •  lidocaine (LIDODERM) 5 %, PLACE ONE PATCH ONE THE SKIN EVERY DAY. REMOVE AND DISCARD THE PATCH WITHIN 12 HOURS AFTER APPLICATION EVERY DAY OR AS DIRECTED BY MD., Disp: 30 patch, Rfl: 1  •  lovastatin (MEVACOR) 10 MG tablet, Take 1 tablet by mouth Every Other Day. cholesterol, Disp: 45 tablet, Rfl: 3  •  Multiple Vitamins-Minerals (MULTIVITAMIN GUMMIES ADULT PO), Take 1 each by mouth Daily., Disp: , Rfl:   •  nystatin (MYCOSTATIN) 685742 UNIT/GM powder, APPLY TOPICALLY TO THE AFFECTED AREA THREE TIMES A DAY, Disp: 60 g, Rfl: 2  •  omeprazole (priLOSEC) 40 MG capsule, Take 40 mg by mouth 2 (Two) Times a Day., Disp: , Rfl:   •  ondansetron (ZOFRAN) 8 MG tablet, Take 8 mg by mouth Every 8 (Eight) Hours As Needed for Nausea or Vomiting., Disp: , Rfl:   •  ondansetron ODT (ZOFRAN-ODT) 4 MG disintegrating tablet, Take 1 tablet by mouth Every 8 (Eight) Hours As Needed for Nausea or Vomiting., Disp: 10 tablet, Rfl: 0  •  ONETOUCH VERIO test strip, , Disp: , Rfl: 11  •  phenazopyridine (PYRIDIUM) 100 MG tablet, Take 1 tablet by mouth 3 (Three) Times a Day As Needed for bladder spasms., Disp: 6 tablet, Rfl: 0  •  promethazine (PHENERGAN) 25 MG tablet, Take 25 mg by mouth Every 6 (Six) Hours As Needed for Nausea or Vomiting., Disp: , Rfl:   •  Syringe/Needle, Disp, 25G X 5/8\" 3 ML misc, USE 1 SYRINGE FOR MONTHLY VITAMIN B-12 SHOTS, Disp: 6 each, Rfl: 2  •  traMADol (ULTRAM) 50 MG tablet, Take 1 tablet by mouth 3 (Three) Times a Day As Needed for Moderate Pain . ti, Disp: 90 tablet, Rfl: 2  •  traZODone (DESYREL) 100 MG tablet, Take 1.5 tablets by mouth Every Night., Disp: 45 tablet, Rfl: 3    Physical Exam:  Vitals:    07/25/19 1117   BP: 154/80   Pulse: 71   SpO2: 99%     Body mass index is 26.7 kg/m².   Last Weights:   Wt Readings from Last 3 " Encounters:   07/22/19 67.6 kg (149 lb)   06/27/19 66.2 kg (145 lb 14.4 oz)   03/21/19 68.8 kg (151 lb 9.6 oz)     Pulse Ox: Normal   General: alert, appears stated age and cooperative  Body Habitus: well-nourished  HEENT: Head: Normocephalic, no lesions, without obvious abnormality. No arcus senilis, xanthelasma or xanthomas.  JVP: 6 cm of water at 45 degrees   Volume/Pulsation: Normal  Heart rate: normal    Heart Rhythm: regular     Heart Sounds: S1: normal intensity  S2: normal intensity  S3: absent   S4: absent  Opening Snap: absent  A2-OS:  absent.   Pericardial rub: absent    Ejection click: None      Murmurs:  absent   Extremity: moves all extremities equally.       DATA REVIEWED:   EKG: NSR, LAD,   ---------------------------------------------------  TTE/ASHLEIGH:  Results for orders placed in visit on 05/19/17   Adult Transthoracic Echo Complete    Narrative · Left Ventricle: Left ventricular systolic function is normal. Estimated   EF appears to be in the range of 61 - 65%. Normal left ventricular cavity   size noted  · Left ventricular wall thickness is consistent with mild concentric   hypertrophy  · Left ventricular diastolic dysfunction is noted (grade I a w/high LAP)   consistent with impaired relaxation. Elevated left atrial pressure  · Right Ventricle: Normal right ventricular cavity size, wall thickness,   systolic function and septal motion noted  · Small patent foramen ovale present with right to left shunting indicated   by saline contrast  · There is mild pulmonic valve regurgitation present.  · No evidence of pulmonary hypertension is present  · There is no evidence of pericardial effusion            --------------------------------------------------------------------------------------------------  LABS:   Lab Results   Component Value Date    GLUCOSE 116 (H) 06/27/2019    BUN 10 06/27/2019    CREATININE 0.69 06/27/2019    EGFRIFNONA 82 06/27/2019    BCR 14.5 06/27/2019    K 3.9 06/27/2019    CO2  27.5 06/27/2019    CALCIUM 8.6 06/27/2019    ALBUMIN 3.80 03/14/2019    AST 22 03/14/2019    ALT 13 03/14/2019     Lab Results   Component Value Date    WBC 9.65 08/14/2018    HGB 13.3 08/14/2018    HCT 39.8 08/14/2018    MCV 85.0 08/14/2018     08/14/2018     Lab Results   Component Value Date    CHOL 140 06/21/2018    CHLPL 167 06/30/2016    TRIG 128 06/21/2018    HDL 61 06/21/2018     (H) 03/14/2019     Lab Results   Component Value Date    TSH 1.390 02/14/2018    C8BZSVT 111 07/12/2016    Z3RMRVT 7.8 07/12/2016     Lab Results   Component Value Date    CKTOTAL <20 (L) 08/09/2016    CKMB <0.2 08/09/2016    TROPONINI <0.012 08/09/2016     Lab Results   Component Value Date    HGBA1C 6.1 (H) 03/14/2019     No results found for: DDIMER  Lab Results   Component Value Date    ALT 13 03/14/2019     Lab Results   Component Value Date    HGBA1C 6.1 (H) 03/14/2019    HGBA1C 6.3 (H) 04/02/2018    HGBA1C 6.1 (H) 08/11/2017     Lab Results   Component Value Date    MICROALBUR 5.4 05/19/2017    CREATININE 0.69 06/27/2019     Lab Results   Component Value Date    IRON 31 (L) 05/01/2018    TIBC 381 05/01/2018    FERRITIN 15.00 11/09/2017     No results found for: INR, PROTIME    Assessment/Plan       1. Pulmonary Regurgitation. ACC stage B.  There are no surgical indications at this time. The patient has not had valve surgery..   · The patient has been advised to remain in clinical surveillance every 12 months.  · Signs and symptoms of worsening valve disease discussed.  I've asked the patient contact me for an earlier appointment if these develop.  · I've recommended a repeat 2D TTE every 3 years.   · TTE due: 2020.  No indication based on 2017 ACC/AHA guidelines for IE prophylaxis for dental procedures: Optimal oral health is recommended through regular professional dental care and the use of appropriate dental products, such as manual, powered, and ultrasonic toothbrushes; dental floss; and other plaque-removal  "devices    2.  paroxysmal - 7 days or less Atrial fibrillation. The patient is currently in NSR with a well controlled ventricular rate.   She clearly meets indications for anticoagulation, but she's had issues with bleeding in the past with ASA.  I think she is at elevated risk of hemorrhage.    Anticoagulation CI: bleeding disorder, aneurysm  Rate control agents:ordered.  - Agent: beta blocker-Coreg  -Rhythm control agents:not indicated     3.   Subclinical atherosclerosis.  Patient's CT scan documented significant calcification of the coronary arteries.  Myocardial perfusion stress 2017 was without evidence of inducible ischemia. Today, she is having CP. I recommended an ischemia eval. She declined.   -Continue risk factor modifications  -Ischemia eval recommended     4.  Thoracoabdominal aortic aneurysm at the diaphragmatic hiatus most recently documented at 4.3cm.    - Dr. Phillips for surveillance  -Continue beta blocker and risk factor modifications     5. Cardiac Risk Assessment  -Continue follow-up visits with PCP for monitoring of labs; Dietary changes: Increase soluble fiber: A printed copy of a low fat, low cholesterol diet was given; Reduce saturated fat, \"trans\" monounsaturated fatty acids, and cholesterol; Exercise changes:  Encouraged daily aerobic activity.  -HTN is a significant risk factor for stroke, heart disease and vascular disease. I've recommended the patient continue current medications, if any, as prescribed by the primary care provider. I recommended they have close follow-up for ongoing mgmt of this and the medical comorbidities associated with HTN with their PCP.    General patient education regarding weight:   The patient's BMI is Body mass index is 26.7 kg/m²..  This places the patient in weight class:  Overweight: 25.0-29.9kg/m2 .   -Weight loss hand-out  -Exercise intervention:   Hand out, increase aerobic activity  decrease soda or juice intake, eat breakfast, eat more fruits and " vegetables, reduce fast food intake, reduce portion size and reduce screen time  -Close PCP follow-up for Body mass index is 26.7 kg/m²..     Former tobacco exposure:   -Congratulated on cessation  -Avoid tobacco    Return in about 1 year (around 7/25/2020).

## 2019-07-31 NOTE — PROGRESS NOTES
2019    Mariluz Arango  1940    Chief Complaint:  Thoracic aortic aneurysm    HPI:      PCP:  Ami Quispe MD  Cardiology:  Dr Gill     78y.o. female with Afib(stable), hyperlipidemia(stable), DM2(stable), GERD, hiatal hernia, peripheral neuropathy.  former smoker.  no new problems. Moderate palpitations x 1 year.  Moderate burning, stinging pain bilateral feet legs x years.  Asymptomatic thoracic aortic aneurysm found on imaging.  Grandmother  with ruptured abdominal aortic aneurysm.  BP runs 120s at home. No TIA stroke amaurosis.  No claudication.  Walks with cane..  No other associated signs, symptoms or modifying factors.     2015 CT Abdomen Pelvis:  38mm aorta at diaphragm, 22mm infrarenal.  2016 CT Abdomen Pelvis:  38mm aorta at diaphragm, 22mm infrarenal.  2017 CT Chest:  Ascending aorta 39mm, arch 31mm, descending thoracic aorta 43mm, diaphragm 36mm, infrarenal abdominal 21mm.  2018  CT Chest:  Ascending aorta 39mm, arch 30mm, descending thoracic aorta 42mm, diaphragm 35mm, infrarenal abdominal 22mm.  2019 CT Chest:  Ascending aorta 39mm, arch 31mm, descending thoracic aorta 42mm, diaphragm 35mm, infrarenal abdominal 24mm.     2017 Carotid Duplex:  WILEY 0-49%, antegrade vert.  LICA 0-49%, antegrade vert.  2017 SILVIA:  RIGHT 1.0, LEFT 1.0    The following portions of the patient's history were reviewed and updated as appropriate: allergies, current medications, past family history, past medical history, past social history, past surgical history and problem list.  Recent images independently reviewed.  Available laboratory values reviewed.    PMH:  Past Medical History:   Diagnosis Date   • Altered mental status     unspecified    • Anxiety    • Artificial lens present     Artificial lens in position   • Depressive disorder    • Diabetes mellitus (CMS/HCC)     no retinopathy      • Diabetic polyneuropathy (CMS/HCC)    • Diarrhea     unspecified    • Diverticular disease  of colon    • Dizziness    • Gastroesophageal reflux disease    • Generalized abdominal pain    • Headache    • Hiatal hernia    • History of colonic polyps     Personal history of colonic polyps   • History of echocardiogram 09/17/2008    Echocardiogram W/O color flow 57213 (1) - Evidence of LVH & diastolic dysfunction & mild LA enlargement, otherwise NRL echocradiogram   • History of mammogram 05/27/2011    MAMMOGRAM DIAGNOSTIC BILATERAL 22156 ([facility]) (1) - benign Birads category 2   • History of transfusion    • Hypercholesterolemia    • Lumbar radiculopathy    • Memory impairment     Multifactorial      • Nausea    • 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)    • Pain in thoracic spine    • Palpitations    • Polyp of colon     History of polyp of colon   • Stroke (CMS/HCC)    • Vitamin D deficiency      Past Surgical History:   Procedure Laterality Date   • ANKLE SURGERY  11/18/2008    Ankle surgery (4) - Open ankle arthrodesis with intermedullary bella fixation from the heel. Non union of the ankle, tibial plafond with failed ankle arthrodesis   • APPENDECTOMY  06/16/1965    Appendectomy (1)   • BLADDER SURGERY  06/08/1973    Bladder surgery (1) - Kendall-Raul repair. Cystocele with stress incontinence & endometriosis.Same with P.I.D   • BREAST BIOPSY  02/06/1997    Stereotactic breast biopsy (1) - Stereotactic needle localization with aspiration. Non palpable U/S solid mass, R breast by one interpretation & cluster cyst by another   • CARDIAC CATHETERIZATION  12/16/1986    Cardiac cath 52271 (1) - NRL LV function w/ NRL hemodynamics. NRL coronary arteries w/o evidence of atherosclerotic heart disease   • CARPAL TUNNEL RELEASE Left 08/15/1995    Carpal tunnel surgery (1) - Left carpal tunnel release, endoscopic procedure.   • CATARACT EXTRACTION Right 11/29/2007    Remove cataract, insert lens (2) - right   • CATARACT EXTRACTION Bilateral    • COLONOSCOPY W/ POLYPECTOMY  04/04/2016     Colonoscopy remove polyps 95548 (1) - One polyp in the ascending colon.Resected and retrieved.   • DILATATION AND CURETTAGE  10/02/1964    D&C (1) - D&C & colpotomy. Incomplete  or tubal pregnancy. Dysfunctional uterine bleeding   • ENDOSCOPY      Colon endoscopy 03702 (2)   • ENDOSCOPY  2016    EGD w/ biopsy 70049 (1) - Gastritis.Normal duodenum.Biopsied.Normal esophagus.Several biopsies obtained in the gastric antrum.   • ENDOSCOPY  2008    EGD w/ tube 08356 (1) - Hiatal hernia. GERD. Esophageal motility disorder.   • ENDOSCOPY N/A 3/29/2018    Procedure: ESOPHAGOGASTRODUODENOSCOPY;  Surgeon: Ezra Meyers DO;  Location: [facility] ENDOSCOPY;  Service: Gastroenterology   • ENDOSCOPY N/A 5/10/2018    Procedure: ESOPHAGOGASTRODUODENOSCOPY;  Surgeon: Ezra Meyers DO;  Location: [facility] ENDOSCOPY;  Service: Gastroenterology   • INJECTION OF MEDICATION  2011    Kenalog (3) - Ordered By: RADHA JIMENEZ ()    • LEG SURGERY  1985    Extensive leg surgery (1) - Arthroscopy, arthrotomy lateral release. Chomdromalcia, patella, right knee   • LUMBAR DISC SURGERY      Low back disk surgery (1)   • OTHER SURGICAL HISTORY  1981    Bowel surgery procedure (1) - Lysis of adhesions, resection of terminal ileum with end-to-end anastomosis. partial small bowel ibstruction. Same plus ulceration of distal ileum   • OTHER SURGICAL HISTORY  1965    Suspension of uterus (1) - Uterine suspension & appendectomy, lysis of adhesion. Chronic pelvic inflammatory disease vs endometriosis. Chronic pelvic inflannatory disease   • TOTAL ABDOMINAL HYSTERECTOMY WITH SALPINGO OOPHORECTOMY  1966    YAEL/BSO (1) - Chronic pelvic inflammatory disease.Same plus endometriosis of the ovary   • WRIST ARTHROSCOPY  1988    Wrist arthroscopy/surgery (2) - excision ganglion cyst left wrist.     Family History   Problem Relation Age of Onset   • Hypertension Mother    • Stroke Mother    • Arthritis  Mother    • Diabetes Mother    • Hyperlipidemia Mother    • Obesity Mother    • Diabetes Other      Social History     Tobacco Use   • Smoking status: Former Smoker     Packs/day: 0.50     Types: Cigarettes     Last attempt to quit: 06/2016     Years since quitting: 3.1   • Smokeless tobacco: Never Used   • Tobacco comment: 0.5 - 1 Pack(s) Of Cigarettes Per Day   Substance Use Topics   • Alcohol use: No   • Drug use: No       ALLERGIES:  Allergies   Allergen Reactions   • Lovastatin Itching   • Aspirin GI Bleeding     Has Stomach Issues   • Codeine      UNKNOWN REACTION   • Macrobid [Nitrofurantoin Macrocrystal] Nausea And Vomiting   • Sulfa Antibiotics      Sulfa (Sulfonamide Antibiotics)   • Dexilant [Dexlansoprazole] Rash   • Other Other (See Comments)     New informati   • Penicillins Rash         MEDICATIONS:    Current Outpatient Medications:   •  carvedilol (COREG) 6.25 MG tablet, TAKE 1 TABLET BY MOUTH TWO TIMES A DAY WITH MEALS., Disp: 60 tablet, Rfl: 3  •  ciprofloxacin (CIPRO) 250 MG tablet, Take 250 mg by mouth Daily., Disp: , Rfl:   •  clobetasol (TEMOVATE) 0.05 % external solution, Apply 1 application topically Daily., Disp: , Rfl:   •  clopidogrel (PLAVIX) 75 MG tablet, TAKE 1 TABLET BY MOUTH DAILY., Disp: 30 tablet, Rfl: 2  •  Cranberry 500 MG capsule, Take 1 tablet by mouth Daily., Disp: , Rfl:   •  cyanocobalamin 1000 MCG/ML injection, Inject 1 mL into the appropriate muscle as directed by prescriber Every 28 (Twenty-Eight) Days., Disp: 1 mL, Rfl: 11  •  donepezil (ARICEPT) 10 MG tablet, Take 1 tablet by mouth Every Night., Disp: 90 tablet, Rfl: 2  •  DULoxetine (CYMBALTA) 30 MG capsule, Take 1 capsule by mouth Daily., Disp: 90 capsule, Rfl: 3  •  FLUOCINOLONE ACETONIDE SCALP 0.01 % oil, Apply 1 application topically Daily., Disp: , Rfl:   •  gabapentin (NEURONTIN) 300 MG capsule, 1 cap qam and 2 caps qhs, Disp: 90 capsule, Rfl: 2  •  hydrocortisone 1 % lotion, Apply  topically 3 (Three) Times a  "Day As Needed for Irritation or Rash., Disp: 1 bottle, Rfl: 1  •  hydrOXYzine pamoate (VISTARIL) 50 MG capsule, TAKE ONE CAPSULE BY MOUTH THREE TIMES A DAY AS NEEDED FOR ITCHING, Disp: 90 capsule, Rfl: 1  •  ketoconazole (NIZORAL) 2 % shampoo, , Disp: , Rfl:   •  lidocaine (LIDODERM) 5 %, PLACE ONE PATCH ONE THE SKIN EVERY DAY. REMOVE AND DISCARD THE PATCH WITHIN 12 HOURS AFTER APPLICATION EVERY DAY OR AS DIRECTED BY MD., Disp: 30 patch, Rfl: 1  •  Multiple Vitamins-Minerals (MULTIVITAMIN GUMMIES ADULT PO), Take 1 each by mouth Daily., Disp: , Rfl:   •  nystatin (MYCOSTATIN) 624238 UNIT/GM powder, APPLY TOPICALLY TO THE AFFECTED AREA THREE TIMES A DAY, Disp: 60 g, Rfl: 2  •  omeprazole (priLOSEC) 40 MG capsule, Take 40 mg by mouth 2 (Two) Times a Day., Disp: , Rfl:   •  ondansetron (ZOFRAN) 8 MG tablet, Take 8 mg by mouth Every 8 (Eight) Hours As Needed for Nausea or Vomiting., Disp: , Rfl:   •  ondansetron ODT (ZOFRAN-ODT) 4 MG disintegrating tablet, Take 1 tablet by mouth Every 8 (Eight) Hours As Needed for Nausea or Vomiting., Disp: 10 tablet, Rfl: 0  •  ONETOUCH VERIO test strip, , Disp: , Rfl: 11  •  phenazopyridine (PYRIDIUM) 100 MG tablet, Take 1 tablet by mouth 3 (Three) Times a Day As Needed for bladder spasms., Disp: 6 tablet, Rfl: 0  •  promethazine (PHENERGAN) 25 MG tablet, Take 25 mg by mouth Every 6 (Six) Hours As Needed for Nausea or Vomiting., Disp: , Rfl:   •  Syringe/Needle, Disp, 25G X 5/8\" 3 ML misc, USE 1 SYRINGE FOR MONTHLY VITAMIN B-12 SHOTS, Disp: 6 each, Rfl: 2  •  traMADol (ULTRAM) 50 MG tablet, Take 1 tablet by mouth 3 (Three) Times a Day As Needed for Moderate Pain . ti, Disp: 90 tablet, Rfl: 2  •  traZODone (DESYREL) 100 MG tablet, Take 1.5 tablets by mouth Every Night., Disp: 45 tablet, Rfl: 3  •  fluticasone (FLONASE) 50 MCG/ACT nasal spray, INSTILL 2 SPRAYS INTO EACH NOSTRIL DAILY, Disp: , Rfl:   •  Sennosides-Docusate Sodium (SENNA-DOCUSATE SODIUM PO), Take 1 tablet by mouth As " "Needed., Disp: , Rfl:     Review of Systems   Review of Systems   Constitution: Positive for weakness and malaise/fatigue. Negative for weight loss.   Cardiovascular: Positive for palpitations. Negative for chest pain, claudication and dyspnea on exertion.   Respiratory: Negative for cough and shortness of breath.    Skin: Positive for rash. Negative for color change and poor wound healing.   Musculoskeletal: Positive for arthritis, back pain, muscle weakness and neck pain.   Neurological: Positive for difficulty with concentration, dizziness, numbness and paresthesias.   Psychiatric/Behavioral: Positive for depression and memory loss.       Physical Exam   Vitals:    07/22/19 1507   BP: 115/75   BP Location: Left arm   Pulse: 98   Temp: 97.3 °F (36.3 °C)   TempSrc: Temporal   SpO2: 95%   Weight: 67.6 kg (149 lb)   Height: 157.5 cm (62\")     Physical Exam   Constitutional: She is oriented to person, place, and time. She is active and cooperative. She does not appear ill. No distress.   HENT:   Right Ear: Hearing normal.   Left Ear: Hearing normal.   Nose: No nasal deformity. No epistaxis.   Mouth/Throat: She does not have dentures. Normal dentition.   Cardiovascular: Normal rate and regular rhythm.   No murmur heard.  Pulses:       Carotid pulses are 2+ on the right side, and 2+ on the left side.       Radial pulses are 2+ on the right side, and 2+ on the left side.        Dorsalis pedis pulses are 2+ on the right side, and 2+ on the left side.        Posterior tibial pulses are 1+ on the right side, and 1+ on the left side.   Pulmonary/Chest: Effort normal and breath sounds normal.   Abdominal: Soft. She exhibits no distension and no mass. There is no tenderness.   Musculoskeletal: She exhibits no deformity.   Gait normal.    Neurological: She is alert and oriented to person, place, and time. She has normal strength.   Skin: Skin is warm and dry. No cyanosis or erythema. No pallor.   No venous staining "   Psychiatric: She has a normal mood and affect. Her speech is normal. Judgment and thought content normal.     Results for MARILUZ ARANGO (MRN [id number]) as of 7/31/2019 16:13  GFR 82 Ref. Range 6/27/2019 14:17   Creatinine Latest Ref Range: 0.57 - 1.00 mg/dL 0.69   BUN Latest Ref Range: 8 - 23 mg/dL 10     ASSESSMENT:  Mariluz was seen today for thoracic aneurysm.    Diagnoses and all orders for this visit:    Thoracic aortic aneurysm without rupture (CMS/HCC)  -     CT Chest Without Contrast; Future    Paroxysmal atrial fibrillation (CMS/HCC)    Mixed hyperlipidemia    Hypercholesterolemia    Essential hypertension    Diabetic polyneuropathy associated with type 2 diabetes mellitus (CMS/HCC)    PLAN:  Detailed discussion with Ms Arango regarding situation and options.  Aneurysm descending thoracic Aorta.  Surgical intervention not indicated at this time.  Will plan to follow with interval imaging.  BP control in 120 range.  Discussed symptoms of rupture, details of surgical repair including  Endovascular and open repair.  Risks, benefits discussed.  Understands and wishes to proceed with plan     Return in 1 year with CT Chest without contrast    Return after above studies complete  Obesity Class  0. Increased risk cardiovascular events, sleep and breathing disorders, joint issues, type 2 diabetes mellitus. Options for weight management, heart healthy diet, exercise programs, and associated health risks of obesity discussed.  Recommended regular physical activity, progressive walking program.  Continue current medications as directed.    Thank you for the opportunity to participate in this patient's care.    Copy to primary care provider.    EMR Dragon/Transcription disclaimer:   Much of this encounter note is an electronic transcription/translation of spoken language to printed text. The electronic translation of spoken language may permit erroneous, or at times, nonsensical words or phrases to be  inadvertently transcribed; Although I have reviewed the note for such errors, some may still exist.

## 2019-08-02 RX ORDER — DONEPEZIL HYDROCHLORIDE 10 MG/1
10 TABLET, FILM COATED ORAL NIGHTLY
Qty: 90 TABLET | Refills: 2 | Status: SHIPPED | OUTPATIENT
Start: 2019-08-02 | End: 2020-08-17

## 2019-08-26 RX ORDER — CYANOCOBALAMIN 1000 UG/ML
INJECTION, SOLUTION INTRAMUSCULAR; SUBCUTANEOUS
Qty: 1 ML | Refills: 11 | Status: SHIPPED | OUTPATIENT
Start: 2019-08-26

## 2019-09-13 RX ORDER — HYDROXYZINE PAMOATE 50 MG/1
CAPSULE ORAL
Qty: 90 CAPSULE | Refills: 1 | Status: SHIPPED | OUTPATIENT
Start: 2019-09-13 | End: 2019-09-26

## 2019-09-20 ENCOUNTER — TELEPHONE (OUTPATIENT)
Dept: FAMILY MEDICINE CLINIC | Facility: CLINIC | Age: 79
End: 2019-09-20

## 2019-09-26 ENCOUNTER — LAB (OUTPATIENT)
Dept: LAB | Facility: HOSPITAL | Age: 79
End: 2019-09-26

## 2019-09-26 ENCOUNTER — OFFICE VISIT (OUTPATIENT)
Dept: FAMILY MEDICINE CLINIC | Facility: CLINIC | Age: 79
End: 2019-09-26

## 2019-09-26 VITALS
OXYGEN SATURATION: 97 % | HEART RATE: 91 BPM | BODY MASS INDEX: 26.72 KG/M2 | DIASTOLIC BLOOD PRESSURE: 90 MMHG | SYSTOLIC BLOOD PRESSURE: 140 MMHG | WEIGHT: 145.2 LBS | HEIGHT: 62 IN

## 2019-09-26 DIAGNOSIS — E78.2 MIXED HYPERLIPIDEMIA: ICD-10-CM

## 2019-09-26 DIAGNOSIS — E78.2 MIXED HYPERLIPIDEMIA: Chronic | ICD-10-CM

## 2019-09-26 DIAGNOSIS — I10 ESSENTIAL HYPERTENSION: Chronic | ICD-10-CM

## 2019-09-26 DIAGNOSIS — Z79.4 TYPE 2 DIABETES MELLITUS WITH DIABETIC MONONEUROPATHY, WITH LONG-TERM CURRENT USE OF INSULIN (HCC): ICD-10-CM

## 2019-09-26 DIAGNOSIS — Z79.4 TYPE 2 DIABETES MELLITUS WITH DIABETIC MONONEUROPATHY, WITH LONG-TERM CURRENT USE OF INSULIN (HCC): Chronic | ICD-10-CM

## 2019-09-26 DIAGNOSIS — I48.0 PAROXYSMAL ATRIAL FIBRILLATION (HCC): ICD-10-CM

## 2019-09-26 DIAGNOSIS — E11.41 TYPE 2 DIABETES MELLITUS WITH DIABETIC MONONEUROPATHY, WITH LONG-TERM CURRENT USE OF INSULIN (HCC): Chronic | ICD-10-CM

## 2019-09-26 DIAGNOSIS — I10 ESSENTIAL HYPERTENSION: ICD-10-CM

## 2019-09-26 DIAGNOSIS — Z00.00 MEDICARE ANNUAL WELLNESS VISIT, SUBSEQUENT: Primary | ICD-10-CM

## 2019-09-26 DIAGNOSIS — E11.42 DIABETIC POLYNEUROPATHY ASSOCIATED WITH TYPE 2 DIABETES MELLITUS (HCC): Chronic | ICD-10-CM

## 2019-09-26 DIAGNOSIS — E11.41 TYPE 2 DIABETES MELLITUS WITH DIABETIC MONONEUROPATHY, WITH LONG-TERM CURRENT USE OF INSULIN (HCC): ICD-10-CM

## 2019-09-26 DIAGNOSIS — Z23 NEED FOR INFLUENZA VACCINATION: ICD-10-CM

## 2019-09-26 LAB
ALBUMIN SERPL-MCNC: 4.1 G/DL (ref 3.5–5.2)
ALBUMIN UR-MCNC: 4.9 MG/DL
ALBUMIN/GLOB SERPL: 1.8 G/DL
ALP SERPL-CCNC: 35 U/L (ref 39–117)
ALT SERPL W P-5'-P-CCNC: <5 U/L (ref 1–33)
ANION GAP SERPL CALCULATED.3IONS-SCNC: 7.6 MMOL/L (ref 5–15)
AST SERPL-CCNC: 12 U/L (ref 1–32)
BACTERIA UR QL AUTO: NORMAL /HPF
BASOPHILS # BLD AUTO: 0.05 10*3/MM3 (ref 0–0.2)
BASOPHILS NFR BLD AUTO: 0.7 % (ref 0–1.5)
BILIRUB SERPL-MCNC: 0.3 MG/DL (ref 0.2–1.2)
BILIRUB UR QL STRIP: NEGATIVE
BUN BLD-MCNC: 8 MG/DL (ref 8–23)
BUN/CREAT SERPL: 11.1 (ref 7–25)
CALCIUM SPEC-SCNC: 8.4 MG/DL (ref 8.6–10.5)
CHLORIDE SERPL-SCNC: 98 MMOL/L (ref 98–107)
CHOLEST SERPL-MCNC: 161 MG/DL (ref 0–200)
CLARITY UR: CLEAR
CO2 SERPL-SCNC: 29.4 MMOL/L (ref 22–29)
COD CRY URNS QL: NORMAL /HPF
COLOR UR: YELLOW
CREAT BLD-MCNC: 0.72 MG/DL (ref 0.57–1)
DEPRECATED RDW RBC AUTO: 44.9 FL (ref 37–54)
EOSINOPHIL # BLD AUTO: 0.34 10*3/MM3 (ref 0–0.4)
EOSINOPHIL NFR BLD AUTO: 4.8 % (ref 0.3–6.2)
ERYTHROCYTE [DISTWIDTH] IN BLOOD BY AUTOMATED COUNT: 14.1 % (ref 12.3–15.4)
GFR SERPL CREATININE-BSD FRML MDRD: 78 ML/MIN/1.73
GLOBULIN UR ELPH-MCNC: 2.3 GM/DL
GLUCOSE BLD-MCNC: 126 MG/DL (ref 65–99)
GLUCOSE UR STRIP-MCNC: NEGATIVE MG/DL
HBA1C MFR BLD: 6.41 % (ref 4.8–5.6)
HCT VFR BLD AUTO: 37.9 % (ref 34–46.6)
HDLC SERPL-MCNC: 46 MG/DL (ref 40–60)
HGB BLD-MCNC: 12.3 G/DL (ref 12–15.9)
HGB UR QL STRIP.AUTO: ABNORMAL
HYALINE CASTS UR QL AUTO: NORMAL /LPF
IMM GRANULOCYTES # BLD AUTO: 0.02 10*3/MM3 (ref 0–0.05)
IMM GRANULOCYTES NFR BLD AUTO: 0.3 % (ref 0–0.5)
KETONES UR QL STRIP: NEGATIVE
LDLC SERPL CALC-MCNC: 80 MG/DL (ref 0–100)
LDLC/HDLC SERPL: 1.73 {RATIO}
LEUKOCYTE ESTERASE UR QL STRIP.AUTO: NEGATIVE
LYMPHOCYTES # BLD AUTO: 2 10*3/MM3 (ref 0.7–3.1)
LYMPHOCYTES NFR BLD AUTO: 28.2 % (ref 19.6–45.3)
MCH RBC QN AUTO: 28.5 PG (ref 26.6–33)
MCHC RBC AUTO-ENTMCNC: 32.5 G/DL (ref 31.5–35.7)
MCV RBC AUTO: 87.7 FL (ref 79–97)
MONOCYTES # BLD AUTO: 0.5 10*3/MM3 (ref 0.1–0.9)
MONOCYTES NFR BLD AUTO: 7.1 % (ref 5–12)
NEUTROPHILS # BLD AUTO: 4.18 10*3/MM3 (ref 1.7–7)
NEUTROPHILS NFR BLD AUTO: 58.9 % (ref 42.7–76)
NITRITE UR QL STRIP: NEGATIVE
NRBC BLD AUTO-RTO: 0 /100 WBC (ref 0–0.2)
PH UR STRIP.AUTO: 6 [PH] (ref 5–8)
PLATELET # BLD AUTO: 228 10*3/MM3 (ref 140–450)
PMV BLD AUTO: 9.8 FL (ref 6–12)
POTASSIUM BLD-SCNC: 4.4 MMOL/L (ref 3.5–5.2)
PROT SERPL-MCNC: 6.4 G/DL (ref 6–8.5)
PROT UR QL STRIP: ABNORMAL
RBC # BLD AUTO: 4.32 10*6/MM3 (ref 3.77–5.28)
RBC # UR: NORMAL /HPF
REF LAB TEST METHOD: NORMAL
SODIUM BLD-SCNC: 135 MMOL/L (ref 136–145)
SP GR UR STRIP: 1.02 (ref 1–1.03)
SQUAMOUS #/AREA URNS HPF: NORMAL /HPF
TRIGL SERPL-MCNC: 176 MG/DL (ref 0–150)
UROBILINOGEN UR QL STRIP: ABNORMAL
VLDLC SERPL-MCNC: 35.2 MG/DL (ref 5–40)
WBC NRBC COR # BLD: 7.09 10*3/MM3 (ref 3.4–10.8)
WBC UR QL AUTO: NORMAL /HPF

## 2019-09-26 PROCEDURE — 80053 COMPREHEN METABOLIC PANEL: CPT

## 2019-09-26 PROCEDURE — 81001 URINALYSIS AUTO W/SCOPE: CPT

## 2019-09-26 PROCEDURE — 83036 HEMOGLOBIN GLYCOSYLATED A1C: CPT

## 2019-09-26 PROCEDURE — 90653 IIV ADJUVANT VACCINE IM: CPT | Performed by: GENERAL PRACTICE

## 2019-09-26 PROCEDURE — 80061 LIPID PANEL: CPT

## 2019-09-26 PROCEDURE — G0008 ADMIN INFLUENZA VIRUS VAC: HCPCS | Performed by: GENERAL PRACTICE

## 2019-09-26 PROCEDURE — 99214 OFFICE O/P EST MOD 30 MIN: CPT | Performed by: GENERAL PRACTICE

## 2019-09-26 PROCEDURE — G0439 PPPS, SUBSEQ VISIT: HCPCS | Performed by: GENERAL PRACTICE

## 2019-09-26 PROCEDURE — 85025 COMPLETE CBC W/AUTO DIFF WBC: CPT

## 2019-09-26 PROCEDURE — 82043 UR ALBUMIN QUANTITATIVE: CPT

## 2019-09-26 PROCEDURE — 36415 COLL VENOUS BLD VENIPUNCTURE: CPT

## 2019-09-26 RX ORDER — HYDROXYZINE HYDROCHLORIDE 25 MG/1
25 TABLET, FILM COATED ORAL 2 TIMES DAILY PRN
Qty: 60 TABLET | Refills: 5 | Status: SHIPPED | OUTPATIENT
Start: 2019-09-26

## 2019-09-26 RX ORDER — GABAPENTIN 300 MG/1
CAPSULE ORAL
Qty: 90 CAPSULE | Refills: 2 | Status: SHIPPED | OUTPATIENT
Start: 2019-09-26 | End: 2020-01-31

## 2019-09-26 NOTE — PROGRESS NOTES
Subjective   Mariluz Arango is a 78 y.o. female.     Chief Complaint   Patient presents with   • Annual Exam     labs medicare wellness     For review and evaluation of management of chronic medical problems. Labs pending. Depression fairly well controlled, is a little worse and she lost her .  She states that she was tried on Trintellix at one point and did very well on this.  Unfortunately at that point insurance would not cover it.    Hypertension   This is a chronic problem. The current episode started more than 1 year ago. The problem has been rapidly improving since onset. The problem is controlled. Associated symptoms include malaise/fatigue. Pertinent negatives include no blurred vision, chest pain, headaches, neck pain, orthopnea, palpitations, peripheral edema, PND or shortness of breath. Risk factors for coronary artery disease include dyslipidemia, diabetes mellitus, post-menopausal state, sedentary lifestyle and stress. Current antihypertension treatment includes beta blockers and direct vasodilators. The current treatment provides moderate improvement. There are no compliance problems.  There is no history of angina or kidney disease.   Hyperlipidemia   This is a chronic problem. The current episode started more than 1 year ago. The problem is controlled. Recent lipid tests were reviewed and are normal. There are no known factors aggravating her hyperlipidemia. Associated symptoms include myalgias. Pertinent negatives include no chest pain or shortness of breath. Current antihyperlipidemic treatment includes statins. The current treatment provides significant improvement of lipids. There are no compliance problems.    Diabetes   She presents for her follow-up diabetic visit. She has type 2 diabetes mellitus. Her disease course has been stable. Pertinent negatives for hypoglycemia include no dizziness, headaches or nervousness/anxiousness. Pertinent negatives for diabetes include no blurred  vision, no chest pain, no fatigue and no weakness. There are no hypoglycemic complications. Risk factors for coronary artery disease include diabetes mellitus, dyslipidemia, stress, sedentary lifestyle and post-menopausal. Current diabetic treatment includes diet. She is compliant with treatment most of the time. Her weight is fluctuating minimally. She is following a generally healthy and diabetic diet. There is no change in her home blood glucose trend. An ACE inhibitor/angiotensin II receptor blocker is being taken. She does not see a podiatrist.Eye exam is current.   Back Pain   This is a chronic problem. The current episode started more than 1 year ago. The problem occurs constantly. The problem is unchanged. Radiates to: Down both legs. The pain is at a severity of 7/10. The pain is moderate. The pain is worse during the day. The symptoms are aggravated by position. Stiffness is present in the morning. Pertinent negatives include no abdominal pain, chest pain, dysuria, fever, headaches, numbness or weakness. She has tried analgesics for the symptoms. The treatment provided moderate relief.        The following portions of the patient's history were reviewed and updated as appropriate: allergies, current medications, past family and social history and problem list.    Outpatient Medications Prior to Visit   Medication Sig Dispense Refill   • carvedilol (COREG) 6.25 MG tablet TAKE 1 TABLET BY MOUTH TWO TIMES A DAY WITH MEALS. 60 tablet 3   • ciprofloxacin (CIPRO) 250 MG tablet Take 250 mg by mouth Daily.     • clobetasol (TEMOVATE) 0.05 % external solution Apply 1 application topically Daily.     • clopidogrel (PLAVIX) 75 MG tablet TAKE 1 TABLET BY MOUTH DAILY. 30 tablet 2   • Cranberry 500 MG capsule Take 1 tablet by mouth Daily.     • cyanocobalamin 1000 MCG/ML injection INJECT 1 ML INTO THE APPROPRIATE MUSCLE AS DIRECTED BY PRESCRIBER EVERY 28 DAYS. 1 mL 11   • donepezil (ARICEPT) 10 MG tablet TAKE 1 TABLET  "BY MOUTH EVERY NIGHT. 90 tablet 2   • DULoxetine (CYMBALTA) 30 MG capsule Take 1 capsule by mouth Daily. 90 capsule 3   • FLUOCINOLONE ACETONIDE SCALP 0.01 % oil Apply 1 application topically Daily.     • fluticasone (FLONASE) 50 MCG/ACT nasal spray INSTILL 2 SPRAYS INTO EACH NOSTRIL DAILY     • hydrocortisone 1 % lotion Apply  topically 3 (Three) Times a Day As Needed for Irritation or Rash. 1 bottle 1   • ketoconazole (NIZORAL) 2 % shampoo      • Multiple Vitamins-Minerals (MULTIVITAMIN GUMMIES ADULT PO) Take 1 each by mouth Daily.     • nystatin (MYCOSTATIN) 463162 UNIT/GM powder APPLY TOPICALLY TO THE AFFECTED AREA THREE TIMES A DAY 60 g 2   • omeprazole (priLOSEC) 40 MG capsule Take 40 mg by mouth 2 (Two) Times a Day.     • ondansetron (ZOFRAN) 8 MG tablet Take 8 mg by mouth Every 8 (Eight) Hours As Needed for Nausea or Vomiting.     • phenazopyridine (PYRIDIUM) 100 MG tablet Take 1 tablet by mouth 3 (Three) Times a Day As Needed for bladder spasms. 6 tablet 0   • Syringe/Needle, Disp, 25G X 5/8\" 3 ML misc USE 1 SYRINGE FOR MONTHLY VITAMIN B-12 SHOTS 6 each 2   • traMADol (ULTRAM) 50 MG tablet Take 1 tablet by mouth 3 (Three) Times a Day As Needed for Moderate Pain . ti 90 tablet 2   • traZODone (DESYREL) 100 MG tablet Take 1.5 tablets by mouth Every Night. 45 tablet 3   • gabapentin (NEURONTIN) 300 MG capsule 1 cap qam and 2 caps qhs 90 capsule 2   • hydrOXYzine pamoate (VISTARIL) 50 MG capsule TAKE ONE CAPSULE BY MOUTH THREE TIMES A DAY AS NEEDED FOR ITCHING 90 capsule 1   • lidocaine (LIDODERM) 5 % PLACE ONE PATCH ONE THE SKIN EVERY DAY. REMOVE AND DISCARD THE PATCH WITHIN 12 HOURS AFTER APPLICATION EVERY DAY OR AS DIRECTED BY MD. 30 patch 1   • ONETOUCH VERIO test strip   11   • promethazine (PHENERGAN) 25 MG tablet Take 25 mg by mouth Every 6 (Six) Hours As Needed for Nausea or Vomiting.     • ondansetron ODT (ZOFRAN-ODT) 4 MG disintegrating tablet Take 1 tablet by mouth Every 8 (Eight) Hours As Needed for " "Nausea or Vomiting. 10 tablet 0   • Sennosides-Docusate Sodium (SENNA-DOCUSATE SODIUM PO) Take 1 tablet by mouth As Needed.       No facility-administered medications prior to visit.        Review of Systems   Constitutional: Positive for malaise/fatigue. Negative for chills, fatigue, fever and unexpected weight change.   HENT: Negative.  Negative for congestion, ear pain, hearing loss, nosebleeds, rhinorrhea, sneezing, sore throat and tinnitus.    Eyes: Negative.  Negative for blurred vision and discharge.   Respiratory: Negative.  Negative for cough, shortness of breath and wheezing.    Cardiovascular: Negative.  Negative for chest pain, palpitations, orthopnea and PND.   Gastrointestinal: Negative.  Negative for abdominal pain, constipation, diarrhea, nausea and vomiting.   Endocrine: Negative.    Genitourinary: Negative.  Negative for dysuria, frequency and urgency.   Musculoskeletal: Positive for back pain and myalgias. Negative for arthralgias, joint swelling and neck pain.   Skin: Negative.  Negative for rash.   Allergic/Immunologic: Negative.    Neurological: Negative.  Negative for dizziness, weakness, numbness and headaches.   Hematological: Negative.  Negative for adenopathy.   Psychiatric/Behavioral: Negative.  Negative for dysphoric mood and sleep disturbance. The patient is not nervous/anxious.      Objective     Visit Vitals  /90   Pulse 91   Ht 157.5 cm (62\")   Wt 65.9 kg (145 lb 3.2 oz)   LMP  (LMP Unknown)   SpO2 97%   BMI 26.56 kg/m²     Physical Exam   Constitutional: She is oriented to person, place, and time. She appears well-developed and well-nourished. No distress.   HENT:   Head: Normocephalic and atraumatic.   Nose: Nose normal.   Mouth/Throat: Oropharynx is clear and moist.   Eyes: Conjunctivae and EOM are normal. Pupils are equal, round, and reactive to light. Right eye exhibits no discharge. Left eye exhibits no discharge.   Neck: No tracheal deviation present. No thyromegaly " present.   Cardiovascular: Normal rate, regular rhythm, normal heart sounds and intact distal pulses.   No murmur heard.  Pulmonary/Chest: Effort normal and breath sounds normal. No respiratory distress. She has no wheezes. She has no rales. She exhibits no tenderness. Right breast exhibits no inverted nipple, no mass, no nipple discharge, no skin change and no tenderness. Left breast exhibits no inverted nipple, no mass, no nipple discharge, no skin change and no tenderness.   Abdominal: Soft. Bowel sounds are normal. She exhibits no distension and no mass. There is no tenderness. No hernia.   Musculoskeletal: Normal range of motion. She exhibits no deformity.   Lymphadenopathy:     She has no cervical adenopathy.   Neurological: She is alert and oriented to person, place, and time. She has normal reflexes.   Skin: Skin is warm and dry.   Psychiatric: She has a normal mood and affect. Her behavior is normal. Judgment and thought content normal.   Nursing note and vitals reviewed.      Assessment/Plan   Problem List Items Addressed This Visit        Cardiovascular and Mediastinum    Essential hypertension (Chronic)    Mixed hyperlipidemia (Chronic)       Endocrine    Diabetic polyneuropathy (CMS/HCC) (Chronic)    RESOLVED: Diabetes mellitus (CMS/HCC)      Other Visit Diagnoses     Medicare annual wellness visit, subsequent    -  Primary    Need for influenza vaccination        Relevant Orders    Fluad Quad >65 years (0262-1732) (Completed)         Continue current treatment.  Will try decreasing dose of hydroxyzine due to increased sedation risk.  Try switching duloxetine to Trintellix if we can get it covered for her. Gurvinder reviewed and appropriate.     New Medications Ordered This Visit   Medications   • hydrOXYzine (ATARAX) 25 MG tablet     Sig: Take 1 tablet by mouth 2 (Two) Times a Day As Needed for Itching.     Dispense:  60 tablet     Refill:  5   • Vortioxetine HBr (TRINTELLIX) 10 MG tablet     Sig: Take 10  mg by mouth Daily.     Dispense:  30 tablet     Refill:  5   • gabapentin (NEURONTIN) 300 MG capsule     Si cap qam and 2 caps qhs     Dispense:  90 capsule     Refill:  2     Return in about 3 months (around 2019) for Recheck.

## 2019-09-26 NOTE — PATIENT INSTRUCTIONS
Medicare Wellness  Personal Prevention Plan of Service     Date of Office Visit:  2019  Encounter Provider:  Ami Quispe MD  Place of Service:  [facility]  Patient Name: Mariluz Arango  :  1940    As part of the Medicare Wellness portion of your visit today, we are providing you with this personalized preventive plan of services (PPPS). This plan is based upon recommendations of the [place] Preventive Services Task Force (USPSTF) and the Advisory Committee on Immunization Practices (ACIP).    This lists the preventive care services that should be considered, and provides dates of when you are due. Items listed as completed are up-to-date and do not require any further intervention.    Health Maintenance   Topic Date Due   • TDAP/TD VACCINES (1 - Tdap) 1957   • ZOSTER VACCINE (2 of 2) 2017   • URINE MICROALBUMIN  2018   • DIABETIC EYE EXAM  2018   • INFLUENZA VACCINE  2019   • MAMMOGRAM  2019   • HEMOGLOBIN A1C  2019   • MEDICARE ANNUAL WELLNESS  2019   • LIPID PANEL  2020   • DXA SCAN  2020   • COLONOSCOPY  2021   • PNEUMOCOCCAL VACCINES (65+ LOW/MEDIUM RISK)  Addressed       Orders Placed This Encounter   Procedures   • Fluad Quad >65 years ([id number])       Return in about 3 months (around 2019) for Recheck.

## 2019-09-26 NOTE — PROGRESS NOTES
The ABCs of the Annual Wellness Visit  Subsequent Medicare Wellness Visit    Chief Complaint   Patient presents with   • Annual Exam     labs medicare wellness       Subjective   History of Present Illness:  Mariluz Arango is a 78 y.o. female who presents for a Subsequent Medicare Wellness Visit.    HEALTH RISK ASSESSMENT    Recent Hospitalizations:  No hospitalization(s) within the last year.    Current Medical Providers:  Patient Care Team:  Ami Quispe MD as PCP - General (Family Medicine)  Denisa Powell MD as PCP - Claims Attributed  Hima Gill MD PhD as Consulting Physician (Cardiology)  Ezra Meyers DO as Consulting Physician (Gastroenterology)  Axel Phillips MD as Surgeon (Cardiothoracic Surgery)  [place], Adan AZAR MD as Consulting Physician (Urology)  Lamin Dimas MD as Consulting Physician (Dermatology)    Smoking Status:  Social History     Tobacco Use   Smoking Status Former Smoker   • Packs/day: 0.50   • Types: Cigarettes   • Last attempt to quit: 06/2016   • Years since quitting: 3.3   Smokeless Tobacco Never Used   Tobacco Comment    0.5 - 1 Pack(s) Of Cigarettes Per Day       Alcohol Consumption:  Social History     Substance and Sexual Activity   Alcohol Use No       Depression Screen:   PHQ-2/PHQ-9 Depression Screening 9/26/2019   Little interest or pleasure in doing things 2   Feeling down, depressed, or hopeless 2   Trouble falling or staying asleep, or sleeping too much 2   Feeling tired or having little energy 3   Poor appetite or overeating 1   Feeling bad about yourself - or that you are a failure or have let yourself or your family down 1   Trouble concentrating on things, such as reading the newspaper or watching television 0   Moving or speaking so slowly that other people could have noticed. Or the opposite - being so fidgety or restless that you have been moving around a lot more than usual 0   Thoughts that you would be better off  dead, or of hurting yourself in some way 0   Total Score 11   If you checked off any problems, how difficult have these problems made it for you to do your work, take care of things at home, or get along with other people? Somewhat difficult       Fall Risk Screen:  JOSE Fall Risk Assessment was completed, and patient is at MODERATE risk for falls. Assessment completed on:9/26/2019    Health Habits and Functional and Cognitive Screening:  Functional & Cognitive Status 9/26/2019   Do you have difficulty preparing food and eating? Yes   Do you have difficulty bathing yourself, getting dressed or grooming yourself? Yes   Do you have difficulty using the toilet? Yes   Do you have difficulty moving around from place to place? Yes   Do you have trouble with steps or getting out of a bed or a chair? Yes   Current Diet Well Balanced Diet   Dental Exam Up to date   Eye Exam Unknown   Exercise (times per week) 0 times per week   Current Exercise Activities Include None   Do you need help using the phone?  No   Are you deaf or do you have serious difficulty hearing?  No   Do you need help with transportation? Yes   Do you need help shopping? Yes   Do you need help preparing meals?  Yes   Do you need help with housework?  Yes   Do you need help with laundry? No   Do you need help taking your medications? Yes   Do you need help managing money? No   Do you ever drive or ride in a car without wearing a seat belt? Yes   Have you felt unusual stress, anger or loneliness in the last month? Yes   Who do you live with? Alone   If you need help, do you have trouble finding someone available to you? No   Have you been bothered in the last four weeks by sexual problems? No   Do you have difficulty concentrating, remembering or making decisions? Yes         Does the patient have evidence of cognitive impairment? Yes    Asprin use counseling:Does not need ASA (and currently is not on it)    Age-appropriate Screening Schedule:  Refer to  the list below for future screening recommendations based on patient's age, sex and/or medical conditions. Orders for these recommended tests are listed in the plan section. The patient has been provided with a written plan.    Health Maintenance   Topic Date Due   • TDAP/TD VACCINES (1 - Tdap) 11/18/1957   • ZOSTER VACCINE (2 of 2) 11/03/2017   • URINE MICROALBUMIN  05/19/2018   • DIABETIC EYE EXAM  06/29/2018   • INFLUENZA VACCINE  08/01/2019   • MAMMOGRAM  08/14/2019   • HEMOGLOBIN A1C  09/14/2019   • LIPID PANEL  03/14/2020   • DXA SCAN  08/14/2020   • COLONOSCOPY  04/04/2021   • PNEUMOCOCCAL VACCINES (65+ LOW/MEDIUM RISK)  Addressed          The following portions of the patient's history were reviewed and updated as appropriate: allergies, current medications, past family history, past medical history, past social history, past surgical history and problem list.    Outpatient Medications Prior to Visit   Medication Sig Dispense Refill   • carvedilol (COREG) 6.25 MG tablet TAKE 1 TABLET BY MOUTH TWO TIMES A DAY WITH MEALS. 60 tablet 3   • ciprofloxacin (CIPRO) 250 MG tablet Take 250 mg by mouth Daily.     • clobetasol (TEMOVATE) 0.05 % external solution Apply 1 application topically Daily.     • clopidogrel (PLAVIX) 75 MG tablet TAKE 1 TABLET BY MOUTH DAILY. 30 tablet 2   • Cranberry 500 MG capsule Take 1 tablet by mouth Daily.     • cyanocobalamin 1000 MCG/ML injection INJECT 1 ML INTO THE APPROPRIATE MUSCLE AS DIRECTED BY PRESCRIBER EVERY 28 DAYS. 1 mL 11   • donepezil (ARICEPT) 10 MG tablet TAKE 1 TABLET BY MOUTH EVERY NIGHT. 90 tablet 2   • DULoxetine (CYMBALTA) 30 MG capsule Take 1 capsule by mouth Daily. 90 capsule 3   • FLUOCINOLONE ACETONIDE SCALP 0.01 % oil Apply 1 application topically Daily.     • fluticasone (FLONASE) 50 MCG/ACT nasal spray INSTILL 2 SPRAYS INTO EACH NOSTRIL DAILY     • hydrocortisone 1 % lotion Apply  topically 3 (Three) Times a Day As Needed for Irritation or Rash. 1 bottle 1   •  "ketoconazole (NIZORAL) 2 % shampoo      • Multiple Vitamins-Minerals (MULTIVITAMIN GUMMIES ADULT PO) Take 1 each by mouth Daily.     • nystatin (MYCOSTATIN) 227655 UNIT/GM powder APPLY TOPICALLY TO THE AFFECTED AREA THREE TIMES A DAY 60 g 2   • omeprazole (priLOSEC) 40 MG capsule Take 40 mg by mouth 2 (Two) Times a Day.     • ondansetron (ZOFRAN) 8 MG tablet Take 8 mg by mouth Every 8 (Eight) Hours As Needed for Nausea or Vomiting.     • phenazopyridine (PYRIDIUM) 100 MG tablet Take 1 tablet by mouth 3 (Three) Times a Day As Needed for bladder spasms. 6 tablet 0   • Syringe/Needle, Disp, 25G X 5/8\" 3 ML misc USE 1 SYRINGE FOR MONTHLY VITAMIN B-12 SHOTS 6 each 2   • traMADol (ULTRAM) 50 MG tablet Take 1 tablet by mouth 3 (Three) Times a Day As Needed for Moderate Pain . ti 90 tablet 2   • traZODone (DESYREL) 100 MG tablet Take 1.5 tablets by mouth Every Night. 45 tablet 3   • gabapentin (NEURONTIN) 300 MG capsule 1 cap qam and 2 caps qhs 90 capsule 2   • hydrOXYzine pamoate (VISTARIL) 50 MG capsule TAKE ONE CAPSULE BY MOUTH THREE TIMES A DAY AS NEEDED FOR ITCHING 90 capsule 1   • lidocaine (LIDODERM) 5 % PLACE ONE PATCH ONE THE SKIN EVERY DAY. REMOVE AND DISCARD THE PATCH WITHIN 12 HOURS AFTER APPLICATION EVERY DAY OR AS DIRECTED BY MD. 30 patch 1   • ONETOUCH VERIO test strip   11   • promethazine (PHENERGAN) 25 MG tablet Take 25 mg by mouth Every 6 (Six) Hours As Needed for Nausea or Vomiting.     • ondansetron ODT (ZOFRAN-ODT) 4 MG disintegrating tablet Take 1 tablet by mouth Every 8 (Eight) Hours As Needed for Nausea or Vomiting. 10 tablet 0   • Sennosides-Docusate Sodium (SENNA-DOCUSATE SODIUM PO) Take 1 tablet by mouth As Needed.       No facility-administered medications prior to visit.        Patient Active Problem List   Diagnosis   • Anxiety   • Artificial lens present   • Depressive disorder   • Diabetic polyneuropathy (CMS/HCC)   • Diverticular disease of colon   • Gastroesophageal reflux disease   • " Hemorrhoid   • Hiatal hernia   • History of colon polyps   • Hypercholesterolemia   • Essential hypertension   • Lumbar radiculopathy   • 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)   • Pain in thoracic spine   • Vitamin D deficiency   • Paroxysmal atrial fibrillation (CMS/HCC)   • Thoracic aortic aneurysm without rupture (CMS/HCC)   • Early onset Alzheimer's dementia without behavioral disturbance   • Iron deficiency anemia secondary to inadequate dietary iron intake   • Pyelonephritis   • Acute cystitis without hematuria   • Mixed hyperlipidemia   • Chronic midline low back pain without sciatica   • Age related osteoporosis   • Nonrheumatic pulmonary valve insufficiency   • Precordial pain       Advanced Care Planning:  Patient has an advance directive - a copy has been provided and is visible in patient header    Review of Systems   Constitutional: Negative for fever and unexpected weight change.   HENT: Negative.  Negative for congestion, ear pain, hearing loss, nosebleeds, rhinorrhea, sneezing, sore throat and tinnitus.    Eyes: Negative.  Negative for discharge.   Respiratory: Negative.  Negative for cough and wheezing.    Cardiovascular: Negative.  Negative for palpitations.   Gastrointestinal: Negative.  Negative for abdominal pain, constipation and diarrhea.   Endocrine: Negative.    Genitourinary: Negative for dysuria.   Musculoskeletal: Positive for arthralgias and back pain. Negative for joint swelling and neck pain.   Skin: Negative.  Negative for rash.   Allergic/Immunologic: Negative.    Neurological: Negative.  Negative for numbness.   Hematological: Negative.  Negative for adenopathy.   Psychiatric/Behavioral: Negative.  Negative for dysphoric mood and sleep disturbance.       Compared to one year ago, the patient feels her physical health is the same.  Compared to one year ago, the patient feels her mental health is worse.    Reviewed chart for potential of high risk medication  "in the elderly: yes  Reviewed chart for potential of harmful drug interactions in the elderly:yes    Objective         Vitals:    09/26/19 1344   BP: 140/90   Pulse: 91   SpO2: 97%   Weight: 65.9 kg (145 lb 3.2 oz)   Height: 157.5 cm (62\")   PainSc:   5   PainLoc: Generalized       Body mass index is 26.56 kg/m².  Discussed the patient's BMI with her. The BMI is in the acceptable range.    Physical Exam   Constitutional: She is oriented to person, place, and time. She appears well-developed and well-nourished. No distress.   HENT:   Head: Normocephalic and atraumatic.   Nose: Nose normal.   Mouth/Throat: Oropharynx is clear and moist.   Eyes: Conjunctivae and EOM are normal. Pupils are equal, round, and reactive to light. Right eye exhibits no discharge. Left eye exhibits no discharge.   Neck: No thyromegaly present.   Cardiovascular: Normal rate, regular rhythm, normal heart sounds and intact distal pulses.   Pulmonary/Chest: Effort normal and breath sounds normal.   Musculoskeletal:        Lumbar back: She exhibits decreased range of motion and tenderness.   Lymphadenopathy:     She has no cervical adenopathy.   Neurological: She is alert and oriented to person, place, and time.   Skin: Skin is warm and dry.   Psychiatric: She exhibits a depressed mood.   Nursing note and vitals reviewed.      Assessment/Plan   Medicare Risks and Personalized Health Plan  CMS Preventative Services Quick Reference  Breast Cancer/Mammogram Screening  Chronic Pain   Dementia/Memory   Fall Risk  Immunizations Discussed/Encouraged (specific immunizations; Hepatitis A Vaccine/Series, Influenza and Shingrix )    The above risks/problems have been discussed with the patient.  Pertinent information has been shared with the patient in the After Visit Summary.  Follow up plans and orders are seen below in the Assessment/Plan Section.    Diagnoses and all orders for this visit:    1. Medicare annual wellness visit, subsequent (Primary)    2. " Need for influenza vaccination  -     Fluad Quad >65 years ([id number])    3. Diabetic polyneuropathy associated with type 2 diabetes mellitus (CMS/[facility])    4. Type 2 diabetes mellitus with diabetic mononeuropathy, with long-term current use of insulin (CMS/[facility])    5. Essential hypertension    6. Mixed hyperlipidemia    Other orders  -     hydrOXYzine (ATARAX) 25 MG tablet; Take 1 tablet by mouth 2 (Two) Times a Day As Needed for Itching.  Dispense: 60 tablet; Refill: 5  -     Vortioxetine HBr (TRINTELLIX) 10 MG tablet; Take 10 mg by mouth Daily.  Dispense: 30 tablet; Refill: 5  -     gabapentin (NEURONTIN) 300 MG capsule; 1 cap qam and 2 caps qhs  Dispense: 90 capsule; Refill: 2      Follow Up:  Return in about 3 months (around 12/19/2019) for Recheck.     An After Visit Summary and PPPS were given to the patient.    Information has been scanned into chart.  Discussed importance of taking medications as prescribed. Encouraged healthy eating habits with low fat, low salt choices and working towards maintaining a healthy weight. Recommended regular exercise if able as well as care to prevent falls including avoiding anything on the floor that they could slip or trip on such as throw rugs, making sure they have a bathmat to step onto when their feet are wet and having grab bars and railings where needed.

## 2019-10-03 ENCOUNTER — TELEPHONE (OUTPATIENT)
Dept: FAMILY MEDICINE CLINIC | Facility: CLINIC | Age: 79
End: 2019-10-03

## 2019-10-03 NOTE — TELEPHONE ENCOUNTER
-Per Dr. Quispe, Ms. Arango has been called with recent lab results & recommendations.  Continue current medications and follow-up as planned or sooner if any problems.      ---- Message from Ami Quispe MD sent at 10/2/2019  4:43 PM CDT -----  Call and tell labs are all looking good, continue on same medications and recheck as scheduled

## 2019-10-16 RX ORDER — CLOPIDOGREL BISULFATE 75 MG/1
75 TABLET ORAL DAILY
Qty: 30 TABLET | Refills: 2 | Status: SHIPPED | OUTPATIENT
Start: 2019-10-16 | End: 2020-01-23

## 2019-10-16 RX ORDER — TRAZODONE HYDROCHLORIDE 100 MG/1
TABLET ORAL
Qty: 45 TABLET | Refills: 3 | Status: SHIPPED | OUTPATIENT
Start: 2019-10-16 | End: 2020-02-17

## 2019-11-12 ENCOUNTER — TRANSCRIBE ORDERS (OUTPATIENT)
Dept: LAB | Facility: HOSPITAL | Age: 79
End: 2019-11-12

## 2019-11-12 ENCOUNTER — LAB (OUTPATIENT)
Dept: LAB | Facility: HOSPITAL | Age: 79
End: 2019-11-12

## 2019-11-12 DIAGNOSIS — N39.0 URINARY TRACT INFECTION WITHOUT HEMATURIA, SITE UNSPECIFIED: ICD-10-CM

## 2019-11-12 DIAGNOSIS — N39.0 URINARY TRACT INFECTION WITHOUT HEMATURIA, SITE UNSPECIFIED: Primary | ICD-10-CM

## 2019-11-12 PROCEDURE — 87086 URINE CULTURE/COLONY COUNT: CPT

## 2019-11-12 PROCEDURE — 81001 URINALYSIS AUTO W/SCOPE: CPT

## 2019-11-13 LAB
BACTERIA SPEC AEROBE CULT: NO GROWTH
BACTERIA UR QL AUTO: ABNORMAL /HPF
BILIRUB UR QL STRIP: NEGATIVE
CLARITY UR: CLEAR
COLOR UR: YELLOW
GLUCOSE UR STRIP-MCNC: NEGATIVE MG/DL
HGB UR QL STRIP.AUTO: ABNORMAL
HYALINE CASTS UR QL AUTO: ABNORMAL /LPF
KETONES UR QL STRIP: NEGATIVE
LEUKOCYTE ESTERASE UR QL STRIP.AUTO: NEGATIVE
NITRITE UR QL STRIP: NEGATIVE
PH UR STRIP.AUTO: 6.5 [PH] (ref 5–8)
PROT UR QL STRIP: NEGATIVE
RBC # UR: ABNORMAL /HPF
REF LAB TEST METHOD: ABNORMAL
SP GR UR STRIP: 1.01 (ref 1–1.03)
SQUAMOUS #/AREA URNS HPF: ABNORMAL /HPF
UROBILINOGEN UR QL STRIP: ABNORMAL
WBC UR QL AUTO: ABNORMAL /HPF

## 2019-11-20 DIAGNOSIS — M81.0 AGE RELATED OSTEOPOROSIS, UNSPECIFIED PATHOLOGICAL FRACTURE PRESENCE: Primary | ICD-10-CM

## 2019-12-19 ENCOUNTER — HOSPITAL ENCOUNTER (OUTPATIENT)
Dept: INFUSION THERAPY | Facility: HOSPITAL | Age: 79
Discharge: HOME | End: 2019-12-19

## 2019-12-19 ENCOUNTER — APPOINTMENT (OUTPATIENT)
Dept: ONCOLOGY | Facility: HOSPITAL | Age: 79
End: 2019-12-19

## 2019-12-19 ENCOUNTER — HOSPITAL ENCOUNTER (OUTPATIENT)
Dept: INFUSION THERAPY | Facility: HOSPITAL | Age: 79
Discharge: HOME | End: 2019-12-19
Admitting: NURSE PRACTITIONER

## 2019-12-19 VITALS
HEART RATE: 86 BPM | SYSTOLIC BLOOD PRESSURE: 171 MMHG | TEMPERATURE: 97.3 F | RESPIRATION RATE: 18 BRPM | DIASTOLIC BLOOD PRESSURE: 71 MMHG

## 2019-12-19 DIAGNOSIS — M81.0 AGE RELATED OSTEOPOROSIS, UNSPECIFIED PATHOLOGICAL FRACTURE PRESENCE: ICD-10-CM

## 2019-12-19 DIAGNOSIS — M81.0 AGE RELATED OSTEOPOROSIS, UNSPECIFIED PATHOLOGICAL FRACTURE PRESENCE: Primary | ICD-10-CM

## 2019-12-19 LAB
CALCIUM SPEC-SCNC: 8.9 MG/DL (ref 8.6–10.5)
MAGNESIUM SERPL-MCNC: 1.6 MG/DL (ref 1.6–2.4)
PHOSPHATE SERPL-MCNC: 4.4 MG/DL (ref 2.5–4.5)

## 2019-12-19 PROCEDURE — 25010000002 DENOSUMAB 60 MG/ML SOLUTION PREFILLED SYRINGE: Performed by: NURSE PRACTITIONER

## 2019-12-19 PROCEDURE — 36415 COLL VENOUS BLD VENIPUNCTURE: CPT | Performed by: NURSE PRACTITIONER

## 2019-12-19 PROCEDURE — 84100 ASSAY OF PHOSPHORUS: CPT | Performed by: NURSE PRACTITIONER

## 2019-12-19 PROCEDURE — 82310 ASSAY OF CALCIUM: CPT | Performed by: NURSE PRACTITIONER

## 2019-12-19 PROCEDURE — 83735 ASSAY OF MAGNESIUM: CPT | Performed by: NURSE PRACTITIONER

## 2019-12-19 PROCEDURE — 96372 THER/PROPH/DIAG INJ SC/IM: CPT | Performed by: NURSE PRACTITIONER

## 2019-12-19 RX ADMIN — DENOSUMAB 60 MG: 60 INJECTION SUBCUTANEOUS at 11:48

## 2020-01-23 RX ORDER — CLOPIDOGREL BISULFATE 75 MG/1
75 TABLET ORAL DAILY
Qty: 30 TABLET | Refills: 2 | Status: SHIPPED | OUTPATIENT
Start: 2020-01-23 | End: 2020-05-19

## 2020-01-31 DIAGNOSIS — E11.42 DIABETIC POLYNEUROPATHY ASSOCIATED WITH TYPE 2 DIABETES MELLITUS (HCC): Primary | Chronic | ICD-10-CM

## 2020-02-03 RX ORDER — GABAPENTIN 300 MG/1
CAPSULE ORAL
Qty: 90 CAPSULE | Refills: 0 | OUTPATIENT
Start: 2020-02-03 | End: 2020-09-28

## 2020-02-03 NOTE — TELEPHONE ENCOUNTER
Per Jose Enrique Hsieh, Refill called to [facility] for her Gabapentin 300 mg #90 with No additional refill.   Left message with the Pharamcy that she needs to be seen before additional refills are needed.     I have tried to call number listed, not accepting calls.    Script Changed to Phone In and sent back to Dr. Quispe for her signature

## 2020-02-17 RX ORDER — CARVEDILOL 6.25 MG/1
TABLET ORAL
Qty: 60 TABLET | Refills: 3 | Status: SHIPPED | OUTPATIENT
Start: 2020-02-17 | End: 2020-07-29 | Stop reason: SDUPTHER

## 2020-02-17 RX ORDER — TRAZODONE HYDROCHLORIDE 100 MG/1
TABLET ORAL
Qty: 45 TABLET | Refills: 3 | Status: SHIPPED | OUTPATIENT
Start: 2020-02-17 | End: 2020-08-17

## 2020-03-12 DIAGNOSIS — F33.41 RECURRENT MAJOR DEPRESSIVE DISORDER, IN PARTIAL REMISSION (HCC): Chronic | ICD-10-CM

## 2020-03-12 RX ORDER — DULOXETIN HYDROCHLORIDE 30 MG/1
30 CAPSULE, DELAYED RELEASE ORAL DAILY
Qty: 90 CAPSULE | Refills: 2 | Status: SHIPPED | OUTPATIENT
Start: 2020-03-12 | End: 2020-08-17

## 2020-05-19 RX ORDER — CLOPIDOGREL BISULFATE 75 MG/1
TABLET ORAL
Qty: 30 TABLET | Refills: 0 | Status: SHIPPED | OUTPATIENT
Start: 2020-05-19 | End: 2020-08-17

## 2020-06-10 ENCOUNTER — TELEPHONE (OUTPATIENT)
Dept: ONCOLOGY | Facility: CLINIC | Age: 80
End: 2020-06-10

## 2020-06-18 ENCOUNTER — APPOINTMENT (OUTPATIENT)
Dept: ONCOLOGY | Facility: HOSPITAL | Age: 80
End: 2020-06-18

## 2020-06-22 ENCOUNTER — APPOINTMENT (OUTPATIENT)
Dept: ONCOLOGY | Facility: HOSPITAL | Age: 80
End: 2020-06-22

## 2020-07-10 ENCOUNTER — TELEPHONE (OUTPATIENT)
Dept: ONCOLOGY | Facility: HOSPITAL | Age: 80
End: 2020-07-10

## 2020-07-10 DIAGNOSIS — M81.0 AGE RELATED OSTEOPOROSIS, UNSPECIFIED PATHOLOGICAL FRACTURE PRESENCE: Primary | ICD-10-CM

## 2020-07-10 NOTE — TELEPHONE ENCOUNTER
Spoke with patient's daughter--explained patient does not have PICC/Port access needs.  Patient to get lab work done in outpatient lab at the clinic.  Verbal understanding by the daughter and Prolia appointment will remain at the [facility].  Paula Dobbins RN  July 10, 2020  14:26

## 2020-07-14 ENCOUNTER — APPOINTMENT (OUTPATIENT)
Dept: ONCOLOGY | Facility: HOSPITAL | Age: 80
End: 2020-07-14

## 2020-07-14 ENCOUNTER — LAB (OUTPATIENT)
Dept: LAB | Facility: HOSPITAL | Age: 80
End: 2020-07-14

## 2020-07-14 DIAGNOSIS — M81.0 AGE RELATED OSTEOPOROSIS, UNSPECIFIED PATHOLOGICAL FRACTURE PRESENCE: ICD-10-CM

## 2020-07-14 LAB
CALCIUM SPEC-SCNC: 9 MG/DL (ref 8.6–10.5)
MAGNESIUM SERPL-MCNC: 1.9 MG/DL (ref 1.6–2.4)
PHOSPHATE SERPL-MCNC: 4 MG/DL (ref 2.5–4.5)

## 2020-07-14 PROCEDURE — 82310 ASSAY OF CALCIUM: CPT

## 2020-07-14 PROCEDURE — 83735 ASSAY OF MAGNESIUM: CPT

## 2020-07-14 PROCEDURE — 84100 ASSAY OF PHOSPHORUS: CPT

## 2020-07-17 ENCOUNTER — APPOINTMENT (OUTPATIENT)
Dept: ONCOLOGY | Facility: HOSPITAL | Age: 80
End: 2020-07-17

## 2020-07-21 ENCOUNTER — APPOINTMENT (OUTPATIENT)
Dept: ONCOLOGY | Facility: HOSPITAL | Age: 80
End: 2020-07-21

## 2020-07-23 ENCOUNTER — INFUSION (OUTPATIENT)
Dept: ONCOLOGY | Facility: HOSPITAL | Age: 80
End: 2020-07-23

## 2020-07-23 VITALS
HEART RATE: 96 BPM | TEMPERATURE: 97.1 F | DIASTOLIC BLOOD PRESSURE: 85 MMHG | RESPIRATION RATE: 18 BRPM | SYSTOLIC BLOOD PRESSURE: 179 MMHG

## 2020-07-23 DIAGNOSIS — M81.0 AGE RELATED OSTEOPOROSIS, UNSPECIFIED PATHOLOGICAL FRACTURE PRESENCE: Primary | ICD-10-CM

## 2020-07-23 PROCEDURE — 96372 THER/PROPH/DIAG INJ SC/IM: CPT | Performed by: NURSE PRACTITIONER

## 2020-07-23 PROCEDURE — 25010000002 DENOSUMAB 60 MG/ML SOLUTION PREFILLED SYRINGE: Performed by: NURSE PRACTITIONER

## 2020-07-23 RX ADMIN — DENOSUMAB 60 MG: 60 INJECTION SUBCUTANEOUS at 10:22

## 2020-07-23 NOTE — PATIENT INSTRUCTIONS
Denosumab injection  What is this medicine?  DENOSUMAB (den oh nicholas mab) slows bone breakdown. Prolia is used to treat osteoporosis in women after menopause and in men, and in people who are taking corticosteroids for 6 months or more. Xgeva is used to treat a high calcium level due to cancer and to prevent bone fractures and other bone problems caused by multiple myeloma or cancer bone metastases. Xgeva is also used to treat giant cell tumor of the bone.  This medicine may be used for other purposes; ask your health care provider or pharmacist if you have questions.  COMMON BRAND NAME(S): Prolia, XGEVA  What should I tell my health care provider before I take this medicine?  They need to know if you have any of these conditions:  · dental disease  · having surgery or tooth extraction  · infection  · kidney disease  · low levels of calcium or Vitamin D in the blood  · malnutrition  · on hemodialysis  · skin conditions or sensitivity  · thyroid or parathyroid disease  · an unusual reaction to denosumab, other medicines, foods, dyes, or preservatives  · pregnant or trying to get pregnant  · breast-feeding  How should I use this medicine?  This medicine is for injection under the skin. It is given by a health care professional in a hospital or clinic setting.  A special MedGuide will be given to you before each treatment. Be sure to read this information carefully each time.  For Prolia, talk to your pediatrician regarding the use of this medicine in children. Special care may be needed. For Xgeva, talk to your pediatrician regarding the use of this medicine in children. While this drug may be prescribed for children as young as 13 years for selected conditions, precautions do apply.  Overdosage: If you think you have taken too much of this medicine contact a poison control center or emergency room at once.  NOTE: This medicine is only for you. Do not share this medicine with others.  What if I miss a dose?  It is  important not to miss your dose. Call your doctor or health care professional if you are unable to keep an appointment.  What may interact with this medicine?  Do not take this medicine with any of the following medications:  · other medicines containing denosumab  This medicine may also interact with the following medications:  · medicines that lower your chance of fighting infection  · steroid medicines like prednisone or cortisone  This list may not describe all possible interactions. Give your health care provider a list of all the medicines, herbs, non-prescription drugs, or dietary supplements you use. Also tell them if you smoke, drink alcohol, or use illegal drugs. Some items may interact with your medicine.  What should I watch for while using this medicine?  Visit your doctor or health care professional for regular checks on your progress. Your doctor or health care professional may order blood tests and other tests to see how you are doing.  Call your doctor or health care professional for advice if you get a fever, chills or sore throat, or other symptoms of a cold or flu. Do not treat yourself. This drug may decrease your body's ability to fight infection. Try to avoid being around people who are sick.  You should make sure you get enough calcium and vitamin D while you are taking this medicine, unless your doctor tells you not to. Discuss the foods you eat and the vitamins you take with your health care professional.  See your dentist regularly. Brush and floss your teeth as directed. Before you have any dental work done, tell your dentist you are receiving this medicine.  Do not become pregnant while taking this medicine or for 5 months after stopping it. Talk with your doctor or health care professional about your birth control options while taking this medicine. Women should inform their doctor if they wish to become pregnant or think they might be pregnant. There is a potential for serious side  effects to an unborn child. Talk to your health care professional or pharmacist for more information.  What side effects may I notice from receiving this medicine?  Side effects that you should report to your doctor or health care professional as soon as possible:  · allergic reactions like skin rash, itching or hives, swelling of the face, lips, or tongue  · bone pain  · breathing problems  · dizziness  · jaw pain, especially after dental work  · redness, blistering, peeling of the skin  · signs and symptoms of infection like fever or chills; cough; sore throat; pain or trouble passing urine  · signs of low calcium like fast heartbeat, muscle cramps or muscle pain; pain, tingling, numbness in the hands or feet; seizures  · unusual bleeding or bruising  · unusually weak or tired  Side effects that usually do not require medical attention (report to your doctor or health care professional if they continue or are bothersome):  · constipation  · diarrhea  · headache  · joint pain  · loss of appetite  · muscle pain  · runny nose  · tiredness  · upset stomach  This list may not describe all possible side effects. Call your doctor for medical advice about side effects. You may report side effects to FDA at 4-692-FDA-4510.  Where should I keep my medicine?  This medicine is only given in a clinic, doctor's office, or other health care setting and will not be stored at home.  NOTE: This sheet is a summary. It may not cover all possible information. If you have questions about this medicine, talk to your doctor, pharmacist, or health care provider.  © 2020 [employer] (2019-04-26 16:10:44)

## 2020-07-27 LAB
BH CV ECHO MEAS - ACS: 1.5 CM
BH CV ECHO MEAS - AI DEC SLOPE: 140 CM/SEC^2
BH CV ECHO MEAS - AI MAX PG: 35.3 MMHG
BH CV ECHO MEAS - AI MAX VEL: 297 CM/SEC
BH CV ECHO MEAS - AI P1/2T: 621.4 MSEC
BH CV ECHO MEAS - AO MAX PG (FULL): 2.9 MMHG
BH CV ECHO MEAS - AO MAX PG: 6.6 MMHG
BH CV ECHO MEAS - AO MEAN PG (FULL): 2 MMHG
BH CV ECHO MEAS - AO MEAN PG: 4 MMHG
BH CV ECHO MEAS - AO ROOT AREA (BSA CORRECTED): 2.2
BH CV ECHO MEAS - AO ROOT AREA: 10.8 CM^2
BH CV ECHO MEAS - AO ROOT DIAM: 3.7 CM
BH CV ECHO MEAS - AO V2 MAX: 128 CM/SEC
BH CV ECHO MEAS - AO V2 MEAN: 93.5 CM/SEC
BH CV ECHO MEAS - AO V2 VTI: 27.3 CM
BH CV ECHO MEAS - ASC AORTA: 3.8 CM
BH CV ECHO MEAS - AVA(I,A): 2.7 CM^2
BH CV ECHO MEAS - AVA(I,D): 2.7 CM^2
BH CV ECHO MEAS - AVA(V,A): 2.4 CM^2
BH CV ECHO MEAS - AVA(V,D): 2.4 CM^2
BH CV ECHO MEAS - BSA(HAYCOCK): 1.7 M^2
BH CV ECHO MEAS - BSA: 1.7 M^2
BH CV ECHO MEAS - BZI_BMI: 26.5 KILOGRAMS/M^2
BH CV ECHO MEAS - BZI_METRIC_HEIGHT: 157.5 CM
BH CV ECHO MEAS - BZI_METRIC_WEIGHT: 65.8 KG
BH CV ECHO MEAS - EDV(CUBED): 59.3 ML
BH CV ECHO MEAS - EDV(MOD-SP2): 45 ML
BH CV ECHO MEAS - EDV(MOD-SP4): 51 ML
BH CV ECHO MEAS - EDV(TEICH): 65.9 ML
BH CV ECHO MEAS - EF(CUBED): 58.9 %
BH CV ECHO MEAS - EF(MOD-SP2): 51.1 %
BH CV ECHO MEAS - EF(MOD-SP4): 64.7 %
BH CV ECHO MEAS - EF(TEICH): 51.1 %
BH CV ECHO MEAS - EPSS: 0.9 CM
BH CV ECHO MEAS - ESV(CUBED): 24.4 ML
BH CV ECHO MEAS - ESV(MOD-SP2): 22 ML
BH CV ECHO MEAS - ESV(MOD-SP4): 18 ML
BH CV ECHO MEAS - ESV(TEICH): 32.2 ML
BH CV ECHO MEAS - FS: 25.6 %
BH CV ECHO MEAS - IVS/LVPW: 1.2
BH CV ECHO MEAS - IVSD: 1.2 CM
BH CV ECHO MEAS - LA DIMENSION: 3.4 CM
BH CV ECHO MEAS - LA/AO: 0.92
BH CV ECHO MEAS - LV DIASTOLIC VOL/BSA (35-75): 30.6 ML/M^2
BH CV ECHO MEAS - LV MASS(C)D: 140.1 GRAMS
BH CV ECHO MEAS - LV MASS(C)DI: 84 GRAMS/M^2
BH CV ECHO MEAS - LV MAX PG: 3.7 MMHG
BH CV ECHO MEAS - LV MEAN PG: 2 MMHG
BH CV ECHO MEAS - LV SYSTOLIC VOL/BSA (12-30): 10.8 ML/M^2
BH CV ECHO MEAS - LV V1 MAX: 96.1 CM/SEC
BH CV ECHO MEAS - LV V1 MEAN: 73.5 CM/SEC
BH CV ECHO MEAS - LV V1 VTI: 23.7 CM
BH CV ECHO MEAS - LVIDD: 3.9 CM
BH CV ECHO MEAS - LVIDS: 2.9 CM
BH CV ECHO MEAS - LVLD AP2: 6.8 CM
BH CV ECHO MEAS - LVLD AP4: 6.8 CM
BH CV ECHO MEAS - LVLS AP2: 5.8 CM
BH CV ECHO MEAS - LVLS AP4: 5.7 CM
BH CV ECHO MEAS - LVOT AREA (M): 3.1 CM^2
BH CV ECHO MEAS - LVOT AREA: 3.1 CM^2
BH CV ECHO MEAS - LVOT DIAM: 2 CM
BH CV ECHO MEAS - LVPWD: 1 CM
BH CV ECHO MEAS - MV A MAX VEL: 99.2 CM/SEC
BH CV ECHO MEAS - MV DEC SLOPE: 215 CM/SEC^2
BH CV ECHO MEAS - MV E MAX VEL: 61.7 CM/SEC
BH CV ECHO MEAS - MV E/A: 0.62
BH CV ECHO MEAS - MV MAX PG: 4.7 MMHG
BH CV ECHO MEAS - MV MEAN PG: 2 MMHG
BH CV ECHO MEAS - MV P1/2T MAX VEL: 67.8 CM/SEC
BH CV ECHO MEAS - MV P1/2T: 92.4 MSEC
BH CV ECHO MEAS - MV V2 MAX: 108 CM/SEC
BH CV ECHO MEAS - MV V2 MEAN: 56.5 CM/SEC
BH CV ECHO MEAS - MV V2 VTI: 27 CM
BH CV ECHO MEAS - MVA P1/2T LCG: 3.2 CM^2
BH CV ECHO MEAS - MVA(P1/2T): 2.4 CM^2
BH CV ECHO MEAS - MVA(VTI): 2.8 CM^2
BH CV ECHO MEAS - PA MAX PG (FULL): 2.6 MMHG
BH CV ECHO MEAS - PA MAX PG: 4.2 MMHG
BH CV ECHO MEAS - PA MEAN PG (FULL): 1 MMHG
BH CV ECHO MEAS - PA MEAN PG: 2 MMHG
BH CV ECHO MEAS - PA V2 MAX: 103 CM/SEC
BH CV ECHO MEAS - PA V2 MEAN: 68.8 CM/SEC
BH CV ECHO MEAS - PA V2 VTI: 22 CM
BH CV ECHO MEAS - PI END-D VEL: 100 CM/SEC
BH CV ECHO MEAS - RAP SYSTOLE: 3 MMHG
BH CV ECHO MEAS - RV MAX PG: 1.7 MMHG
BH CV ECHO MEAS - RV MEAN PG: 1 MMHG
BH CV ECHO MEAS - RV V1 MAX: 64.7 CM/SEC
BH CV ECHO MEAS - RV V1 MEAN: 42.7 CM/SEC
BH CV ECHO MEAS - RV V1 VTI: 12.7 CM
BH CV ECHO MEAS - RVDD: 2.9 CM
BH CV ECHO MEAS - RVSP: 27 MMHG
BH CV ECHO MEAS - SI(AO): 176 ML/M^2
BH CV ECHO MEAS - SI(CUBED): 20.9 ML/M^2
BH CV ECHO MEAS - SI(LVOT): 44.7 ML/M^2
BH CV ECHO MEAS - SI(MOD-SP2): 13.8 ML/M^2
BH CV ECHO MEAS - SI(MOD-SP4): 19.8 ML/M^2
BH CV ECHO MEAS - SI(TEICH): 20.2 ML/M^2
BH CV ECHO MEAS - SV(AO): 293.5 ML
BH CV ECHO MEAS - SV(CUBED): 34.9 ML
BH CV ECHO MEAS - SV(LVOT): 74.5 ML
BH CV ECHO MEAS - SV(MOD-SP2): 23 ML
BH CV ECHO MEAS - SV(MOD-SP4): 33 ML
BH CV ECHO MEAS - SV(TEICH): 33.7 ML
BH CV ECHO MEAS - TR MAX VEL: 238 CM/SEC
BH CV VAS BP LEFT ARM: NORMAL MMHG

## 2020-07-29 ENCOUNTER — OFFICE VISIT (OUTPATIENT)
Dept: CARDIOLOGY | Facility: CLINIC | Age: 80
End: 2020-07-29

## 2020-07-29 ENCOUNTER — APPOINTMENT (OUTPATIENT)
Dept: ONCOLOGY | Facility: HOSPITAL | Age: 80
End: 2020-07-29

## 2020-07-29 ENCOUNTER — TELEPHONE (OUTPATIENT)
Dept: CARDIOLOGY | Facility: CLINIC | Age: 80
End: 2020-07-29

## 2020-07-29 ENCOUNTER — LAB (OUTPATIENT)
Dept: LAB | Facility: HOSPITAL | Age: 80
End: 2020-07-29

## 2020-07-29 VITALS
WEIGHT: 147.4 LBS | BODY MASS INDEX: 27.12 KG/M2 | DIASTOLIC BLOOD PRESSURE: 84 MMHG | HEIGHT: 62 IN | SYSTOLIC BLOOD PRESSURE: 134 MMHG | OXYGEN SATURATION: 95 % | HEART RATE: 88 BPM

## 2020-07-29 DIAGNOSIS — I44.4 LEFT ANTERIOR FASCICULAR BLOCK (LAFB): ICD-10-CM

## 2020-07-29 DIAGNOSIS — I71.20 THORACIC AORTIC ANEURYSM WITHOUT RUPTURE (HCC): Chronic | ICD-10-CM

## 2020-07-29 DIAGNOSIS — I48.0 PAF (PAROXYSMAL ATRIAL FIBRILLATION) (HCC): Primary | ICD-10-CM

## 2020-07-29 DIAGNOSIS — I48.0 PAROXYSMAL ATRIAL FIBRILLATION (HCC): ICD-10-CM

## 2020-07-29 DIAGNOSIS — I10 ESSENTIAL HYPERTENSION: Chronic | ICD-10-CM

## 2020-07-29 DIAGNOSIS — I37.1 NONRHEUMATIC PULMONARY VALVE INSUFFICIENCY: Primary | ICD-10-CM

## 2020-07-29 DIAGNOSIS — R07.89 CHEST PAIN, ATYPICAL: ICD-10-CM

## 2020-07-29 DIAGNOSIS — E78.2 MIXED HYPERLIPIDEMIA: Chronic | ICD-10-CM

## 2020-07-29 LAB
ALBUMIN SERPL-MCNC: 3.9 G/DL (ref 3.5–5.2)
ALBUMIN/GLOB SERPL: 1.5 G/DL
ALP SERPL-CCNC: 47 U/L (ref 39–117)
ALT SERPL W P-5'-P-CCNC: 7 U/L (ref 1–33)
ANION GAP SERPL CALCULATED.3IONS-SCNC: 9 MMOL/L (ref 5–15)
AST SERPL-CCNC: 13 U/L (ref 1–32)
BILIRUB SERPL-MCNC: 0.2 MG/DL (ref 0–1.2)
BUN SERPL-MCNC: 10 MG/DL (ref 8–23)
BUN/CREAT SERPL: 13 (ref 7–25)
CALCIUM SPEC-SCNC: 7.6 MG/DL (ref 8.6–10.5)
CHLORIDE SERPL-SCNC: 100 MMOL/L (ref 98–107)
CHOLEST SERPL-MCNC: 210 MG/DL (ref 0–200)
CO2 SERPL-SCNC: 26 MMOL/L (ref 22–29)
CREAT SERPL-MCNC: 0.77 MG/DL (ref 0.57–1)
DEPRECATED RDW RBC AUTO: 42.5 FL (ref 37–54)
ERYTHROCYTE [DISTWIDTH] IN BLOOD BY AUTOMATED COUNT: 13.2 % (ref 12.3–15.4)
GFR SERPL CREATININE-BSD FRML MDRD: 72 ML/MIN/1.73
GLOBULIN UR ELPH-MCNC: 2.6 GM/DL
GLUCOSE SERPL-MCNC: 180 MG/DL (ref 65–99)
HCT VFR BLD AUTO: 38.6 % (ref 34–46.6)
HDLC SERPL-MCNC: 51 MG/DL (ref 40–60)
HGB BLD-MCNC: 12.5 G/DL (ref 12–15.9)
LDLC SERPL CALC-MCNC: 108 MG/DL (ref 0–100)
LDLC/HDLC SERPL: 2.12 {RATIO}
MCH RBC QN AUTO: 28.4 PG (ref 26.6–33)
MCHC RBC AUTO-ENTMCNC: 32.4 G/DL (ref 31.5–35.7)
MCV RBC AUTO: 87.7 FL (ref 79–97)
PLATELET # BLD AUTO: 197 10*3/MM3 (ref 140–450)
PMV BLD AUTO: 8.6 FL (ref 6–12)
POTASSIUM SERPL-SCNC: 3.6 MMOL/L (ref 3.5–5.2)
PROT SERPL-MCNC: 6.5 G/DL (ref 6–8.5)
RBC # BLD AUTO: 4.4 10*6/MM3 (ref 3.77–5.28)
SODIUM SERPL-SCNC: 135 MMOL/L (ref 136–145)
TRIGL SERPL-MCNC: 255 MG/DL (ref 0–150)
VLDLC SERPL-MCNC: 51 MG/DL
WBC # BLD AUTO: 7.89 10*3/MM3 (ref 3.4–10.8)

## 2020-07-29 PROCEDURE — 99214 OFFICE O/P EST MOD 30 MIN: CPT | Performed by: INTERNAL MEDICINE

## 2020-07-29 PROCEDURE — 36415 COLL VENOUS BLD VENIPUNCTURE: CPT | Performed by: INTERNAL MEDICINE

## 2020-07-29 PROCEDURE — 85027 COMPLETE CBC AUTOMATED: CPT | Performed by: INTERNAL MEDICINE

## 2020-07-29 PROCEDURE — 80061 LIPID PANEL: CPT | Performed by: INTERNAL MEDICINE

## 2020-07-29 PROCEDURE — 93000 ELECTROCARDIOGRAM COMPLETE: CPT | Performed by: INTERNAL MEDICINE

## 2020-07-29 PROCEDURE — 80053 COMPREHEN METABOLIC PANEL: CPT | Performed by: INTERNAL MEDICINE

## 2020-07-29 RX ORDER — DICYCLOMINE HYDROCHLORIDE 10 MG/1
CAPSULE ORAL
COMMUNITY
Start: 2020-06-29

## 2020-07-29 RX ORDER — PROCHLORPERAZINE MALEATE 5 MG/1
1 TABLET ORAL EVERY 12 HOURS
COMMUNITY
Start: 2020-01-09 | End: 2022-08-29

## 2020-07-29 RX ORDER — CARVEDILOL 12.5 MG/1
12.5 TABLET ORAL 2 TIMES DAILY WITH MEALS
Qty: 60 TABLET | Refills: 3 | Status: SHIPPED | OUTPATIENT
Start: 2020-07-29

## 2020-07-29 RX ORDER — NYSTATIN 100000 [USP'U]/G
POWDER TOPICAL EVERY 8 HOURS
Status: ON HOLD | COMMUNITY
Start: 2018-03-26 | End: 2020-08-19

## 2020-07-29 RX ORDER — HYDROCODONE BITARTRATE AND ACETAMINOPHEN 5; 325 MG/1; MG/1
TABLET ORAL
COMMUNITY
Start: 2020-06-09

## 2020-07-29 RX ORDER — ROPINIROLE 0.5 MG/1
TABLET, FILM COATED ORAL
COMMUNITY
Start: 2020-06-16

## 2020-07-29 NOTE — PATIENT INSTRUCTIONS
Coronary Calcium Scan  A coronary calcium scan is an imaging test used to look for deposits of calcium and other fatty materials (plaques) in the inner lining of the blood vessels of the heart (coronary arteries). These deposits of calcium and plaques can partly clog and narrow the coronary arteries without producing any symptoms or warning signs. This puts a person at risk for a heart attack. This test can detect these deposits before symptoms develop.  Tell a health care provider about:  · Any allergies you have.  · All medicines you are taking, including vitamins, herbs, eye drops, creams, and over-the-counter medicines.  · Any problems you or family members have had with anesthetic medicines.  · Any blood disorders you have.  · Any surgeries you have had.  · Any medical conditions you have.  · Whether you are pregnant or may be pregnant.  What are the risks?  Generally, this is a safe procedure. However, problems may occur, including:  · Harm to a pregnant woman and her unborn baby. This test involves the use of radiation. Radiation exposure can be dangerous to a pregnant woman and her unborn baby. If you are pregnant, you generally should not have this procedure done.  · Slight increase in the risk of cancer. This is because of the radiation involved in the test.  What happens before the procedure?  No preparation is needed for this procedure.  What happens during the procedure?    · You will undress and remove any jewelry around your neck or chest.  · You will put on a hospital gown.  · Sticky electrodes will be placed on your chest. The electrodes will be connected to an electrocardiogram (ECG) machine to record a tracing of the electrical activity of your heart.  · A CT scanner will take pictures of your heart. During this time, you will be asked to lie still and hold your breath for 2-3 seconds while a picture of your heart is being taken.  The procedure may vary among health care providers and  [facility].  What happens after the procedure?  · You can get dressed.  · You can return to your normal activities.  · It is up to you to get the results of your test. Ask your health care provider, or the department that is doing the test, when your results will be ready.  Summary  · A coronary calcium scan is an imaging test used to look for deposits of calcium and other fatty materials (plaques) in the inner lining of the blood vessels of the heart (coronary arteries).  · Generally, this is a safe procedure. Tell your health care provider if you are pregnant or may be pregnant.  · No preparation is needed for this procedure.  · A CT scanner will take pictures of your heart.  · You can return to your normal activities after the scan is done.  This information is not intended to replace advice given to you by your health care provider. Make sure you discuss any questions you have with your health care provider.  Document Released: 06/15/2009 Document Revised: 11/30/2018 Document Reviewed: 11/06/2017  Elsevier Patient Education © 2020 [employer]

## 2020-07-29 NOTE — TELEPHONE ENCOUNTER
Went over labs with patient per Dr Gill note below. She is agreeable to try a different medication for lipid level. Will let Dr Gill know.    ----- Message from Hima Gill MD PhD sent at 7/29/2020 12:36 PM CDT -----  Can you let her know that her renal function on her labs today were acceptable?  Her blood glucose was elevated today and was abnormal.  Also, please let her know that all of her lipid levels are extremely high.  I know that she had some itching with lovastatin.  Which she be agreeable with trying another medication for this since they are so high and we know that she has coronary disease?  Just let me know.

## 2020-07-29 NOTE — PROGRESS NOTES
Cardiovascular Medicine   Hima Gill M.D., Ph.D., [facility]      The patient returns to cardiovascular clinic for follow-up of the following:    PROBLEM LIST:      1. NVAF  A. Anticoagulation CI secondary to GI bleeds and possible cerebral aneurysm  2.  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 Risks: PAD, coronary artery calcifications by CT scan 2016 with normal MPS 2017, former smoker, hypertension, hyperlipidemia      Mariluz Arango is a 79 y.o. female who presents for follow-up of atrial fibrillation. Symptoms include none. Clinical course: stable. Associated symptoms include: palpitations. The patient denies cough, dizziness and fatigue. he patient is currently prescribed beta blocker-Coreg. The patient does not have a prescription for a rhythm control medication.  The patient is not prescribed anticoagulation due to prior bleeding episodes.  The patient denies easy bleeding.  The patient denies bleeding episodes. The patient denies increased cardiac awareness.  The patient denies dizziness, lightheadedness or syncope. Patient's prior CT scan for other reasons revealed calcification of the coronary arteries.  She was not endorsing any overt angina.  She did perform a myocardial perfusion stress which was unremarkable. 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 .  Patient has no family history of aneurysmal diseases.  She does have follow-up imaging with Dr. Phillips.     At her last visit, she was having some intermittent and non-exertional CP. This is left-sided. She has also had some LUE pain.  She continues with CPS. This is non-exertional. This is SSCP and radiates to the left.        Review of Systems   Cardiovascular: Positive for chest pain, dyspnea on exertion and irregular heartbeat. Negative for claudication, leg swelling, orthopnea, palpitations, paroxysmal nocturnal dyspnea and syncope.   Respiratory: Positive for  shortness of breath. Negative for cough, hemoptysis, sleep disturbances due to breathing, snoring, sputum production and wheezing.      family history includes Arthritis in her mother; Diabetes in her mother and another family member; Hyperlipidemia in her mother; Hypertension in her brother, mother, and sister; Obesity in her mother; Stroke in her mother.   reports that she quit smoking about 4 years ago. Her smoking use included cigarettes. She smoked 0.50 packs per day. She has never used smokeless tobacco. She reports that she does not drink alcohol or use drugs.  Allergies   Allergen Reactions   • Lovastatin Itching   • Aspirin GI Bleeding     Has Stomach Issues   • Codeine      UNKNOWN REACTION   • Macrobid [Nitrofurantoin Macrocrystal] Nausea And Vomiting   • Sulfa Antibiotics      Sulfa (Sulfonamide Antibiotics)   • Dexilant [Dexlansoprazole] Rash   • Other Other (See Comments)     New informati   • Penicillins Rash       Current Outpatient Medications:   •  carvedilol (COREG) 6.25 MG tablet, TAKE 1 TABLET BY MOUTH TWO TIMES A DAY WITH MEALS., Disp: 60 tablet, Rfl: 3  •  ciprofloxacin (CIPRO) 250 MG tablet, Take 250 mg by mouth Daily., Disp: , Rfl:   •  clobetasol (TEMOVATE) 0.05 % external solution, Apply 1 application topically Daily., Disp: , Rfl:   •  clopidogrel (PLAVIX) 75 MG tablet, TAKE ONE TABLET BY MOUTH EVERY DAY, Disp: 30 tablet, Rfl: 0  •  Cranberry 500 MG capsule, Take 1 tablet by mouth Daily., Disp: , Rfl:   •  cyanocobalamin 1000 MCG/ML injection, INJECT 1 ML INTO THE APPROPRIATE MUSCLE AS DIRECTED BY PRESCRIBER EVERY 28 DAYS., Disp: 1 mL, Rfl: 11  •  donepezil (ARICEPT) 10 MG tablet, TAKE 1 TABLET BY MOUTH EVERY NIGHT., Disp: 90 tablet, Rfl: 2  •  DULoxetine (CYMBALTA) 30 MG capsule, TAKE 1 CAPSULE BY MOUTH DAILY., Disp: 90 capsule, Rfl: 2  •  FLUOCINOLONE ACETONIDE SCALP 0.01 % oil, Apply 1 application topically Daily., Disp: , Rfl:   •  fluticasone (FLONASE) 50 MCG/ACT nasal spray, INSTILL  "2 SPRAYS INTO EACH NOSTRIL DAILY, Disp: , Rfl:   •  gabapentin (NEURONTIN) 300 MG capsule, TAKE ONE CAPSULE BY MOUTH EVERY MORNING AND TAKE 2 CAPSULES BY MOUTH EVERY NIGHT AT BEDTIME, Disp: 90 capsule, Rfl: 0  •  hydrocortisone 1 % lotion, Apply  topically 3 (Three) Times a Day As Needed for Irritation or Rash., Disp: 1 bottle, Rfl: 1  •  hydrOXYzine (ATARAX) 25 MG tablet, Take 1 tablet by mouth 2 (Two) Times a Day As Needed for Itching., Disp: 60 tablet, Rfl: 5  •  ketoconazole (NIZORAL) 2 % shampoo, , Disp: , Rfl:   •  lidocaine (LIDODERM) 5 %, PLACE ONE PATCH ONE THE SKIN EVERY DAY. REMOVE AND DISCARD THE PATCH WITHIN 12 HOURS AFTER APPLICATION EVERY DAY OR AS DIRECTED BY MD., Disp: 30 patch, Rfl: 1  •  Multiple Vitamins-Minerals (MULTIVITAMIN GUMMIES ADULT PO), Take 1 each by mouth Daily., Disp: , Rfl:   •  nystatin (MYCOSTATIN) 301787 UNIT/GM powder, APPLY TOPICALLY TO THE AFFECTED AREA THREE TIMES A DAY, Disp: 60 g, Rfl: 2  •  omeprazole (priLOSEC) 40 MG capsule, Take 40 mg by mouth 2 (Two) Times a Day., Disp: , Rfl:   •  ondansetron (ZOFRAN) 8 MG tablet, Take 8 mg by mouth Every 8 (Eight) Hours As Needed for Nausea or Vomiting., Disp: , Rfl:   •  ONETOUCH VERIO test strip, , Disp: , Rfl: 11  •  phenazopyridine (PYRIDIUM) 100 MG tablet, Take 1 tablet by mouth 3 (Three) Times a Day As Needed for bladder spasms., Disp: 6 tablet, Rfl: 0  •  promethazine (PHENERGAN) 25 MG tablet, Take 25 mg by mouth Every 6 (Six) Hours As Needed for Nausea or Vomiting., Disp: , Rfl:   •  Syringe/Needle, Disp, (B-D INTEGRA SYRINGE) 25G X 5/8\" 3 ML misc, USE 1 SYRINGE FOR MONTHLY VITAMIN B-12 SHOTS, Disp: 6 each, Rfl: 2  •  traMADol (ULTRAM) 50 MG tablet, Take 1 tablet by mouth 3 (Three) Times a Day As Needed for Moderate Pain . ti, Disp: 90 tablet, Rfl: 2  •  traZODone (DESYREL) 100 MG tablet, TAKE 1 & 1/2 TABLETS BY MOUTH EVERY NIGHT, Disp: 45 tablet, Rfl: 3  •  Vortioxetine HBr (TRINTELLIX) 10 MG tablet, Take 10 mg by mouth " Daily., Disp: 30 tablet, Rfl: 5    Physical Exam:  Vitals:    07/29/20 1112   BP: 134/84   Pulse: 88   SpO2: 95%     Body mass index is 26.96 kg/m².   Last Weights:   Wt Readings from Last 3 Encounters:   07/27/20 65.8 kg (145 lb)   09/26/19 65.9 kg (145 lb 3.2 oz)   07/25/19 66.2 kg (146 lb)     Pulse Ox: Normal   General: alert, appears stated age and cooperative  Body Habitus: well-nourished  HEENT: Head: Normocephalic, no lesions, without obvious abnormality. No arcus senilis, xanthelasma or xanthomas.  JVP: 6 cm of water at 45 degrees   Volume/Pulsation: Normal  Heart rate: normal    Heart Rhythm: regular     Heart Sounds: S1: normal intensity  S2: normal intensity  S3: absent   S4: absent  Opening Snap: absent  A2-OS:  absent.   Pericardial rub: absent    Ejection click: None      Murmurs:  absent   Extremity: moves all extremities equally.       DATA REVIEWED:         ---------------------------------------------------  TTE/ASHLEIGH:  Results for orders placed in visit on 07/25/19   Adult Transthoracic Echo Complete W/ Cont if Necessary Per Protocol    Narrative · Left ventricle systolic function is normal at 61-65%. Mild concentric   hypertrophy with grade 1 diastolic function.  · Right ventricle systolic function is normal.  · Aortic sclerosis and mitral annular calcification.  · Mild pulmonic valve regurgitation.  · Tubular ascending aorta is dilated at 3.8 cm. The patient's ASI is 2.2   cm/meter squared.            --------------------------------------------------------------------------------------------------  LABS:   Lab Results   Component Value Date    GLUCOSE 126 (H) 09/26/2019    BUN 8 09/26/2019    CREATININE 0.72 09/26/2019    EGFRIFNONA 78 09/26/2019    BCR 11.1 09/26/2019    K 4.4 09/26/2019    CO2 29.4 (H) 09/26/2019    CALCIUM 9.0 07/14/2020    ALBUMIN 4.10 09/26/2019    AST 12 09/26/2019    ALT <5 09/26/2019     Lab Results   Component Value Date    WBC 7.09 09/26/2019    HGB 12.3 09/26/2019     HCT 37.9 09/26/2019    MCV 87.7 09/26/2019     09/26/2019     Lab Results   Component Value Date    CHOL 161 09/26/2019    CHLPL 167 06/30/2016    TRIG 176 (H) 09/26/2019    HDL 46 09/26/2019    LDL 80 09/26/2019     Lab Results   Component Value Date    TSH 1.390 02/14/2018    T1NTMUD 111 07/12/2016    P7GMNYV 7.8 07/12/2016     Lab Results   Component Value Date    CKTOTAL <20 (L) 08/09/2016    CKMB <0.2 08/09/2016    TROPONINI <0.012 08/09/2016     Lab Results   Component Value Date    HGBA1C 6.41 (H) 09/26/2019     No results found for: DDIMER  Lab Results   Component Value Date    ALT <5 09/26/2019     Lab Results   Component Value Date    HGBA1C 6.41 (H) 09/26/2019    HGBA1C 6.1 (H) 03/14/2019    HGBA1C 6.3 (H) 04/02/2018     Lab Results   Component Value Date    MICROALBUR 4.9 09/26/2019    CREATININE 0.72 09/26/2019     Lab Results   Component Value Date    IRON 31 (L) 05/01/2018    TIBC 381 05/01/2018    FERRITIN 15.00 11/09/2017     No results found for: INR, PROTIME    Assessment/Plan       1. Pulmonary Regurgitation. ACC stage B.  There are no surgical indications at this time. The patient has not had valve surgery..   · The patient has been advised to remain in clinical surveillance every 12 months.  · Signs and symptoms of worsening valve disease discussed.  I've asked the patient contact me for an earlier appointment if these develop.  · I've recommended a repeat 2D TTE every 3 years.   · TTE due: 2020.  No indication based on 2017 ACC/AHA guidelines for IE prophylaxis for dental procedures: Optimal oral health is recommended through regular professional dental care and the use of appropriate dental products, such as manual, powered, and ultrasonic toothbrushes; dental floss; and other plaque-removal devices    2.  paroxysmal - 7 days or less Atrial fibrillation. The patient is currently in NSR with a well controlled ventricular rate.   She clearly meets indications for anticoagulation, but  she's had issues with bleeding in the past with ASA.  I think she is at elevated risk of hemorrhage.    Anticoagulation CI: bleeding disorder, aneurysm  Rate control agents:ordered.  - Agent: beta blocker-Coreg INCREASE 12.5 mg PO BID  -Rhythm control agents:not indicated     3.   Subclinical atherosclerosis.  Patient's CT scan documented significant calcification of the coronary arteries.  Myocardial perfusion stress 2017 was without evidence of inducible ischemia. Today, she is having CP. I recommended an ischemia eval. She is now agreeable.   -Continue risk factor modifications  -Ischemia eval recommended  -Coronary CTA; Increase Coreg     4.  Thoracoabdominal aortic aneurysm at the diaphragmatic hiatus most recently documented at 4.3cm.    - Dr. Phillips for surveillance  -Continue beta blocker and risk factor modifications     5. Cardiac Risk Assessments based on 2019 ACCF guidelines:  A team-based care approach is recommended for the control of risk factors associated with ASCAD.  As such, Mariluz Arango was requested to have ongoing follow-up with their PCP. A PCP can provide the detailed monitoring that is required for management of risk factors. Essential HTN is a significant risk factor for stroke, heart disease and vascular disease. I've recommended the patient continue current medications, if any, as prescribed by the primary care provider. A diet emphasizing intake of vegetables, fruits, nuts, whole grains and fish is recommended. Physical activity recommendations were provided by literature. They were also provided with information regarding maintaining a healthy weight, heart-healthy dietary pattern and DASH information.  Goal blood pressure less than 130/80.  The patient's BMI is recommended to be calculated at least annually.  The patient's BMI is Body mass index is 26.96 kg/m²..  Tobacco status is assessed at every visit based on established guidelines.  The patient's nicotine status: Social  History    Tobacco Use      Smoking status: Former Smoker        Packs/day: 0.50        Types: Cigarettes        Quit date: 2016        Years since quittin.1      Smokeless tobacco: Never Used      Tobacco comment: 0.5 - 1 Pack(s) Of Cigarettes Per Day    6. LAFB. Patient's EKG is consistent with an LAFB. In older populations without overt cardiovascular disease, LAFB does convey an increased risk of CHF (142%), AF (89%) and even SCD (57%).  However, there seems to be no increased risk for LBBB or requirement for pacing.  The specific EKG finding does not require any specific follow-up other than clinical follow-up.  -Will plan on EKG at next visit  Her CTA was abnormal.        The indications, risks and benefits of diagnostic left heart cardiac catheterization, angiography, conscious sedation, and possible blood transfusion were discussed in detail with the patient. The potential complications of 2000 chance of death, 1000 chance of heart attack or stroke, 1/500 chance of bleeding or clotting of the femoral artery, and 1/500 chance of allergic reaction to contrast were discussed. We also reviewed possible complications of infection and kidney dysfunction. If PCI were performed and intra-coronary stents indicated, we discussed the details about  DANISH and due to her intolerance of aspirin we would use a bare-metal stent.. This included a review of the risks of the shorter duration of dual antiplatelet therapy.  The importance of maintaining a consistent daily regimen of aspirin and an additional anti-platelet agent  for as long as directed after implantation was emphasized. No contraindications were found.  The patient  appeared to understand and agree to the above.       ASA 2 MAL2

## 2020-07-30 ENCOUNTER — TELEPHONE (OUTPATIENT)
Dept: CARDIOLOGY | Facility: CLINIC | Age: 80
End: 2020-07-30

## 2020-07-30 RX ORDER — ROSUVASTATIN CALCIUM 5 MG/1
5 TABLET, COATED ORAL DAILY
Qty: 30 TABLET | Refills: 11 | Status: SHIPPED | OUTPATIENT
Start: 2020-07-30

## 2020-07-30 NOTE — TELEPHONE ENCOUNTER
Spoke with patient to let her know Dr Gill is going to start her on Crestor 5 mg Daily. Sent script to Rx.Patient verbalized understanding.

## 2020-08-04 ENCOUNTER — HOSPITAL ENCOUNTER (OUTPATIENT)
Dept: CT IMAGING | Facility: HOSPITAL | Age: 80
Discharge: HOME | End: 2020-08-04
Admitting: INTERNAL MEDICINE

## 2020-08-04 PROCEDURE — 0 IOPAMIDOL PER 1 ML: Performed by: INTERNAL MEDICINE

## 2020-08-04 PROCEDURE — 75574 CT ANGIO HRT W/3D IMAGE: CPT

## 2020-08-04 RX ADMIN — IOPAMIDOL 80 ML: 755 INJECTION, SOLUTION INTRAVENOUS at 11:39

## 2020-08-05 DIAGNOSIS — R07.89 CHEST PAIN, ATYPICAL: Primary | ICD-10-CM

## 2020-08-05 DIAGNOSIS — R93.1 ABNORMAL CARDIAC CT ANGIOGRAPHY: ICD-10-CM

## 2020-08-05 RX ORDER — SODIUM CHLORIDE 9 MG/ML
100 INJECTION, SOLUTION INTRAVENOUS CONTINUOUS
Status: CANCELLED | OUTPATIENT
Start: 2020-08-19

## 2020-08-05 NOTE — PROGRESS NOTES
Jessie Hendrickson MD Brown, Karen M RN             Sure    Previous Messages      ----- Message -----   From: Zoila Singh RN   Sent: 8/4/2020   4:25 PM CDT   To: Jessie Hendrickson MD     Another one. Want it? I was thinking 19th   ----- Message -----   From: Hima Gill MD PhD   Sent: 8/4/2020   1:31 PM CDT   To: Zoila Singh RN     Can you let her know that her CT scan shows that her aneurysm is stable?  It is concerning for at least 1 vessel having significant stenosis in the circumflex.  I think given her most recent chest pain symptoms that the safest thing would be to proceed with C.  If she is agreeable you can go ahead and set that up with someone.         The above was discussed with patient's daughter Maya per patient's request. Heart cath scheduled with Dr Hendrickson for August 19th, covid testing will be Sunday august 16th between 9-10am. Daughter aware of these dates and times. All instructions given including pre procedure instructions and covid test instructions. Understanding was verbalized.

## 2020-08-14 ENCOUNTER — TELEPHONE (OUTPATIENT)
Dept: CARDIOLOGY | Facility: CLINIC | Age: 80
End: 2020-08-14

## 2020-08-14 NOTE — TELEPHONE ENCOUNTER
----- Message from Francisco Javier Mcdowell sent at 8/14/2020 12:12 PM CDT -----  Regarding: Dr. Hendrickson  Pt is going to have a heart cath next week w/ Dr. Hendrickson.  Her daughter Maya Mabry would like someone to call her at [phone], they have a question about a medication she is taking.     Thanks    Francisco Javier

## 2020-08-16 ENCOUNTER — LAB (OUTPATIENT)
Dept: LAB | Facility: HOSPITAL | Age: 80
End: 2020-08-16

## 2020-08-16 LAB — SARS-COV-2 N GENE RESP QL NAA+PROBE: NOT DETECTED

## 2020-08-16 PROCEDURE — C9803 HOPD COVID-19 SPEC COLLECT: HCPCS | Performed by: INTERNAL MEDICINE

## 2020-08-16 PROCEDURE — C9803 HOPD COVID-19 SPEC COLLECT: HCPCS

## 2020-08-16 PROCEDURE — 87635 SARS-COV-2 COVID-19 AMP PRB: CPT | Performed by: INTERNAL MEDICINE

## 2020-08-17 DIAGNOSIS — F33.41 RECURRENT MAJOR DEPRESSIVE DISORDER, IN PARTIAL REMISSION (HCC): Chronic | ICD-10-CM

## 2020-08-17 RX ORDER — TRAZODONE HYDROCHLORIDE 100 MG/1
TABLET ORAL
Qty: 30 TABLET | Refills: 5 | Status: SHIPPED | OUTPATIENT
Start: 2020-08-17 | End: 2021-02-15

## 2020-08-17 RX ORDER — TRAZODONE HYDROCHLORIDE 50 MG/1
TABLET ORAL
Qty: 30 TABLET | Refills: 5 | Status: SHIPPED | OUTPATIENT
Start: 2020-08-17 | End: 2021-05-13

## 2020-08-17 RX ORDER — DONEPEZIL HYDROCHLORIDE 10 MG/1
TABLET, FILM COATED ORAL
Qty: 30 TABLET | Refills: 5 | Status: SHIPPED | OUTPATIENT
Start: 2020-08-17

## 2020-08-17 RX ORDER — DULOXETIN HYDROCHLORIDE 30 MG/1
CAPSULE, DELAYED RELEASE ORAL
Qty: 30 CAPSULE | Refills: 5 | Status: SHIPPED | OUTPATIENT
Start: 2020-08-17

## 2020-08-17 RX ORDER — CLOPIDOGREL BISULFATE 75 MG/1
TABLET ORAL
Qty: 30 TABLET | Refills: 5 | Status: SHIPPED | OUTPATIENT
Start: 2020-08-17 | End: 2020-10-07

## 2020-08-19 ENCOUNTER — HOSPITAL ENCOUNTER (OUTPATIENT)
Facility: HOSPITAL | Age: 80
Discharge: HOME | End: 2020-08-20
Attending: INTERNAL MEDICINE | Admitting: INTERNAL MEDICINE

## 2020-08-19 DIAGNOSIS — R07.89 CHEST PAIN, ATYPICAL: ICD-10-CM

## 2020-08-19 DIAGNOSIS — R93.1 ABNORMAL CARDIAC CT ANGIOGRAPHY: ICD-10-CM

## 2020-08-19 LAB
ACT BLD: 171 SECONDS (ref 82–152)
ANION GAP SERPL CALCULATED.3IONS-SCNC: 11 MMOL/L (ref 5–15)
BUN SERPL-MCNC: 13 MG/DL (ref 8–23)
BUN/CREAT SERPL: 18.8 (ref 7–25)
CALCIUM SPEC-SCNC: 8.1 MG/DL (ref 8.6–10.5)
CHLORIDE SERPL-SCNC: 100 MMOL/L (ref 98–107)
CO2 SERPL-SCNC: 28 MMOL/L (ref 22–29)
CREAT SERPL-MCNC: 0.69 MG/DL (ref 0.57–1)
DEPRECATED RDW RBC AUTO: 39.9 FL (ref 37–54)
ERYTHROCYTE [DISTWIDTH] IN BLOOD BY AUTOMATED COUNT: 13 % (ref 12.3–15.4)
GFR SERPL CREATININE-BSD FRML MDRD: 82 ML/MIN/1.73
GLUCOSE SERPL-MCNC: 115 MG/DL (ref 65–99)
HCT VFR BLD AUTO: 38.4 % (ref 34–46.6)
HGB BLD-MCNC: 12.5 G/DL (ref 12–15.9)
INR PPP: 0.99 (ref 0.8–1.2)
MCH RBC QN AUTO: 27.8 PG (ref 26.6–33)
MCHC RBC AUTO-ENTMCNC: 32.6 G/DL (ref 31.5–35.7)
MCV RBC AUTO: 85.3 FL (ref 79–97)
PLATELET # BLD AUTO: 247 10*3/MM3 (ref 140–450)
PMV BLD AUTO: 9 FL (ref 6–12)
POTASSIUM SERPL-SCNC: 4.3 MMOL/L (ref 3.5–5.2)
PROTHROMBIN TIME: 13.5 SECONDS (ref 11.1–15.3)
RBC # BLD AUTO: 4.5 10*6/MM3 (ref 3.77–5.28)
SODIUM SERPL-SCNC: 139 MMOL/L (ref 136–145)
WBC # BLD AUTO: 8.8 10*3/MM3 (ref 3.4–10.8)

## 2020-08-19 PROCEDURE — C1769 GUIDE WIRE: HCPCS | Performed by: INTERNAL MEDICINE

## 2020-08-19 PROCEDURE — A9270 NON-COVERED ITEM OR SERVICE: HCPCS | Performed by: INTERNAL MEDICINE

## 2020-08-19 PROCEDURE — 94799 UNLISTED PULMONARY SVC/PX: CPT

## 2020-08-19 PROCEDURE — 63710000001 CARVEDILOL 12.5 MG TABLET: Performed by: INTERNAL MEDICINE

## 2020-08-19 PROCEDURE — 25010000002 ATROPINE PER 0.01 MG: Performed by: INTERNAL MEDICINE

## 2020-08-19 PROCEDURE — 63710000001 CALCIUM CARBONATE 500 MG CHEWABLE TABLET: Performed by: INTERNAL MEDICINE

## 2020-08-19 PROCEDURE — 0 IOPAMIDOL PER 1 ML: Performed by: INTERNAL MEDICINE

## 2020-08-19 PROCEDURE — 80048 BASIC METABOLIC PNL TOTAL CA: CPT | Performed by: INTERNAL MEDICINE

## 2020-08-19 PROCEDURE — 85027 COMPLETE CBC AUTOMATED: CPT | Performed by: INTERNAL MEDICINE

## 2020-08-19 PROCEDURE — G0378 HOSPITAL OBSERVATION PER HR: HCPCS

## 2020-08-19 PROCEDURE — C1887 CATHETER, GUIDING: HCPCS | Performed by: INTERNAL MEDICINE

## 2020-08-19 PROCEDURE — 85610 PROTHROMBIN TIME: CPT | Performed by: INTERNAL MEDICINE

## 2020-08-19 PROCEDURE — 63710000001 ALPRAZOLAM 0.25 MG TABLET: Performed by: INTERNAL MEDICINE

## 2020-08-19 PROCEDURE — 25010000002 FENTANYL CITRATE (PF) 100 MCG/2ML SOLUTION: Performed by: INTERNAL MEDICINE

## 2020-08-19 PROCEDURE — 63710000001 TEMAZEPAM 7.5 MG CAPSULE: Performed by: INTERNAL MEDICINE

## 2020-08-19 PROCEDURE — C1876 STENT, NON-COA/NON-COV W/DEL: HCPCS | Performed by: INTERNAL MEDICINE

## 2020-08-19 PROCEDURE — 93454 CORONARY ARTERY ANGIO S&I: CPT | Performed by: INTERNAL MEDICINE

## 2020-08-19 PROCEDURE — 63710000001 HYDROCODONE-ACETAMINOPHEN 5-325 MG TABLET: Performed by: INTERNAL MEDICINE

## 2020-08-19 PROCEDURE — 25010000002 ONDANSETRON PER 1 MG: Performed by: INTERNAL MEDICINE

## 2020-08-19 PROCEDURE — 85347 COAGULATION TIME ACTIVATED: CPT

## 2020-08-19 PROCEDURE — 25010000002 HYDRALAZINE PER 20 MG: Performed by: INTERNAL MEDICINE

## 2020-08-19 PROCEDURE — 92928 PRQ TCAT PLMT NTRAC ST 1 LES: CPT | Performed by: INTERNAL MEDICINE

## 2020-08-19 PROCEDURE — 63710000001 ROSUVASTATIN 5 MG TABLET: Performed by: INTERNAL MEDICINE

## 2020-08-19 PROCEDURE — C1894 INTRO/SHEATH, NON-LASER: HCPCS | Performed by: INTERNAL MEDICINE

## 2020-08-19 PROCEDURE — 25010000002 BIVALIRUDIN TRIFLUOROACETATE 250 MG RECONSTITUTED SOLUTION 1 EACH VIAL: Performed by: INTERNAL MEDICINE

## 2020-08-19 PROCEDURE — 25010000002 MIDAZOLAM PER 1 MG: Performed by: INTERNAL MEDICINE

## 2020-08-19 PROCEDURE — 63710000001 PROCHLORPERAZINE MALEATE PER 5 MG: Performed by: INTERNAL MEDICINE

## 2020-08-19 PROCEDURE — 63710000001 DONEPEZIL 10 MG TABLET: Performed by: INTERNAL MEDICINE

## 2020-08-19 PROCEDURE — 25010000002 HYDRALAZINE PER 20 MG

## 2020-08-19 PROCEDURE — 63710000001 DULOXETINE 30 MG CAPSULE DELAYED-RELEASE PARTICLES: Performed by: INTERNAL MEDICINE

## 2020-08-19 DEVICE — MULTI-LINK MINI VISION CORONARY STENT AND STENT DELIVERY SYSTEM 2.50 MM X 18 MM / RAPID-EXCHANGE
Type: IMPLANTABLE DEVICE | Status: FUNCTIONAL
Brand: MULTI-LINK MINI VISION

## 2020-08-19 RX ORDER — SODIUM CHLORIDE 9 MG/ML
100 INJECTION, SOLUTION INTRAVENOUS CONTINUOUS
Status: DISCONTINUED | OUTPATIENT
Start: 2020-08-19 | End: 2020-08-19

## 2020-08-19 RX ORDER — FLUTICASONE PROPIONATE 50 MCG
2 SPRAY, SUSPENSION (ML) NASAL DAILY
Status: DISCONTINUED | OUTPATIENT
Start: 2020-08-19 | End: 2020-08-20 | Stop reason: HOSPADM

## 2020-08-19 RX ORDER — CLOPIDOGREL BISULFATE 75 MG/1
75 TABLET ORAL DAILY
Status: DISCONTINUED | OUTPATIENT
Start: 2020-08-20 | End: 2020-08-19 | Stop reason: SDUPTHER

## 2020-08-19 RX ORDER — DONEPEZIL HYDROCHLORIDE 10 MG/1
10 TABLET, FILM COATED ORAL NIGHTLY
Status: DISCONTINUED | OUTPATIENT
Start: 2020-08-19 | End: 2020-08-20 | Stop reason: HOSPADM

## 2020-08-19 RX ORDER — PHENAZOPYRIDINE HYDROCHLORIDE 100 MG/1
100 TABLET, FILM COATED ORAL 3 TIMES DAILY PRN
Status: DISCONTINUED | OUTPATIENT
Start: 2020-08-19 | End: 2020-08-20 | Stop reason: HOSPADM

## 2020-08-19 RX ORDER — CLOPIDOGREL BISULFATE 75 MG/1
TABLET ORAL AS NEEDED
Status: DISCONTINUED | OUTPATIENT
Start: 2020-08-19 | End: 2020-08-19 | Stop reason: HOSPADM

## 2020-08-19 RX ORDER — MIDAZOLAM HYDROCHLORIDE 1 MG/ML
INJECTION INTRAMUSCULAR; INTRAVENOUS AS NEEDED
Status: DISCONTINUED | OUTPATIENT
Start: 2020-08-19 | End: 2020-08-19 | Stop reason: HOSPADM

## 2020-08-19 RX ORDER — CITALOPRAM 10 MG/1
10 TABLET ORAL DAILY
Status: DISCONTINUED | OUTPATIENT
Start: 2020-08-19 | End: 2020-08-20 | Stop reason: HOSPADM

## 2020-08-19 RX ORDER — DIPHENHYDRAMINE HYDROCHLORIDE 50 MG/ML
25 INJECTION INTRAMUSCULAR; INTRAVENOUS EVERY 6 HOURS PRN
Status: DISCONTINUED | OUTPATIENT
Start: 2020-08-19 | End: 2020-08-20 | Stop reason: HOSPADM

## 2020-08-19 RX ORDER — FENTANYL CITRATE 50 UG/ML
25 INJECTION, SOLUTION INTRAMUSCULAR; INTRAVENOUS
Status: DISCONTINUED | OUTPATIENT
Start: 2020-08-19 | End: 2020-08-20 | Stop reason: HOSPADM

## 2020-08-19 RX ORDER — TEMAZEPAM 7.5 MG/1
7.5 CAPSULE ORAL NIGHTLY PRN
Status: DISCONTINUED | OUTPATIENT
Start: 2020-08-19 | End: 2020-08-20 | Stop reason: HOSPADM

## 2020-08-19 RX ORDER — SODIUM CHLORIDE 9 MG/ML
250 INJECTION, SOLUTION INTRAVENOUS ONCE AS NEEDED
Status: DISCONTINUED | OUTPATIENT
Start: 2020-08-19 | End: 2020-08-20 | Stop reason: HOSPADM

## 2020-08-19 RX ORDER — HYDROXYZINE HYDROCHLORIDE 25 MG/1
25 TABLET, FILM COATED ORAL 2 TIMES DAILY PRN
Status: DISCONTINUED | OUTPATIENT
Start: 2020-08-19 | End: 2020-08-20 | Stop reason: HOSPADM

## 2020-08-19 RX ORDER — ATROPINE SULFATE 1 MG/ML
INJECTION, SOLUTION INTRAMUSCULAR; INTRAVENOUS; SUBCUTANEOUS AS NEEDED
Status: DISCONTINUED | OUTPATIENT
Start: 2020-08-19 | End: 2020-08-19 | Stop reason: HOSPADM

## 2020-08-19 RX ORDER — NITROGLYCERIN 0.4 MG/1
TABLET SUBLINGUAL AS NEEDED
Status: DISCONTINUED | OUTPATIENT
Start: 2020-08-19 | End: 2020-08-19 | Stop reason: HOSPADM

## 2020-08-19 RX ORDER — ONDANSETRON 2 MG/ML
4 INJECTION INTRAMUSCULAR; INTRAVENOUS EVERY 6 HOURS PRN
Status: DISCONTINUED | OUTPATIENT
Start: 2020-08-19 | End: 2020-08-20 | Stop reason: HOSPADM

## 2020-08-19 RX ORDER — CITALOPRAM 10 MG/1
10 TABLET ORAL DAILY
COMMUNITY
End: 2020-09-28

## 2020-08-19 RX ORDER — ALPRAZOLAM 0.25 MG/1
0.25 TABLET ORAL 3 TIMES DAILY PRN
Status: DISCONTINUED | OUTPATIENT
Start: 2020-08-19 | End: 2020-08-20 | Stop reason: HOSPADM

## 2020-08-19 RX ORDER — LIDOCAINE HYDROCHLORIDE 20 MG/ML
INJECTION, SOLUTION INFILTRATION; PERINEURAL AS NEEDED
Status: DISCONTINUED | OUTPATIENT
Start: 2020-08-19 | End: 2020-08-19 | Stop reason: HOSPADM

## 2020-08-19 RX ORDER — ONDANSETRON 4 MG/1
8 TABLET, FILM COATED ORAL EVERY 8 HOURS PRN
Status: DISCONTINUED | OUTPATIENT
Start: 2020-08-19 | End: 2020-08-20 | Stop reason: HOSPADM

## 2020-08-19 RX ORDER — CLOBETASOL PROPIONATE 0.46 MG/ML
1 SOLUTION TOPICAL DAILY
Status: DISCONTINUED | OUTPATIENT
Start: 2020-08-19 | End: 2020-08-20 | Stop reason: HOSPADM

## 2020-08-19 RX ORDER — HYDROCODONE BITARTRATE AND ACETAMINOPHEN 5; 325 MG/1; MG/1
1 TABLET ORAL EVERY 4 HOURS PRN
Status: DISCONTINUED | OUTPATIENT
Start: 2020-08-19 | End: 2020-08-20 | Stop reason: HOSPADM

## 2020-08-19 RX ORDER — CLOPIDOGREL BISULFATE 75 MG/1
75 TABLET ORAL DAILY
Status: DISCONTINUED | OUTPATIENT
Start: 2020-08-20 | End: 2020-08-20 | Stop reason: HOSPADM

## 2020-08-19 RX ORDER — ACETAMINOPHEN 325 MG/1
650 TABLET ORAL EVERY 4 HOURS PRN
Status: DISCONTINUED | OUTPATIENT
Start: 2020-08-19 | End: 2020-08-20 | Stop reason: HOSPADM

## 2020-08-19 RX ORDER — CARVEDILOL 12.5 MG/1
12.5 TABLET ORAL 2 TIMES DAILY WITH MEALS
Status: DISCONTINUED | OUTPATIENT
Start: 2020-08-19 | End: 2020-08-20 | Stop reason: HOSPADM

## 2020-08-19 RX ORDER — ASPIRIN 81 MG/1
81 TABLET ORAL DAILY
Status: DISCONTINUED | OUTPATIENT
Start: 2020-08-20 | End: 2020-08-20 | Stop reason: HOSPADM

## 2020-08-19 RX ORDER — DULOXETIN HYDROCHLORIDE 30 MG/1
30 CAPSULE, DELAYED RELEASE ORAL DAILY
Status: DISCONTINUED | OUTPATIENT
Start: 2020-08-19 | End: 2020-08-20 | Stop reason: HOSPADM

## 2020-08-19 RX ORDER — ROSUVASTATIN CALCIUM 5 MG/1
5 TABLET, COATED ORAL DAILY
Status: DISCONTINUED | OUTPATIENT
Start: 2020-08-19 | End: 2020-08-20 | Stop reason: HOSPADM

## 2020-08-19 RX ORDER — NALOXONE HCL 0.4 MG/ML
0.4 VIAL (ML) INJECTION
Status: DISCONTINUED | OUTPATIENT
Start: 2020-08-19 | End: 2020-08-20 | Stop reason: HOSPADM

## 2020-08-19 RX ORDER — ONDANSETRON 4 MG/1
4 TABLET, FILM COATED ORAL EVERY 6 HOURS PRN
Status: DISCONTINUED | OUTPATIENT
Start: 2020-08-19 | End: 2020-08-19 | Stop reason: SDUPTHER

## 2020-08-19 RX ORDER — SODIUM CHLORIDE 9 MG/ML
100 INJECTION, SOLUTION INTRAVENOUS CONTINUOUS
Status: DISCONTINUED | OUTPATIENT
Start: 2020-08-19 | End: 2020-08-20 | Stop reason: HOSPADM

## 2020-08-19 RX ORDER — PROCHLORPERAZINE MALEATE 5 MG/1
5 TABLET ORAL EVERY 12 HOURS
Status: DISCONTINUED | OUTPATIENT
Start: 2020-08-19 | End: 2020-08-20 | Stop reason: HOSPADM

## 2020-08-19 RX ORDER — FENTANYL CITRATE 50 UG/ML
INJECTION, SOLUTION INTRAMUSCULAR; INTRAVENOUS AS NEEDED
Status: DISCONTINUED | OUTPATIENT
Start: 2020-08-19 | End: 2020-08-19 | Stop reason: HOSPADM

## 2020-08-19 RX ORDER — CALCIUM CARBONATE 200(500)MG
2 TABLET,CHEWABLE ORAL 3 TIMES DAILY PRN
Status: DISCONTINUED | OUTPATIENT
Start: 2020-08-19 | End: 2020-08-20 | Stop reason: HOSPADM

## 2020-08-19 RX ORDER — ASPIRIN 81 MG/1
TABLET, CHEWABLE ORAL AS NEEDED
Status: DISCONTINUED | OUTPATIENT
Start: 2020-08-19 | End: 2020-08-19 | Stop reason: HOSPADM

## 2020-08-19 RX ORDER — HYDRALAZINE HYDROCHLORIDE 20 MG/ML
10 INJECTION INTRAMUSCULAR; INTRAVENOUS ONCE
Status: COMPLETED | OUTPATIENT
Start: 2020-08-19 | End: 2020-08-19

## 2020-08-19 RX ORDER — HYDRALAZINE HYDROCHLORIDE 20 MG/ML
INJECTION INTRAMUSCULAR; INTRAVENOUS
Status: COMPLETED
Start: 2020-08-19 | End: 2020-08-19

## 2020-08-19 RX ADMIN — CALCIUM CARBONATE (ANTACID) CHEW TAB 500 MG 2 TABLET: 500 CHEW TAB at 17:46

## 2020-08-19 RX ADMIN — BIVALIRUDIN 1.75 MG/KG/HR: 250 INJECTION, POWDER, LYOPHILIZED, FOR SOLUTION INTRAVENOUS at 14:09

## 2020-08-19 RX ADMIN — ROSUVASTATIN CALCIUM 5 MG: 5 TABLET, FILM COATED ORAL at 22:30

## 2020-08-19 RX ADMIN — SODIUM CHLORIDE 100 ML/HR: 9 INJECTION, SOLUTION INTRAVENOUS at 07:57

## 2020-08-19 RX ADMIN — ALPRAZOLAM 0.25 MG: 0.25 TABLET ORAL at 17:31

## 2020-08-19 RX ADMIN — CARVEDILOL 12.5 MG: 12.5 TABLET, FILM COATED ORAL at 17:32

## 2020-08-19 RX ADMIN — HYDROCODONE BITARTRATE AND ACETAMINOPHEN 1 TABLET: 5; 325 TABLET ORAL at 15:44

## 2020-08-19 RX ADMIN — DULOXETINE HYDROCHLORIDE 30 MG: 30 CAPSULE, DELAYED RELEASE ORAL at 17:31

## 2020-08-19 RX ADMIN — SODIUM CHLORIDE 100 ML/HR: 9 INJECTION, SOLUTION INTRAVENOUS at 13:11

## 2020-08-19 RX ADMIN — TEMAZEPAM 7.5 MG: 7.5 CAPSULE ORAL at 22:30

## 2020-08-19 RX ADMIN — ONDANSETRON 4 MG: 2 SOLUTION INTRAMUSCULAR; INTRAVENOUS at 18:56

## 2020-08-19 RX ADMIN — HYDRALAZINE HYDROCHLORIDE 10 MG: 20 INJECTION INTRAMUSCULAR; INTRAVENOUS at 18:47

## 2020-08-19 RX ADMIN — PROCHLORPERAZINE MALEATE 5 MG: 5 TABLET ORAL at 22:30

## 2020-08-19 RX ADMIN — HYDRALAZINE HYDROCHLORIDE: 20 INJECTION INTRAMUSCULAR; INTRAVENOUS at 19:43

## 2020-08-19 RX ADMIN — ONDANSETRON 4 MG: 2 SOLUTION INTRAMUSCULAR; INTRAVENOUS at 20:18

## 2020-08-19 RX ADMIN — DONEPEZIL HYDROCHLORIDE 10 MG: 10 TABLET ORAL at 22:30

## 2020-08-19 NOTE — NURSING NOTE
Patient developed hematoma around arterial sheath. Pressure applied to hematoma. ACT check. . Dr. Gill called about hematoma and patient blood pressure that is elevated. PRN hydralazine ordered. Radha removed per order. Pressure held for 40 minutes due to bleeding. Site is now soft, no bleeding and oozing, hemastatic patch in place. Will continue to monitor.

## 2020-08-19 NOTE — PLAN OF CARE
Problem: Patient Care Overview  Goal: Plan of Care Review  Outcome: Ongoing (interventions implemented as appropriate)  Flowsheets (Taken 8/19/2020 2095)  Progress: no change  Plan of Care Reviewed With: patient; daughter  Outcome Summary: New admission from cath lab, one stent to LAD, angiomax stopped @ 1645; ACT due to be checked at 1845; Radha right femoral CDI, no hematoma or ozzing

## 2020-08-20 ENCOUNTER — READMISSION MANAGEMENT (OUTPATIENT)
Dept: CALL CENTER | Facility: HOSPITAL | Age: 80
End: 2020-08-20

## 2020-08-20 VITALS
WEIGHT: 145.28 LBS | HEART RATE: 75 BPM | BODY MASS INDEX: 25.74 KG/M2 | OXYGEN SATURATION: 96 % | HEIGHT: 63 IN | SYSTOLIC BLOOD PRESSURE: 153 MMHG | RESPIRATION RATE: 20 BRPM | DIASTOLIC BLOOD PRESSURE: 67 MMHG | TEMPERATURE: 97.9 F

## 2020-08-20 LAB
ANION GAP SERPL CALCULATED.3IONS-SCNC: 11 MMOL/L (ref 5–15)
BUN SERPL-MCNC: 15 MG/DL (ref 8–23)
BUN/CREAT SERPL: 22.4 (ref 7–25)
CALCIUM SPEC-SCNC: 8.8 MG/DL (ref 8.6–10.5)
CHLORIDE SERPL-SCNC: 100 MMOL/L (ref 98–107)
CHOLEST SERPL-MCNC: 143 MG/DL (ref 0–200)
CO2 SERPL-SCNC: 27 MMOL/L (ref 22–29)
CREAT SERPL-MCNC: 0.67 MG/DL (ref 0.57–1)
DEPRECATED RDW RBC AUTO: 40.1 FL (ref 37–54)
ERYTHROCYTE [DISTWIDTH] IN BLOOD BY AUTOMATED COUNT: 13.2 % (ref 12.3–15.4)
GFR SERPL CREATININE-BSD FRML MDRD: 84 ML/MIN/1.73
GLUCOSE SERPL-MCNC: 152 MG/DL (ref 65–99)
HBA1C MFR BLD: 6.8 % (ref 4.8–5.6)
HCT VFR BLD AUTO: 35.1 % (ref 34–46.6)
HDLC SERPL-MCNC: 80 MG/DL (ref 40–60)
HGB BLD-MCNC: 11.5 G/DL (ref 12–15.9)
LDLC SERPL CALC-MCNC: 38 MG/DL (ref 0–100)
LDLC/HDLC SERPL: 0.48 {RATIO}
MCH RBC QN AUTO: 27.5 PG (ref 26.6–33)
MCHC RBC AUTO-ENTMCNC: 32.8 G/DL (ref 31.5–35.7)
MCV RBC AUTO: 84 FL (ref 79–97)
PLATELET # BLD AUTO: 247 10*3/MM3 (ref 140–450)
PMV BLD AUTO: 9.1 FL (ref 6–12)
POTASSIUM SERPL-SCNC: 4.1 MMOL/L (ref 3.5–5.2)
RBC # BLD AUTO: 4.18 10*6/MM3 (ref 3.77–5.28)
SODIUM SERPL-SCNC: 138 MMOL/L (ref 136–145)
TRIGL SERPL-MCNC: 123 MG/DL (ref 0–150)
VLDLC SERPL-MCNC: 24.6 MG/DL
WBC # BLD AUTO: 11.61 10*3/MM3 (ref 3.4–10.8)

## 2020-08-20 PROCEDURE — 93010 ELECTROCARDIOGRAM REPORT: CPT | Performed by: INTERNAL MEDICINE

## 2020-08-20 PROCEDURE — 63710000001 PROCHLORPERAZINE MALEATE PER 5 MG: Performed by: INTERNAL MEDICINE

## 2020-08-20 PROCEDURE — A9270 NON-COVERED ITEM OR SERVICE: HCPCS | Performed by: INTERNAL MEDICINE

## 2020-08-20 PROCEDURE — 25010000002 HYDRALAZINE PER 20 MG: Performed by: INTERNAL MEDICINE

## 2020-08-20 PROCEDURE — 63710000001 LOSARTAN 50 MG TABLET: Performed by: INTERNAL MEDICINE

## 2020-08-20 PROCEDURE — 63710000001 ASPIRIN 81 MG TABLET DELAYED-RELEASE: Performed by: INTERNAL MEDICINE

## 2020-08-20 PROCEDURE — 63710000001 CITALOPRAM 10 MG TABLET: Performed by: INTERNAL MEDICINE

## 2020-08-20 PROCEDURE — 63710000001 ROSUVASTATIN 5 MG TABLET: Performed by: INTERNAL MEDICINE

## 2020-08-20 PROCEDURE — 63710000001 CLOPIDOGREL 75 MG TABLET: Performed by: INTERNAL MEDICINE

## 2020-08-20 PROCEDURE — 80061 LIPID PANEL: CPT | Performed by: INTERNAL MEDICINE

## 2020-08-20 PROCEDURE — 85027 COMPLETE CBC AUTOMATED: CPT | Performed by: INTERNAL MEDICINE

## 2020-08-20 PROCEDURE — 83036 HEMOGLOBIN GLYCOSYLATED A1C: CPT | Performed by: INTERNAL MEDICINE

## 2020-08-20 PROCEDURE — G0378 HOSPITAL OBSERVATION PER HR: HCPCS

## 2020-08-20 PROCEDURE — 25010000002 ONDANSETRON PER 1 MG: Performed by: INTERNAL MEDICINE

## 2020-08-20 PROCEDURE — 63710000001 HYDROCODONE-ACETAMINOPHEN 5-325 MG TABLET: Performed by: INTERNAL MEDICINE

## 2020-08-20 PROCEDURE — 99214 OFFICE O/P EST MOD 30 MIN: CPT | Performed by: INTERNAL MEDICINE

## 2020-08-20 PROCEDURE — 80048 BASIC METABOLIC PNL TOTAL CA: CPT | Performed by: INTERNAL MEDICINE

## 2020-08-20 PROCEDURE — 63710000001 DULOXETINE 30 MG CAPSULE DELAYED-RELEASE PARTICLES: Performed by: INTERNAL MEDICINE

## 2020-08-20 PROCEDURE — 63710000001 CARVEDILOL 12.5 MG TABLET: Performed by: INTERNAL MEDICINE

## 2020-08-20 PROCEDURE — 93005 ELECTROCARDIOGRAM TRACING: CPT | Performed by: INTERNAL MEDICINE

## 2020-08-20 RX ORDER — HYDRALAZINE HYDROCHLORIDE 20 MG/ML
20 INJECTION INTRAMUSCULAR; INTRAVENOUS ONCE
Status: COMPLETED | OUTPATIENT
Start: 2020-08-20 | End: 2020-08-20

## 2020-08-20 RX ORDER — ROSUVASTATIN CALCIUM 10 MG/1
20 TABLET, COATED ORAL NIGHTLY
Status: CANCELLED | OUTPATIENT
Start: 2020-08-20

## 2020-08-20 RX ORDER — LOSARTAN POTASSIUM 50 MG/1
100 TABLET ORAL
Status: DISCONTINUED | OUTPATIENT
Start: 2020-08-20 | End: 2020-08-20 | Stop reason: HOSPADM

## 2020-08-20 RX ORDER — ASPIRIN 81 MG/1
81 TABLET ORAL DAILY
Qty: 30 TABLET | Refills: 1 | Status: SHIPPED | OUTPATIENT
Start: 2020-08-20 | End: 2020-09-28

## 2020-08-20 RX ORDER — LOSARTAN POTASSIUM 100 MG/1
100 TABLET ORAL
Qty: 30 TABLET | Refills: 1 | Status: SHIPPED | OUTPATIENT
Start: 2020-08-21 | End: 2022-08-29

## 2020-08-20 RX ADMIN — LOSARTAN POTASSIUM 100 MG: 50 TABLET, FILM COATED ORAL at 08:46

## 2020-08-20 RX ADMIN — CARVEDILOL 12.5 MG: 12.5 TABLET, FILM COATED ORAL at 08:47

## 2020-08-20 RX ADMIN — DULOXETINE HYDROCHLORIDE 30 MG: 30 CAPSULE, DELAYED RELEASE ORAL at 10:39

## 2020-08-20 RX ADMIN — PROCHLORPERAZINE MALEATE 5 MG: 5 TABLET ORAL at 06:32

## 2020-08-20 RX ADMIN — HYDRALAZINE HYDROCHLORIDE 20 MG: 20 INJECTION INTRAMUSCULAR; INTRAVENOUS at 06:28

## 2020-08-20 RX ADMIN — CLOPIDOGREL BISULFATE 75 MG: 75 TABLET ORAL at 08:46

## 2020-08-20 RX ADMIN — CITALOPRAM HYDROBROMIDE 10 MG: 10 TABLET ORAL at 08:47

## 2020-08-20 RX ADMIN — SODIUM CHLORIDE 100 ML/HR: 9 INJECTION, SOLUTION INTRAVENOUS at 00:54

## 2020-08-20 RX ADMIN — ONDANSETRON 4 MG: 2 SOLUTION INTRAMUSCULAR; INTRAVENOUS at 02:44

## 2020-08-20 RX ADMIN — ASPIRIN 81 MG: 81 TABLET, COATED ORAL at 08:46

## 2020-08-20 RX ADMIN — HYDROCODONE BITARTRATE AND ACETAMINOPHEN 1 TABLET: 5; 325 TABLET ORAL at 04:34

## 2020-08-20 RX ADMIN — ROSUVASTATIN CALCIUM 5 MG: 5 TABLET, FILM COATED ORAL at 08:57

## 2020-08-20 NOTE — PLAN OF CARE
Problem: Patient Care Overview  Goal: Plan of Care Review  Outcome: Ongoing (interventions implemented as appropriate)  Flowsheets  Taken 8/20/2020 0625  Progress: improving  Outcome Summary: pt VSS throughout shift, pt NSR on tele monitor, pt right groin site dry and intact, pt given blood pressure medication this am for hypertension, pt given prn pain medication for chronic back pain, pt urinary cath d/c'd, pt anticipates d/c to home today, will continue to monitor pt.  Taken 8/19/2020 2000  Plan of Care Reviewed With: patient;daughter

## 2020-08-20 NOTE — DISCHARGE SUMMARY
[facility] Cardiology  INPATIENT DISCHARGE SUMMARY    Date of Discharge:  8/20/2020    Discharge Diagnosis:  1.  PCI stent to the mid LAD, bare-metal stent  2.  Angina, abnormal CTA  3.  Hyperlipidemia  4.  Diabetes mellitus  5.  Restless leg syndrome  6.  Pulmonary stenosis  7.  Hypertension    Hospital Course  Patient is a 79 y.o. female presented with abnormal CTA and chest pain.  She underwent cardiac catheterization she was found to have mid LAD lesion.  She will underwent bare-metal stenting secondary to her inability to take long-term aspirin.  She was agreeable to take aspirin form of medication Plavix.    She has some issues where she had restless leg syndrome and would not keep her right leg straight.  There is was stable on morning.  She did have some disease in a second diagonal branch however this was a smaller branch and medical therapy was felt to be indicated.  She had issues with her blood pressure so she was given hydralazine and then sent home on losartan.    She was following a healthy heart diet.    She was not to do  any lifting or driving for 72 hours.  She was to wear a pressure dressing until Saturday morning.  Is to call if there was any issues.      Follow-up with Dr. Gill 4 weeks.  Procedures Performed  Procedure(s):  Left Heart Cath, PCI bare-metal stent to the mid LAD       Consults:   Consults     No orders found for last 30 day(s).          Pertinent Test Results:     Lab Results (last 24 hours)     Procedure Component Value Units Date/Time    Basic Metabolic Panel [[id number]]  (Abnormal) Collected:  08/20/20 0324    Specimen:  Blood Updated:  08/20/20 0428     Glucose 152 mg/dL      BUN 15 mg/dL      Creatinine 0.67 mg/dL      Sodium 138 mmol/L      Potassium 4.1 mmol/L      Chloride 100 mmol/L      CO2 27.0 mmol/L      Calcium 8.8 mg/dL      eGFR Non African Amer 84 mL/min/1.73      BUN/Creatinine Ratio 22.4     Anion Gap 11.0 mmol/L     Narrative:       GFR  Normal >60  Chronic Kidney Disease <60  Kidney Failure <15      Lipid Panel [[id number]]  (Abnormal) Collected:  08/20/20 0324    Specimen:  Blood Updated:  08/20/20 0428     Total Cholesterol 143 mg/dL      Triglycerides 123 mg/dL      HDL Cholesterol 80 mg/dL      LDL Cholesterol  38 mg/dL      VLDL Cholesterol 24.6 mg/dL      LDL/HDL Ratio 0.48    Narrative:       Cholesterol Reference Ranges  (U.S. Department of Health and Human Services ATP III Classifications)    Desirable          <200 mg/dL  Borderline High    200-239 mg/dL  High Risk          >240 mg/dL      Triglyceride Reference Ranges  (U.S. Department of Health and Human Services ATP III Classifications)    Normal           <150 mg/dL  Borderline High  150-199 mg/dL  High             200-499 mg/dL  Very High        >500 mg/dL    HDL Reference Ranges  (U.S. Department of Health and Human Services ATP III Classifcations)    Low     <40 mg/dl (major risk factor for CHD)  High    >60 mg/dl ('negative' risk factor for CHD)        LDL Reference Ranges  (U.S. Department of Health and Human Services ATP III Classifcations)    Optimal          <100 mg/dL  Near Optimal     100-129 mg/dL  Borderline High  130-159 mg/dL  High             160-189 mg/dL  Very High        >189 mg/dL    Hemoglobin A1c [[id number]]  (Abnormal) Collected:  08/20/20 0323    Specimen:  Blood Updated:  08/20/20 0421     Hemoglobin A1C 6.80 %     Narrative:       Hemoglobin A1C Ranges:    Increased Risk for Diabetes  5.7% to 6.4%  Diabetes                     >= 6.5%  Diabetic Goal                < 7.0%    CBC (No Diff) [[id number]]  (Abnormal) Collected:  08/20/20 0324    Specimen:  Blood Updated:  08/20/20 0409     WBC 11.61 10*3/mm3      RBC 4.18 10*6/mm3      Hemoglobin 11.5 g/dL      Hematocrit 35.1 %      MCV 84.0 fL      MCH 27.5 pg      MCHC 32.8 g/dL      RDW 13.2 %      RDW-SD 40.1 fl      MPV 9.1 fL      Platelets 247 10*3/mm3     POC Activated Clotting Time [[id number]]  (Abnormal)  Collected:  08/19/20 1835    Specimen:  Blood Updated:  08/19/20 2141     Activated Clotting Time  171.0000 Seconds      Comment: Serial Number: [id number]:  [id number]             Imaging Results (Last 24 Hours)     ** No results found for the last 24 hours. **          ECG/EMG Results (last 24 hours)     Procedure Component Value Units Date/Time    ECG 12 Lead [[id number]] Collected:  08/20/20 0658     Updated:  08/20/20 0658    Narrative:       Test Reason : pci  Blood Pressure : **/** mmHG  Vent. Rate : 094 BPM     Atrial Rate : 094 BPM     P-R Int : 210 ms          QRS Dur : 092 ms      QT Int : 376 ms       P-R-T Axes : 054 -48 029 degrees     QTc Int : 470 ms    Sinus rhythm with 1st degree AV block  Left anterior fascicular block  Septal infarct (cited on or before 08-AUG-2016)  Abnormal ECG  When compared with ECG of 29-JUL-2020 11:03,  NH interval has increased  Serial changes of Septal infarct present    Referred By:             Confirmed By:           Condition on Discharge:  stable    Vital Signs  Temp:  [97.5 °F (36.4 °C)-99 °F (37.2 °C)] 97.9 °F (36.6 °C)  Heart Rate:  [54-92] 92  Resp:  [18-23] 20  BP: (118-237)/() 190/92  Arterial Line BP: (147-266)/() 147/62    Discharge Disposition  Home or Self Care    Discharge Medications     Discharge Medications      New Medications      Instructions Start Date   aspirin 81 MG EC tablet   81 mg, Oral, Daily      losartan 100 MG tablet  Commonly known as:  COZAAR   100 mg, Oral, Every 24 Hours Scheduled   Start Date:  August 21, 2020        Continue These Medications      Instructions Start Date   carvedilol 12.5 MG tablet  Commonly known as:  COREG   12.5 mg, Oral, 2 Times Daily With Meals      ciprofloxacin 250 MG tablet  Commonly known as:  CIPRO   250 mg, Oral, Daily      citalopram 10 MG tablet  Commonly known as:  CeleXA   10 mg, Oral, Daily      clobetasol 0.05 % external solution  Commonly known as:  TEMOVATE   1 application, Topical,  Daily      clopidogrel 75 MG tablet  Commonly known as:  PLAVIX   TAKE 1 TABLET EVERY DAY      Cranberry 500 MG capsule   1 tablet, Oral, Daily      cyanocobalamin 1000 MCG/ML injection   INJECT 1 ML INTO THE APPROPRIATE MUSCLE AS DIRECTED BY PRESCRIBER EVERY 28 DAYS.      dicyclomine 10 MG capsule  Commonly known as:  BENTYL   TAKE 1 CAPSULE TWICE DAILY AS NEEDED FOR DIARRHEA      donepezil 10 MG tablet  Commonly known as:  ARICEPT   TAKE 1 TABLET EVERY NIGHT      DULoxetine 30 MG capsule  Commonly known as:  CYMBALTA   TAKE 1 CAPSULE EVERY DAY      Fluocinolone Acetonide Scalp 0.01 % oil   1 application, Topical, Daily      fluticasone 50 MCG/ACT nasal spray  Commonly known as:  FLONASE   INSTILL 2 SPRAYS INTO EACH NOSTRIL DAILY      gabapentin 300 MG capsule  Commonly known as:  NEURONTIN   TAKE ONE CAPSULE BY MOUTH EVERY MORNING AND TAKE 2 CAPSULES BY MOUTH EVERY NIGHT AT BEDTIME      HYDROcodone-acetaminophen 5-325 MG per tablet  Commonly known as:  NORCO   No dose, route, or frequency recorded.      hydrocortisone 1 % lotion   Topical, 3 Times Daily PRN      hydrOXYzine 25 MG tablet  Commonly known as:  ATARAX   25 mg, Oral, 2 Times Daily PRN      ketoconazole 2 % shampoo  Commonly known as:  NIZORAL   No dose, route, or frequency recorded.      nystatin 015638 UNIT/GM powder  Commonly known as:  MYCOSTATIN   APPLY TOPICALLY TO THE AFFECTED AREA THREE TIMES A DAY      omeprazole 40 MG capsule  Commonly known as:  priLOSEC   40 mg, Oral, 2 Times Daily      ondansetron 8 MG tablet  Commonly known as:  ZOFRAN   8 mg, Oral, Every 8 Hours PRN      OneTouch Verio test strip  Generic drug:  glucose blood   No dose, route, or frequency recorded.      phenazopyridine 100 MG tablet  Commonly known as:  PYRIDIUM   100 mg, Oral, 3 Times Daily PRN      PREDNISONE PO   4 mg, Oral, Daily      PROBIOTIC PO   Oral      prochlorperazine 5 MG tablet  Commonly known as:  COMPAZINE   1 tablet, Oral, Every 12 Hours     "  promethazine 25 MG tablet  Commonly known as:  PHENERGAN   25 mg, Oral, Every 6 Hours PRN      rOPINIRole 0.5 MG tablet  Commonly known as:  REQUIP   No dose, route, or frequency recorded.      rosuvastatin 5 MG tablet  Commonly known as:  CRESTOR   5 mg, Oral, Daily      Syringe/Needle (Disp) 25G X 5/8\" 3 ML misc  Commonly known as:  B-D INTEGRA SYRINGE   USE 1 SYRINGE FOR MONTHLY VITAMIN B-12 SHOTS      traMADol 50 MG tablet  Commonly known as:  ULTRAM   50 mg, Oral, 3 Times Daily PRN, ti      traZODone 100 MG tablet  Commonly known as:  DESYREL   TAKE 1 TABLET EVERY NIGHT WITH TRAZODONE 50MG      traZODone 50 MG tablet  Commonly known as:  DESYREL   TAKE 1 TABLET EVERY NIGHT WITH TRAZODONE 100MG             Discharge Diet:   Diet Instructions     Diet:      Diet Texture / Consistency:  Regular    Common Modifiers:  Cardiac    heaLTHY HEART      Healthy heart    Activity at Discharge:  No lifting or driving x72 hours.    Follow-up Appointments  Future Appointments   Date Time Provider Department Center   9/28/2020 10:45 AM Ami Quispe MD [facility]3 None   1/18/2021 10:00 AM  [facility] OP INFU CHAIR 12  MAD OPI MAD   1/25/2021 10:30 AM  [facility] OP INFU CHAIR 13  MAD OPI MAD   2/4/2021  2:30 PM Hima Gill MD PhD [facility] CD [facility]AD None     Additional Instructions for the Follow-ups that You Need to Schedule     Ambulatory Referral to Cardiac Rehab   As directed      Discharge Follow-up with Specified Provider: bharat; 1 Month   As directed      To:  bharat    Follow Up:  1 Month               Test Results Pending at Discharge          Jessie Hendrickson MD    Part of this note may be an electronic transcription/translation of spoken language to printed text using the Dragon Dictation System.  "

## 2020-08-20 NOTE — OUTREACH NOTE
Prep Survey      Responses   [facility] facility patient discharged from?  [facility]   Is LACE score < 7 ?  No   Eligibility  [facility]   Date of Admission  08/19/20   Date of Discharge  08/20/20   Discharge Disposition  Home or Self Care   Discharge diagnosis  chest pain, heart cath & stent   COVID-19 Test Status  Negative   Does the patient have one of the following disease processes/diagnoses(primary or secondary)?  Other   Does the patient have Home health ordered?  No   Is there a DME ordered?  No   Prep survey completed?  Yes          Yamileth Dimas RN

## 2020-08-21 ENCOUNTER — TRANSITIONAL CARE MANAGEMENT TELEPHONE ENCOUNTER (OUTPATIENT)
Dept: CALL CENTER | Facility: HOSPITAL | Age: 80
End: 2020-08-21

## 2020-08-21 NOTE — OUTREACH NOTE
Call Center TCM Note      Responses   [facility] patient discharged from?  [place]   COVID-19 Test Status  Negative   Does the patient have one of the following disease processes/diagnoses(primary or secondary)?  Other   TCM attempt successful?  Yes   Call start time  1119   Call end time  1128   Discharge diagnosis  chest pain, heart cath & stent   Is patient permission given to speak with other caregiver?  Yes   Person spoke with today (if not patient) and relationship  Maya Raghavendra/ daughter   Meds reviewed with patient/caregiver?  Yes   Is the patient having any side effects they believe may be caused by any medication additions or changes?  No   Does the patient have all medications ordered at discharge?  Yes   Is the patient taking all medications as directed (includes completed medication regime)?  Yes   Comments regarding appointments  Cardiology on 10/6/2020   Does the patient have a primary care provider?   Yes   Does the patient have an appointment with their PCP within 7 days of discharge?  Greater than 7 days   Comments regarding PCP  PCP:  Ami Quispe MD Patient has a followup on 9/28/2020 for her yearly well exam, declines sooner appt.  and duaghter wishes to leave as is.    Nursing Interventions  Educated patient on importance of making appointment, Advised patient to make appointment, Verified appointment date/time/provider   Has the patient kept scheduled appointments due by today?  N/A   Has home health visited the patient within 72 hours of discharge?  N/A   Psychosocial issues?  No   Comments  Patient has a bruised area to heart cath side. No Hematoma.    Did the patient receive a copy of their discharge instructions?  Yes   Nursing interventions  Reviewed instructions with patient   What is the patient's perception of their health status since discharge?  Improving   Is the patient/caregiver able to teach back signs and symptoms related to disease process for when to call  PCP?  Yes   Is the patient/caregiver able to teach back signs and symptoms related to disease process for when to call 911?  Yes   Is the patient/caregiver able to teach back the hierarchy of who to call/visit for symptoms/problems? PCP, Specialist, Home health nurse, Urgent Care, ED, 911  Yes   TCM call completed?  Yes          Costa Zuluaga RN    8/21/2020, 11:29

## 2020-08-25 ENCOUNTER — DOCUMENTATION (OUTPATIENT)
Dept: CARDIAC REHAB | Facility: HOSPITAL | Age: 80
End: 2020-08-25

## 2020-08-27 ENCOUNTER — READMISSION MANAGEMENT (OUTPATIENT)
Dept: CALL CENTER | Facility: HOSPITAL | Age: 80
End: 2020-08-27

## 2020-08-27 NOTE — OUTREACH NOTE
Medical Week 2 Survey      Responses   [facility] patient discharged from?  [place]   COVID-19 Test Status  Negative   Does the patient have one of the following disease processes/diagnoses(primary or secondary)?  Other   Week 2 attempt successful?  Yes   Call start time  1232   Discharge diagnosis  chest pain, heart cath & stent   Call end time  1232   Is patient permission given to speak with other caregiver?  Yes   List who call center can speak with  Maya- Daughter    Meds reviewed with patient/caregiver?  Yes   Is the patient having any side effects they believe may be caused by any medication additions or changes?  No   Does the patient have all medications ordered at discharge?  Yes   Is the patient taking all medications as directed (includes completed medication regime)?  Yes   Does the patient have a primary care provider?   Yes   Has the patient kept scheduled appointments due by today?  N/A   Pulse Ox monitoring  None   Psychosocial issues?  No   Did the patient receive a copy of their discharge instructions?  Yes   Nursing interventions  Reviewed instructions with patient, Educated on MyChart   What is the patient's perception of their health status since discharge?  Improving   Is the patient/caregiver able to teach back signs and symptoms related to disease process for when to call PCP?  Yes   Is the patient/caregiver able to teach back signs and symptoms related to disease process for when to call 911?  Yes   Is the patient/caregiver able to teach back the hierarchy of who to call/visit for symptoms/problems? PCP, Specialist, Home health nurse, Urgent Care, ED, 911  Yes   Week 2 Call Completed?  Yes          Patsy Adams RN

## 2020-09-02 ENCOUNTER — READMISSION MANAGEMENT (OUTPATIENT)
Dept: CALL CENTER | Facility: HOSPITAL | Age: 80
End: 2020-09-02

## 2020-09-02 NOTE — OUTREACH NOTE
Medical Week 3 Survey      Responses   [facility] [place] patient discharged from?  [place]   COVID-19 Test Status  Negative   Does the patient have one of the following disease processes/diagnoses(primary or secondary)?  Other   Week 3 attempt successful?  No   Unsuccessful attempts  Attempt 1          Anaid Paul RN

## 2020-09-04 ENCOUNTER — READMISSION MANAGEMENT (OUTPATIENT)
Dept: CALL CENTER | Facility: HOSPITAL | Age: 80
End: 2020-09-04

## 2020-09-04 NOTE — OUTREACH NOTE
Medical Week 3 Survey      Responses   [facility] patient discharged from?  [place]   COVID-19 Test Status  Negative   Does the patient have one of the following disease processes/diagnoses(primary or secondary)?  Other   Week 3 attempt successful?  No   Unsuccessful attempts  Attempt 2          Radhika Melgoza RN

## 2020-09-26 NOTE — PROGRESS NOTES
The ABCs of the Annual Wellness Visit  Subsequent Medicare Wellness Visit    Chief Complaint   Patient presents with   • Medicare Wellness-subsequent   • Hyperlipidemia   • Hypertension   • Diabetes       Subjective   History of Present Illness:  Mariluz Arango is a 79 y.o. female who presents for a Subsequent Medicare Wellness Visit.    HEALTH RISK ASSESSMENT    Recent Hospitalizations:  Recently treated at the following:  [facility]    Current Medical Providers:  Patient Care Team:  Ami Quispe MD as PCP - General (Family Medicine)  Lexie Méndez APRN as PCP - Claims Attributed  Hima Gill MD PhD as Consulting Physician (Cardiology)  Ezra Meyers DO as Consulting Physician (Gastroenterology)  Axel Phillips MD as Surgeon (Cardiothoracic Surgery)  [place], Adan AZAR MD as Consulting Physician (Urology)  Lamin Dimas MD as Consulting Physician (Dermatology)    Smoking Status:  Social History     Tobacco Use   Smoking Status Former Smoker   • Packs/day: 0.50   • Types: Cigarettes   • Quit date: 2016   • Years since quittin.3   Smokeless Tobacco Never Used   Tobacco Comment    0.5 - 1 Pack(s) Of Cigarettes Per Day       Alcohol Consumption:  Social History     Substance and Sexual Activity   Alcohol Use No       Depression Screen:   PHQ-2/PHQ-9 Depression Screening 2020   Little interest or pleasure in doing things 0   Feeling down, depressed, or hopeless 3   Trouble falling or staying asleep, or sleeping too much 3   Feeling tired or having little energy 3   Poor appetite or overeating 2   Feeling bad about yourself - or that you are a failure or have let yourself or your family down 0   Trouble concentrating on things, such as reading the newspaper or watching television 2   Moving or speaking so slowly that other people could have noticed. Or the opposite - being so fidgety or restless that you have been moving around a lot more than usual 2    Thoughts that you would be better off dead, or of hurting yourself in some way 0   Total Score 15   If you checked off any problems, how difficult have these problems made it for you to do your work, take care of things at home, or get along with other people? Extremely dIfficult       Fall Risk Screen:  JOSE Fall Risk Assessment was completed, and patient is at MODERATE risk for falls. Assessment completed on:9/28/2020    Health Habits and Functional and Cognitive Screening:  Functional & Cognitive Status 9/28/2020   Do you have difficulty preparing food and eating? Yes   Do you have difficulty bathing yourself, getting dressed or grooming yourself? Yes   Do you have difficulty using the toilet? No   Do you have difficulty moving around from place to place? Yes   Do you have trouble with steps or getting out of a bed or a chair? Yes   Current Diet Frequent Junk Food   Dental Exam Not up to date   Eye Exam Not up to date   Exercise (times per week) 0 times per week   Current Exercise Activities Include -   Do you need help using the phone?  No   Are you deaf or do you have serious difficulty hearing?  No   Do you need help with transportation? Yes   Do you need help shopping? Yes   Do you need help preparing meals?  Yes   Do you need help with housework?  Yes   Do you need help with laundry? Yes   Do you need help taking your medications? Yes   Do you need help managing money? Yes   Do you ever drive or ride in a car without wearing a seat belt? No   Have you felt unusual stress, anger or loneliness in the last month? No   Who do you live with? Child   If you need help, do you have trouble finding someone available to you? No   Have you been bothered in the last four weeks by sexual problems? No   Do you have difficulty concentrating, remembering or making decisions? Yes         Does the patient have evidence of cognitive impairment? Yes    Asprin use counseling:On clopidrogel as an alternative (due to ASA  contraindication)    Age-appropriate Screening Schedule:  Refer to the list below for future screening recommendations based on patient's age, sex and/or medical conditions. Orders for these recommended tests are listed in the plan section. The patient has been provided with a written plan.    Health Maintenance   Topic Date Due   • TDAP/TD VACCINES (1 - Tdap) 11/18/1959   • ZOSTER VACCINE (2 of 2) 11/03/2017   • DIABETIC EYE EXAM  06/29/2018   • MAMMOGRAM  08/14/2019   • INFLUENZA VACCINE  08/01/2020   • DXA SCAN  08/14/2020   • URINE MICROALBUMIN  09/26/2020   • HEMOGLOBIN A1C  02/20/2021   • COLONOSCOPY  04/04/2021   • LIPID PANEL  08/20/2021          The following portions of the patient's history were reviewed and updated as appropriate: allergies, current medications, past family history, past medical history, past social history, past surgical history and problem list.    Outpatient Medications Prior to Visit   Medication Sig Dispense Refill   • carvedilol (COREG) 12.5 MG tablet Take 1 tablet by mouth 2 (Two) Times a Day With Meals. 60 tablet 3   • ciprofloxacin (CIPRO) 250 MG tablet Take 250 mg by mouth Daily.     • clobetasol (TEMOVATE) 0.05 % external solution Apply 1 application topically Daily.     • clopidogrel (PLAVIX) 75 MG tablet TAKE 1 TABLET EVERY DAY 30 tablet 5   • Cranberry 500 MG capsule Take 1 tablet by mouth Daily.     • dicyclomine (BENTYL) 10 MG capsule TAKE 1 CAPSULE TWICE DAILY AS NEEDED FOR DIARRHEA     • donepezil (ARICEPT) 10 MG tablet TAKE 1 TABLET EVERY NIGHT 30 tablet 5   • DULoxetine (CYMBALTA) 30 MG capsule TAKE 1 CAPSULE EVERY DAY 30 capsule 5   • FLUOCINOLONE ACETONIDE SCALP 0.01 % oil Apply 1 application topically Daily.     • fluticasone (FLONASE) 50 MCG/ACT nasal spray INSTILL 2 SPRAYS INTO EACH NOSTRIL DAILY     • gabapentin (NEURONTIN) 300 MG capsule Take 300 mg by mouth 3 (Three) Times a Day. 1 qam and 2 qhs     • HYDROcodone-acetaminophen (NORCO) 5-325 MG per tablet     "  • hydrOXYzine (ATARAX) 25 MG tablet Take 1 tablet by mouth 2 (Two) Times a Day As Needed for Itching. 60 tablet 5   • ketoconazole (NIZORAL) 2 % shampoo      • losartan (COZAAR) 100 MG tablet Take 1 tablet by mouth Daily. 30 tablet 1   • nystatin (MYCOSTATIN) 317362 UNIT/GM powder APPLY TOPICALLY TO THE AFFECTED AREA THREE TIMES A DAY 60 g 2   • omeprazole (priLOSEC) 40 MG capsule Take 40 mg by mouth 2 (Two) Times a Day.     • ONETOUCH VERIO test strip   11   • Probiotic Product (PROBIOTIC PO) Take  by mouth.     • prochlorperazine (COMPAZINE) 5 MG tablet Take 1 tablet by mouth Every 12 (Twelve) Hours.     • rOPINIRole (REQUIP) 0.5 MG tablet      • rosuvastatin (CRESTOR) 5 MG tablet Take 1 tablet by mouth Daily. 30 tablet 11   • Syringe/Needle, Disp, (B-D INTEGRA SYRINGE) 25G X 5/8\" 3 ML misc USE 1 SYRINGE FOR MONTHLY VITAMIN B-12 SHOTS 6 each 2   • traZODone (DESYREL) 100 MG tablet TAKE 1 TABLET EVERY NIGHT WITH TRAZODONE 50MG 30 tablet 5   • traZODone (DESYREL) 50 MG tablet TAKE 1 TABLET EVERY NIGHT WITH TRAZODONE 100MG 30 tablet 5   • citalopram (CeleXA) 10 MG tablet Take 10 mg by mouth Daily.     • gabapentin (NEURONTIN) 300 MG capsule TAKE ONE CAPSULE BY MOUTH EVERY MORNING AND TAKE 2 CAPSULES BY MOUTH EVERY NIGHT AT BEDTIME 90 capsule 0   • gabapentin (NEURONTIN) 300 MG capsule Take 300 mg by mouth 3 (Three) Times a Day.     • ondansetron (ZOFRAN) 8 MG tablet Take 8 mg by mouth Every 8 (Eight) Hours As Needed for Nausea or Vomiting.     • promethazine (PHENERGAN) 25 MG tablet Take 25 mg by mouth Every 6 (Six) Hours As Needed for Nausea or Vomiting.     • cyanocobalamin 1000 MCG/ML injection INJECT 1 ML INTO THE APPROPRIATE MUSCLE AS DIRECTED BY PRESCRIBER EVERY 28 DAYS. 1 mL 11   • aspirin 81 MG EC tablet Take 1 tablet by mouth Daily. 30 tablet 1   • hydrocortisone 1 % lotion Apply  topically 3 (Three) Times a Day As Needed for Irritation or Rash. 1 bottle 1   • phenazopyridine (PYRIDIUM) 100 MG tablet Take " 1 tablet by mouth 3 (Three) Times a Day As Needed for bladder spasms. 6 tablet 0   • PREDNISONE PO Take 4 mg by mouth Daily.     • traMADol (ULTRAM) 50 MG tablet Take 1 tablet by mouth 3 (Three) Times a Day As Needed for Moderate Pain . ti 90 tablet 2     No facility-administered medications prior to visit.        Patient Active Problem List   Diagnosis   • Anxiety   • Artificial lens present   • Depressive disorder   • Diabetic polyneuropathy (CMS/HCC)   • Diverticular disease of colon   • Gastroesophageal reflux disease   • Hemorrhoid   • Hiatal hernia   • History of colon polyps   • Hypercholesterolemia   • Essential hypertension   • Lumbar radiculopathy   • 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)   • Pain in thoracic spine   • Vitamin D deficiency   • Paroxysmal atrial fibrillation (CMS/HCC)   • Thoracic aortic aneurysm without rupture (CMS/HCC)   • Early onset Alzheimer's dementia without behavioral disturbance (CMS/HCC)   • Iron deficiency anemia secondary to inadequate dietary iron intake   • Pyelonephritis   • Acute cystitis without hematuria   • Mixed hyperlipidemia   • Chronic midline low back pain without sciatica   • Age related osteoporosis   • Nonrheumatic pulmonary valve insufficiency   • Precordial pain   • Chest pain, atypical   • Left anterior fascicular block (LAFB)   • Abnormal cardiac CT angiography       Advanced Care Planning:  ACP discussion was held with the patient during this visit. Patient has an advance directive in EMR which is still valid.     Review of Systems   Constitutional: Negative.  Negative for chills, fatigue, fever and unexpected weight change.   HENT: Negative.  Negative for congestion, ear pain, hearing loss, nosebleeds, rhinorrhea, sneezing, sore throat and tinnitus.    Eyes: Negative.  Negative for discharge.   Respiratory: Negative.  Negative for cough, shortness of breath and wheezing.    Cardiovascular: Negative.  Negative for chest pain and  "palpitations.   Gastrointestinal: Negative.  Negative for abdominal pain, constipation, diarrhea, nausea and vomiting.   Endocrine: Negative.    Genitourinary: Negative.  Negative for dysuria, frequency and urgency.   Musculoskeletal: Negative.  Negative for arthralgias, back pain, joint swelling, myalgias and neck pain.   Skin: Negative.  Negative for rash.   Allergic/Immunologic: Negative.    Neurological: Negative.  Negative for dizziness, weakness, numbness and headaches.   Hematological: Negative.  Negative for adenopathy.   Psychiatric/Behavioral: Negative.  Negative for dysphoric mood and sleep disturbance. The patient is not nervous/anxious.        Compared to one year ago, the patient feels her physical health is worse.  Compared to one year ago, the patient feels her mental health is worse.    Reviewed chart for potential of high risk medication in the elderly: yes  Reviewed chart for potential of harmful drug interactions in the elderly:yes    Objective         Vitals:    09/28/20 1039   BP: 100/68   BP Location: Left arm   Pulse: 63   SpO2: 98%   Weight: 68 kg (150 lb)   Height: 160 cm (63\")   PainSc: 10-Worst pain ever   PainLoc: Back       Body mass index is 26.57 kg/m².  Discussed the patient's BMI with her. The BMI is in the acceptable range.    Physical Exam  Vitals signs and nursing note reviewed.   Constitutional:       General: She is not in acute distress.     Appearance: She is well-developed.   HENT:      Head: Normocephalic and atraumatic.      Nose: Nose normal.   Eyes:      General:         Right eye: No discharge.         Left eye: No discharge.      Conjunctiva/sclera: Conjunctivae normal.      Pupils: Pupils are equal, round, and reactive to light.   Neck:      Thyroid: No thyromegaly.   Cardiovascular:      Rate and Rhythm: Normal rate and regular rhythm.      Heart sounds: Normal heart sounds.   Pulmonary:      Effort: Pulmonary effort is normal.      Breath sounds: Normal breath " sounds.   Lymphadenopathy:      Cervical: No cervical adenopathy.   Skin:     General: Skin is warm and dry.   Neurological:      Mental Status: She is alert and oriented to person, place, and time.         Lab Results   Component Value Date    TRIG 123 08/20/2020    HDL 80 (H) 08/20/2020    LDL 38 08/20/2020    VLDL 24.6 08/20/2020    HGBA1C 6.80 (H) 08/20/2020        Assessment/Plan   Medicare Risks and Personalized Health Plan  CMS Preventative Services Quick Reference  Breast Cancer/Mammogram Screening  Dementia/Memory   Fall Risk  Immunizations Discussed/Encouraged (specific immunizations; adacel Tdap, Influenza and Shingrix )    The above risks/problems have been discussed with the patient.  Pertinent information has been shared with the patient in the After Visit Summary.  Follow up plans and orders are seen below in the Assessment/Plan Section.    Diagnoses and all orders for this visit:    1. Medicare annual wellness visit, subsequent (Primary)    2. Essential hypertension  -     CBC & Differential; Future  -     Comprehensive Metabolic Panel; Future  -     Urinalysis With Culture If Indicated -; Future    3. Dysuria  -     Urinalysis With Culture If Indicated -; Future    4. Type 2 diabetes mellitus with diabetic mononeuropathy, with long-term current use of insulin (CMS/[facility])  -     MicroAlbumin, Urine, Random - Urine, Clean Catch; Future    5. Need for influenza vaccination  -     Fluad Quad 65+ yrs ([id number])    6. Coronary arteriosclerosis      Follow Up:  No follow-ups on file.     An After Visit Summary and PPPS were given to the patient.    Information has been scanned into chart. Discussed importance of taking medications as prescribed. Encouraged healthy eating habits with low fat, low salt choices and working towards maintaining a healthy weight. Recommended regular exercise if able as well as care to prevent falls including avoiding anything on the floor that they could slip or trip on such as  throw rugs, making sure they have a bathmat to step onto when their feet are wet and having grab bars and railings where needed.

## 2020-09-28 ENCOUNTER — LAB (OUTPATIENT)
Dept: LAB | Facility: HOSPITAL | Age: 80
End: 2020-09-28

## 2020-09-28 ENCOUNTER — OFFICE VISIT (OUTPATIENT)
Dept: FAMILY MEDICINE CLINIC | Facility: CLINIC | Age: 80
End: 2020-09-28

## 2020-09-28 VITALS
SYSTOLIC BLOOD PRESSURE: 100 MMHG | HEIGHT: 63 IN | BODY MASS INDEX: 26.58 KG/M2 | DIASTOLIC BLOOD PRESSURE: 68 MMHG | HEART RATE: 63 BPM | OXYGEN SATURATION: 98 % | WEIGHT: 150 LBS

## 2020-09-28 DIAGNOSIS — Z79.4 TYPE 2 DIABETES MELLITUS WITH DIABETIC MONONEUROPATHY, WITH LONG-TERM CURRENT USE OF INSULIN (HCC): ICD-10-CM

## 2020-09-28 DIAGNOSIS — I10 ESSENTIAL HYPERTENSION: ICD-10-CM

## 2020-09-28 DIAGNOSIS — I25.10 CORONARY ARTERIOSCLEROSIS: ICD-10-CM

## 2020-09-28 DIAGNOSIS — I10 ESSENTIAL HYPERTENSION: Chronic | ICD-10-CM

## 2020-09-28 DIAGNOSIS — R30.0 DYSURIA: ICD-10-CM

## 2020-09-28 DIAGNOSIS — Z00.00 MEDICARE ANNUAL WELLNESS VISIT, SUBSEQUENT: Primary | ICD-10-CM

## 2020-09-28 DIAGNOSIS — E11.41 TYPE 2 DIABETES MELLITUS WITH DIABETIC MONONEUROPATHY, WITH LONG-TERM CURRENT USE OF INSULIN (HCC): ICD-10-CM

## 2020-09-28 DIAGNOSIS — Z23 NEED FOR INFLUENZA VACCINATION: ICD-10-CM

## 2020-09-28 LAB
ALBUMIN SERPL-MCNC: 3.6 G/DL (ref 3.5–5.2)
ALBUMIN UR-MCNC: 4.7 MG/DL
ALBUMIN/GLOB SERPL: 1.2 G/DL
ALP SERPL-CCNC: 36 U/L (ref 39–117)
ALT SERPL W P-5'-P-CCNC: 9 U/L (ref 1–33)
ANION GAP SERPL CALCULATED.3IONS-SCNC: 7.4 MMOL/L (ref 5–15)
AST SERPL-CCNC: 14 U/L (ref 1–32)
BACTERIA UR QL AUTO: ABNORMAL /HPF
BASOPHILS # BLD AUTO: 0.06 10*3/MM3 (ref 0–0.2)
BASOPHILS NFR BLD AUTO: 0.9 % (ref 0–1.5)
BILIRUB SERPL-MCNC: 0.3 MG/DL (ref 0–1.2)
BILIRUB UR QL STRIP: NEGATIVE
BUN SERPL-MCNC: 31 MG/DL (ref 8–23)
BUN/CREAT SERPL: 23.8 (ref 7–25)
CALCIUM SPEC-SCNC: 7.9 MG/DL (ref 8.6–10.5)
CHLORIDE SERPL-SCNC: 104 MMOL/L (ref 98–107)
CLARITY UR: ABNORMAL
CO2 SERPL-SCNC: 24.6 MMOL/L (ref 22–29)
COLOR UR: ABNORMAL
CREAT SERPL-MCNC: 1.3 MG/DL (ref 0.57–1)
DEPRECATED RDW RBC AUTO: 42 FL (ref 37–54)
EOSINOPHIL # BLD AUTO: 0.76 10*3/MM3 (ref 0–0.4)
EOSINOPHIL NFR BLD AUTO: 11.1 % (ref 0.3–6.2)
ERYTHROCYTE [DISTWIDTH] IN BLOOD BY AUTOMATED COUNT: 13.7 % (ref 12.3–15.4)
GFR SERPL CREATININE-BSD FRML MDRD: 39 ML/MIN/1.73
GLOBULIN UR ELPH-MCNC: 2.9 GM/DL
GLUCOSE SERPL-MCNC: 122 MG/DL (ref 65–99)
GLUCOSE UR STRIP-MCNC: NEGATIVE MG/DL
HCT VFR BLD AUTO: 33.1 % (ref 34–46.6)
HGB BLD-MCNC: 11 G/DL (ref 12–15.9)
HGB UR QL STRIP.AUTO: ABNORMAL
HYALINE CASTS UR QL AUTO: ABNORMAL /LPF
IMM GRANULOCYTES # BLD AUTO: 0.01 10*3/MM3 (ref 0–0.05)
IMM GRANULOCYTES NFR BLD AUTO: 0.1 % (ref 0–0.5)
KETONES UR QL STRIP: NEGATIVE
LEUKOCYTE ESTERASE UR QL STRIP.AUTO: ABNORMAL
LYMPHOCYTES # BLD AUTO: 1.74 10*3/MM3 (ref 0.7–3.1)
LYMPHOCYTES NFR BLD AUTO: 25.3 % (ref 19.6–45.3)
MCH RBC QN AUTO: 28.1 PG (ref 26.6–33)
MCHC RBC AUTO-ENTMCNC: 33.2 G/DL (ref 31.5–35.7)
MCV RBC AUTO: 84.7 FL (ref 79–97)
MONOCYTES # BLD AUTO: 0.51 10*3/MM3 (ref 0.1–0.9)
MONOCYTES NFR BLD AUTO: 7.4 % (ref 5–12)
NEUTROPHILS NFR BLD AUTO: 3.79 10*3/MM3 (ref 1.7–7)
NEUTROPHILS NFR BLD AUTO: 55.2 % (ref 42.7–76)
NITRITE UR QL STRIP: POSITIVE
NRBC BLD AUTO-RTO: 0 /100 WBC (ref 0–0.2)
PH UR STRIP.AUTO: 5.5 [PH] (ref 5–8)
PLATELET # BLD AUTO: 193 10*3/MM3 (ref 140–450)
PMV BLD AUTO: 10 FL (ref 6–12)
POTASSIUM SERPL-SCNC: 5.7 MMOL/L (ref 3.5–5.2)
PROT SERPL-MCNC: 6.5 G/DL (ref 6–8.5)
PROT UR QL STRIP: ABNORMAL
RBC # BLD AUTO: 3.91 10*6/MM3 (ref 3.77–5.28)
RBC # UR: ABNORMAL /HPF
REF LAB TEST METHOD: ABNORMAL
SODIUM SERPL-SCNC: 136 MMOL/L (ref 136–145)
SP GR UR STRIP: 1.02 (ref 1–1.03)
SQUAMOUS #/AREA URNS HPF: ABNORMAL /HPF
UROBILINOGEN UR QL STRIP: ABNORMAL
WBC # BLD AUTO: 6.87 10*3/MM3 (ref 3.4–10.8)
WBC UR QL AUTO: ABNORMAL /HPF

## 2020-09-28 PROCEDURE — 36415 COLL VENOUS BLD VENIPUNCTURE: CPT

## 2020-09-28 PROCEDURE — G0008 ADMIN INFLUENZA VIRUS VAC: HCPCS | Performed by: GENERAL PRACTICE

## 2020-09-28 PROCEDURE — 90694 VACC AIIV4 NO PRSRV 0.5ML IM: CPT | Performed by: GENERAL PRACTICE

## 2020-09-28 PROCEDURE — 87086 URINE CULTURE/COLONY COUNT: CPT

## 2020-09-28 PROCEDURE — 81001 URINALYSIS AUTO W/SCOPE: CPT

## 2020-09-28 PROCEDURE — 99214 OFFICE O/P EST MOD 30 MIN: CPT | Performed by: GENERAL PRACTICE

## 2020-09-28 PROCEDURE — 85025 COMPLETE CBC W/AUTO DIFF WBC: CPT

## 2020-09-28 PROCEDURE — 80053 COMPREHEN METABOLIC PANEL: CPT

## 2020-09-28 PROCEDURE — 82043 UR ALBUMIN QUANTITATIVE: CPT

## 2020-09-28 PROCEDURE — G0439 PPPS, SUBSEQ VISIT: HCPCS | Performed by: GENERAL PRACTICE

## 2020-09-28 RX ORDER — GABAPENTIN 300 MG/1
300 CAPSULE ORAL 3 TIMES DAILY
COMMUNITY
End: 2022-08-29

## 2020-09-28 RX ORDER — GABAPENTIN 300 MG/1
300 CAPSULE ORAL 3 TIMES DAILY
COMMUNITY
End: 2020-09-28

## 2020-09-28 NOTE — PATIENT INSTRUCTIONS
Check on tetanus/pertussis vaccine at pharmacy or with insurance.    Check at pharmacy on Shingrix shingles vaccine  Stop citalopram

## 2020-09-29 DIAGNOSIS — R79.89 ELEVATED SERUM CREATININE: Primary | ICD-10-CM

## 2020-09-29 LAB — BACTERIA SPEC AEROBE CULT: NORMAL

## 2020-10-06 ENCOUNTER — OFFICE VISIT (OUTPATIENT)
Dept: CARDIOLOGY | Facility: CLINIC | Age: 80
End: 2020-10-06

## 2020-10-06 ENCOUNTER — TELEPHONE (OUTPATIENT)
Dept: FAMILY MEDICINE CLINIC | Facility: CLINIC | Age: 80
End: 2020-10-06

## 2020-10-06 DIAGNOSIS — I25.10 CORONARY ARTERY DISEASE INVOLVING NATIVE CORONARY ARTERY OF NATIVE HEART WITHOUT ANGINA PECTORIS: Primary | ICD-10-CM

## 2020-10-06 PROCEDURE — 99442 PR PHYS/QHP TELEPHONE EVALUATION 11-20 MIN: CPT | Performed by: INTERNAL MEDICINE

## 2020-10-06 NOTE — PROGRESS NOTES
Pulmonary Arterial Hypertension & Heart Failure   Hima Gill M.D., Ph.D., [facility]              Non-Face-To-Face Scheduled Telephone Service    Mariluz Arango is a 79 y.o. female who has requested telephone service in lieu of a follow-up appointment for results and advice.  This patient is an established patient.  There is no face-to-face service planned within the next 24 hours.  There also has been no E/M in the past 7 days.      Mariluz Arango is a 79 y.o. female who is followed in my clinic typically.    Symptoms include none. Clinical course: stable. Associated symptoms include: palpitations. The patient denies cough, dizziness and fatigue. he patient is currently prescribed beta blocker-Coreg. The patient does not have a prescription for a rhythm control medication.  The patient is not prescribed anticoagulation due to prior bleeding episodes.  The patient denies easy bleeding.  The patient denies bleeding episodes. The patient denies increased cardiac awareness.  The patient denies dizziness, lightheadedness or syncope. Patient's prior CT scan for other reasons revealed calcification of the coronary arteries.  She was not endorsing any overt angina.  She did perform a myocardial perfusion stress which was unremarkable. 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 .  Patient has no family history of aneurysmal diseases.  She does have follow-up imaging with Dr. Phillips.      She started to have some some intermittent and non-exertional CP. This is left-sided. She has also had some LUE pain.  She went for a CCTA and this was abnormal. LHC was performed with Dr. Hendrickson. She is now s/p mLAD PTCI.     She tells me that she hasn't done well s/p PTCI. She went to see Dr. Quispe this week. She has had some issues with just wanting to sleep. Today, her Daughter tells me that she has only wanted to sleep today. Overall, her PO intake is not well. Her  Daughter is interested in perhaps discussing her case with hospice. She is on Plavix.     ROS  family history includes Arthritis in her mother; Diabetes in her mother and another family member; Hyperlipidemia in her mother; Hypertension in her brother, mother, and sister; Obesity in her mother; Stroke in her mother.   reports that she quit smoking about 4 years ago. Her smoking use included cigarettes. She smoked 0.50 packs per day. She has never used smokeless tobacco. She reports that she does not drink alcohol or use drugs.  Allergies   Allergen Reactions   • Lovastatin Itching   • Aspirin GI Bleeding     Has Stomach Issues   • Codeine      UNKNOWN REACTION   • Macrobid [Nitrofurantoin Macrocrystal] Nausea And Vomiting   • Sulfa Antibiotics      Sulfa (Sulfonamide Antibiotics)   • Dexilant [Dexlansoprazole] Rash   • Penicillins Rash       Current Outpatient Medications:   •  carvedilol (COREG) 12.5 MG tablet, Take 1 tablet by mouth 2 (Two) Times a Day With Meals., Disp: 60 tablet, Rfl: 3  •  ciprofloxacin (CIPRO) 250 MG tablet, Take 250 mg by mouth Daily., Disp: , Rfl:   •  clobetasol (TEMOVATE) 0.05 % external solution, Apply 1 application topically Daily., Disp: , Rfl:   •  clopidogrel (PLAVIX) 75 MG tablet, TAKE 1 TABLET EVERY DAY, Disp: 30 tablet, Rfl: 5  •  Cranberry 500 MG capsule, Take 1 tablet by mouth Daily., Disp: , Rfl:   •  cyanocobalamin 1000 MCG/ML injection, INJECT 1 ML INTO THE APPROPRIATE MUSCLE AS DIRECTED BY PRESCRIBER EVERY 28 DAYS., Disp: 1 mL, Rfl: 11  •  dicyclomine (BENTYL) 10 MG capsule, TAKE 1 CAPSULE TWICE DAILY AS NEEDED FOR DIARRHEA, Disp: , Rfl:   •  donepezil (ARICEPT) 10 MG tablet, TAKE 1 TABLET EVERY NIGHT, Disp: 30 tablet, Rfl: 5  •  DULoxetine (CYMBALTA) 30 MG capsule, TAKE 1 CAPSULE EVERY DAY, Disp: 30 capsule, Rfl: 5  •  FLUOCINOLONE ACETONIDE SCALP 0.01 % oil, Apply 1 application topically Daily., Disp: , Rfl:   •  fluticasone (FLONASE) 50 MCG/ACT nasal spray, INSTILL 2  "SPRAYS INTO EACH NOSTRIL DAILY, Disp: , Rfl:   •  gabapentin (NEURONTIN) 300 MG capsule, Take 300 mg by mouth 3 (Three) Times a Day. 1 qam and 2 qhs, Disp: , Rfl:   •  HYDROcodone-acetaminophen (NORCO) 5-325 MG per tablet, , Disp: , Rfl:   •  hydrOXYzine (ATARAX) 25 MG tablet, Take 1 tablet by mouth 2 (Two) Times a Day As Needed for Itching., Disp: 60 tablet, Rfl: 5  •  ketoconazole (NIZORAL) 2 % shampoo, , Disp: , Rfl:   •  losartan (COZAAR) 100 MG tablet, Take 1 tablet by mouth Daily., Disp: 30 tablet, Rfl: 1  •  nystatin (MYCOSTATIN) 017480 UNIT/GM powder, APPLY TOPICALLY TO THE AFFECTED AREA THREE TIMES A DAY, Disp: 60 g, Rfl: 2  •  omeprazole (priLOSEC) 40 MG capsule, Take 40 mg by mouth 2 (Two) Times a Day., Disp: , Rfl:   •  ONETOUCH VERIO test strip, , Disp: , Rfl: 11  •  Probiotic Product (PROBIOTIC PO), Take  by mouth., Disp: , Rfl:   •  prochlorperazine (COMPAZINE) 5 MG tablet, Take 1 tablet by mouth Every 12 (Twelve) Hours., Disp: , Rfl:   •  rOPINIRole (REQUIP) 0.5 MG tablet, , Disp: , Rfl:   •  rosuvastatin (CRESTOR) 5 MG tablet, Take 1 tablet by mouth Daily., Disp: 30 tablet, Rfl: 11  •  Syringe/Needle, Disp, (B-D INTEGRA SYRINGE) 25G X 5/8\" 3 ML misc, USE 1 SYRINGE FOR MONTHLY VITAMIN B-12 SHOTS, Disp: 6 each, Rfl: 2  •  traZODone (DESYREL) 100 MG tablet, TAKE 1 TABLET EVERY NIGHT WITH TRAZODONE 50MG, Disp: 30 tablet, Rfl: 5  •  traZODone (DESYREL) 50 MG tablet, TAKE 1 TABLET EVERY NIGHT WITH TRAZODONE 100MG, Disp: 30 tablet, Rfl: 5      DATA REVIEWED:     TTE/ASHLEIGH:  Results for orders placed in visit on 07/25/19   Adult Transthoracic Echo Complete W/ Cont if Necessary Per Protocol    Narrative · Left ventricle systolic function is normal at 61-65%. Mild concentric   hypertrophy with grade 1 diastolic function.  · Right ventricle systolic function is normal.  · Aortic sclerosis and mitral annular calcification.  · Mild pulmonic valve regurgitation.  · Tubular ascending aorta is dilated at 3.8 cm. The " patient's ASI is 2.2   cm/meter squared.          --------------------------------------------------------------------------------------------------  LABS:     The CVD Risk score (Ofelia et al., 2008) failed to calculate for the following reasons:    The 2008 CVD risk score is only valid for ages 30 to 74    The patient has a prior MI, stroke, CHF, or peripheral vascular disease diagnosis  Lab Results   Component Value Date    GLUCOSE 122 (H) 09/28/2020    BUN 31 (H) 09/28/2020    CREATININE 1.30 (H) 09/28/2020    EGFRIFNONA 39 (L) 09/28/2020    BCR 23.8 09/28/2020    K 5.7 (H) 09/28/2020    CO2 24.6 09/28/2020    CALCIUM 7.9 (L) 09/28/2020    ALBUMIN 3.60 09/28/2020    AST 14 09/28/2020    ALT 9 09/28/2020     Lab Results   Component Value Date    WBC 6.87 09/28/2020    HGB 11.0 (L) 09/28/2020    HCT 33.1 (L) 09/28/2020    MCV 84.7 09/28/2020     09/28/2020     Lab Results   Component Value Date    CHOL 143 08/20/2020    CHLPL 167 06/30/2016    TRIG 123 08/20/2020    HDL 80 (H) 08/20/2020    LDL 38 08/20/2020     Lab Results   Component Value Date    TSH 1.390 02/14/2018    O6SYMRN 111 07/12/2016    B8QYLZV 7.8 07/12/2016     Lab Results   Component Value Date    CKTOTAL <20 (L) 08/09/2016    CKMB <0.2 08/09/2016    TROPONINI <0.012 08/09/2016     Lab Results   Component Value Date    HGBA1C 6.80 (H) 08/20/2020     No results found for: DDIMER  Lab Results   Component Value Date    ALT 9 09/28/2020     Lab Results   Component Value Date    HGBA1C 6.80 (H) 08/20/2020    HGBA1C 6.41 (H) 09/26/2019    HGBA1C 6.1 (H) 03/14/2019     Lab Results   Component Value Date    MICROALBUR 4.7 09/28/2020    CREATININE 1.30 (H) 09/28/2020     Lab Results   Component Value Date    IRON 31 (L) 05/01/2018    TIBC 381 05/01/2018    FERRITIN 15.00 11/09/2017     Lab Results   Component Value Date    INR 0.99 08/19/2020    PROTIME 13.5 08/19/2020       Assessment/Plan     1. Pulmonary Regurgitation. ACC stage B.  There are  no surgical indications at this time. The patient has not had valve surgery..   · The patient has been advised to remain in clinical surveillance every 12 months.  · Signs and symptoms of worsening valve disease discussed.  I've asked the patient contact me for an earlier appointment if these develop.  · I've recommended a repeat 2D TTE every 3 years.   · TTE due: 2023  · No indication based on 2017 ACC/AHA guidelines for IE prophylaxis for dental procedures: Optimal oral health is recommended through regular professional dental care and the use of appropriate dental products, such as manual, powered, and ultrasonic toothbrushes; dental floss; and other plaque-removal devices     2.  paroxysmal - 7 days or less Atrial fibrillation. The patient is currently in NSR with a well controlled ventricular rate.   She clearly meets indications for anticoagulation, but she's had issues with bleeding in the past with ASA.  I think she is at elevated risk of hemorrhage.    Anticoagulation CI: bleeding disorder, aneurysm  Rate control agents:ordered.  - Agent: beta blocker-Coreg   -Rhythm control agents:not indicated     3.   ASCAD, s/p mLAD PTCI.  CCS 0. This was a BMS given her extensive hemorrhage history. She was placed on ASA 81mg for a period of one month, as that was as long as she stated she would be able to tolerate it. She is now on plavix.    4. Poor PO intake, sleepy, hyperkalemia. K was 5.7 with PCP. Has not been repeated. I recommended she have her home care agency check her potassium. I have asked her Daughter to have her potassium rechecked. She should have close PCP f/u. Low threshold for ED.  I will send a note to her PCP.     Return in about 3 months (around 1/6/2021).      Time: >10    Hima Gill MD PhD    You have chosen to receive care through a telephone visit. Do you consent to use a telephone visit for your medical care today? Yes

## 2020-10-06 NOTE — TELEPHONE ENCOUNTER
CALLER STATES PATIENT HAS HOME CARE THROUGH [facility] THAT SEES PT MONTHLY, WHO IS REQUESTING RECENT POTASSIUM LEVELS IN ORDER TO HAVE LABS DRAWN IN-HOME THROUGH [facility].    [facility] FAX NUMBER  [phone]    CALLBACK NUMBER IS   [phone]

## 2020-10-07 RX ORDER — CLOPIDOGREL BISULFATE 75 MG/1
TABLET ORAL
Qty: 28 TABLET | Refills: 0 | Status: SHIPPED | OUTPATIENT
Start: 2020-10-07

## 2021-01-12 DIAGNOSIS — M81.0 AGE RELATED OSTEOPOROSIS, UNSPECIFIED PATHOLOGICAL FRACTURE PRESENCE: Primary | ICD-10-CM

## 2021-01-13 ENCOUNTER — APPOINTMENT (OUTPATIENT)
Dept: ONCOLOGY | Facility: HOSPITAL | Age: 81
End: 2021-01-13

## 2021-01-19 ENCOUNTER — APPOINTMENT (OUTPATIENT)
Dept: ONCOLOGY | Facility: HOSPITAL | Age: 81
End: 2021-01-19

## 2021-01-20 ENCOUNTER — TELEPHONE (OUTPATIENT)
Dept: ORTHOPEDIC SURGERY | Facility: CLINIC | Age: 81
End: 2021-01-20

## 2021-01-20 NOTE — TELEPHONE ENCOUNTER
THOR.  PATIENT'S DAUGHTER CALLED ASKING IF YOU COULD TRANSFER THE PROLIA INJECTIONS TO [facility].  SHE IS  LIVING IN [place], [place] NOW.

## 2021-01-25 ENCOUNTER — APPOINTMENT (OUTPATIENT)
Dept: ONCOLOGY | Facility: HOSPITAL | Age: 81
End: 2021-01-25

## 2021-02-15 RX ORDER — TRAZODONE HYDROCHLORIDE 100 MG/1
TABLET ORAL
Qty: 30 TABLET | Refills: 2 | Status: SHIPPED | OUTPATIENT
Start: 2021-02-15 | End: 2021-05-13

## 2021-05-13 RX ORDER — TRAZODONE HYDROCHLORIDE 100 MG/1
TABLET ORAL
Qty: 30 TABLET | Refills: 0 | Status: SHIPPED | OUTPATIENT
Start: 2021-05-13 | End: 2021-06-14

## 2021-06-07 DIAGNOSIS — I71.20 THORACIC AORTIC ANEURYSM WITHOUT RUPTURE (HCC): Primary | ICD-10-CM

## 2021-06-14 RX ORDER — TRAZODONE HYDROCHLORIDE 100 MG/1
TABLET ORAL
Qty: 30 TABLET | Refills: 0 | Status: SHIPPED | OUTPATIENT
Start: 2021-06-14

## 2021-07-16 ENCOUNTER — APPOINTMENT (OUTPATIENT)
Dept: ONCOLOGY | Facility: HOSPITAL | Age: 81
End: 2021-07-16

## 2021-07-23 ENCOUNTER — APPOINTMENT (OUTPATIENT)
Dept: ONCOLOGY | Facility: HOSPITAL | Age: 81
End: 2021-07-23

## 2022-08-02 ENCOUNTER — TELEPHONE (OUTPATIENT)
Dept: ORTHOPEDIC SURGERY | Facility: CLINIC | Age: 82
End: 2022-08-02

## 2022-08-02 NOTE — TELEPHONE ENCOUNTER
You are correct, patient has now moved back to [facility] and she would like for you start back taking over the care of her osteoporosis. She states patient has bone density 11/2021 she will have her bone density sent to you and make appt with you.

## 2022-08-02 NOTE — TELEPHONE ENCOUNTER
THOR,     Patient's daughter called and wants to get a Prolia injection scheduled. Call back at [phone]

## 2022-08-02 NOTE — TELEPHONE ENCOUNTER
I'm not sure what to do with this. I thought she had moved to [place] and was getting the Prolia at [facility]. I'm not sure if she has moved back here now. I'm also not sure if her order and pre-authorization is current. Thanks for your help.

## 2022-08-03 DIAGNOSIS — M81.0 AGE-RELATED OSTEOPOROSIS WITHOUT CURRENT PATHOLOGICAL FRACTURE: Primary | ICD-10-CM

## 2022-08-22 ENCOUNTER — LAB (OUTPATIENT)
Dept: LAB | Facility: HOSPITAL | Age: 82
End: 2022-08-22

## 2022-08-22 DIAGNOSIS — M81.0 AGE-RELATED OSTEOPOROSIS WITHOUT CURRENT PATHOLOGICAL FRACTURE: ICD-10-CM

## 2022-08-22 LAB — CALCIUM SPEC-SCNC: 8.4 MG/DL (ref 8.6–10.5)

## 2022-08-22 PROCEDURE — 82310 ASSAY OF CALCIUM: CPT

## 2022-08-22 NOTE — PROGRESS NOTES
Please let the patient know that her calcium level is slightly low.  While it is not significantly low, we cannot proceed with her Prolia injection until it is corrected as Prolia can further lower her calcium level and potentially cause significant symptoms.  Please verify if she is taking a calcium supplement.  If she is not, she needs to start taking calcium supplementation of about 600 mg twice daily.  If she is taking her calcium once daily, then I would recommend she increase it to twice daily.  We can recheck her calcium level in 2 weeks and reschedule her Prolia once her calcium level has normalized.  Thanks.

## 2022-08-23 ENCOUNTER — TELEPHONE (OUTPATIENT)
Dept: ORTHOPEDIC SURGERY | Facility: CLINIC | Age: 82
End: 2022-08-23

## 2022-08-23 NOTE — TELEPHONE ENCOUNTER
----- Message from PATRICK Marx sent at 8/22/2022  1:37 PM CDT -----  Please let the patient know that her calcium level is slightly low.  While it is not significantly low, we cannot proceed with her Prolia injection until it is corrected as Prolia can further lower her calcium level and potentially cause significant   symptoms.  Please verify if she is taking a calcium supplement.  If she is not, she needs to start taking calcium supplementation of about 600 mg twice daily.  If she is taking her calcium once daily, then I would recommend she increase it to twice d  aily.  We can recheck her calcium level in 2 weeks and reschedule her Prolia once her calcium level has normalized.  Thanks.

## 2022-08-25 ENCOUNTER — APPOINTMENT (OUTPATIENT)
Dept: LAB | Facility: HOSPITAL | Age: 82
End: 2022-08-25

## 2022-08-29 ENCOUNTER — OFFICE VISIT (OUTPATIENT)
Dept: ORTHOPEDIC SURGERY | Facility: CLINIC | Age: 82
End: 2022-08-29

## 2022-08-29 ENCOUNTER — APPOINTMENT (OUTPATIENT)
Dept: ONCOLOGY | Facility: HOSPITAL | Age: 82
End: 2022-08-29

## 2022-08-29 VITALS — BODY MASS INDEX: 23.57 KG/M2 | WEIGHT: 133 LBS | HEIGHT: 63 IN

## 2022-08-29 DIAGNOSIS — K44.9 HIATAL HERNIA: ICD-10-CM

## 2022-08-29 DIAGNOSIS — E11.49 NEUROLOGIC DISORDER ASSOCIATED WITH DIABETES MELLITUS: ICD-10-CM

## 2022-08-29 DIAGNOSIS — Z78.0 POSTMENOPAUSAL: ICD-10-CM

## 2022-08-29 DIAGNOSIS — M81.0 AGE-RELATED OSTEOPOROSIS WITHOUT CURRENT PATHOLOGICAL FRACTURE: Primary | ICD-10-CM

## 2022-08-29 DIAGNOSIS — E55.9 VITAMIN D DEFICIENCY: ICD-10-CM

## 2022-08-29 DIAGNOSIS — Z86.73 HISTORY OF CVA (CEREBROVASCULAR ACCIDENT): ICD-10-CM

## 2022-08-29 DIAGNOSIS — K21.9 GASTROESOPHAGEAL REFLUX DISEASE WITHOUT ESOPHAGITIS: ICD-10-CM

## 2022-08-29 DIAGNOSIS — Z87.891 HISTORY OF TOBACCO ABUSE: ICD-10-CM

## 2022-08-29 PROCEDURE — 99214 OFFICE O/P EST MOD 30 MIN: CPT | Performed by: NURSE PRACTITIONER

## 2022-08-29 RX ORDER — ACETAMINOPHEN 500 MG
500 TABLET ORAL EVERY 6 HOURS PRN
COMMUNITY

## 2022-08-29 RX ORDER — NITROGLYCERIN 80 MG/1
1 PATCH TRANSDERMAL DAILY
COMMUNITY

## 2022-08-29 RX ORDER — ONDANSETRON HYDROCHLORIDE 8 MG/1
TABLET, FILM COATED ORAL EVERY 8 HOURS PRN
COMMUNITY

## 2022-08-29 RX ORDER — PHENAZOPYRIDINE HYDROCHLORIDE 100 MG/1
100 TABLET, FILM COATED ORAL 3 TIMES DAILY PRN
COMMUNITY

## 2022-08-29 RX ORDER — MELATONIN 10 MG
CAPSULE ORAL
COMMUNITY

## 2022-08-29 RX ORDER — AMLODIPINE BESYLATE 10 MG/1
10 TABLET ORAL DAILY
COMMUNITY

## 2022-08-29 RX ORDER — ERGOCALCIFEROL 1.25 MG/1
50000 CAPSULE ORAL WEEKLY
COMMUNITY

## 2022-08-29 RX ORDER — CEPHALEXIN 250 MG/1
250 CAPSULE ORAL 4 TIMES DAILY
COMMUNITY

## 2022-08-29 RX ORDER — PHENOL 1.4 %
600 AEROSOL, SPRAY (ML) MUCOUS MEMBRANE DAILY
COMMUNITY

## 2022-08-29 NOTE — PROGRESS NOTES
Mariluz Arango is a 81 y.o. female returns for     Chief Complaint   Patient presents with   • Osteoporosis     HISTORY OF PRESENT ILLNESS: Patient presents to bone health clinic accompanied by her daughter for follow-up of osteoporosis.  Patient initially established care with the bone health clinic on 10/1/2018.  She was started on Prolia for treatment of osteoporosis on 11/6/2018.  She has not been seen in office since that time.  She apparently had moved to [place] with her family in the interim but continued her Prolia injections, which were managed by a different provider.  Patient has recently moved back to [place].  She currently resides at [facility].  She wants to continue with her Prolia injections and was actually scheduled today to have her Prolia injection.  However, labs performed a week ago indicated that she had some mild hypocalcemia, which has to be corrected before proceeding with the Prolia injection.  The patient has recently started taking calcium supplementation and is currently taking 600 mg daily.  She also continues to take a high-dose vitamin D supplement of 50,000 units once weekly.  The patient denies any changes to her bone health and has not sustained any new fractures since her last office visit.    Previously identified risk factors for osteoporosis include her age, race, gender, postmenopausal status, previous long-term use of tobacco products for approximately 50 years, early menopause due to hysterectomy at a young age (age 25), positive family history and comorbid medical conditions including vitamin D deficiency, type 2 diabetes mellitus and history of CVA.  Patient quit smoking in 2016.     CONCURRENT MEDICAL HISTORY:    The following portions of the patient's history were reviewed and updated as appropriate: allergies, current medications, past family history, past medical history, past social history, past surgical history and problem list.  "    ROS  No fevers or chills.  No chest pain or shortness of air.  No GI or  disturbances.     PHYSICAL EXAMINATION:       Ht 160 cm (63\")   Wt 60.3 kg (133 lb)   LMP  (LMP Unknown)   BMI 23.56 kg/m²     Physical Exam  Vitals reviewed.   Constitutional:       General: She is not in acute distress.     Appearance: She is well-developed. She is not ill-appearing.   HENT:      Head: Normocephalic.   Pulmonary:      Effort: Pulmonary effort is normal. No respiratory distress.   Abdominal:      General: There is no distension.      Palpations: Abdomen is soft.   Musculoskeletal:         General: No deformity or signs of injury.   Skin:     General: Skin is warm and dry.      Capillary Refill: Capillary refill takes less than 2 seconds.      Findings: No erythema.   Neurological:      Mental Status: She is alert and oriented to person, place, and time.      GCS: GCS eye subscore is 4. GCS verbal subscore is 5. GCS motor subscore is 6.   Psychiatric:         Speech: Speech normal.         Behavior: Behavior normal.         Thought Content: Thought content normal.         Judgment: Judgment normal.         GAIT:     []  Normal  [x]  Antalgic    Assistive device: []  None  [x]  Walker     []  Crutches  []  Cane     []  Wheelchair  []  Stretcher    Back Exam     Comments:  No kyphosis or deformity noted.  No focal neurological deficits noted.                    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  We discussed that she has a T score of -0.2 in the lumbar spine, indicative of " normal bone density.  We discussed that she has a T score of -2.8 in the left femoral neck, indicative of michelle osteoporosis.  We discussed that she has a T score of -1.8 in the right femoral neck, indicative of osteopenia.  The patient was started on Prolia for treatment of osteoporosis in 2018 and she has continue with Prolia injections, although she has lived in [place] in recent years and was getting them at an infusion center there.  She has moved back to [place] and is currently residing at [facility] and wants to continue with Prolia injections, which I am in agreement with and would recommend.  She was scheduled to have the Prolia injection performed today and initially but labs indicated that she had some mild hypocalcemia and this had to be corrected before proceeding with her next Prolia injection.  The patient is now rescheduled to have the Prolia performed on 9/28/2022.  She was contacted via telephone on 8/22/2022 and instructed to start or increase her calcium intake in an effort to correct the hypocalcemia.  The patient was not previously taking calcium supplementation and she has since started taking calcium supplements.  She will have a repeat calcium and it is anticipated she will be able to proceed with the Prolia injection as scheduled next month.     I would advise against any use of oral bisphosphonates as she has history of significant reflux requiring long-term use of proton pump inhibitors and history of hiatal hernia.  Prolia would be the better option for her as it will not exacerbate her upper GI symptoms/issues.    We discussed the importance of continuing osteoporosis treatment.  We discussed that osteoporosis is a chronic, progressive and silent disease it typically requires long-term treatment/management.  We discussed that she is high risk for fracture due to osteoporosis.  The patient is doing well overall and has not sustained any new fractures since her  last office visit.     Recommend to continue with calcium supplementation daily.  We discussed the importance of taking calcium not only to support good bone health but also to prevent low blood calcium while taking Prolia.  I have recommended that she take a minimum of 600 mg of calcium supplementation daily, preferably 600 mg twice daily.  She recently started back taking calcium supplementation per the daughter and hopefully this will correct her mild hypocalcemia.  Patient also has a history of vitamin D deficiency.  Recommend to continue with her high-dose vitamin D supplement of 50,000 units weekly.  Last vitamin D level was low normal at 34.0 as of 5/26/2022.    Recommend to continue with efforts for staying as active as possible and performing some weightbearing exercise, such as walking, which will help to keep her bones and muscle strong and prevent worsening osteoporosis.  The patient does have a history of chronic low back pain and has mobility issues due to prior spine surgery as well as her history of CVA.  She ambulates with use of a walker.    Patient is encouraged to maintain her non-smoking status as smoking can worsen osteoporosis.  She quit smoking in 2016 but does have a 50-year history of tobacco use.    Time spent of a minimum of 30 minutes including the face to face evaluation, reviewing of medical history and prior medial records, reviewing of diagnostic studies, prescription drug management, documentation, patient education and coordination of care.     EMR Dragon/Transciption Disclaimer: Some of this note may be an electronic transcription/translation of spoken language to printed text using the Dragon Dictation System.     Return in about 1 year (around 8/29/2023) for Recheck.        This document has been electronically signed by PATRICK Marx on August 29, 2022 17:04 CDT      PATRICK Marx

## 2022-09-26 ENCOUNTER — LAB (OUTPATIENT)
Dept: LAB | Facility: HOSPITAL | Age: 82
End: 2022-09-26

## 2022-09-26 DIAGNOSIS — M81.0 AGE-RELATED OSTEOPOROSIS WITHOUT CURRENT PATHOLOGICAL FRACTURE: ICD-10-CM

## 2022-09-26 LAB — CALCIUM SPEC-SCNC: 9.1 MG/DL (ref 8.6–10.5)

## 2022-09-26 PROCEDURE — 82310 ASSAY OF CALCIUM: CPT

## 2022-09-27 ENCOUNTER — TELEPHONE (OUTPATIENT)
Dept: ORTHOPEDIC SURGERY | Facility: CLINIC | Age: 82
End: 2022-09-27

## 2022-09-27 NOTE — TELEPHONE ENCOUNTER
Guess    Patient had calcium checked yesterday.  Please let her daughter know if its ok to get Prolia injection tomorrow.

## 2022-09-27 NOTE — TELEPHONE ENCOUNTER
Yes, its good now. She can proceed with Prolia. It's already scheduled and the [facility] staff has already messaged me and I told them to go ahead with it. Thanks.

## 2022-09-28 ENCOUNTER — INFUSION (OUTPATIENT)
Dept: ONCOLOGY | Facility: HOSPITAL | Age: 82
End: 2022-09-28

## 2022-09-28 VITALS
DIASTOLIC BLOOD PRESSURE: 76 MMHG | TEMPERATURE: 97 F | RESPIRATION RATE: 18 BRPM | HEART RATE: 63 BPM | SYSTOLIC BLOOD PRESSURE: 146 MMHG

## 2022-09-28 DIAGNOSIS — M81.0 AGE-RELATED OSTEOPOROSIS WITHOUT CURRENT PATHOLOGICAL FRACTURE: Primary | ICD-10-CM

## 2022-09-28 PROCEDURE — 25010000002 DENOSUMAB 60 MG/ML SOLUTION PREFILLED SYRINGE: Performed by: NURSE PRACTITIONER

## 2022-09-28 PROCEDURE — 96372 THER/PROPH/DIAG INJ SC/IM: CPT | Performed by: NURSE PRACTITIONER

## 2022-09-28 RX ADMIN — DENOSUMAB 60 MG: 60 INJECTION SUBCUTANEOUS at 10:20

## 2023-02-14 ENCOUNTER — APPOINTMENT (OUTPATIENT)
Dept: GENERAL RADIOLOGY | Facility: HOSPITAL | Age: 83
End: 2023-02-14
Payer: MEDICARE

## 2023-02-14 ENCOUNTER — HOSPITAL ENCOUNTER (EMERGENCY)
Facility: HOSPITAL | Age: 83
Discharge: NURSING, REHAB OR LONG-TERM CARE | End: 2023-02-14
Attending: STUDENT IN AN ORGANIZED HEALTH CARE EDUCATION/TRAINING PROGRAM | Admitting: STUDENT IN AN ORGANIZED HEALTH CARE EDUCATION/TRAINING PROGRAM
Payer: MEDICARE

## 2023-02-14 VITALS
HEIGHT: 63 IN | TEMPERATURE: 98.3 F | OXYGEN SATURATION: 97 % | DIASTOLIC BLOOD PRESSURE: 66 MMHG | SYSTOLIC BLOOD PRESSURE: 142 MMHG | HEART RATE: 62 BPM | RESPIRATION RATE: 18 BRPM | WEIGHT: 135 LBS | BODY MASS INDEX: 23.92 KG/M2

## 2023-02-14 DIAGNOSIS — J18.9 PNEUMONIA DUE TO INFECTIOUS ORGANISM, UNSPECIFIED LATERALITY, UNSPECIFIED PART OF LUNG: Primary | ICD-10-CM

## 2023-02-14 LAB
ALBUMIN SERPL-MCNC: 3.6 G/DL (ref 3.5–5.2)
ALBUMIN/GLOB SERPL: 1.6 G/DL
ALP SERPL-CCNC: 32 U/L (ref 39–117)
ALT SERPL W P-5'-P-CCNC: 8 U/L (ref 1–33)
ANION GAP SERPL CALCULATED.3IONS-SCNC: 8 MMOL/L (ref 5–15)
AST SERPL-CCNC: 15 U/L (ref 1–32)
BASOPHILS # BLD AUTO: 0.06 10*3/MM3 (ref 0–0.2)
BASOPHILS NFR BLD AUTO: 0.8 % (ref 0–1.5)
BILIRUB SERPL-MCNC: 0.3 MG/DL (ref 0–1.2)
BUN SERPL-MCNC: 17 MG/DL (ref 8–23)
BUN/CREAT SERPL: 12.8 (ref 7–25)
CALCIUM SPEC-SCNC: 9 MG/DL (ref 8.6–10.5)
CHLORIDE SERPL-SCNC: 100 MMOL/L (ref 98–107)
CO2 SERPL-SCNC: 29 MMOL/L (ref 22–29)
CREAT SERPL-MCNC: 1.33 MG/DL (ref 0.57–1)
DEPRECATED RDW RBC AUTO: 43.8 FL (ref 37–54)
EGFRCR SERPLBLD CKD-EPI 2021: 39.5 ML/MIN/1.73
EOSINOPHIL # BLD AUTO: 0.23 10*3/MM3 (ref 0–0.4)
EOSINOPHIL NFR BLD AUTO: 3 % (ref 0.3–6.2)
ERYTHROCYTE [DISTWIDTH] IN BLOOD BY AUTOMATED COUNT: 13.9 % (ref 12.3–15.4)
GEN 5 2HR TROPONIN T REFLEX: 16 NG/L
GLOBULIN UR ELPH-MCNC: 2.2 GM/DL
GLUCOSE SERPL-MCNC: 132 MG/DL (ref 65–99)
HCT VFR BLD AUTO: 34.8 % (ref 34–46.6)
HGB BLD-MCNC: 11.3 G/DL (ref 12–15.9)
HOLD SPECIMEN: NORMAL
HOLD SPECIMEN: NORMAL
IMM GRANULOCYTES # BLD AUTO: 0.01 10*3/MM3 (ref 0–0.05)
IMM GRANULOCYTES NFR BLD AUTO: 0.1 % (ref 0–0.5)
LYMPHOCYTES # BLD AUTO: 2.85 10*3/MM3 (ref 0.7–3.1)
LYMPHOCYTES NFR BLD AUTO: 37.6 % (ref 19.6–45.3)
MAGNESIUM SERPL-MCNC: 1.9 MG/DL (ref 1.6–2.4)
MCH RBC QN AUTO: 28.4 PG (ref 26.6–33)
MCHC RBC AUTO-ENTMCNC: 32.5 G/DL (ref 31.5–35.7)
MCV RBC AUTO: 87.4 FL (ref 79–97)
MONOCYTES # BLD AUTO: 0.46 10*3/MM3 (ref 0.1–0.9)
MONOCYTES NFR BLD AUTO: 6.1 % (ref 5–12)
NEUTROPHILS NFR BLD AUTO: 3.96 10*3/MM3 (ref 1.7–7)
NEUTROPHILS NFR BLD AUTO: 52.4 % (ref 42.7–76)
NRBC BLD AUTO-RTO: 0 /100 WBC (ref 0–0.2)
NT-PROBNP SERPL-MCNC: 244 PG/ML (ref 0–1800)
PLATELET # BLD AUTO: 182 10*3/MM3 (ref 140–450)
PMV BLD AUTO: 9.1 FL (ref 6–12)
POTASSIUM SERPL-SCNC: 4.3 MMOL/L (ref 3.5–5.2)
PROT SERPL-MCNC: 5.8 G/DL (ref 6–8.5)
RBC # BLD AUTO: 3.98 10*6/MM3 (ref 3.77–5.28)
SODIUM SERPL-SCNC: 137 MMOL/L (ref 136–145)
TROPONIN T DELTA: -1 NG/L
TROPONIN T SERPL HS-MCNC: 17 NG/L
WBC NRBC COR # BLD: 7.57 10*3/MM3 (ref 3.4–10.8)
WHOLE BLOOD HOLD COAG: NORMAL
WHOLE BLOOD HOLD SPECIMEN: NORMAL

## 2023-02-14 PROCEDURE — 83735 ASSAY OF MAGNESIUM: CPT | Performed by: STUDENT IN AN ORGANIZED HEALTH CARE EDUCATION/TRAINING PROGRAM

## 2023-02-14 PROCEDURE — 83880 ASSAY OF NATRIURETIC PEPTIDE: CPT

## 2023-02-14 PROCEDURE — 71045 X-RAY EXAM CHEST 1 VIEW: CPT

## 2023-02-14 PROCEDURE — 93010 ELECTROCARDIOGRAM REPORT: CPT | Performed by: INTERNAL MEDICINE

## 2023-02-14 PROCEDURE — 84484 ASSAY OF TROPONIN QUANT: CPT

## 2023-02-14 PROCEDURE — 80053 COMPREHEN METABOLIC PANEL: CPT

## 2023-02-14 PROCEDURE — 93005 ELECTROCARDIOGRAM TRACING: CPT

## 2023-02-14 PROCEDURE — 85025 COMPLETE CBC W/AUTO DIFF WBC: CPT

## 2023-02-14 PROCEDURE — 99284 EMERGENCY DEPT VISIT MOD MDM: CPT

## 2023-02-14 PROCEDURE — 36415 COLL VENOUS BLD VENIPUNCTURE: CPT

## 2023-02-14 RX ORDER — DOXYCYCLINE 100 MG/1
100 CAPSULE ORAL ONCE
Status: COMPLETED | OUTPATIENT
Start: 2023-02-14 | End: 2023-02-14

## 2023-02-14 RX ORDER — DOXYCYCLINE 100 MG/1
100 CAPSULE ORAL 2 TIMES DAILY
Qty: 14 CAPSULE | Refills: 0 | Status: SHIPPED | OUTPATIENT
Start: 2023-02-14 | End: 2023-02-21

## 2023-02-14 RX ORDER — HYDROCODONE BITARTRATE AND ACETAMINOPHEN 7.5; 325 MG/1; MG/1
1 TABLET ORAL ONCE
Status: COMPLETED | OUTPATIENT
Start: 2023-02-14 | End: 2023-02-14

## 2023-02-14 RX ADMIN — DOXYCYCLINE 100 MG: 100 CAPSULE ORAL at 21:45

## 2023-02-14 RX ADMIN — HYDROCODONE BITARTRATE AND ACETAMINOPHEN 1 TABLET: 7.5; 325 TABLET ORAL at 21:45

## 2023-02-15 LAB
QT INTERVAL: 442 MS
QTC INTERVAL: 444 MS

## 2023-02-15 NOTE — DISCHARGE INSTRUCTIONS
Take your medications as directed.  Call your primary care tomorrow for immediate follow-up.  Stay hydrated.  Return to ED as needed.

## 2023-02-15 NOTE — ED PROVIDER NOTES
"Subjective   History of Present Illness  Patient presents with chest pain starting all morning and getting worse around 3:00 this afternoon.  She describes the pain of under her left breast going through to her back and describes it as \"pain.\"  She states that the worst, it was 10 out of 10 and after 3 nitro tablets it improved and currently is a 3 out of 10.  She denies cough, vomiting, shortness of breath but did have chills and diaphoresis with this episode.  Movement of her left arm or sitting up in bed reproduces this pain.  She presents with a family member who is helping with history.        Review of Systems   Constitutional: Positive for chills and fever. Negative for activity change and appetite change.   HENT: Negative for congestion and ear pain.    Eyes: Negative for pain and discharge.   Respiratory: Negative for chest tightness and shortness of breath.    Cardiovascular: Positive for chest pain. Negative for palpitations.   Gastrointestinal: Negative for abdominal distention and abdominal pain.   Endocrine: Negative for cold intolerance and heat intolerance.   Genitourinary: Negative for difficulty urinating and dysuria.   Musculoskeletal: Positive for myalgias. Negative for arthralgias and back pain.   Skin: Negative for color change and rash.   Allergic/Immunologic: Negative for environmental allergies and food allergies.   Neurological: Negative for dizziness and headaches.   Hematological: Negative for adenopathy. Does not bruise/bleed easily.   Psychiatric/Behavioral: Negative for agitation and confusion.       Past Medical History:   Diagnosis Date   • Altered mental status     unspecified    • Anxiety    • Artificial lens present     Artificial lens in position   • Depressive disorder    • Diabetes mellitus (HCC)     no retinopathy      • Diabetic polyneuropathy (HCC)    • Diarrhea     unspecified    • Diverticular disease of colon    • Dizziness    • Gastroesophageal reflux disease    • " Generalized abdominal pain    • Headache    • Hiatal hernia    • History of colonic polyps     Personal history of colonic polyps   • History of echocardiogram 09/17/2008    Echocardiogram W/O color flow 19207 (1) - Evidence of LVH & diastolic dysfunction & mild LA enlargement, otherwise NRL echocradiogram   • History of mammogram 05/27/2011    MAMMOGRAM DIAGNOSTIC BILATERAL 66581 ([facility]) (1) - benign Birads category 2   • History of transfusion    • Hypercholesterolemia    • Lumbar radiculopathy    • Memory impairment     Multifactorial      • Nausea    • 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)    • Pain in thoracic spine    • Palpitations    • Polyp of colon     History of polyp of colon   • Stroke (HCC)    • Vitamin D deficiency        Allergies   Allergen Reactions   • Lovastatin Itching   • Aspirin GI Bleeding     Has Stomach Issues   • Codeine      UNKNOWN REACTION   • Macrobid [Nitrofurantoin Macrocrystal] Nausea And Vomiting   • Sulfa Antibiotics      Sulfa (Sulfonamide Antibiotics)   • Dexilant [Dexlansoprazole] Rash   • Penicillins Rash       Past Surgical History:   Procedure Laterality Date   • ANKLE SURGERY  11/18/2008    Ankle surgery (4) - Open ankle arthrodesis with intermedullary bella fixation from the heel. Non union of the ankle, tibial plafond with failed ankle arthrodesis   • APPENDECTOMY  06/16/1965    Appendectomy (1)   • BLADDER SURGERY  06/08/1973    Bladder surgery (1) - Kendall-Raul repair. Cystocele with stress incontinence & endometriosis.Same with P.I.D   • BREAST BIOPSY  02/06/1997    Stereotactic breast biopsy (1) - Stereotactic needle localization with aspiration. Non palpable U/S solid mass, R breast by one interpretation & cluster cyst by another   • CARDIAC CATHETERIZATION  12/16/1986    Cardiac cath 58407 (1) - NRL LV function w/ NRL hemodynamics. NRL coronary arteries w/o evidence of atherosclerotic heart disease   • CARDIAC CATHETERIZATION N/A 8/19/2020     Procedure: Left Heart Cath;  Surgeon: Jessie Hendrickson MD;  Location: [facility] CATH INVASIVE LOCATION;  Service: Cardiology;  Laterality: N/A;   • CARPAL TUNNEL RELEASE Left 08/15/1995    Carpal tunnel surgery (1) - Left carpal tunnel release, endoscopic procedure.   • CATARACT EXTRACTION Right 2007    Remove cataract, insert lens (2) - right   • CATARACT EXTRACTION Bilateral    • COLONOSCOPY W/ POLYPECTOMY  2016    Colonoscopy remove polyps 28368 (1) - One polyp in the ascending colon.Resected and retrieved.   • DILATATION AND CURETTAGE  10/02/1964    D&C (1) - D&C & colpotomy. Incomplete  or tubal pregnancy. Dysfunctional uterine bleeding   • ENDOSCOPY      Colon endoscopy 01914 (2)   • ENDOSCOPY  2016    EGD w/ biopsy 48032 (1) - Gastritis.Normal duodenum.Biopsied.Normal esophagus.Several biopsies obtained in the gastric antrum.   • ENDOSCOPY  2008    EGD w/ tube 70412 (1) - Hiatal hernia. GERD. Esophageal motility disorder.   • ENDOSCOPY N/A 3/29/2018    Procedure: ESOPHAGOGASTRODUODENOSCOPY;  Surgeon: Ezra Meyers DO;  Location: [facility] ENDOSCOPY;  Service: Gastroenterology   • ENDOSCOPY N/A 5/10/2018    Procedure: ESOPHAGOGASTRODUODENOSCOPY;  Surgeon: Ezra Meyers DO;  Location: [facility] ENDOSCOPY;  Service: Gastroenterology   • INJECTION OF MEDICATION  2011    Kenalog (3) - Ordered By: RADHA JIMENEZ ()    • LEG SURGERY  1985    Extensive leg surgery (1) - Arthroscopy, arthrotomy lateral release. Chomdromalcia, patella, right knee   • LUMBAR DISC SURGERY      Low back disk surgery (1)   • OTHER SURGICAL HISTORY  1981    Bowel surgery procedure (1) - Lysis of adhesions, resection of terminal ileum with end-to-end anastomosis. partial small bowel ibstruction. Same plus ulceration of distal ileum   • OTHER SURGICAL HISTORY  1965    Suspension of uterus (1) - Uterine suspension & appendectomy, lysis of adhesion. Chronic pelvic inflammatory  disease vs endometriosis. Chronic pelvic inflannatory disease   • TOTAL ABDOMINAL HYSTERECTOMY WITH SALPINGO OOPHORECTOMY  1966    YAEL/BSO (1) - Chronic pelvic inflammatory disease.Same plus endometriosis of the ovary   • WRIST ARTHROSCOPY  1988    Wrist arthroscopy/surgery (2) - excision ganglion cyst left wrist.       Family History   Problem Relation Age of Onset   • Hypertension Mother    • Stroke Mother    • Arthritis Mother    • Diabetes Mother    • Hyperlipidemia Mother    • Obesity Mother    • Diabetes Other    • Hypertension Sister    • Hypertension Brother        Social History     Socioeconomic History   • Marital status:    Tobacco Use   • Smoking status: Former     Packs/day: 0.50     Types: Cigarettes     Quit date: 2016     Years since quittin.7   • Smokeless tobacco: Never   • Tobacco comments:     0.5 - 1 Pack(s) Of Cigarettes Per Day   Substance and Sexual Activity   • Alcohol use: No   • Drug use: No   • Sexual activity: Defer     Comment:  - 59 Years           Objective   Physical Exam  Vitals and nursing note reviewed.   Constitutional:       Appearance: She is well-developed.   HENT:      Head: Normocephalic and atraumatic.   Eyes:      Pupils: Pupils are equal, round, and reactive to light.   Neck:      Thyroid: No thyromegaly.      Vascular: No JVD.      Trachea: No tracheal deviation.   Cardiovascular:      Rate and Rhythm: Normal rate and regular rhythm.      Pulses:           Radial pulses are 2+ on the right side and 2+ on the left side.        Dorsalis pedis pulses are 2+ on the right side and 2+ on the left side.      Heart sounds: Normal heart sounds, S1 normal and S2 normal.   Pulmonary:      Effort: Pulmonary effort is normal.      Breath sounds: Examination of the right-lower field reveals decreased breath sounds. Decreased breath sounds present.   Chest:      Chest wall: Tenderness (left lower anterior chest tenderness) present.   Abdominal:       General: Bowel sounds are normal.   Musculoskeletal:         General: Normal range of motion.      Comments: Movement of left arm or sitting up in bed reproduces this pain   Skin:     General: Skin is warm and dry.      Capillary Refill: Capillary refill takes 2 to 3 seconds.   Neurological:      Mental Status: She is alert and oriented to person, place, and time.      GCS: GCS eye subscore is 4. GCS verbal subscore is 5. GCS motor subscore is 6.   Psychiatric:         Speech: Speech normal.         Behavior: Behavior normal.         Thought Content: Thought content normal.         Procedures           ED Course  ED Course as of 02/14/23 2256 Tue Feb 14, 2023 2055 CBC required redraw.  Lab processing at this time. [BRAYDON]      ED Course User Index  [BRAYDON] Radha Holland MD           Vitals:    02/14/23 2204 02/14/23 2216 02/14/23 2224 02/14/23 2241   BP:  142/66     BP Location:       Patient Position:       Pulse: 60 66 62    Resp:    18   Temp:       TempSrc:       SpO2: 96% 96% 97%    Weight:       Height:          Lab Results (last 24 hours)     Procedure Component Value Units Date/Time    High Sensitivity Troponin T 2Hr [[id number]]  (Abnormal) Collected: 02/14/23 2125    Specimen: Blood Updated: 02/14/23 2145     HS Troponin T 16 ng/L      Troponin T Delta -1 ng/L     Narrative:      High Sensitive Troponin T Reference Range:  <10.0 ng/L- Negative Female for AMI  <15.0 ng/L- Negative Male for AMI  >=10 - Abnormal Female indicating possible myocardial injury.  >=15 - Abnormal Male indicating possible myocardial injury.   Clinicians would have to utilize clinical acumen, EKG, Troponin, and serial changes to determine if it is an Acute Myocardial Infarction or myocardial injury due to an underlying chronic condition.         CBC & Differential [[id number]]  (Abnormal) Collected: 02/14/23 1930    Specimen: Blood Updated: 02/14/23 2100    Narrative:      The following orders were created for panel order CBC &  Differential.  Procedure                               Abnormality         Status                     ---------                               -----------         ------                     CBC Auto Differential[[id number]]        Abnormal            Final result                 Please view results for these tests on the individual orders.    CBC Auto Differential [[id number]]  (Abnormal) Collected: 02/14/23 1930    Specimen: Blood Updated: 02/14/23 2100     WBC 7.57 10*3/mm3      RBC 3.98 10*6/mm3      Hemoglobin 11.3 g/dL      Hematocrit 34.8 %      MCV 87.4 fL      MCH 28.4 pg      MCHC 32.5 g/dL      RDW 13.9 %      RDW-SD 43.8 fl      MPV 9.1 fL      Platelets 182 10*3/mm3      Neutrophil % 52.4 %      Lymphocyte % 37.6 %      Monocyte % 6.1 %      Eosinophil % 3.0 %      Basophil % 0.8 %      Immature Grans % 0.1 %      Neutrophils, Absolute 3.96 10*3/mm3      Lymphocytes, Absolute 2.85 10*3/mm3      Monocytes, Absolute 0.46 10*3/mm3      Eosinophils, Absolute 0.23 10*3/mm3      Basophils, Absolute 0.06 10*3/mm3      Immature Grans, Absolute 0.01 10*3/mm3      nRBC 0.0 /100 WBC     [place] Draw [[id number]] Collected: 02/14/23 1922    Specimen: Blood Updated: 02/14/23 2031    Narrative:      The following orders were created for panel order [place] Draw.  Procedure                               Abnormality         Status                     ---------                               -----------         ------                     Green Top (Gel)[[id number]]                                  Final result               Lavender Top[[id number]]                                     Final result               Gold Top - SST[[id number]]                                   Final result               Light Blue Top[[id number]]                                   Final result                 Please view results for these tests on the individual orders.    Lavender Top [[id number]] Collected: 02/14/23 1930    Specimen: Blood Updated:  02/14/23 2031     Extra Tube hold for add-on     Comment: Auto resulted       Light Blue Top [[id number]] Collected: 02/14/23 1929    Specimen: Blood Updated: 02/14/23 2031     Extra Tube Hold for add-ons.     Comment: Auto resulted       Green Top (Gel) [[id number]] Collected: 02/14/23 1922    Specimen: Blood Updated: 02/14/23 2031     Extra Tube Hold for add-ons.     Comment: Auto resulted.       Gold Top - SST [[id number]] Collected: 02/14/23 1922    Specimen: Blood Updated: 02/14/23 2031     Extra Tube Hold for add-ons.     Comment: Auto resulted.       High Sensitivity Troponin T [[id number]]  (Abnormal) Collected: 02/14/23 1922    Specimen: Blood Updated: 02/14/23 2003     HS Troponin T 17 ng/L     Narrative:      High Sensitive Troponin T Reference Range:  <10.0 ng/L- Negative Female for AMI  <15.0 ng/L- Negative Male for AMI  >=10 - Abnormal Female indicating possible myocardial injury.  >=15 - Abnormal Male indicating possible myocardial injury.   Clinicians would have to utilize clinical acumen, EKG, Troponin, and serial changes to determine if it is an Acute Myocardial Infarction or myocardial injury due to an underlying chronic condition.         Comprehensive Metabolic Panel [[id number]]  (Abnormal) Collected: 02/14/23 1922    Specimen: Blood Updated: 02/14/23 1951     Glucose 132 mg/dL      BUN 17 mg/dL      Creatinine 1.33 mg/dL      Sodium 137 mmol/L      Potassium 4.3 mmol/L      Comment: Slight hemolysis detected by analyzer. Results may be affected.        Chloride 100 mmol/L      CO2 29.0 mmol/L      Calcium 9.0 mg/dL      Total Protein 5.8 g/dL      Albumin 3.6 g/dL      ALT (SGPT) 8 U/L      AST (SGOT) 15 U/L      Alkaline Phosphatase 32 U/L      Total Bilirubin 0.3 mg/dL      Globulin 2.2 gm/dL      A/G Ratio 1.6 g/dL      BUN/Creatinine Ratio 12.8     Anion Gap 8.0 mmol/L      eGFR 39.5 mL/min/1.73     Narrative:      GFR Normal >60  Chronic Kidney Disease <60  Kidney Failure <15    The GFR  formula is only valid for adults with stable renal function between ages 18 and 70.    Magnesium [[id number]]  (Normal) Collected: 02/14/23 1922    Specimen: Blood Updated: 02/14/23 1945     Magnesium 1.9 mg/dL     BNP [[id number]]  (Normal) Collected: 02/14/23 1922    Specimen: Blood Updated: 02/14/23 1943     proBNP 244.0 pg/mL     Narrative:      Among patients with dyspnea, NT-proBNP is highly sensitive for the detection of acute congestive heart failure. In addition NT-proBNP of <300 pg/ml effectively rules out acute congestive heart failure with 99% negative predictive value.           XR Chest 1 View    Result Date: 2/14/2023  There appears be patchy increased density in both lungs. This may be accentuated by low lung volumes. Infectious process is not excluded Electronically signed by:  Placido Flor MD  2/14/2023 7:08 PM CST Workstation: [id number]                                     Medical Decision Making  Patient with x-ray findings significant for pneumonia.  High-sensitivity troponin decreasing over stay x2 measurements.  Pain was reproducible with movement and palpation.  Initiated treatment for pneumonia and discharged with prescription to complete dosing.  Close follow-up with PCP.    Pneumonia due to infectious organism, unspecified laterality, unspecified part of lung: acute illness or injury  Amount and/or Complexity of Data Reviewed  Independent Historian:      Details: family member      Risk  Prescription drug management.          Final diagnoses:   Pneumonia due to infectious organism, unspecified laterality, unspecified part of lung       ED Disposition  ED Disposition     ED Disposition   Discharge    Condition   Stable    Comment   --             Ami Quispe MD  [address] [place] [address] [address]  [facility] [address] [place] [address]  [phone]    Call in 1 day  for follow up         Medication List      New Prescriptions    doxycycline 100 MG capsule  Commonly known as: MONODOX  Take 1 capsule by  mouth 2 (Two) Times a Day for 7 days.           Where to Get Your Medications      These medications were sent to [facility] - [place], [place] - [facility] - [phone]  - [phone] [place] [id number]    Phone: [phone]   · doxycycline 100 MG capsule       This document has been electronically signed by Radha Holland MD on February 14, 2023 22:57 CST    Radha Holland MD   Part of this note may be an electronic transcription/translation of spoken language to printed text using the Dragon Dictation System.        Radha Holland MD  02/14/23 5237

## 2023-02-15 NOTE — ED NOTES
Cyn[facility] Llamas[facility] and plans for pt's DC back to NH with son.

## 2023-02-28 NOTE — DISCHARGE INSTR - APPOINTMENTS
Discharge Follow-up with Specified Provider: bharat; 1 Month  As directed    To: bharat    Follow Up: 1 Month          Office will call with appointment.    [phone]   Use Enhanced Medication Counseling?: No

## 2023-03-22 ENCOUNTER — LAB (OUTPATIENT)
Dept: LAB | Facility: HOSPITAL | Age: 83
End: 2023-03-22
Payer: MEDICARE

## 2023-03-22 DIAGNOSIS — M81.0 AGE-RELATED OSTEOPOROSIS WITHOUT CURRENT PATHOLOGICAL FRACTURE: ICD-10-CM

## 2023-03-22 DIAGNOSIS — M81.0 AGE-RELATED OSTEOPOROSIS WITHOUT CURRENT PATHOLOGICAL FRACTURE: Primary | ICD-10-CM

## 2023-03-22 LAB — CALCIUM SPEC-SCNC: 9.8 MG/DL (ref 8.6–10.5)

## 2023-03-22 PROCEDURE — 82310 ASSAY OF CALCIUM: CPT

## 2023-03-22 PROCEDURE — 36415 COLL VENOUS BLD VENIPUNCTURE: CPT

## 2023-03-29 ENCOUNTER — INFUSION (OUTPATIENT)
Dept: ONCOLOGY | Facility: HOSPITAL | Age: 83
End: 2023-03-29
Payer: MEDICARE

## 2023-03-29 VITALS
TEMPERATURE: 97.5 F | HEART RATE: 83 BPM | DIASTOLIC BLOOD PRESSURE: 77 MMHG | SYSTOLIC BLOOD PRESSURE: 129 MMHG | RESPIRATION RATE: 20 BRPM

## 2023-03-29 DIAGNOSIS — M81.0 AGE-RELATED OSTEOPOROSIS WITHOUT CURRENT PATHOLOGICAL FRACTURE: Primary | ICD-10-CM

## 2023-03-29 PROCEDURE — 25010000002 DENOSUMAB 60 MG/ML SOLUTION PREFILLED SYRINGE: Performed by: NURSE PRACTITIONER

## 2023-03-29 PROCEDURE — 96372 THER/PROPH/DIAG INJ SC/IM: CPT | Performed by: NURSE PRACTITIONER

## 2023-03-29 RX ADMIN — DENOSUMAB 60 MG: 60 INJECTION SUBCUTANEOUS at 14:38

## 2023-04-21 ENCOUNTER — PREP FOR SURGERY (OUTPATIENT)
Dept: OTHER | Facility: HOSPITAL | Age: 83
End: 2023-04-21
Payer: MEDICARE

## 2023-04-21 DIAGNOSIS — K22.2 STRICTURE AND STENOSIS OF ESOPHAGUS: Primary | ICD-10-CM

## 2023-04-21 RX ORDER — SODIUM CHLORIDE 9 MG/ML
40 INJECTION, SOLUTION INTRAVENOUS AS NEEDED
OUTPATIENT
Start: 2023-04-25

## 2023-04-21 RX ORDER — DEXTROSE AND SODIUM CHLORIDE 5; .45 G/100ML; G/100ML
30 INJECTION, SOLUTION INTRAVENOUS CONTINUOUS PRN
OUTPATIENT
Start: 2023-04-25

## 2023-04-24 RX ORDER — ISOSORBIDE MONONITRATE 60 MG/1
60 TABLET, EXTENDED RELEASE ORAL DAILY
COMMUNITY

## 2023-04-24 RX ORDER — DONEPEZIL HYDROCHLORIDE 10 MG/1
10 TABLET, FILM COATED ORAL NIGHTLY
COMMUNITY

## 2023-04-24 RX ORDER — DULOXETIN HYDROCHLORIDE 30 MG/1
30 CAPSULE, DELAYED RELEASE ORAL DAILY
COMMUNITY

## 2023-04-24 RX ORDER — CLOPIDOGREL BISULFATE 75 MG/1
75 TABLET ORAL DAILY
COMMUNITY

## 2023-04-24 RX ORDER — TRAZODONE HYDROCHLORIDE 100 MG/1
100 TABLET ORAL NIGHTLY
COMMUNITY

## 2023-04-24 RX ORDER — BUSPIRONE HYDROCHLORIDE 5 MG/1
5 TABLET ORAL 2 TIMES DAILY
COMMUNITY

## 2023-04-24 RX ORDER — FAMOTIDINE 20 MG/1
20 TABLET, FILM COATED ORAL 2 TIMES DAILY
COMMUNITY

## 2023-04-24 RX ORDER — NITROGLYCERIN 0.4 MG/1
0.4 TABLET SUBLINGUAL
COMMUNITY

## 2023-04-24 RX ORDER — PROMETHAZINE HYDROCHLORIDE 25 MG/1
25 TABLET ORAL EVERY 6 HOURS PRN
COMMUNITY

## 2023-04-24 RX ORDER — CYANOCOBALAMIN 1000 UG/ML
1000 INJECTION, SOLUTION INTRAMUSCULAR; SUBCUTANEOUS
COMMUNITY

## 2023-04-25 ENCOUNTER — ANESTHESIA (OUTPATIENT)
Dept: GASTROENTEROLOGY | Facility: HOSPITAL | Age: 83
End: 2023-04-25
Payer: MEDICARE

## 2023-04-25 ENCOUNTER — HOSPITAL ENCOUNTER (OUTPATIENT)
Facility: HOSPITAL | Age: 83
Setting detail: HOSPITAL OUTPATIENT SURGERY
Discharge: HOME | End: 2023-04-25
Attending: INTERNAL MEDICINE | Admitting: INTERNAL MEDICINE
Payer: MEDICARE

## 2023-04-25 ENCOUNTER — ANESTHESIA EVENT (OUTPATIENT)
Dept: GASTROENTEROLOGY | Facility: HOSPITAL | Age: 83
End: 2023-04-25
Payer: MEDICARE

## 2023-04-25 VITALS
RESPIRATION RATE: 18 BRPM | HEIGHT: 60 IN | OXYGEN SATURATION: 94 % | SYSTOLIC BLOOD PRESSURE: 177 MMHG | TEMPERATURE: 99 F | BODY MASS INDEX: 27.88 KG/M2 | HEART RATE: 82 BPM | DIASTOLIC BLOOD PRESSURE: 88 MMHG | WEIGHT: 142 LBS

## 2023-04-25 DIAGNOSIS — K22.2 STRICTURE AND STENOSIS OF ESOPHAGUS: ICD-10-CM

## 2023-04-25 PROCEDURE — 25010000002 PROPOFOL 10 MG/ML EMULSION: Performed by: NURSE ANESTHETIST, CERTIFIED REGISTERED

## 2023-04-25 PROCEDURE — C1769 GUIDE WIRE: HCPCS | Performed by: INTERNAL MEDICINE

## 2023-04-25 RX ORDER — PROPOFOL 10 MG/ML
VIAL (ML) INTRAVENOUS AS NEEDED
Status: DISCONTINUED | OUTPATIENT
Start: 2023-04-25 | End: 2023-04-25 | Stop reason: SURG

## 2023-04-25 RX ORDER — LIDOCAINE HYDROCHLORIDE 20 MG/ML
INJECTION, SOLUTION INTRAVENOUS AS NEEDED
Status: DISCONTINUED | OUTPATIENT
Start: 2023-04-25 | End: 2023-04-25 | Stop reason: SURG

## 2023-04-25 RX ORDER — DEXTROSE AND SODIUM CHLORIDE 5; .45 G/100ML; G/100ML
30 INJECTION, SOLUTION INTRAVENOUS CONTINUOUS PRN
Status: DISCONTINUED | OUTPATIENT
Start: 2023-04-25 | End: 2023-04-25 | Stop reason: HOSPADM

## 2023-04-25 RX ORDER — SODIUM CHLORIDE 9 MG/ML
40 INJECTION, SOLUTION INTRAVENOUS AS NEEDED
Status: DISCONTINUED | OUTPATIENT
Start: 2023-04-25 | End: 2023-04-25 | Stop reason: HOSPADM

## 2023-04-25 RX ADMIN — LIDOCAINE HYDROCHLORIDE 40 MG: 20 INJECTION, SOLUTION INTRAVENOUS at 09:35

## 2023-04-25 RX ADMIN — PROPOFOL 30 MG: 10 INJECTION, EMULSION INTRAVENOUS at 09:43

## 2023-04-25 RX ADMIN — DEXTROSE AND SODIUM CHLORIDE 30 ML/HR: 5; 450 INJECTION, SOLUTION INTRAVENOUS at 09:05

## 2023-04-25 RX ADMIN — PROPOFOL 50 MG: 10 INJECTION, EMULSION INTRAVENOUS at 09:35

## 2023-04-25 RX ADMIN — DEXTROSE AND SODIUM CHLORIDE 30 ML/HR: 5; 450 INJECTION, SOLUTION INTRAVENOUS at 09:29

## 2023-04-25 RX ADMIN — PROPOFOL 30 MG: 10 INJECTION, EMULSION INTRAVENOUS at 09:39

## 2023-04-25 NOTE — ANESTHESIA PREPROCEDURE EVALUATION
Anesthesia Evaluation     Patient summary reviewed and Nursing notes reviewed   history of anesthetic complications: PONV  NPO Solid Status: > 8 hours  NPO Liquid Status: > 2 hours           Airway   Mallampati: II  TM distance: >3 FB  No difficulty expected  Dental    (+) poor dentition    Pulmonary - normal exam   (+) a smoker Former, COPD,   (-) asthma  Cardiovascular - normal exam    ECG reviewed  PT is on anticoagulation therapy  Patient on routine beta blocker    (+) hypertension, CAD, cardiac stents dysrhythmias Atrial Fib, PVD, hyperlipidemia,     ROS comment: Status: (Other)       Test Reason : Chest Pain  Blood Pressure :   */*   mmHG  Vent. Rate :  61 BPM     Atrial Rate :  61 BPM     P-R Int : 182 ms          QRS Dur : 100 ms      QT Int : 442 ms       P-R-T Axes :  34 -50 -27 degrees     QTc Int : 444 ms     Normal sinus rhythm  Left anterior fascicular block  Septal infarct (cited on or before 08-AUG-2016)  Abnormal ECG  When compared with ECG of 20-AUG-2020 06:58,  Vent. rate has decreased BY  33 BPM  Questionable change in initial forces of Septal leads  T wave inversion now evident in Inferior leads  T wave inversion now evident in Anterior leads     Referred By:            Confirmed By: JESÚS YBARRA MD    Interpretation Summary    ? Left ventricle systolic function is normal at 61-65%. Mild concentric hypertrophy with grade 1 diastolic function.  ? Right ventricle systolic function is normal.  ? Aortic sclerosis and mitral annular calcification.  ? Mild pulmonic valve regurgitation.  ? Tubular ascending aorta is dilated at 3.8 cm. The patient's ASI is 2.2 cm/meter squared.        Neuro/Psych  (+) CVA, headaches, dizziness/light headedness, numbness, psychiatric history Depression and Anxiety, dementia,    (-) seizures  GI/Hepatic/Renal/Endo    (+)  hiatal hernia, GERD,  diabetes mellitus type 2 well controlled,     Musculoskeletal     Abdominal  - normal exam   Substance History - negative  use     OB/GYN negative ob/gyn ROS         Other - negative ROS                       Anesthesia Plan    ASA 3     general   total IV anesthesia  intravenous induction     Anesthetic plan, risks, benefits, and alternatives have been provided, discussed and informed consent has been obtained with: patient.    Plan discussed with CRNA.

## 2023-04-25 NOTE — H&P
Henrietta Damioc DO,[facility]  Gastroenterology  Hepatology  Endoscopy  Board Certified in Internal Medicine and gastroenterology  [address], [address]  [place]. [address]  - [phone]   F - [phone]     GASTROENTEROLOGY HISTORY AND PHYSICAL  NOTE   HENRIETTA DAMICO DO.         SUBJECTIVE:   4/25/2023    Name: Mariluz Arango  DOD: 1940          Subjective : Dysphagia    Patient is 82 y.o. female presents with desire for elective EGD with dilation of the esophagus.      ROS/HISTORY/ CURRENT MEDICATIONS/OBJECTIVE/VS/PE:   Review of Systems:  All systems unremarkable unless specified below.  Constitutional   HENT  Eyes   Respiratory    Cardiovascular  Gastrointestinal   Endocrine  Genitourinary    Musculoskeletal   Skin  Allergic/Immunologic    Neurological    Hematological  Psychiatric/Behavioral    History:     Past Medical History:   Diagnosis Date   • Altered mental status     unspecified    • Anxiety    • Artificial lens present     Artificial lens in position   • COPD (chronic obstructive pulmonary disease)    • Coronary artery disease     1 stent, followed by Dr. Doe   • Depressive disorder    • Diabetes mellitus     diet controlled   • Diabetic polyneuropathy    • Diarrhea     unspecified    • Diverticular disease of colon    • Dizziness    • Gastroesophageal reflux disease    • Generalized abdominal pain    • Headache    • Hiatal hernia    • History of colonic polyps     Personal history of colonic polyps   • History of echocardiogram 09/17/2008    Echocardiogram W/O color flow 87350 (1) - Evidence of LVH & diastolic dysfunction & mild LA enlargement, otherwise NRL echocradiogram   • History of mammogram 05/27/2011    MAMMOGRAM DIAGNOSTIC BILATERAL 41039 ([facility]) (1) - benign Birads category 2   • History of transfusion    • Hypercholesterolemia    • Lumbar radiculopathy    • Memory impairment     Multifactorial      • Nausea    • Neurologic disorder associated with diabetes  mellitus    • Pain in thoracic spine    • Palpitations    • Polyp of colon     History of polyp of colon   • PONV (postoperative nausea and vomiting)    • Stroke     TIA   • Vitamin D deficiency      Past Surgical History:   Procedure Laterality Date   • ANKLE SURGERY Right 2008    Ankle surgery (4) - Open ankle arthrodesis with intermedullary bella fixation from the heel. Non union of the ankle, tibial plafond with failed ankle arthrodesis   • APPENDECTOMY  1965    Appendectomy (1)   • BLADDER SURGERY  1973    Bladder surgery (1) - Kendall-Raul repair. Cystocele with stress incontinence & endometriosis.Same with P.I.D   • BREAST BIOPSY  1997    Stereotactic breast biopsy (1) - Stereotactic needle localization with aspiration. Non palpable U/S solid mass, R breast by one interpretation & cluster cyst by another   • CARDIAC CATHETERIZATION  1986    Cardiac cath 24286 (1) - NRL LV function w/ NRL hemodynamics. NRL coronary arteries w/o evidence of atherosclerotic heart disease   • CARDIAC CATHETERIZATION N/A 2020    Procedure: Left Heart Cath;  Surgeon: Jessie Hendrickson MD;  Location: [facility] CATH INVASIVE LOCATION;  Service: Cardiology;  Laterality: N/A;   • CARPAL TUNNEL RELEASE Left 08/15/1995    Carpal tunnel surgery (1) - Left carpal tunnel release, endoscopic procedure.   • CATARACT EXTRACTION Bilateral    • COLONOSCOPY W/ POLYPECTOMY  2016    Colonoscopy remove polyps 85755 (1) - One polyp in the ascending colon.Resected and retrieved.   • DILATATION AND CURETTAGE  10/02/1964    D&C (1) - D&C & colpotomy. Incomplete  or tubal pregnancy. Dysfunctional uterine bleeding   • ENDOSCOPY      Colon endoscopy 18484 (2)   • ENDOSCOPY  2016    EGD w/ biopsy 48187 (1) - Gastritis.Normal duodenum.Biopsied.Normal esophagus.Several biopsies obtained in the gastric antrum.   • ENDOSCOPY  2008    EGD w/ tube 95343 (1) - Hiatal hernia. GERD. Esophageal  motility disorder.   • ENDOSCOPY N/A 2018    Procedure: ESOPHAGOGASTRODUODENOSCOPY;  Surgeon: Ezra Meyers DO;  Location: [facility] ENDOSCOPY;  Service: Gastroenterology   • ENDOSCOPY N/A 05/10/2018    Procedure: ESOPHAGOGASTRODUODENOSCOPY;  Surgeon: Ezra Meyers DO;  Location: [facility] ENDOSCOPY;  Service: Gastroenterology   • INJECTION OF MEDICATION  2011    Kenalog (3) - Ordered By: RADHA JIMENEZ ()    • LEG SURGERY Right 1985    Extensive leg surgery (1) - Arthroscopy, arthrotomy lateral release. Chomdromalcia, patella, right knee   • LUMBAR DISC SURGERY      Low back disk surgery (1)   • OTHER SURGICAL HISTORY  1981    Bowel surgery procedure (1) - Lysis of adhesions, resection of terminal ileum with end-to-end anastomosis. partial small bowel ibstruction. Same plus ulceration of distal ileum   • OTHER SURGICAL HISTORY  1965    Suspension of uterus (1) - Uterine suspension & appendectomy, lysis of adhesion. Chronic pelvic inflammatory disease vs endometriosis. Chronic pelvic inflannatory disease   • TOTAL ABDOMINAL HYSTERECTOMY WITH SALPINGO OOPHORECTOMY  1966    YAEL/BSO (1) - Chronic pelvic inflammatory disease.Same plus endometriosis of the ovary   • WRIST ARTHROSCOPY Left 1988    Wrist arthroscopy/surgery (2) - excision ganglion cyst left wrist.     Family History   Problem Relation Age of Onset   • Hypertension Mother    • Stroke Mother    • Arthritis Mother    • Diabetes Mother    • Hyperlipidemia Mother    • Obesity Mother    • Diabetes Other    • Hypertension Sister    • Hypertension Brother      Social History     Tobacco Use   • Smoking status: Former     Packs/day: 0.50     Types: Cigarettes     Quit date: 2016     Years since quittin.9   • Smokeless tobacco: Never   Substance Use Topics   • Alcohol use: No   • Drug use: No     Prior to Admission medications    Medication Sig Start Date End Date Taking? Authorizing Provider   amLODIPine (NORVASC)  10 MG tablet Take 1 tablet by mouth Daily.   Yes Liseth Blackmon MD   busPIRone (BUSPAR) 5 MG tablet Take 1 tablet by mouth 2 (Two) Times a Day.   Yes Liseth Blackmon MD   calcium carbonate (OS-RAJENDRA) 600 MG tablet Take 1 tablet by mouth Daily.   Yes Liseth Blackmon MD   carvedilol (COREG) 12.5 MG tablet Take 1 tablet by mouth 2 (Two) Times a Day With Meals. 7/29/20  Yes Hima Gill MD PhD   cephalexin (KEFLEX) 250 MG capsule Take 1 capsule by mouth 4 (Four) Times a Day.   Yes Liseth Blackmon MD   dicyclomine (BENTYL) 10 MG capsule 1 capsule. 6/29/20  Yes Liseth Blackmon MD   donepezil (ARICEPT) 10 MG tablet Take 1 tablet by mouth Every Night.   Yes Liseth Blackmon MD   DULoxetine (CYMBALTA) 30 MG capsule Take 1 capsule by mouth Daily.   Yes Liseth Blackmon MD   famotidine (PEPCID) 20 MG tablet Take 1 tablet by mouth 2 (Two) Times a Day.   Yes Liseth Blackmon MD   fluticasone (FLONASE) 50 MCG/ACT nasal spray 2 sprays into the nostril(s) as directed by provider As Needed. 7/11/17  Yes Liseth Blackmon MD   FOSFOMYCIN TROMETHAMINE PO Take 1 tablet by mouth As Needed.   Yes Liseth Blackmon MD   HYDROcodone-acetaminophen (NORCO) 7.5-325 MG per tablet Take 1 tablet by mouth 3 (Three) Times a Day. 6/9/20  Yes Liseth Blackmon MD   hydrOXYzine (ATARAX) 25 MG tablet Take 1 tablet by mouth 2 (Two) Times a Day As Needed for Itching. 9/26/19  Yes Ami Quispe MD   isosorbide mononitrate (IMDUR) 60 MG 24 hr tablet Take 1 tablet by mouth Daily.   Yes Liseth Blackmon MD   ondansetron (ZOFRAN) 8 MG tablet Take 1 tablet by mouth Every 8 (Eight) Hours As Needed for Nausea or Vomiting.   Yes Liseth Blackmon MD   ONETOUCH VERIO test strip  7/12/17  Yes Liseth Blackmon MD   phenazopyridine (PYRIDIUM) 100 MG tablet Take 1 tablet by mouth 3 (Three) Times a Day As Needed for Bladder Spasms.   Yes Provider, MD Liseth   traZODone (DESYREL)  "100 MG tablet Take 1 tablet by mouth Every Night.   Yes Liseth Blackmon MD   acetaminophen (TYLENOL) 500 MG tablet Take 1 tablet by mouth Every 6 (Six) Hours As Needed for Mild Pain.    Liseth Blackmon MD B-D TB SYRINGE 1CC/25GX5/8\" 25G X 5/8\" 1 ML misc USE ONCE MONTHLY WITH B-12 INJECTION  5/17/21   Ami Quispe MD   clopidogrel (PLAVIX) 75 MG tablet Take 1 tablet by mouth Daily.    Liseth Blackmon MD   cyanocobalamin 1000 MCG/ML injection Inject 1 mL into the appropriate muscle as directed by prescriber Every 30 (Thirty) Days.    Liseth Blackmon MD   magnesium hydroxide (MILK OF MAGNESIA) 400 MG/5ML suspension Take 5 mL by mouth Daily As Needed for Constipation.    Liseth Blackmon MD   nitroglycerin (NITROSTAT) 0.4 MG SL tablet Place 1 tablet under the tongue Every 5 (Five) Minutes As Needed for Chest Pain. Take no more than 3 doses in 15 minutes.    Liseth Blackmon MD   promethazine (PHENERGAN) 25 MG tablet Take 1 tablet by mouth Every 6 (Six) Hours As Needed for Nausea or Vomiting.    Liseth Blackmon MD   rOPINIRole (REQUIP) 1 MG tablet Take 1 tablet by mouth Every Night. 6/16/20   Liseth Blackmon MD   rosuvastatin (CRESTOR) 5 MG tablet Take 1 tablet by mouth Daily. 7/30/20   Hima Gill MD PhD   Syringe/Needle, Disp, (B-D INTEGRA SYRINGE) 25G X 5/8\" 3 ML misc USE 1 SYRINGE FOR MONTHLY VITAMIN B-12 SHOTS 3/12/20   Ami Quispe MD     Allergies:  Lovastatin, Aspirin, Codeine, Macrobid [nitrofurantoin macrocrystal], Sulfa antibiotics, Dexilant [dexlansoprazole], and Penicillins    I have reviewed the patients medical history, surgical history and family history in the available medical record system.     Current Medications:     Current Facility-Administered Medications   Medication Dose Route Frequency Provider Last Rate Last Admin   • dextrose 5 % and sodium chloride 0.45 % infusion  30 mL/hr Intravenous Continuous PRN Ezra Meyers, " DO 30 mL/hr at 04/25/23 0905 30 mL/hr at 04/25/23 0905   • sodium chloride 0.9 % infusion 40 mL  40 mL Intravenous PRN Ezra Meyers DO           Objective     Physical Exam:   Temp:  [99 °F (37.2 °C)] 99 °F (37.2 °C)  Heart Rate:  [82] 82  Resp:  [18] 18  BP: (177)/(88) 177/88    Physical Exam:  General Appearance:    Alert, cooperative, in no acute distress   Head:    Normocephalic, without obvious abnormality, atraumatic   Eyes:            Lids and lashes normal, conjunctivae and sclerae normal, no icterus, no pallor, corneas clear, PERRLA   Ears:    Ears appear intact with no abnormalities noted   Throat:   No oral lesions, no thrush, oral mucosa moist   Neck:   No adenopathy, supple, trachea midline, no thyromegaly, no  carotid bruit, no JVD   Back:     No kyphosis present, no scoliosis present, no skin lesions,   erythema or scars, no tenderness to percussion or                 palpation,  range of motion normal   Lungs:     Clear to auscultation,respirations regular, even and         unlabored    Heart:    Regular rhythm and normal rate, normal S1 and S2, no  murmur, no gallop, no rub, no click   Breast Exam:    Deferred   Abdomen:     Normal bowel sounds, no masses, no organomegaly, soft  nontender, nondistended, no guarding, no rebound                 tenderness   Genitalia:    Deferred   Extremities:   Moves all extremities well, no edema, no cyanosis, no          redness   Pulses:   Pulses palpable and equal bilaterally   Skin:   No bleeding, bruising or rash   Lymph nodes:   No palpable adenopathy   Neurologic:   Cranial nerves 2 - 12 grossly intact, sensation intact, DTR     present and equal bilaterally      Results Review:     Lab Results   Component Value Date    WBC 7.57 02/14/2023    WBC 6.87 09/28/2020    WBC 11.61 (H) 08/20/2020    HGB 11.3 (L) 02/14/2023    HGB 11.0 (L) 09/28/2020    HGB 11.5 (L) 08/20/2020    HCT 34.8 02/14/2023    HCT 33.1 (L) 09/28/2020    HCT 35.1 08/20/2020      02/14/2023     09/28/2020     08/20/2020             No results found for: LIPASE  Lab Results   Component Value Date    INR 0.99 08/19/2020     No results found for: THROATCX    Radiology Review:  Imaging Results (Last 72 Hours)     ** No results found for the last 72 hours. **           I reviewed the patient's new clinical results.  I reviewed the patient's new imaging results and agree with the interpretation.     ASSESSMENT/PLAN:   ASSESSMENT:  1.  Dysphagia.  Esophageal stricture.  Esophageal motor disorder    PLAN:  1.  Esophagogastroduodenoscopy with dilation of the esophagus    Risk and benefits associated with the procedure are reviewed with the patient.  The patient wished to proceed     Ezra Meyers DO  04/25/23  09:22 CDT

## 2023-04-25 NOTE — ANESTHESIA POSTPROCEDURE EVALUATION
Patient: Mariluz Arango    Procedure Summary     Date: 04/25/23 Room / Location: [facility] ENDOSCOPY 2 / [facility] ENDOSCOPY    Anesthesia Start: 0930 Anesthesia Stop: 0951    Procedure: ESOPHAGOGASTRODUODENOSCOPY 9:30 Diagnosis:       Stricture and stenosis of esophagus      (Stricture and stenosis of esophagus [K22.2])    Surgeons: Ezra Meyers DO Provider: Homar John CRNA    Anesthesia Type: general ASA Status: 3          Anesthesia Type: general    Vitals  No vitals data found for the desired time range.          Post Anesthesia Care and Evaluation    Patient location during evaluation: PHASE II  Patient participation: waiting for patient participation  Level of consciousness: sleepy but conscious  Pain management: adequate    Airway patency: patent  Anesthetic complications: No anesthetic complications  PONV Status: none  Cardiovascular status: acceptable  Respiratory status: acceptable, spontaneous ventilation and room air  Hydration status: acceptable  No anesthesia care post op

## (undated) DEVICE — PK CATH LAB 60

## (undated) DEVICE — Device

## (undated) DEVICE — DEV INFL MONARCH 20ML

## (undated) DEVICE — CATH DIAG IMPULSE M/ PK 145 5FR

## (undated) DEVICE — PINNACLE INTRODUCER SHEATH: Brand: PINNACLE

## (undated) DEVICE — SINGLE-USE BIOPSY FORCEPS: Brand: RADIAL JAW 4

## (undated) DEVICE — BITEBLOCK ENDO W/STRAP 60F A/ LF DISP

## (undated) DEVICE — GW PERIPH GUIDERIGHT STD/J/TP PTFE/PCOAT SS 0.038IN 5X150CM

## (undated) DEVICE — CATH GUIDE LAUNCHER JL4.0 6F 100CM

## (undated) DEVICE — A2000 MULTI-USE SYRINGE KIT, P/N 701277-003KIT CONTENTS: 100ML CONTRAST RESERVOIR AND TUBING WITH CONTRAST SPIKE AND CLAMP: Brand: A2000 MULTI-USE SYRINGE KIT

## (undated) DEVICE — CANN SMPL SOFTECH BIFLO ETCO2 A/M 7FT

## (undated) DEVICE — MODEL BT2000 P/N 700287-012KIT CONTENTS: MANIFOLD WITH SALINE AND CONTRAST PORTS, SALINE TUBING WITH SPIKE AND HAND SYRINGE, TRANSDUCER: Brand: BT2000 AUTOMATED MANIFOLD KIT

## (undated) DEVICE — MSK ENDO PORT O2 POM ELITE CURAPLEX A/

## (undated) DEVICE — CLEANGUIDE DISPOSABLE MARKED SPRING TIP GUIDEWIRE, 1.86 MM X 210 CM: Brand: CLEANGUIDE

## (undated) DEVICE — ARTERIAL NEEDLE: Brand: UNBRANDED

## (undated) DEVICE — ELECTRODE,RT,STRESS,FOAM,50PK: Brand: MEDLINE

## (undated) DEVICE — COPILOT KIT INCLUDES BLEEDBACK CONTROL VALVE / GUIDE WIRE INTRODUCER / TORQUE DEVICE: Brand: ACCESSORIES